# Patient Record
Sex: FEMALE | Race: WHITE | NOT HISPANIC OR LATINO | Employment: FULL TIME | URBAN - METROPOLITAN AREA
[De-identification: names, ages, dates, MRNs, and addresses within clinical notes are randomized per-mention and may not be internally consistent; named-entity substitution may affect disease eponyms.]

---

## 2016-08-10 LAB
HUMAN PAPILLOMAVIRUS (HPV): NORMAL
PAP SMEAR: ABNORMAL

## 2017-01-09 DIAGNOSIS — I10 ESSENTIAL HYPERTENSION: ICD-10-CM

## 2017-01-10 RX ORDER — ATENOLOL 25 MG/1
TABLET ORAL
Qty: 90 TABLET | Refills: 3 | Status: SHIPPED | OUTPATIENT
Start: 2017-01-10 | End: 2017-04-26

## 2017-01-11 ENCOUNTER — PATIENT MESSAGE (OUTPATIENT)
Dept: FAMILY MEDICINE | Facility: CLINIC | Age: 57
End: 2017-01-11

## 2017-01-13 ENCOUNTER — PATIENT MESSAGE (OUTPATIENT)
Dept: FAMILY MEDICINE | Facility: CLINIC | Age: 57
End: 2017-01-13

## 2017-01-16 ENCOUNTER — TELEPHONE (OUTPATIENT)
Dept: FAMILY MEDICINE | Facility: CLINIC | Age: 57
End: 2017-01-16

## 2017-01-16 DIAGNOSIS — I10 ESSENTIAL HYPERTENSION: Primary | ICD-10-CM

## 2017-01-16 RX ORDER — HYDROCHLOROTHIAZIDE 25 MG/1
25 TABLET ORAL DAILY
Qty: 30 TABLET | Refills: 1 | Status: SHIPPED | OUTPATIENT
Start: 2017-01-16 | End: 2017-03-11 | Stop reason: SDUPTHER

## 2017-01-16 NOTE — TELEPHONE ENCOUNTER
----- Message from Mela Hurst sent at 1/13/2017  4:44 PM CST -----  Contact: Patient  Malaika rendon [phone] patient. Patient has sent several requests for medication refill on Rx HCTZ (blood pressure). Please advise. Patient is out of medication. Thanks. Third request.

## 2017-02-23 ENCOUNTER — OFFICE VISIT (OUTPATIENT)
Dept: OPHTHALMOLOGY | Facility: CLINIC | Age: 57
End: 2017-02-23
Payer: COMMERCIAL

## 2017-02-23 DIAGNOSIS — Z11.59 NEED FOR HEPATITIS C SCREENING TEST: ICD-10-CM

## 2017-02-23 DIAGNOSIS — H40.003 GLAUCOMA SUSPECT, BILATERAL: Primary | ICD-10-CM

## 2017-02-23 PROCEDURE — 92004 COMPRE OPH EXAM NEW PT 1/>: CPT | Mod: S$GLB,,, | Performed by: OPHTHALMOLOGY

## 2017-02-23 PROCEDURE — 99999 PR PBB SHADOW E&M-EST. PATIENT-LVL II: CPT | Mod: PBBFAC,,, | Performed by: OPHTHALMOLOGY

## 2017-02-23 PROCEDURE — 92134 CPTRZ OPH DX IMG PST SGM RTA: CPT | Mod: S$GLB,,, | Performed by: OPHTHALMOLOGY

## 2017-02-23 NOTE — MR AVS SNAPSHOT
[facility]  [address]  [facility] [address]  Phone: [phone]  Fax: [phone]                  Malaika Craven   2017 3:15 PM   Office Visit    Description:  Female : 1960   Provider:  Vicki Lujan MD   Department:  [facility]           Reason for Visit     Concerns About Ocular Health           Diagnoses this Visit        Comments    Glaucoma suspect, bilateral    -  Primary     Need for hepatitis C screening test                To Do List           Future Appointments        Provider Department Dept Phone    3/29/2017 3:30 PM Carlito Jaime MD [facility] [phone]      Goals (5 Years of Data)     None      Follow-Up and Disposition     Return in about 6 months (around 2017) for IOP check.      [facility]     [facility] Nurse Care [facility] -  Assistance  Registered nurses in the [facility] provide clinical advisement, health education, appointment booking, and other advisory services.  Call for this free service at [phone].             Medications           Message regarding Medications     Verify the changes and/or additions to your medication regime listed below are the same as discussed with your clinician today.  If any of these changes or additions are incorrect, please notify your healthcare provider.             Verify that the below list of medications is an accurate representation of the medications you are currently taking.  If none reported, the list may be blank. If incorrect, please contact your healthcare provider. Carry this list with you in case of emergency.           Current Medications     atenolol (TENORMIN) 25 MG tablet TAKE 1 TABLET(25 MG) BY MOUTH EVERY DAY    fluticasone (FLONASE) 50 mcg/actuation nasal spray 2 sprays by Each Nare route once daily.    hydrochlorothiazide (HYDRODIURIL) 25 MG tablet Take 1 tablet (25 mg total) by mouth once daily.            Clinical Reference Information           Allergies as of 2/23/2017     Tetracyclines      Immunizations Administered on Date of Encounter - 2/23/2017     None      Orders Placed During Today's Visit      Normal Orders This Visit    [place] Retina Tomography (HRT) - OU - Both Eyes     Hepatitis C antibody     Recurring Lab Work Interval Expires    [place] [facility]ina Tomography (HRT) - OU - Both Eyes  Every 12 Months until 2/23/2018 2/23/2018      Language Assistance Services     ATTENTION: Language assistance services are available, free of charge. Please call [phone].      ATENCIÓN: Si trixiela paddy, tiene a lobo disposición servicios gratuitos de asistencia lingüística. Llame al [phone].     [facility] Ý: N?u b?n nói Ti?ng Vi?t, có các d?ch v? h? tr? ngôn ng? mi?n phí dành cho b?n. G?i s? [phone].         [facility] complies with applicable Federal civil rights laws and does not discriminate on the basis of race, color, national origin, age, disability, or sex.

## 2017-02-23 NOTE — PROGRESS NOTES
HPI     Concerns About Ocular Health    Additional comments: Ocular health exam           Comments   DLE: x 7+ yrs    Pt states no va complaints at this time. Uses OTC readers for near only.   Has been having trouble with dry mouth and PCP worried it may effect her   eyes. Has h/o dry eyes in past but seems to be doing well at this time. No   floaters or flashes.     Agree with above. No known family history of eye problems.        Last edited by Vicki Lujan MD on 2/23/2017  4:46 PM.   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   (History)        Assessment /Plan     For exam results, see Encounter Report.    Glaucoma suspect, bilateral  -     [place] Retina Tomography (HRT) - OU - Both Eyes; Standing    Need for hepatitis C screening test  -     Hepatitis C antibody          Glaucoma suspect, bilateral - based on C:D asymmetry, OD>OS. Both look WNL independently. IOP high normal, no known family history. Baseline HRT done today, borderline 1 section OU. F/u 6 months for IOP check and OCT optic nerve.     Need for hepatitis C screening test - I have no idea why this is on my visit problem list and I get a pop-up message if I try to remove it.  -     Hepatitis C antibody

## 2017-03-11 DIAGNOSIS — I10 ESSENTIAL HYPERTENSION: ICD-10-CM

## 2017-03-13 DIAGNOSIS — I10 ESSENTIAL HYPERTENSION: ICD-10-CM

## 2017-03-13 RX ORDER — HYDROCHLOROTHIAZIDE 25 MG/1
TABLET ORAL
Qty: 30 TABLET | Refills: 0 | Status: SHIPPED | OUTPATIENT
Start: 2017-03-13 | End: 2017-03-21

## 2017-03-13 RX ORDER — HYDROCHLOROTHIAZIDE 25 MG/1
25 TABLET ORAL DAILY
Qty: 30 TABLET | Refills: 0 | Status: SHIPPED | OUTPATIENT
Start: 2017-03-13 | End: 2017-03-21 | Stop reason: SDUPTHER

## 2017-03-15 ENCOUNTER — PATIENT OUTREACH (OUTPATIENT)
Dept: ADMINISTRATIVE | Facility: HOSPITAL | Age: 57
End: 2017-03-15

## 2017-03-21 ENCOUNTER — LAB VISIT (OUTPATIENT)
Dept: LAB | Facility: HOSPITAL | Age: 57
End: 2017-03-21
Attending: NURSE PRACTITIONER
Payer: COMMERCIAL

## 2017-03-21 ENCOUNTER — OFFICE VISIT (OUTPATIENT)
Dept: FAMILY MEDICINE | Facility: CLINIC | Age: 57
End: 2017-03-21
Payer: COMMERCIAL

## 2017-03-21 VITALS
HEART RATE: 74 BPM | DIASTOLIC BLOOD PRESSURE: 64 MMHG | OXYGEN SATURATION: 97 % | BODY MASS INDEX: 30.9 KG/M2 | HEIGHT: 63 IN | SYSTOLIC BLOOD PRESSURE: 114 MMHG | WEIGHT: 174.38 LBS

## 2017-03-21 DIAGNOSIS — R41.3 MEMORY PROBLEM: ICD-10-CM

## 2017-03-21 DIAGNOSIS — R68.2 DRY MOUTH: Primary | ICD-10-CM

## 2017-03-21 DIAGNOSIS — I10 ESSENTIAL HYPERTENSION: ICD-10-CM

## 2017-03-21 DIAGNOSIS — F41.9 ANXIOUSNESS: ICD-10-CM

## 2017-03-21 DIAGNOSIS — R00.2 PALPITATIONS: ICD-10-CM

## 2017-03-21 DIAGNOSIS — R21 RASH: ICD-10-CM

## 2017-03-21 LAB
ANION GAP SERPL CALC-SCNC: 10 MMOL/L
BASOPHILS # BLD AUTO: 0.06 K/UL
BASOPHILS NFR BLD: 0.8 %
BUN SERPL-MCNC: 13 MG/DL
CALCIUM SERPL-MCNC: 9.8 MG/DL
CHLORIDE SERPL-SCNC: 102 MMOL/L
CO2 SERPL-SCNC: 32 MMOL/L
CREAT SERPL-MCNC: 0.9 MG/DL
DIFFERENTIAL METHOD: NORMAL
EOSINOPHIL # BLD AUTO: 0.3 K/UL
EOSINOPHIL NFR BLD: 3.6 %
ERYTHROCYTE [DISTWIDTH] IN BLOOD BY AUTOMATED COUNT: 13.1 %
EST. GFR  (AFRICAN AMERICAN): >60 ML/MIN/1.73 M^2
EST. GFR  (NON AFRICAN AMERICAN): >60 ML/MIN/1.73 M^2
GLUCOSE SERPL-MCNC: 116 MG/DL
HCT VFR BLD AUTO: 39.6 %
HGB BLD-MCNC: 13.8 G/DL
LYMPHOCYTES # BLD AUTO: 2.7 K/UL
LYMPHOCYTES NFR BLD: 37.4 %
MCH RBC QN AUTO: 30.7 PG
MCHC RBC AUTO-ENTMCNC: 34.8 %
MCV RBC AUTO: 88 FL
MONOCYTES # BLD AUTO: 0.4 K/UL
MONOCYTES NFR BLD: 5.3 %
NEUTROPHILS # BLD AUTO: 3.8 K/UL
NEUTROPHILS NFR BLD: 52.9 %
PLATELET # BLD AUTO: 251 K/UL
PMV BLD AUTO: 11.3 FL
POTASSIUM SERPL-SCNC: 4 MMOL/L
RBC # BLD AUTO: 4.5 M/UL
SODIUM SERPL-SCNC: 144 MMOL/L
TSH SERPL DL<=0.005 MIU/L-ACNC: 0.87 UIU/ML
WBC # BLD AUTO: 7.13 K/UL

## 2017-03-21 PROCEDURE — 3074F SYST BP LT 130 MM HG: CPT | Mod: S$GLB,,, | Performed by: NURSE PRACTITIONER

## 2017-03-21 PROCEDURE — 85025 COMPLETE CBC W/AUTO DIFF WBC: CPT | Mod: PO

## 2017-03-21 PROCEDURE — 99213 OFFICE O/P EST LOW 20 MIN: CPT | Mod: S$GLB,,, | Performed by: NURSE PRACTITIONER

## 2017-03-21 PROCEDURE — 36415 COLL VENOUS BLD VENIPUNCTURE: CPT | Mod: PO

## 2017-03-21 PROCEDURE — 84443 ASSAY THYROID STIM HORMONE: CPT

## 2017-03-21 PROCEDURE — 1160F RVW MEDS BY RX/DR IN RCRD: CPT | Mod: S$GLB,,, | Performed by: NURSE PRACTITIONER

## 2017-03-21 PROCEDURE — 80048 BASIC METABOLIC PNL TOTAL CA: CPT | Mod: PO

## 2017-03-21 PROCEDURE — 99999 PR PBB SHADOW E&M-EST. PATIENT-LVL III: CPT | Mod: PBBFAC,,, | Performed by: NURSE PRACTITIONER

## 2017-03-21 PROCEDURE — 3078F DIAST BP <80 MM HG: CPT | Mod: S$GLB,,, | Performed by: NURSE PRACTITIONER

## 2017-03-21 NOTE — MR AVS SNAPSHOT
[facility]  [address]  [place] [address]  Phone: [phone]  Fax: [phone]                  Malaika Craven   3/21/2017 1:40 PM   Office Visit    Description:  Female : 1960   Provider:  Alexa Caballero NP   Department:  [facility]           Reason for Visit     Medication Reaction           Diagnoses this Visit        Comments    Dry mouth    -  Primary     Essential hypertension         Rash         Memory problem         Anxiousness         Palpitations                To Do List           Future Appointments        Provider Department Dept Phone    3/21/2017 2:15 PM LAB, [place] [facility] [phone]    2017 3:00 PM Alexa Caballero NP [facility] [phone]      Goals (5 Years of Data)     None      Follow-Up and Disposition     Return in about 4 weeks (around 2017).      [facility]     [facility] -  Assistance  Registered nurses in the [facility] provide clinical advisement, health education, appointment booking, and other advisory services.  Call for this free service at [phone].             Medications           Message regarding Medications     Verify the changes and/or additions to your medication regime listed below are the same as discussed with your clinician today.  If any of these changes or additions are incorrect, please notify your healthcare provider.        STOP taking these medications     hydrochlorothiazide (HYDRODIURIL) 25 MG tablet Take 1 tablet (25 mg total) by mouth once daily.    hydrochlorothiazide (HYDRODIURIL) 25 MG tablet TAKE 1 TABLET(25 MG) BY MOUTH EVERY DAY           Verify that the below list of medications is an accurate representation of the medications you are currently taking.  If none reported, the list may be blank. If incorrect, please contact your healthcare provider. Carry this list with you in case of  "emergency.           Current Medications     atenolol (TENORMIN) 25 MG tablet TAKE 1 TABLET(25 MG) BY MOUTH EVERY DAY    fluticasone (FLONASE) 50 mcg/actuation nasal spray 2 sprays by Each Nare route once daily.           Clinical Reference Information           Your Vitals Were     BP Pulse Height Weight SpO2 BMI    114/64 74 5' 3" (1.6 m) 79.1 kg (174 lb 6.1 oz) 97% 30.89 kg/m2      Blood Pressure          Most Recent Value    BP  114/64      Allergies as of 3/21/2017     Tetracyclines    Sulfa (Sulfonamide Antibiotics)      Immunizations Administered on Date of Encounter - 3/21/2017     None      Orders Placed During Today's Visit     Future Labs/Procedures Expected by Expires    Basic metabolic panel  3/21/2017 5/20/2018    CBC auto differential  3/21/2017 6/19/2017    TSH  3/21/2017 6/19/2017      Language Assistance Services     ATTENTION: Language assistance services are available, free of charge. Please call [phone].      ATENCIÓN: Si habla español, tiene a lobo disposición servicios gratuitos de asistencia lingüística. Llame al [phone].     CHÚ Ý: N?u b?n nói Ti?ng Vi?t, có các d?ch v? h? tr? ngôn ng? mi?n phí missaelh cho b?n. G?i s? [phone].         [facility] complies with applicable Federal civil rights laws and does not discriminate on the basis of race, color, national origin, age, disability, or sex.        "

## 2017-03-22 NOTE — PROGRESS NOTES
Subjective:       Patient ID: Malaika Craven is a 56 y.o. female.    Chief Complaint: Medication Reaction (rash, memory loss, dry mouth, having issues focusing, weight gain)    HPI Comments: Here today as new patient to me. She has multiple complaints.   C/o dry mouth, dry eyes, memory problems, focus problems, anxiety, occasional palpitations, and ongoing recurrent rash  She states that she was told she had hypertension last fall and started on Atenolol and HCTZ. She feel like she has not felt well since starting these medication and attributes her symptoms to starting these medications.    Review of Systems   Constitutional: Positive for fatigue and unexpected weight change. Negative for activity change, appetite change and fever.   HENT: Negative.    Eyes:        Dry eyes   Respiratory: Negative for cough, chest tightness, shortness of breath and wheezing.    Cardiovascular: Positive for palpitations. Negative for chest pain and leg swelling.   Gastrointestinal: Negative for abdominal pain, blood in stool, constipation, diarrhea, nausea and vomiting.   Genitourinary: Negative.    Musculoskeletal: Negative.    Skin: Positive for rash.   Neurological: Negative for dizziness, light-headedness and headaches.   Psychiatric/Behavioral: Positive for confusion. Negative for sleep disturbance and suicidal ideas. The patient is nervous/anxious.        Objective:      Physical Exam   Constitutional: She is oriented to person, place, and time. She appears well-nourished. She is active and cooperative.   HENT:   Head: Normocephalic and atraumatic.   Right Ear: Hearing, tympanic membrane, external ear and ear canal normal.   Left Ear: Hearing, tympanic membrane, external ear and ear canal normal.   Nose: Nose normal.   Mouth/Throat: Oropharynx is clear and moist and mucous membranes are normal. No oropharyngeal exudate or posterior oropharyngeal erythema.   Eyes: Conjunctivae and EOM are normal. Pupils are equal, round,  and reactive to light. No scleral icterus.   Neck: Normal range of motion. Neck supple.   Cardiovascular: Normal rate, regular rhythm, normal heart sounds and intact distal pulses.    Pulmonary/Chest: Effort normal and breath sounds normal. She has no wheezes. She has no rales.   Abdominal: Soft. Bowel sounds are normal. There is no tenderness.   Lymphadenopathy:     She has no cervical adenopathy.   Neurological: She is alert and oriented to person, place, and time. She has normal reflexes. She displays normal reflexes. No cranial nerve deficit. She exhibits normal muscle tone. Coordination normal.   Skin: Skin is warm and dry. Rash (bilateral flank area -appearance of bites) noted.        Vitals reviewed.      Assessment:       1. Dry mouth    2. Essential hypertension    3. Rash    4. Memory problem    5. Anxiousness    6. Palpitations        Plan:     Malaika was seen today for medication reaction.    Diagnoses and all orders for this visit:    Dry mouth  -     Basic metabolic panel; Future  -     TSH; Future  -     CBC auto differential; Future    Essential hypertension  STOP hydrochlorothiazide  Monitor home blood pressures and swelling    Rash  Follow up with derm    Memory problem   -     Basic metabolic panel; Future  -     TSH; Future  -     CBC auto differential; Future    Anxiousness  -     Basic metabolic panel; Future  -     TSH; Future  -     CBC auto differential; Future    Palpitations  -     Basic metabolic panel; Future  -     TSH; Future    Return in about 4 weeks (around 4/18/2017).

## 2017-04-07 ENCOUNTER — PATIENT MESSAGE (OUTPATIENT)
Dept: FAMILY MEDICINE | Facility: CLINIC | Age: 57
End: 2017-04-07

## 2017-04-24 ENCOUNTER — PATIENT MESSAGE (OUTPATIENT)
Dept: FAMILY MEDICINE | Facility: CLINIC | Age: 57
End: 2017-04-24

## 2017-04-26 ENCOUNTER — OFFICE VISIT (OUTPATIENT)
Dept: FAMILY MEDICINE | Facility: CLINIC | Age: 57
End: 2017-04-26
Payer: COMMERCIAL

## 2017-04-26 VITALS
HEART RATE: 65 BPM | HEIGHT: 63 IN | DIASTOLIC BLOOD PRESSURE: 76 MMHG | BODY MASS INDEX: 31.61 KG/M2 | OXYGEN SATURATION: 97 % | WEIGHT: 178.38 LBS | SYSTOLIC BLOOD PRESSURE: 116 MMHG

## 2017-04-26 DIAGNOSIS — I10 ESSENTIAL HYPERTENSION: ICD-10-CM

## 2017-04-26 DIAGNOSIS — R21 RASH: Primary | ICD-10-CM

## 2017-04-26 DIAGNOSIS — R68.2 DRY MOUTH: ICD-10-CM

## 2017-04-26 PROCEDURE — 99999 PR PBB SHADOW E&M-EST. PATIENT-LVL III: CPT | Mod: PBBFAC,,, | Performed by: NURSE PRACTITIONER

## 2017-04-26 PROCEDURE — 99213 OFFICE O/P EST LOW 20 MIN: CPT | Mod: S$GLB,,, | Performed by: NURSE PRACTITIONER

## 2017-04-26 PROCEDURE — 3078F DIAST BP <80 MM HG: CPT | Mod: S$GLB,,, | Performed by: NURSE PRACTITIONER

## 2017-04-26 PROCEDURE — 1160F RVW MEDS BY RX/DR IN RCRD: CPT | Mod: S$GLB,,, | Performed by: NURSE PRACTITIONER

## 2017-04-26 PROCEDURE — 3074F SYST BP LT 130 MM HG: CPT | Mod: S$GLB,,, | Performed by: NURSE PRACTITIONER

## 2017-04-26 NOTE — MR AVS SNAPSHOT
[facility]  [address]  [facility] [address]  Phone: [phone]  Fax: [phone]                  Malaika Craven   2017 2:20 PM   Office Visit    Description:  Female : 1960   Provider:  Alexa Caballero NP   Department:  [facility]           Reason for Visit     Hypertension           Diagnoses this Visit        Comments    Rash    -  Primary     Dry mouth                To Do List           Future Appointments        Provider Department Dept Phone    2017 2:45 PM Joanne Fowler MD [facility] [phone]      Goals (5 Years of Data)     None      Follow-Up and Disposition     Return in about 2 weeks (around 5/10/2017).      [facility] On [facility]     [facility] Nurse Care Line -  Assistance  Unless otherwise directed by your provider, please contact [facility], our nurse care line that is available for  assistance.     Registered nurses in the [facility] provide: appointment scheduling, clinical advisement, health education, and other advisory services.  Call: [phone] (toll free)               Medications           Message regarding Medications     Verify the changes and/or additions to your medication regime listed below are the same as discussed with your clinician today.  If any of these changes or additions are incorrect, please notify your healthcare provider.        STOP taking these medications     atenolol (TENORMIN) 25 MG tablet TAKE 1 TABLET(25 MG) BY MOUTH EVERY DAY           Verify that the below list of medications is an accurate representation of the medications you are currently taking.  If none reported, the list may be blank. If incorrect, please contact your healthcare provider. Carry this list with you in case of emergency.           Current Medications     fluticasone (FLONASE) 50 mcg/actuation nasal spray 2 sprays by Each Nare route once daily.           Clinical Reference  "Information           Your Vitals Were     BP Pulse Height Weight SpO2 BMI    116/76 65 5' 3" (1.6 m) 80.9 kg (178 lb 5.6 oz) 97% 31.59 kg/m2      Blood Pressure          Most Recent Value    BP  116/76      Allergies as of 4/26/2017     Tetracyclines    Sulfa (Sulfonamide Antibiotics)      Immunizations Administered on Date of Encounter - 4/26/2017     None      Orders Placed During Today's Visit      Normal Orders This Visit    Ambulatory referral to Dermatology       Instructions    Blood pressure > 140/90, Notify clinic       Language Assistance Services     ATTENTION: Language assistance services are available, free of charge. Please call [phone].      ATENCIÓN: Si habla español, tiene a lobo disposición servicios gratuitos de asistencia lingüística. Llame al [phone].     SOFÍA Ý: N?u b?n nói Ti?ng Vi?t, có các d?ch v? h? tr? ngôn ng? mi?n phí dành cho b?n. G?i s? [phone].         [facility] complies with applicable Federal civil rights laws and does not discriminate on the basis of race, color, national origin, age, disability, or sex.        "

## 2017-04-26 NOTE — PROGRESS NOTES
Subjective:       Patient ID: Malaika Craven is a 56 y.o. female.    Chief Complaint: Hypertension (follow up)    Hypertension   This is a chronic problem. The current episode started more than 1 month ago. The problem has been resolved since onset. The problem is controlled. Pertinent negatives include no anxiety, blurred vision, chest pain, headaches, malaise/fatigue, orthopnea, peripheral edema, PND or shortness of breath. There are no associated agents to hypertension. Risk factors for coronary artery disease include obesity, post-menopausal state and stress. Past treatments include beta blockers. The current treatment provides no improvement. There are no compliance problems.      Review of Systems   Constitutional: Negative for malaise/fatigue.   Eyes: Negative for blurred vision.   Respiratory: Negative for shortness of breath.    Cardiovascular: Negative for chest pain, orthopnea and PND.   Neurological: Negative for headaches.       Objective:      Physical Exam   Constitutional: She is oriented to person, place, and time. She appears well-nourished.   Cardiovascular: Normal rate, regular rhythm, normal heart sounds and intact distal pulses.    Pulmonary/Chest: Effort normal and breath sounds normal.   Abdominal: Soft. Bowel sounds are normal. There is no tenderness.   Neurological: She is alert and oriented to person, place, and time.   Skin: Skin is warm and dry. No rash noted.   Vitals reviewed.      Assessment:       1. Rash    2. Dry mouth    3. Essential hypertension        Plan:       Malaika was seen today for hypertension.    Diagnoses and all orders for this visit:    Rash  -     Ambulatory referral to Dermatology    Dry mouth  Lemon drops  Consider ENT consult if no improvement    Essential hypertension  Doing well off HCTZ. Will taper off Atenolol and monitor home blood pressures. [facility] sooner if [facility]  Return in about 2 weeks (around  5/10/2017).

## 2017-05-10 ENCOUNTER — PATIENT MESSAGE (OUTPATIENT)
Dept: FAMILY MEDICINE | Facility: CLINIC | Age: 57
End: 2017-05-10

## 2017-05-10 DIAGNOSIS — I10 ESSENTIAL HYPERTENSION: Primary | ICD-10-CM

## 2017-05-10 RX ORDER — LISINOPRIL 20 MG/1
20 TABLET ORAL DAILY
Qty: 30 TABLET | Refills: 11 | Status: SHIPPED | OUTPATIENT
Start: 2017-05-10 | End: 2017-05-23

## 2017-05-23 ENCOUNTER — PATIENT MESSAGE (OUTPATIENT)
Dept: FAMILY MEDICINE | Facility: CLINIC | Age: 57
End: 2017-05-23

## 2017-05-23 RX ORDER — LOSARTAN POTASSIUM 50 MG/1
50 TABLET ORAL DAILY
Qty: 30 TABLET | Refills: 1 | Status: SHIPPED | OUTPATIENT
Start: 2017-05-23 | End: 2017-06-20 | Stop reason: SDUPTHER

## 2017-06-20 RX ORDER — LOSARTAN POTASSIUM 50 MG/1
TABLET ORAL
Qty: 90 TABLET | Refills: 1 | Status: SHIPPED | OUTPATIENT
Start: 2017-06-20 | End: 2017-12-21 | Stop reason: SDUPTHER

## 2017-07-24 ENCOUNTER — TELEPHONE (OUTPATIENT)
Dept: DERMATOLOGY | Facility: CLINIC | Age: 57
End: 2017-07-24

## 2017-07-25 NOTE — TELEPHONE ENCOUNTER
----- Message from Gayatri Pearl sent at 7/25/2017 12:56 PM CDT -----  Contact: self  Patient needs a later time than the one scheduled on 9/12, sometime in the afternoon.  Please call back at [phone] (home) [phone] (work)    Appt scheduled for 9-12-17 @ 3pm .

## 2017-08-04 DIAGNOSIS — J30.89 NON-SEASONAL ALLERGIC RHINITIS DUE TO OTHER ALLERGIC TRIGGER: ICD-10-CM

## 2017-08-04 RX ORDER — FLUTICASONE PROPIONATE 50 MCG
SPRAY, SUSPENSION (ML) NASAL
Qty: 48 G | Refills: 1 | Status: SHIPPED | OUTPATIENT
Start: 2017-08-04 | End: 2017-12-04 | Stop reason: SDUPTHER

## 2017-08-21 DIAGNOSIS — Z12.11 COLON CANCER SCREENING: ICD-10-CM

## 2017-11-18 ENCOUNTER — HOSPITAL ENCOUNTER (EMERGENCY)
Facility: HOSPITAL | Age: 57
Discharge: HOME | End: 2017-11-18
Attending: EMERGENCY MEDICINE
Payer: COMMERCIAL

## 2017-11-18 VITALS
SYSTOLIC BLOOD PRESSURE: 171 MMHG | HEART RATE: 66 BPM | DIASTOLIC BLOOD PRESSURE: 76 MMHG | BODY MASS INDEX: 29.44 KG/M2 | TEMPERATURE: 99 F | HEIGHT: 62 IN | WEIGHT: 160 LBS | RESPIRATION RATE: 12 BRPM | OXYGEN SATURATION: 98 %

## 2017-11-18 DIAGNOSIS — R07.9 CHEST PAIN: ICD-10-CM

## 2017-11-18 DIAGNOSIS — I10 HYPERTENSION, UNSPECIFIED TYPE: Primary | ICD-10-CM

## 2017-11-18 LAB
ALBUMIN SERPL BCP-MCNC: 3.6 G/DL
ALP SERPL-CCNC: 84 U/L
ALT SERPL W/O P-5'-P-CCNC: 21 U/L
ANION GAP SERPL CALC-SCNC: 8 MMOL/L
AST SERPL-CCNC: 20 U/L
BASOPHILS # BLD AUTO: 0 K/UL
BASOPHILS NFR BLD: 0.5 %
BILIRUB SERPL-MCNC: 0.3 MG/DL
BNP SERPL-MCNC: 42 PG/ML
BUN SERPL-MCNC: 13 MG/DL
CALCIUM SERPL-MCNC: 9.4 MG/DL
CHLORIDE SERPL-SCNC: 105 MMOL/L
CO2 SERPL-SCNC: 28 MMOL/L
CREAT SERPL-MCNC: 0.8 MG/DL
DIFFERENTIAL METHOD: ABNORMAL
EOSINOPHIL # BLD AUTO: 0.2 K/UL
EOSINOPHIL NFR BLD: 3.2 %
ERYTHROCYTE [DISTWIDTH] IN BLOOD BY AUTOMATED COUNT: 13.2 %
EST. GFR  (AFRICAN AMERICAN): >60 ML/MIN/1.73 M^2
EST. GFR  (NON AFRICAN AMERICAN): >60 ML/MIN/1.73 M^2
GLUCOSE SERPL-MCNC: 99 MG/DL
HCT VFR BLD AUTO: 39 %
HGB BLD-MCNC: 13.4 G/DL
LYMPHOCYTES # BLD AUTO: 2.8 K/UL
LYMPHOCYTES NFR BLD: 37.3 %
MCH RBC QN AUTO: 30.2 PG
MCHC RBC AUTO-ENTMCNC: 34.3 G/DL
MCV RBC AUTO: 88 FL
MONOCYTES # BLD AUTO: 0.3 K/UL
MONOCYTES NFR BLD: 4.4 %
NEUTROPHILS # BLD AUTO: 4.1 K/UL
NEUTROPHILS NFR BLD: 54.6 %
PLATELET # BLD AUTO: 213 K/UL
PMV BLD AUTO: 8.9 FL
POTASSIUM SERPL-SCNC: 4.5 MMOL/L
PROT SERPL-MCNC: 6.9 G/DL
RBC # BLD AUTO: 4.42 M/UL
SODIUM SERPL-SCNC: 141 MMOL/L
TROPONIN I SERPL DL<=0.01 NG/ML-MCNC: <0.006 NG/ML
TROPONIN I SERPL DL<=0.01 NG/ML-MCNC: <0.006 NG/ML
WBC # BLD AUTO: 7.4 K/UL

## 2017-11-18 PROCEDURE — 25000003 PHARM REV CODE 250: Performed by: EMERGENCY MEDICINE

## 2017-11-18 PROCEDURE — 93005 ELECTROCARDIOGRAM TRACING: CPT

## 2017-11-18 PROCEDURE — 85025 COMPLETE CBC W/AUTO DIFF WBC: CPT

## 2017-11-18 PROCEDURE — 99284 EMERGENCY DEPT VISIT MOD MDM: CPT

## 2017-11-18 PROCEDURE — 80053 COMPREHEN METABOLIC PANEL: CPT

## 2017-11-18 PROCEDURE — 93010 ELECTROCARDIOGRAM REPORT: CPT | Mod: ,,, | Performed by: INTERNAL MEDICINE

## 2017-11-18 PROCEDURE — 83880 ASSAY OF NATRIURETIC PEPTIDE: CPT

## 2017-11-18 PROCEDURE — 84484 ASSAY OF TROPONIN QUANT: CPT

## 2017-11-18 PROCEDURE — 36415 COLL VENOUS BLD VENIPUNCTURE: CPT

## 2017-11-18 RX ORDER — LOSARTAN POTASSIUM 25 MG/1
50 TABLET ORAL DAILY
Status: DISCONTINUED | OUTPATIENT
Start: 2017-11-19 | End: 2017-11-18 | Stop reason: HOSPADM

## 2017-11-18 RX ORDER — LOSARTAN POTASSIUM 25 MG/1
TABLET ORAL
Status: DISCONTINUED
Start: 2017-11-18 | End: 2017-11-18 | Stop reason: HOSPADM

## 2017-11-18 RX ADMIN — LOSARTAN POTASSIUM 50 MG: 25 TABLET, FILM COATED ORAL at 04:11

## 2017-11-18 NOTE — ED PROVIDER NOTES
"Encounter Date: 2017       History     Chief Complaint   Patient presents with    Chest Pain     today off and on. BP elevated.     Pt is a 58 y/o female presenting to the with complaints of elevated BP noted earlier today at multiple intervals. She is additionally endorsing mild "chest tightness" over the central chest, non-radiating. She denies associated SOB, unilateral or BL LE swelling, PMH of PE/DVT, NV, abdominal pain. She denies taking anything PTA to improve symptoms.          Review of patient's allergies indicates:   Allergen Reactions    Tetracyclines Rash    Lisinopril     Sulfa (sulfonamide antibiotics) Rash     Past Medical History:   Diagnosis Date    Allergy     Corneal erosion of right eye     Hypertension     Sinus problem      Past Surgical History:   Procedure Laterality Date     SECTION      X 2    CHOLECYSTECTOMY      DNC      EYE SURGERY Right     FOR CORNEAL EROSION    LIPOSUCTION      x 2    Mole Excision Right     Cartagena     Family History   Problem Relation Age of Onset    Heart disease Mother     Hypertension Mother     Arthritis Father     Thyroid disease Father     Kidney disease Paternal Grandmother     Liver cancer Maternal Grandmother     Cancer Maternal Grandfather     Allergies Sister     Allergic rhinitis Sister     Angioedema Neg Hx     Asthma Neg Hx     Atopy Neg Hx     Eczema Neg Hx     Immunodeficiency Neg Hx     Rhinitis Neg Hx     Urticaria Neg Hx     Amblyopia Neg Hx     Blindness Neg Hx     Cataracts Neg Hx     Diabetes Neg Hx     Glaucoma Neg Hx     Macular degeneration Neg Hx     Retinal detachment Neg Hx     Strabismus Neg Hx     Stroke Neg Hx      Social History   Substance Use Topics    Smoking status: Never Smoker    Smokeless tobacco: Never Used    Alcohol use Yes      Comment: 2 drinks per week     Review of Systems   Constitutional: Negative for fever.   HENT: Negative for congestion.    Respiratory: " Negative for shortness of breath.    Cardiovascular: Positive for chest pain.   Gastrointestinal: Negative for abdominal pain.   Genitourinary: Negative for dysuria.   Musculoskeletal: Negative for back pain.   Skin: Negative for rash.   Neurological: Negative for syncope and headaches.   Hematological: Does not bruise/bleed easily.   Psychiatric/Behavioral: Negative for confusion.       Physical Exam     Initial Vitals [11/18/17 1551]   BP Pulse Resp Temp SpO2   (!) 191/80 77 12 98.8 °F (37.1 °C) 98 %      MAP       117         Physical Exam    Nursing note and vitals reviewed.  Constitutional: She appears well-nourished. No distress.   HENT:   Head: Normocephalic and atraumatic.   Mouth/Throat: Oropharynx is clear and moist.   Eyes: Conjunctivae and EOM are normal.   Neck: Normal range of motion. Neck supple. No JVD present.   Cardiovascular: Normal rate and regular rhythm.   Pulmonary/Chest: No stridor. No respiratory distress. She has no wheezes.   Abdominal: She exhibits no distension. There is no tenderness.   Musculoskeletal: Normal range of motion. She exhibits no edema or tenderness.   Neurological: She is alert and oriented to person, place, and time.   Skin: Skin is warm and dry. Capillary refill takes less than 2 seconds.   Psychiatric: She has a normal mood and affect. Thought content normal.         ED Course   Procedures  Labs Reviewed   CBC W/ AUTO DIFFERENTIAL - Abnormal; Notable for the following:        Result Value    MPV 8.9 (*)     All other components within normal limits   COMPREHENSIVE METABOLIC PANEL   TROPONIN I   B-TYPE NATRIURETIC PEPTIDE     EKG Readings: (Independently Interpreted)   Normal sinus rhythm, 70 bpm, normal axis, normal intervals, possible inferior infarct, age undetermined, possible anterior infarct, age undetermined, no acute ischemic wave segments, no STEMI          Medical Decision Making:   Initial Assessment:   Pt interviewed and examined and noted to be in NAD.  Reporting atypical CP with hypertension and no additional concerning historical red flags. Pt does warrant lab analyses and imaging to r/o evidence of acute cardiac injury or structural contributing disease.   ED Management:  This is an emergent evaluation for a patient with chest pain. The patient is complaining of chest pain for the past 1 full day. The patient's pain is atypical for cardiac etiology.  I decided to obtain and review the patient's past medical record.        The vital signs are stable in the room.    EKG is negative for acute ischemic disease.  There is no evidence of STEMI.    CXR is negative for pneumonia, pneumothorax and edema.  Troponin is negative and was drawn at least 8 hours since the onset of pain.  I doubt ACS.  BNP is negative.  There is no evidence of congestive heart failure.  The electrolytes are relatively normal.  The pt is not anemic. I doubt PE and history, PE and vitals do not support this    Currently the patient has a non-diagnostic EKG with negative troponin in the emergency department.  I doubt acute coronary syndrome.  I did inform the patient that even with negative testing, we can never eliminate all risk.  I believe the patient is low risk with negative initial testing; they are appropriate for close outpatient follow-up particularly to assess for optimal outpatient HTN management, possible stress test.  The patient is aware of the small but persistent risk for MI/ACS with subsequent cardiac complications or death.  I have low suspicion for cardiopulmonary, vascular, infectious, respiratory, or other emergent medical condition based on my evaluation in the ED.     The patient's pain is currently improved.      The results and physical exam findings were reviewed with the patient. Patient agrees with assessment, disposition and treatment plan and has no further questions or complaints at this time.                         ED Course      Clinical Impression:   The primary  encounter diagnosis was Hypertension, unspecified type. A diagnosis of Chest pain was also pertinent to this visit.    Disposition:   Disposition: Discharged  Condition: Stable                        Mike Neil MD  11/23/17 0355

## 2017-11-18 NOTE — ED PROVIDER NOTES
Encounter Date: 2017    SCRIBE #1 NOTE: I, Jarrett James, am scribing for, and in the presence of, Dr. Neil.       History     Chief Complaint   Patient presents with    Chest Pain     today off and on. BP elevated.       2017 4:13  PM     Chief complaint: Chest pain      Malaika Craven is a 57 y.o. female with a PMHx of HTN who presents to the ED c/o of chest pain. She states it began around 5-6AM this morning. She began to take her blood pressure throughout the day reporting pressures in the 200s/100s. She began to take medication for her high blood pressure about a year ago but has only been on her current dose Losartan for about 4 months. She states this morning she had a loss of appetite, chest tightness, and a headache. She says she normally takes her blood pressure medication around 7:30 AM but took it today at 1PM. She denies having any new swelling but states sometimes she will get some swelling on the bottom of her feet or in her hands. She describes her chest pain as being in the center of her breast bone that is not exacerbated by pressure. She does have a past SHx of a cholecystotomy about 4 years ago. She denies having any SOB and does not have any history of blood clots.       The history is provided by the patient.     Review of patient's allergies indicates:   Allergen Reactions    Tetracyclines Rash    Lisinopril     Sulfa (sulfonamide antibiotics) Rash     Past Medical History:   Diagnosis Date    Allergy     Corneal erosion of right eye     Hypertension     Sinus problem      Past Surgical History:   Procedure Laterality Date     SECTION      X 2    CHOLECYSTECTOMY      DNC      EYE SURGERY Right     FOR CORNEAL EROSION    LIPOSUCTION      x 2    Mole Excision Right     Cartagena     Family History   Problem Relation Age of Onset    Heart disease Mother     Hypertension Mother     Arthritis Father     Thyroid disease Father     Kidney disease  Paternal Grandmother     Liver cancer Maternal Grandmother     Cancer Maternal Grandfather     Allergies Sister     Allergic rhinitis Sister     Angioedema Neg Hx     Asthma Neg Hx     Atopy Neg Hx     Eczema Neg Hx     Immunodeficiency Neg Hx     Rhinitis Neg Hx     Urticaria Neg Hx     Amblyopia Neg Hx     Blindness Neg Hx     Cataracts Neg Hx     Diabetes Neg Hx     Glaucoma Neg Hx     Macular degeneration Neg Hx     Retinal detachment Neg Hx     Strabismus Neg Hx     Stroke Neg Hx      Social History   Substance Use Topics    Smoking status: Never Smoker    Smokeless tobacco: Never Used    Alcohol use Yes      Comment: 2 drinks per week     Review of Systems   Constitutional: Positive for appetite change. Negative for fever.   HENT: Negative for congestion.    Eyes: Negative for visual disturbance.   Respiratory: Positive for chest tightness. Negative for shortness of breath and wheezing.    Cardiovascular: Positive for chest pain. Negative for leg swelling.   Gastrointestinal: Negative for abdominal pain.   Genitourinary: Negative for dysuria.   Musculoskeletal: Negative for joint swelling.   Skin: Negative for rash.   Neurological: Positive for headaches. Negative for syncope.   Hematological: Does not bruise/bleed easily.   Psychiatric/Behavioral: Negative for confusion.       Physical Exam     Initial Vitals [11/18/17 1551]   BP Pulse Resp Temp SpO2   (!) 191/80 77 12 98.8 °F (37.1 °C) 98 %      MAP       117         Physical Exam    Nursing note and vitals reviewed.  Constitutional: She appears well-developed and well-nourished. She is not diaphoretic. No distress.   No active distress.    HENT:   Head: Normocephalic and atraumatic.   Mouth/Throat: Oropharynx is clear and moist.   Eyes: Conjunctivae are normal.   Neck: Neck supple.   Cardiovascular: Normal rate, regular rhythm, normal heart sounds and intact distal pulses. Exam reveals no gallop and no friction rub.    No murmur  heard.  Pulmonary/Chest: Breath sounds normal. She has no wheezes. She has no rhonchi. She has no rales.   No reproducible chest pain   Abdominal: Soft. She exhibits no distension. There is no tenderness.   Musculoskeletal: Normal range of motion.   Neurological: She is alert and oriented to person, place, and time.   Skin: No rash noted. No erythema.   Psychiatric: She has a normal mood and affect. Her speech is normal and behavior is normal. Judgment and thought content normal.         ED Course   Procedures  Labs Reviewed   CBC W/ AUTO DIFFERENTIAL   COMPREHENSIVE METABOLIC PANEL   TROPONIN I   B-TYPE NATRIURETIC PEPTIDE             Medical Decision Making:   History:   Old Medical Records: I decided to obtain old medical records.  Clinical Tests:   Lab Tests: Ordered and Reviewed  Radiological Study: Ordered and Reviewed  Medical Tests: Ordered and Reviewed            Scribe Attestation:   Scribe #1: I performed the above scribed service and the documentation accurately describes the services I performed. I attest to the accuracy of the note.    ***        ED Course      Clinical Impression:   The encounter diagnosis was Chest pain.

## 2017-11-20 ENCOUNTER — TELEPHONE (OUTPATIENT)
Dept: FAMILY MEDICINE | Facility: CLINIC | Age: 57
End: 2017-11-20

## 2017-11-20 NOTE — TELEPHONE ENCOUNTER
Spoke to patient and states this weekend her BP was elevated. States they gave her 50mg of Losartan in the ER and this helped her BP. States this AM her BP  150/105 and 142/89. States she took her BP medication this AM around 7:30. Advised patient to continue to monitor BP. Appointment scheduled for patient to come into clinic tomorrow. Verbalized understanding.

## 2017-11-20 NOTE — TELEPHONE ENCOUNTER
----- Message from Rita Gold RT sent at 11/20/2017 10:47 AM CST -----  Contact: pt    pt , requesting to inform she had spikes in her B/P this week end and would like medical advise on if her medication needs to be increased, thanks.

## 2017-11-21 ENCOUNTER — OFFICE VISIT (OUTPATIENT)
Dept: FAMILY MEDICINE | Facility: CLINIC | Age: 57
End: 2017-11-21
Payer: COMMERCIAL

## 2017-11-21 VITALS
BODY MASS INDEX: 34.69 KG/M2 | WEIGHT: 188.5 LBS | HEIGHT: 62 IN | DIASTOLIC BLOOD PRESSURE: 90 MMHG | SYSTOLIC BLOOD PRESSURE: 138 MMHG | HEART RATE: 72 BPM

## 2017-11-21 DIAGNOSIS — K13.79 MOUTH PAIN: Primary | ICD-10-CM

## 2017-11-21 DIAGNOSIS — I10 ESSENTIAL HYPERTENSION: ICD-10-CM

## 2017-11-21 PROCEDURE — 99999 PR PBB SHADOW E&M-EST. PATIENT-LVL III: CPT | Mod: PBBFAC,,, | Performed by: PHYSICIAN ASSISTANT

## 2017-11-21 PROCEDURE — 99214 OFFICE O/P EST MOD 30 MIN: CPT | Mod: S$GLB,,, | Performed by: PHYSICIAN ASSISTANT

## 2017-11-21 NOTE — PROGRESS NOTES
Subjective:       Patient ID: Malaika Craven is a 57 y.o. female    Chief Complaint: Hypertension; Oral Pain; and disoriented    HPI  Mrs Dai presents today CO elevated blood pressure and mouth pain.  She reports that upper and lower tooth pain began a few months ago.  She reports waking up without pain and the pain begins as soon as she gets in her car to go to work.  She denies any headaches.  She saw the dentist on 11/17/2017 and she was told there is no tooth disease.  Also, her blood pressure has been elevated on her home machine.    Review of Systems   Constitutional: Negative for fatigue.   HENT: Negative for congestion, dental problem, ear discharge, ear pain, facial swelling, postnasal drip, sinus pain, sinus pressure, sneezing and sore throat.         Mouth pain anterior upper and lower teeth   Eyes: Negative for visual disturbance.   Respiratory: Negative for cough and shortness of breath.    Cardiovascular: Negative for chest pain.   Gastrointestinal: Negative for diarrhea, nausea and vomiting.   Musculoskeletal: Negative for back pain.   Skin: Negative for rash.   Neurological: Negative for dizziness, tremors, facial asymmetry, speech difficulty, weakness, numbness and headaches.   Psychiatric/Behavioral: Negative for decreased concentration. The patient is not nervous/anxious.         Objective:   Physical Exam   Constitutional: She is oriented to person, place, and time. She appears well-developed and well-nourished. No distress.   HENT:   Head: Normocephalic and atraumatic.   Eyes: EOM are normal. Pupils are equal, round, and reactive to light.   Neck: Normal range of motion. Neck supple.   Cardiovascular: Normal rate, regular rhythm, normal heart sounds and intact distal pulses.  Exam reveals no gallop and no friction rub.    No murmur heard.  Pulmonary/Chest: Effort normal and breath sounds normal. No respiratory distress. She has no wheezes. She has no rales. She exhibits no tenderness.    Abdominal: Soft. Bowel sounds are normal. She exhibits no distension and no mass. There is no tenderness. There is no guarding.   Musculoskeletal: Normal range of motion.   Neurological: She is alert and oriented to person, place, and time.   Skin: Skin is warm and dry. No rash noted. She is not diaphoretic.   Psychiatric: She has a normal mood and affect. Her behavior is normal. Judgment and thought content normal.        Assessment:       1. Mouth pain  Ambulatory referral to Neurology   2. Essential hypertension          Plan:       Mouth pain  -     Ambulatory referral to Neurology    Essential hypertension  - continue Losartan  - RTC 4 weeks, bring log and home machine

## 2017-11-30 ENCOUNTER — TELEPHONE (OUTPATIENT)
Dept: NEUROLOGY | Facility: CLINIC | Age: 57
End: 2017-11-30

## 2017-12-04 RX ORDER — FLUTICASONE PROPIONATE 50 MCG
SPRAY, SUSPENSION (ML) NASAL
Qty: 48 G | Refills: 3 | Status: SHIPPED | OUTPATIENT
Start: 2017-12-04 | End: 2018-12-13

## 2017-12-05 ENCOUNTER — PATIENT MESSAGE (OUTPATIENT)
Dept: FAMILY MEDICINE | Facility: CLINIC | Age: 57
End: 2017-12-05

## 2017-12-05 RX ORDER — FLUTICASONE PROPIONATE 50 MCG
SPRAY, SUSPENSION (ML) NASAL
Qty: 48 G | Refills: 3 | Status: SHIPPED | OUTPATIENT
Start: 2017-12-05 | End: 2018-01-30

## 2017-12-22 RX ORDER — LOSARTAN POTASSIUM 50 MG/1
TABLET ORAL
Qty: 90 TABLET | Refills: 0 | Status: SHIPPED | OUTPATIENT
Start: 2017-12-22 | End: 2018-03-20 | Stop reason: SDUPTHER

## 2018-01-30 ENCOUNTER — OFFICE VISIT (OUTPATIENT)
Dept: NEUROLOGY | Facility: CLINIC | Age: 58
End: 2018-01-30
Payer: COMMERCIAL

## 2018-01-30 VITALS
HEART RATE: 73 BPM | WEIGHT: 188.5 LBS | SYSTOLIC BLOOD PRESSURE: 152 MMHG | HEIGHT: 62 IN | DIASTOLIC BLOOD PRESSURE: 93 MMHG | RESPIRATION RATE: 18 BRPM | BODY MASS INDEX: 34.69 KG/M2

## 2018-01-30 DIAGNOSIS — G24.4 ISOLATED OROMANDIBULAR DYSTONIA: ICD-10-CM

## 2018-01-30 DIAGNOSIS — Z78.0 MENOPAUSE: ICD-10-CM

## 2018-01-30 DIAGNOSIS — I10 ESSENTIAL HYPERTENSION: Primary | ICD-10-CM

## 2018-01-30 PROCEDURE — 99204 OFFICE O/P NEW MOD 45 MIN: CPT | Mod: S$GLB,,, | Performed by: PSYCHIATRY & NEUROLOGY

## 2018-01-30 PROCEDURE — 99999 PR PBB SHADOW E&M-EST. PATIENT-LVL III: CPT | Mod: PBBFAC,,, | Performed by: PSYCHIATRY & NEUROLOGY

## 2018-01-30 RX ORDER — BACLOFEN 10 MG/1
TABLET ORAL
Qty: 90 TABLET | Refills: 11 | Status: SHIPPED | OUTPATIENT
Start: 2018-01-30 | End: 2018-12-13

## 2018-01-30 NOTE — LETTER
January 30, 2018      Jeanette Lira PA-C  [address] [facility] [address]  [facility] [address]           [facility]  [address] [facility] [address]  [facility] [address]  Phone: [phone]          Patient: Malaika Craven   MR Number: [id number]   YOB: 1960   Date of Visit: 1/30/2018       Dear Jeanette Lira:    Thank you for referring Malaika Craven to me for evaluation. Attached you will find relevant portions of my assessment and plan of care.    If you have questions, please do not hesitate to call me. I look forward to following Malaika Craven along with you.    Sincerely,    Rimma Cash MD    Enclosure  CC:  No Recipients    If you would like to receive this communication electronically, please contact [email or web address] or [phone] to request more information on [employer] Link access.    For providers and/or their staff who would like to refer a patient to [facility], please contact us through our one-stop-shop provider referral line, Corey Arora, at [phone].    If you feel you have received this communication in error or would no longer like to receive these types of communications, please e-mail [email or web address]

## 2018-01-30 NOTE — PROGRESS NOTES
Subjective:       Patient ID: Malaika Craven is a 57 y.o. female.    Chief Complaint: Pain (mouth)    HPI  The patient is a very pleasant 58 y/o female presenting with chief complaint of disesthesic sensation in the buccal aspect of the frontal teeth and inner lower lip with subsequent compulsion to tough those areas with her tongue. This has been going on for over a year and a half. It seems that is slightly better or at least, not worse. She possibly attributes it to taking Phentermine for weight loss. This drug is associated with xerostomia, but the symptoms persisted after she stopped it. She has tried all kind of strategies for dry mouth to no avail. She is frustrated as she is nearly constantly touching the affected teeth, fortunately, it does not bother her sleep. Her chin is not involved.        Review of Systems   Constitutional: Negative for activity change, appetite change, fatigue and fever.   HENT: Negative for congestion, dental problem, hearing loss, sinus pressure, tinnitus, trouble swallowing and voice change.    Eyes: Negative for photophobia, pain, redness and visual disturbance.   Respiratory: Negative for cough, chest tightness and shortness of breath.    Cardiovascular: Negative for chest pain, palpitations and leg swelling.   Gastrointestinal: Negative for abdominal pain, blood in stool, nausea and vomiting.   Endocrine: Negative for cold intolerance and heat intolerance.   Genitourinary: Negative for difficulty urinating, frequency, menstrual problem and urgency.   Musculoskeletal: Negative for arthralgias, back pain, gait problem, joint swelling, myalgias, neck pain and neck stiffness.   Skin: Negative.    Neurological: Negative for dizziness, tremors, seizures, syncope, facial asymmetry, speech difficulty, weakness, light-headedness, numbness and headaches.   Hematological: Negative for adenopathy. Does not bruise/bleed easily.   Psychiatric/Behavioral: Negative for agitation,  behavioral problems, confusion, decreased concentration, self-injury, sleep disturbance and suicidal ideas. The patient is not nervous/anxious and is not hyperactive.                Past Medical History:   Diagnosis Date    Allergy     Corneal erosion of right eye     Hypertension     Sinus problem      Past Surgical History:   Procedure Laterality Date     SECTION      X 2    CHOLECYSTECTOMY      DNC      EYE SURGERY Right     FOR CORNEAL EROSION    LIPOSUCTION      x 2    Mole Excision Right     Cartagena     Family History   Problem Relation Age of Onset    Heart disease Mother     Hypertension Mother     Arthritis Father     Thyroid disease Father     Kidney disease Paternal Grandmother     Liver cancer Maternal Grandmother     Cancer Maternal Grandfather     Allergies Sister     Allergic rhinitis Sister     Angioedema Neg Hx     Asthma Neg Hx     Atopy Neg Hx     Eczema Neg Hx     Immunodeficiency Neg Hx     Rhinitis Neg Hx     Urticaria Neg Hx     Amblyopia Neg Hx     Blindness Neg Hx     Cataracts Neg Hx     Diabetes Neg Hx     Glaucoma Neg Hx     Macular degeneration Neg Hx     Retinal detachment Neg Hx     Strabismus Neg Hx     Stroke Neg Hx      Social History     Social History    Marital status:      Spouse name: N/A    Number of children: N/A    Years of education: N/A     Occupational History    Not on file.     Social History Main Topics    Smoking status: Never Smoker    Smokeless tobacco: Never Used    Alcohol use Yes      Comment: 2 drinks per week    Drug use: No    Sexual activity: Yes     Partners: Male     Birth control/ protection: None     Other Topics Concern    Not on file     Social History Narrative    No narrative on file     Review of patient's allergies indicates:   Allergen Reactions    Tetracyclines Rash    Lisinopril     Sulfa (sulfonamide antibiotics) Rash       Current Outpatient Prescriptions:     fluticasone  (FLONASE) 50 mcg/actuation nasal spray, SHAKE LIQUID AND USE 2 SPRAYS IN EACH NOSTRIL EVERY DAY, Disp: 48 g, Rfl: 3    losartan (COZAAR) 50 MG tablet, TAKE 1 TABLET BY MOUTH ONCE DAILY, Disp: 90 tablet, Rfl: 0      Objective:      Vitals:    01/30/18 0759   BP: (!) 152/93   Pulse: 73   Resp: 18     Body mass index is 34.48 kg/m².    Physical Exam    Constitutional:   She appears well-developed and well-nourished. She is well groomed    HENT:    Head: Atraumatic, oral and nasal mucosa intact  Eyes: Conjunctivae and EOM are normal. Pupils are equal, round, and reactive to light OU  Neck: Neck supple. No thyromegaly present  Cardiovascular: Normal rate and normal heart sounds  No murmur heard  Pulmonary/Chest: Effort normal and breath sounds normal  Musculoskeletal: Normal range of motion. No joint stiffness. No vertebral point tenderness  Skin: Skin is warm and dry  Psychiatric: Normal mood and affect     Neuro exam:    Mental status:  Awake, attentive, Alert, oriented to self, place, year and month  Language function is intact    Cranial Nerves:  Smell was not formally evaluated  Cranial Nerves II - XII: intact  Pursuits were smooth, normal saccades, no nystagmus OU  Funduscopic exam - disc were flat and pink, no exudates or hemorrhages OU  Motor - facial movement was symmetrical and normal     Palate moved well and was symmetrical with normal palatal and oral sensation  Tongue movements were full    Coordination:     Rapid alternating movements and rapid finger tapping - normal bilaterally  Finger to nose - normal and symmetric bilaterally   Heel to shin test - normal and symmetric bilaterally   Arm roll - smooth and symmetric   No intentional or positional tremor.     Motor:  Normal muscle bulk and symmetry. No fasciculations were noted    No pronator drift  Strength 5/5 bilaterally     Reflexes:  Tendon reflexes were 2 + at biceps, triceps, brachioradialis, patellar, and Achilles bilaterally  No clonus was noted      Sensory: Intact to light touch, pin prick in all extremities.     Gait: Romberg absent. Normal gait. Normal arm swing and turns. Good tandem    Review of Data:  Lab Results   Component Value Date    BNP 42 11/18/2017     11/18/2017    K 4.5 11/18/2017    MG 1.9 06/19/2011     11/18/2017    CO2 28 11/18/2017    BUN 13 11/18/2017    CREATININE 0.8 11/18/2017    GLU 99 11/18/2017    HGBA1C 5.3 11/22/2016    AST 20 11/18/2017    ALT 21 11/18/2017    ALBUMIN 3.6 11/18/2017    PROT 6.9 11/18/2017    BILITOT 0.3 11/18/2017    CHOL 183 11/09/2016    HDL 50 11/09/2016    LDLCALC 117.4 11/09/2016    TRIG 78 11/09/2016       Lab Results   Component Value Date    WBC 7.40 11/18/2017    HGB 13.4 11/18/2017    HCT 39.0 11/18/2017    MCV 88 11/18/2017     11/18/2017       Lab Results   Component Value Date    TSH 0.874 03/21/2017           Assessment and Plan   The patient does not present with the classic symptoms of burning mouth syndrome, rather, disesthetic frontal teeth sensation with a compulsion to touch the area with her tongue. This is similar to a mild form of oromandibular dystonia or an obsessive compulsive behavior.  Although she experiences a mild tingling sensation, I will avoid Gabapentin given the fact that she is actively trying to lose weight.  I will start with Baclofen 5 mg TID.  Other options include Clomipramine, SSRIs with class A evidence, Orap (pimozide)   Hypertension. Blood pressure noted to be elevated today      Zan Cash M.D  Medical Director, Headache and Facial Pain  [facility]

## 2018-02-17 ENCOUNTER — PATIENT MESSAGE (OUTPATIENT)
Dept: NEUROLOGY | Facility: CLINIC | Age: 58
End: 2018-02-17

## 2018-03-20 RX ORDER — LOSARTAN POTASSIUM 50 MG/1
TABLET ORAL
Qty: 90 TABLET | Refills: 0 | Status: SHIPPED | OUTPATIENT
Start: 2018-03-20 | End: 2018-06-18 | Stop reason: SDUPTHER

## 2018-06-18 RX ORDER — LOSARTAN POTASSIUM 50 MG/1
TABLET ORAL
Qty: 90 TABLET | Refills: 0 | OUTPATIENT
Start: 2018-06-18

## 2018-06-18 RX ORDER — LOSARTAN POTASSIUM 50 MG/1
50 TABLET ORAL DAILY
Qty: 90 TABLET | Refills: 0 | Status: SHIPPED | OUTPATIENT
Start: 2018-06-18 | End: 2018-09-16 | Stop reason: SDUPTHER

## 2018-08-20 ENCOUNTER — OFFICE VISIT (OUTPATIENT)
Dept: URGENT CARE | Facility: CLINIC | Age: 58
End: 2018-08-20
Payer: COMMERCIAL

## 2018-08-20 VITALS
RESPIRATION RATE: 18 BRPM | DIASTOLIC BLOOD PRESSURE: 83 MMHG | HEIGHT: 63 IN | SYSTOLIC BLOOD PRESSURE: 135 MMHG | OXYGEN SATURATION: 96 % | BODY MASS INDEX: 30.12 KG/M2 | TEMPERATURE: 100 F | HEART RATE: 95 BPM | WEIGHT: 170 LBS

## 2018-08-20 DIAGNOSIS — J01.10 ACUTE FRONTAL SINUSITIS, RECURRENCE NOT SPECIFIED: Primary | ICD-10-CM

## 2018-08-20 PROCEDURE — 3075F SYST BP GE 130 - 139MM HG: CPT | Mod: CPTII,S$GLB,, | Performed by: NURSE PRACTITIONER

## 2018-08-20 PROCEDURE — 3079F DIAST BP 80-89 MM HG: CPT | Mod: CPTII,S$GLB,, | Performed by: NURSE PRACTITIONER

## 2018-08-20 PROCEDURE — 3008F BODY MASS INDEX DOCD: CPT | Mod: CPTII,S$GLB,, | Performed by: NURSE PRACTITIONER

## 2018-08-20 PROCEDURE — 99214 OFFICE O/P EST MOD 30 MIN: CPT | Mod: 25,S$GLB,, | Performed by: NURSE PRACTITIONER

## 2018-08-20 PROCEDURE — 96372 THER/PROPH/DIAG INJ SC/IM: CPT | Mod: S$GLB,,, | Performed by: NURSE PRACTITIONER

## 2018-08-20 RX ORDER — AMOXICILLIN AND CLAVULANATE POTASSIUM 875; 125 MG/1; MG/1
1 TABLET, FILM COATED ORAL 2 TIMES DAILY
Qty: 20 TABLET | Refills: 0 | Status: SHIPPED | OUTPATIENT
Start: 2018-08-20 | End: 2018-08-30

## 2018-08-20 RX ORDER — BETAMETHASONE SODIUM PHOSPHATE AND BETAMETHASONE ACETATE 3; 3 MG/ML; MG/ML
9 INJECTION, SUSPENSION INTRA-ARTICULAR; INTRALESIONAL; INTRAMUSCULAR; SOFT TISSUE ONCE
Status: COMPLETED | OUTPATIENT
Start: 2018-08-20 | End: 2018-08-20

## 2018-08-20 RX ORDER — PROMETHAZINE HYDROCHLORIDE AND DEXTROMETHORPHAN HYDROBROMIDE 6.25; 15 MG/5ML; MG/5ML
5 SYRUP ORAL EVERY 6 HOURS PRN
Qty: 180 ML | Refills: 0 | Status: SHIPPED | OUTPATIENT
Start: 2018-08-20 | End: 2018-08-30

## 2018-08-20 RX ORDER — BENZONATATE 100 MG/1
200 CAPSULE ORAL 3 TIMES DAILY PRN
Qty: 30 CAPSULE | Refills: 0 | Status: SHIPPED | OUTPATIENT
Start: 2018-08-20 | End: 2018-12-13

## 2018-08-20 RX ADMIN — BETAMETHASONE SODIUM PHOSPHATE AND BETAMETHASONE ACETATE 9 MG: 3; 3 INJECTION, SUSPENSION INTRA-ARTICULAR; INTRALESIONAL; INTRAMUSCULAR; SOFT TISSUE at 12:08

## 2018-08-20 NOTE — PATIENT INSTRUCTIONS
Sinusitis (Antibiotic Treatment)    The sinuses are air-filled spaces within the bones of the face. They connect to the inside of the nose. Sinusitis is an inflammation of the tissue lining the sinus cavity. Sinus inflammation can occur during a cold. It can also be due to allergies to pollens and other particles in the air. Sinusitis can cause symptoms of sinus congestion and fullness. A sinus infection causes fever, headache and facial pain. There is often green or yellow drainage from the nose or into the back of the throat (post-nasal drip). You have been given antibiotics to treat this condition.  Home care:  · Take the full course of antibiotics as instructed. Do not stop taking them, even if you feel better.  · Drink plenty of water, hot tea, and other liquids. This may help thin mucus. It also may promote sinus drainage.  · Heat may help soothe painful areas of the face. Use a towel soaked in hot water. Or,  the shower and direct the hot spray onto your face. Using a vaporizer along with a menthol rub at night may also help.   · An expectorant containing guaifenesin may help thin the mucus and promote drainage from the sinuses.  · Over-the-counter decongestants may be used unless a similar medicine was prescribed. Nasal sprays work the fastest. Use one that contains phenylephrine or oxymetazoline. First blow the nose gently. Then use the spray. Do not use these medicines more often than directed on the label or symptoms may get worse. You may also use tablets containing pseudoephedrine. Avoid products that combine ingredients, because side effects may be increased. Read labels. You can also ask the pharmacist for help. (NOTE: Persons with high blood pressure should not use decongestants. They can raise blood pressure.)  · Over-the-counter antihistamines may help if allergies contributed to your sinusitis.    · Do not use nasal rinses or irrigation during an acute sinus infection, unless told to by  your health care provider. Rinsing may spread the infection to other sinuses.  · Use acetaminophen or ibuprofen to control pain, unless another pain medicine was prescribed. (If you have chronic liver or kidney disease or ever had a stomach ulcer, talk with your doctor before using these medicines. Aspirin should never be used in anyone under 18 years of age who is ill with a fever. It may cause severe liver damage.)  · Don't smoke. This can worsen symptoms.  Follow-up care  Follow up with your healthcare provider or our staff if you are not improving within the next week.  When to seek medical advice  Call your healthcare provider if any of these occur:  · Facial pain or headache becoming more severe  · Stiff neck  · Unusual drowsiness or confusion  · Swelling of the forehead or eyelids  · Vision problems, including blurred or double vision  · Fever of 100.4ºF (38ºC) or higher, or as directed by your healthcare provider  · Seizure  · Breathing problems  · Symptoms not resolving within 10 days  Date Last Reviewed: 4/13/2015  © 8065-6759 [employer]. [address] [place] [address], [place] [address]. All rights reserved. This information is not intended as a substitute for professional medical care. Always follow your healthcare professional's instructions.

## 2018-08-20 NOTE — PROGRESS NOTES
"Subjective:       Patient ID: Malaika Craven is a 58 y.o. female.    Vitals:  height is 5' 3" (1.6 m) and weight is 77.1 kg (170 lb). Her tympanic temperature is 99.5 °F (37.5 °C). Her blood pressure is 135/83 and her pulse is 95. Her respiration is 18 and oxygen saturation is 96%.     Chief Complaint: URI    URI    This is a new problem. The current episode started in the past 7 days (08/15/2018). The problem has been gradually worsening. The maximum temperature recorded prior to her arrival was 100.4 - 100.9 F. Associated symptoms include congestion, coughing, headaches and a sore throat. Pertinent negatives include no abdominal pain, chest pain, ear pain, nausea or wheezing. She has tried antihistamine (Zyrtec) for the symptoms. The treatment provided no relief.     Review of Systems   Constitution: Positive for fever and malaise/fatigue. Negative for chills.   HENT: Positive for congestion, hoarse voice and sore throat. Negative for ear pain.    Eyes: Negative for discharge and redness.   Cardiovascular: Negative for chest pain, dyspnea on exertion and leg swelling.   Respiratory: Positive for cough and sputum production. Negative for shortness of breath and wheezing.    Musculoskeletal: Negative for myalgias.   Gastrointestinal: Negative for abdominal pain and nausea.   Neurological: Positive for headaches.       Objective:      Physical Exam   Constitutional: She is oriented to person, place, and time. She appears well-developed and well-nourished. She is cooperative.  Non-toxic appearance. She does not appear ill. No distress.   HENT:   Head: Normocephalic and atraumatic.   Right Ear: Hearing and ear canal normal. A middle ear effusion is present.   Left Ear: Hearing and ear canal normal. A middle ear effusion is present.   Nose: Mucosal edema present. No rhinorrhea or nasal deformity. No epistaxis. Right sinus exhibits frontal sinus tenderness. Right sinus exhibits no maxillary sinus tenderness. Left " sinus exhibits frontal sinus tenderness. Left sinus exhibits no maxillary sinus tenderness.   Mouth/Throat: Uvula is midline and mucous membranes are normal. No trismus in the jaw. Normal dentition. No uvula swelling. No posterior oropharyngeal erythema.   Eyes: Conjunctivae and lids are normal. Right eye exhibits no discharge. Left eye exhibits no discharge. No scleral icterus.   Sclera clear bilat   Neck: Trachea normal, normal range of motion, full passive range of motion without pain and phonation normal. Neck supple.   Cardiovascular: Normal rate, regular rhythm, normal heart sounds, intact distal pulses and normal pulses.   Pulmonary/Chest: Effort normal and breath sounds normal. No respiratory distress.   Abdominal: Soft. Normal appearance and bowel sounds are normal. She exhibits no distension, no pulsatile midline mass and no mass. There is no tenderness.   Musculoskeletal: Normal range of motion. She exhibits no edema or deformity.   Neurological: She is alert and oriented to person, place, and time. She exhibits normal muscle tone. Coordination normal.   Skin: Skin is warm, dry and intact. She is not diaphoretic. No pallor.   Psychiatric: She has a normal mood and affect. Her speech is normal and behavior is normal. Judgment and thought content normal. Cognition and memory are normal.   Nursing note and vitals reviewed.      Assessment:       1. Acute frontal sinusitis, recurrence not specified        Plan:       Patient Instructions     Sinusitis (Antibiotic Treatment)    The sinuses are air-filled spaces within the bones of the face. They connect to the inside of the nose. Sinusitis is an inflammation of the tissue lining the sinus cavity. Sinus inflammation can occur during a cold. It can also be due to allergies to pollens and other particles in the air. Sinusitis can cause symptoms of sinus congestion and fullness. A sinus infection causes fever, headache and facial pain. There is often green or  yellow drainage from the nose or into the back of the throat (post-nasal drip). You have been given antibiotics to treat this condition.  Home care:  · Take the full course of antibiotics as instructed. Do not stop taking them, even if you feel better.  · Drink plenty of water, hot tea, and other liquids. This may help thin mucus. It also may promote sinus drainage.  · Heat may help soothe painful areas of the face. Use a towel soaked in hot water. Or,  the shower and direct the hot spray onto your face. Using a vaporizer along with a menthol rub at night may also help.   · An expectorant containing guaifenesin may help thin the mucus and promote drainage from the sinuses.  · Over-the-counter decongestants may be used unless a similar medicine was prescribed. Nasal sprays work the fastest. Use one that contains phenylephrine or oxymetazoline. First blow the nose gently. Then use the spray. Do not use these medicines more often than directed on the label or symptoms may get worse. You may also use tablets containing pseudoephedrine. Avoid products that combine ingredients, because side effects may be increased. Read labels. You can also ask the pharmacist for help. (NOTE: Persons with high blood pressure should not use decongestants. They can raise blood pressure.)  · Over-the-counter antihistamines may help if allergies contributed to your sinusitis.    · Do not use nasal rinses or irrigation during an acute sinus infection, unless told to by your health care provider. Rinsing may spread the infection to other sinuses.  · Use acetaminophen or ibuprofen to control pain, unless another pain medicine was prescribed. (If you have chronic liver or kidney disease or ever had a stomach ulcer, talk with your doctor before using these medicines. Aspirin should never be used in anyone under 18 years of age who is ill with a fever. It may cause severe liver damage.)  · Don't smoke. This can worsen symptoms.  Follow-up  care  Follow up with your healthcare provider or our staff if you are not improving within the next week.  When to seek medical advice  Call your healthcare provider if any of these occur:  · Facial pain or headache becoming more severe  · Stiff neck  · Unusual drowsiness or confusion  · Swelling of the forehead or eyelids  · Vision problems, including blurred or double vision  · Fever of 100.4ºF (38ºC) or higher, or as directed by your healthcare provider  · Seizure  · Breathing problems  · Symptoms not resolving within 10 days  Date Last Reviewed: 4/13/2015  © 4936-9035 [employer]. [address] [place] [address]. All rights reserved. This information is not intended as a substitute for professional medical care. Always follow your healthcare professional's instructions.            Acute frontal sinusitis, recurrence not specified    Other orders  -     betamethasone acetate-betamethasone sodium phosphate injection 9 mg; Inject 1.5 mLs (9 mg total) into the muscle once.  -     amoxicillin-clavulanate 875-125mg (AUGMENTIN) 875-125 mg per tablet; Take 1 tablet by mouth 2 (two) times daily. for 10 days  Dispense: 20 tablet; Refill: 0  -     benzonatate (TESSALON PERLES) 100 MG capsule; Take 2 capsules (200 mg total) by mouth 3 (three) times daily as needed.  Dispense: 30 capsule; Refill: 0  -     promethazine-dextromethorphan (PROMETHAZINE-DM) 6.25-15 mg/5 mL Syrp; Take 5 mLs by mouth every 6 (six) hours as needed.  Dispense: 180 mL; Refill: 0

## 2018-08-24 DIAGNOSIS — Z12.11 COLON CANCER SCREENING: ICD-10-CM

## 2018-09-16 RX ORDER — LOSARTAN POTASSIUM 50 MG/1
TABLET ORAL
Qty: 90 TABLET | Refills: 0 | Status: SHIPPED | OUTPATIENT
Start: 2018-09-16 | End: 2018-12-14 | Stop reason: SDUPTHER

## 2018-09-18 ENCOUNTER — PATIENT MESSAGE (OUTPATIENT)
Dept: FAMILY MEDICINE | Facility: CLINIC | Age: 58
End: 2018-09-18

## 2018-09-28 DIAGNOSIS — Z12.39 BREAST CANCER SCREENING: ICD-10-CM

## 2018-12-13 ENCOUNTER — OFFICE VISIT (OUTPATIENT)
Dept: URGENT CARE | Facility: CLINIC | Age: 58
End: 2018-12-13
Payer: COMMERCIAL

## 2018-12-13 VITALS
DIASTOLIC BLOOD PRESSURE: 93 MMHG | WEIGHT: 180 LBS | HEIGHT: 63 IN | BODY MASS INDEX: 31.89 KG/M2 | HEART RATE: 79 BPM | TEMPERATURE: 99 F | SYSTOLIC BLOOD PRESSURE: 135 MMHG | RESPIRATION RATE: 18 BRPM | OXYGEN SATURATION: 98 %

## 2018-12-13 DIAGNOSIS — H66.91 ACUTE BACTERIAL OTITIS MEDIA, RIGHT: Primary | ICD-10-CM

## 2018-12-13 DIAGNOSIS — J01.40 ACUTE NON-RECURRENT PANSINUSITIS: ICD-10-CM

## 2018-12-13 PROCEDURE — 3008F BODY MASS INDEX DOCD: CPT | Mod: CPTII,S$GLB,, | Performed by: NURSE PRACTITIONER

## 2018-12-13 PROCEDURE — 96372 THER/PROPH/DIAG INJ SC/IM: CPT | Mod: S$GLB,,, | Performed by: NURSE PRACTITIONER

## 2018-12-13 PROCEDURE — 3075F SYST BP GE 130 - 139MM HG: CPT | Mod: CPTII,S$GLB,, | Performed by: NURSE PRACTITIONER

## 2018-12-13 PROCEDURE — 99214 OFFICE O/P EST MOD 30 MIN: CPT | Mod: 25,S$GLB,, | Performed by: NURSE PRACTITIONER

## 2018-12-13 PROCEDURE — 3080F DIAST BP >= 90 MM HG: CPT | Mod: CPTII,S$GLB,, | Performed by: NURSE PRACTITIONER

## 2018-12-13 RX ORDER — BETAMETHASONE SODIUM PHOSPHATE AND BETAMETHASONE ACETATE 3; 3 MG/ML; MG/ML
6 INJECTION, SUSPENSION INTRA-ARTICULAR; INTRALESIONAL; INTRAMUSCULAR; SOFT TISSUE
Status: COMPLETED | OUTPATIENT
Start: 2018-12-13 | End: 2018-12-13

## 2018-12-13 RX ORDER — AMOXICILLIN AND CLAVULANATE POTASSIUM 875; 125 MG/1; MG/1
1 TABLET, FILM COATED ORAL 2 TIMES DAILY
Qty: 20 TABLET | Refills: 0 | Status: SHIPPED | OUTPATIENT
Start: 2018-12-13 | End: 2018-12-23

## 2018-12-13 RX ADMIN — BETAMETHASONE SODIUM PHOSPHATE AND BETAMETHASONE ACETATE 6 MG: 3; 3 INJECTION, SUSPENSION INTRA-ARTICULAR; INTRALESIONAL; INTRAMUSCULAR; SOFT TISSUE at 09:12

## 2018-12-13 NOTE — PROGRESS NOTES
"Subjective:       Patient ID: Malaika Craven is a 58 y.o. female.    Vitals:  height is 5' 3" (1.6 m) and weight is 81.6 kg (180 lb). Her oral temperature is 99.4 °F (37.4 °C). Her blood pressure is 135/93 (abnormal) and her pulse is 79. Her respiration is 18 and oxygen saturation is 98%.     Chief Complaint: URI    URI    This is a new problem. The current episode started in the past 7 days (started 4-5 days ago). The problem has been gradually worsening. There has been no fever. Associated symptoms include congestion, coughing, ear pain, headaches, a plugged ear sensation, rhinorrhea, sinus pain and sneezing. Pertinent negatives include no abdominal pain, chest pain, diarrhea, dysuria, joint pain, joint swelling, nausea, neck pain, rash, sore throat, swollen glands, vomiting or wheezing. Treatments tried: nasal spray, Zyrtec. The treatment provided no relief.       Constitution: Negative for chills, sweating, fatigue and fever.   HENT: Positive for ear pain, congestion, postnasal drip, sinus pain, sinus pressure and voice change. Negative for sore throat and trouble swallowing.    Neck: Negative for neck pain and painful lymph nodes.   Cardiovascular: Negative for chest pain.   Eyes: Negative for eye redness.   Respiratory: Positive for cough and sputum production. Negative for chest tightness, bloody sputum, COPD, shortness of breath, stridor, wheezing and asthma.    Gastrointestinal: Negative for abdominal pain, nausea, vomiting, constipation and diarrhea.   Genitourinary: Negative for dysuria.   Musculoskeletal: Negative for muscle ache.   Skin: Negative for rash.   Allergic/Immunologic: Positive for sneezing. Negative for seasonal allergies and asthma.   Neurological: Positive for headaches.   Hematologic/Lymphatic: Negative for swollen lymph nodes.       Objective:      Physical Exam   Constitutional: She is oriented to person, place, and time. She appears well-developed and well-nourished. She is " cooperative.  Non-toxic appearance. She does not appear ill. No distress.   HENT:   Head: Normocephalic and atraumatic.   Right Ear: Hearing, external ear and ear canal normal. Tympanic membrane is erythematous and bulging.   Left Ear: Hearing, tympanic membrane, external ear and ear canal normal.   Nose: Mucosal edema and rhinorrhea present. No nasal deformity. No epistaxis. Right sinus exhibits no maxillary sinus tenderness and no frontal sinus tenderness. Left sinus exhibits no maxillary sinus tenderness and no frontal sinus tenderness.   Mouth/Throat: Uvula is midline and mucous membranes are normal. No trismus in the jaw. Normal dentition. No uvula swelling. Posterior oropharyngeal erythema present. Tonsils are 1+ on the right. Tonsils are 1+ on the left. No tonsillar exudate.   Eyes: Conjunctivae and lids are normal. No scleral icterus.   Sclera clear bilat   Neck: Trachea normal, full passive range of motion without pain and phonation normal. Neck supple.   Cardiovascular: Normal rate, regular rhythm, normal heart sounds, intact distal pulses and normal pulses.   Pulmonary/Chest: Effort normal and breath sounds normal. No respiratory distress.   Abdominal: Soft. Normal appearance and bowel sounds are normal. She exhibits no distension. There is no tenderness.   Musculoskeletal: Normal range of motion. She exhibits no edema or deformity.   Neurological: She is alert and oriented to person, place, and time. She exhibits normal muscle tone. Coordination normal.   Skin: Skin is warm, dry and intact. She is not diaphoretic. No pallor.   Psychiatric: She has a normal mood and affect. Her speech is normal and behavior is normal. Judgment and thought content normal. Cognition and memory are normal.   Nursing note and vitals reviewed.      Assessment:       1. Acute bacterial otitis media, right    2. Acute non-recurrent pansinusitis        Plan:         Acute bacterial otitis media, right  -     betamethasone  acetate-betamethasone sodium phosphate injection 6 mg  -     amoxicillin-clavulanate 875-125mg (AUGMENTIN) 875-125 mg per tablet; Take 1 tablet by mouth 2 (two) times daily. for 10 days  Dispense: 20 tablet; Refill: 0    Acute non-recurrent pansinusitis    -Flonase.  -Claritin or Zyrtec daily.  Patient Instructions     Middle Ear Infection (Adult)  You have an infection of the middle ear, the space behind the eardrum. This is also called acute otitis media (AOM). Sometimes it is caused by the common cold. This is because congestion can block the internal passage (eustachian tube) that drains fluid from the middle ear. When the middle ear fills with fluid, bacteria can grow there and cause an infection. Oral antibiotics are used to treat this illness, not ear drops. Symptoms usually start to improve within 1 to 2 days of treatment.    Home care  The following are general care guidelines:  · Finish all of the antibiotic medicine given, even though you may feel better after the first few days.  · You may use over-the-counter medicine, such as acetaminophen or ibuprofen, to control pain and fever, unless something else was prescribed. If you have chronic liver or kidney disease or have ever had a stomach ulcer or gastrointestinal bleeding, talk with your healthcare provider before using these medicines. Do not give aspirin to anyone under 18 years of age who has a fever. It may cause severe illness or death.  Follow-up care  Follow up with your healthcare provider, or as advised, in 2 weeks if all symptoms have not gotten better, or if hearing doesn't go back to normal within 1 month.  When to seek medical advice  Call your healthcare provider right away if any of these occur:  · Ear pain gets worse or does not improve after 3 days of treatment  · Unusual drowsiness or confusion  · Neck pain, stiff neck, or headache  · Fluid or blood draining from the ear canal  · Fever of 100.4°F (38°C) or as advised   · Seizure  Date  Last Reviewed: 6/1/2016  © 2442-2338 [employer]. [address] [place] [address], [place] [address]. All rights reserved. This information is not intended as a substitute for professional medical care. Always follow your healthcare professional's instructions.        Sinusitis (Antibiotic Treatment)    The sinuses are air-filled spaces within the bones of the face. They connect to the inside of the nose. Sinusitis is an inflammation of the tissue lining the sinus cavity. Sinus inflammation can occur during a cold. It can also be due to allergies to pollens and other particles in the air. Sinusitis can cause symptoms of sinus congestion and fullness. A sinus infection causes fever, headache and facial pain. There is often green or yellow drainage from the nose or into the back of the throat (post-nasal drip). You have been given antibiotics to treat this condition.  Home care:  · Take the full course of antibiotics as instructed. Do not stop taking them, even if you feel better.  · Drink plenty of water, hot tea, and other liquids. This may help thin mucus. It also may promote sinus drainage.  · Heat may help soothe painful areas of the face. Use a towel soaked in hot water. Or,  the shower and direct the hot spray onto your face. Using a vaporizer along with a menthol rub at night may also help.   · An expectorant containing guaifenesin may help thin the mucus and promote drainage from the sinuses.  · Over-the-counter decongestants may be used unless a similar medicine was prescribed. Nasal sprays work the fastest. Use one that contains phenylephrine or oxymetazoline. First blow the nose gently. Then use the spray. Do not use these medicines more often than directed on the label or symptoms may get worse. You may also use tablets containing pseudoephedrine. Avoid products that combine ingredients, because side effects may be increased. Read labels. You can also ask the pharmacist for help.  (NOTE: Persons with high blood pressure should not use decongestants. They can raise blood pressure.)  · Over-the-counter antihistamines may help if allergies contributed to your sinusitis.    · Do not use nasal rinses or irrigation during an acute sinus infection, unless told to by your health care provider. Rinsing may spread the infection to other sinuses.  · Use acetaminophen or ibuprofen to control pain, unless another pain medicine was prescribed. (If you have chronic liver or kidney disease or ever had a stomach ulcer, talk with your doctor before using these medicines. Aspirin should never be used in anyone under 18 years of age who is ill with a fever. It may cause severe liver damage.)  · Don't smoke. This can worsen symptoms.  Follow-up care  Follow up with your healthcare provider or our staff if you are not improving within the next week.  When to seek medical advice  Call your healthcare provider if any of these occur:  · Facial pain or headache becoming more severe  · Stiff neck  · Unusual drowsiness or confusion  · Swelling of the forehead or eyelids  · Vision problems, including blurred or double vision  · Fever of 100.4ºF (38ºC) or higher, or as directed by your healthcare provider  · Seizure  · Breathing problems  · Symptoms not resolving within 10 days  Date Last Reviewed: 4/13/2015  © 1823-7009 [employer]. [address] [place] [address], [place] [address]. All rights reserved. This information is not intended as a substitute for professional medical care. Always follow your healthcare professional's instructions.      -Flonase and Zyrtec/Claritin as needed for congestion and sinus symptoms.  -10 days of antibiotics.  -Steroid shot given here today.  -Increase your fluids.  Please follow up with your Primary care provider within 2-5 days if your signs and symptoms have not resolved or worsen.     If your condition worsens or fails to improve we recommend that you receive another evaluation  at the emergency room immediately or contact your primary medical clinic to discuss your concerns.   You must understand that you have received an Urgent Care treatment only and that you may be released before all of your medical problems are known or treated. You, the patient, will arrange for follow up care as instructed.

## 2018-12-13 NOTE — PATIENT INSTRUCTIONS
Middle Ear Infection (Adult)  You have an infection of the middle ear, the space behind the eardrum. This is also called acute otitis media (AOM). Sometimes it is caused by the common cold. This is because congestion can block the internal passage (eustachian tube) that drains fluid from the middle ear. When the middle ear fills with fluid, bacteria can grow there and cause an infection. Oral antibiotics are used to treat this illness, not ear drops. Symptoms usually start to improve within 1 to 2 days of treatment.    Home care  The following are general care guidelines:  · Finish all of the antibiotic medicine given, even though you may feel better after the first few days.  · You may use over-the-counter medicine, such as acetaminophen or ibuprofen, to control pain and fever, unless something else was prescribed. If you have chronic liver or kidney disease or have ever had a stomach ulcer or gastrointestinal bleeding, talk with your healthcare provider before using these medicines. Do not give aspirin to anyone under 18 years of age who has a fever. It may cause severe illness or death.  Follow-up care  Follow up with your healthcare provider, or as advised, in 2 weeks if all symptoms have not gotten better, or if hearing doesn't go back to normal within 1 month.  When to seek medical advice  Call your healthcare provider right away if any of these occur:  · Ear pain gets worse or does not improve after 3 days of treatment  · Unusual drowsiness or confusion  · Neck pain, stiff neck, or headache  · Fluid or blood draining from the ear canal  · Fever of 100.4°F (38°C) or as advised   · Seizure  Date Last Reviewed: 6/1/2016  © 3033-6657 [employer]. [address] [place] [address], [place] [address]. All rights reserved. This information is not intended as a substitute for professional medical care. Always follow your healthcare professional's instructions.        Sinusitis (Antibiotic Treatment)    The  sinuses are air-filled spaces within the bones of the face. They connect to the inside of the nose. Sinusitis is an inflammation of the tissue lining the sinus cavity. Sinus inflammation can occur during a cold. It can also be due to allergies to pollens and other particles in the air. Sinusitis can cause symptoms of sinus congestion and fullness. A sinus infection causes fever, headache and facial pain. There is often green or yellow drainage from the nose or into the back of the throat (post-nasal drip). You have been given antibiotics to treat this condition.  Home care:  · Take the full course of antibiotics as instructed. Do not stop taking them, even if you feel better.  · Drink plenty of water, hot tea, and other liquids. This may help thin mucus. It also may promote sinus drainage.  · Heat may help soothe painful areas of the face. Use a towel soaked in hot water. Or,  the shower and direct the hot spray onto your face. Using a vaporizer along with a menthol rub at night may also help.   · An expectorant containing guaifenesin may help thin the mucus and promote drainage from the sinuses.  · Over-the-counter decongestants may be used unless a similar medicine was prescribed. Nasal sprays work the fastest. Use one that contains phenylephrine or oxymetazoline. First blow the nose gently. Then use the spray. Do not use these medicines more often than directed on the label or symptoms may get worse. You may also use tablets containing pseudoephedrine. Avoid products that combine ingredients, because side effects may be increased. Read labels. You can also ask the pharmacist for help. (NOTE: Persons with high blood pressure should not use decongestants. They can raise blood pressure.)  · Over-the-counter antihistamines may help if allergies contributed to your sinusitis.    · Do not use nasal rinses or irrigation during an acute sinus infection, unless told to by your health care provider. Rinsing may  spread the infection to other sinuses.  · Use acetaminophen or ibuprofen to control pain, unless another pain medicine was prescribed. (If you have chronic liver or kidney disease or ever had a stomach ulcer, talk with your doctor before using these medicines. Aspirin should never be used in anyone under 18 years of age who is ill with a fever. It may cause severe liver damage.)  · Don't smoke. This can worsen symptoms.  Follow-up care  Follow up with your healthcare provider or our staff if you are not improving within the next week.  When to seek medical advice  Call your healthcare provider if any of these occur:  · Facial pain or headache becoming more severe  · Stiff neck  · Unusual drowsiness or confusion  · Swelling of the forehead or eyelids  · Vision problems, including blurred or double vision  · Fever of 100.4ºF (38ºC) or higher, or as directed by your healthcare provider  · Seizure  · Breathing problems  · Symptoms not resolving within 10 days  Date Last Reviewed: 4/13/2015  © 5827-2888 [employer]. [address] [place] [address]. All rights reserved. This information is not intended as a substitute for professional medical care. Always follow your healthcare professional's instructions.      -Flonase and Zyrtec/Claritin as needed for congestion and sinus symptoms.  -10 days of antibiotics.  -Steroid shot given here today.  -Increase your fluids.  Please follow up with your Primary care provider within 2-5 days if your signs and symptoms have not resolved or worsen.     If your condition worsens or fails to improve we recommend that you receive another evaluation at the emergency room immediately or contact your primary medical clinic to discuss your concerns.   You must understand that you have received an Urgent Care treatment only and that you may be released before all of your medical problems are known or treated. You, the patient, will arrange for follow up care as  instructed.

## 2018-12-14 RX ORDER — LOSARTAN POTASSIUM 50 MG/1
TABLET ORAL
Qty: 90 TABLET | Refills: 0 | Status: SHIPPED | OUTPATIENT
Start: 2018-12-14 | End: 2019-03-23 | Stop reason: SDUPTHER

## 2019-01-03 ENCOUNTER — OFFICE VISIT (OUTPATIENT)
Dept: URGENT CARE | Facility: CLINIC | Age: 59
End: 2019-01-03
Payer: COMMERCIAL

## 2019-01-03 VITALS
HEART RATE: 70 BPM | BODY MASS INDEX: 30.12 KG/M2 | TEMPERATURE: 98 F | HEIGHT: 63 IN | DIASTOLIC BLOOD PRESSURE: 82 MMHG | RESPIRATION RATE: 18 BRPM | WEIGHT: 170 LBS | SYSTOLIC BLOOD PRESSURE: 129 MMHG | OXYGEN SATURATION: 97 %

## 2019-01-03 DIAGNOSIS — H92.01 OTALGIA OF RIGHT EAR: Primary | ICD-10-CM

## 2019-01-03 DIAGNOSIS — H65.91 MIDDLE EAR EFFUSION, RIGHT: ICD-10-CM

## 2019-01-03 PROCEDURE — 3079F DIAST BP 80-89 MM HG: CPT | Mod: CPTII,S$GLB,, | Performed by: NURSE PRACTITIONER

## 2019-01-03 PROCEDURE — 3074F PR MOST RECENT SYSTOLIC BLOOD PRESSURE < 130 MM HG: ICD-10-PCS | Mod: CPTII,S$GLB,, | Performed by: NURSE PRACTITIONER

## 2019-01-03 PROCEDURE — 3074F SYST BP LT 130 MM HG: CPT | Mod: CPTII,S$GLB,, | Performed by: NURSE PRACTITIONER

## 2019-01-03 PROCEDURE — 3079F PR MOST RECENT DIASTOLIC BLOOD PRESSURE 80-89 MM HG: ICD-10-PCS | Mod: CPTII,S$GLB,, | Performed by: NURSE PRACTITIONER

## 2019-01-03 PROCEDURE — 3008F PR BODY MASS INDEX (BMI) DOCUMENTED: ICD-10-PCS | Mod: CPTII,S$GLB,, | Performed by: NURSE PRACTITIONER

## 2019-01-03 PROCEDURE — 3008F BODY MASS INDEX DOCD: CPT | Mod: CPTII,S$GLB,, | Performed by: NURSE PRACTITIONER

## 2019-01-03 PROCEDURE — 99214 OFFICE O/P EST MOD 30 MIN: CPT | Mod: S$GLB,,, | Performed by: NURSE PRACTITIONER

## 2019-01-03 PROCEDURE — 99214 PR OFFICE/OUTPT VISIT, EST, LEVL IV, 30-39 MIN: ICD-10-PCS | Mod: S$GLB,,, | Performed by: NURSE PRACTITIONER

## 2019-01-03 NOTE — PATIENT INSTRUCTIONS
START TAKING ZYRTEC AND FLONASE DAILY. FOLLOW UP WITH ENT IF YOU DO NOT HEAR FROM SCHEDULING DEPARTMENT-- CALL [phone] TO MAKE APPOINTMENT      You must understand that you've received an Urgent Care treatment only and that you may be released before all your medical problems are known or treated. You, the patient, will arrange for follow up care as instructed.  If your condition worsens we recommend that you receive another evaluation at the emergency room immediately or contact your primary medical clinics after hours call service to discuss your concerns.  Please return here or go to the Emergency Department for any concerns or worsening of condition.        Earache, No Infection (Adult)  Earaches can happen without an infection. This occurs when air and fluid build up behind the eardrum causing a feeling of fullness and discomfort and reduced hearing. This is called otitis media with effusion (OME) or serous otitis media. It means there is fluid in the middle ear. It is not the same as acute otitis media, which is typically from infection.  OME can happen when you have a cold if congestion blocks the passage that drains the middle ear. This passage is called the eustachian tube. OME may also occur with nasal allergies or after a bacterial middle ear infection.    The pain or discomfort may come and go. You may hear clicking or popping sounds when you chew or swallow. You may feel that your balance is off. Or you may hear ringing in the ear.  It often takes from several weeks up to 3 months for the fluid to clear on its own. Oral pain relievers and ear drops help if there is pain. Decongestants and antihistamines sometimes help. Antibiotics don't help since there is no infection. Your doctor may prescribe a nasal spray to help reduce swelling in the nose and eustachian tube. This can allow the ear to drain.  If your OME doesn't improve after 3 months, surgery may be used to drain the fluid and insert a small  tube in the eardrum to allow continued drainage.  Because the middle ear fluid can become infected, it is important to watch for signs of an ear infection which may develop later. These signs include increased ear pain, fever, or drainage from the ear.  Home care  The following guidelines will help you care for yourself at home:  · You may use over-the-counter medicine as directed to control pain, unless another medicine was prescribed. If you have chronic liver or kidney disease or ever had a stomach ulcer or GI bleeding, talk with your doctor before using these medicines. Aspirin should never be used in anyone under 18 years of age who is ill with a fever. It may cause severe liver damage.  · You may use over-the-counter decongestants such as phenylephrine or pseudoephedrine. But they are not always helpful. Don't use nasal spray decongestants more than 3 days. Longer use can make congestion worse. Prescription nasal sprays from your doctor don't typically have those restrictions.  · Antihistamines may help if you are also having allergy symptoms.  · You may use medicines such as guaifenesin to thin mucus and promote drainage.  Follow-up care  Follow up with your healthcare provider or as advised if you are not feeling better after 3 days.  When to seek medical advice  Call your healthcare provider right away if any of the following occur:  · Your ear pain gets worse or does not start to improve   · Fever of 100.4°F (38°C) or higher, or as directed by your healthcare provider  · Fluid or blood draining from the ear  · Headache or sinus pain  · Stiff neck  · Unusual drowsiness or confusion  Date Last Reviewed: 10/1/2016  © 9893-1025 [employer]. [address] [place] [address], [place] [address]. All rights reserved. This information is not intended as a substitute for professional medical care. Always follow your healthcare professional's instructions.

## 2019-01-03 NOTE — PROGRESS NOTES
"Subjective:       Patient ID: Malaika Craven is a 58 y.o. female.    Vitals:  height is 5' 3" (1.6 m) and weight is 77.1 kg (170 lb). Her oral temperature is 98.2 °F (36.8 °C). Her blood pressure is 129/82 and her pulse is 70. Her respiration is 18 and oxygen saturation is 97%.     Chief Complaint: Otalgia    Patient was treated recently for a right ear infection. She states that she still has mild discomfort. She denies any congestion.       Otalgia    There is pain in both ears. This is a new problem. The current episode started in the past 7 days (started 4-5 days). The problem occurs constantly. The problem has been unchanged. There has been no fever. The pain is at a severity of 3/10. The pain is mild. Pertinent negatives include no abdominal pain, coughing, diarrhea, ear discharge, headaches, hearing loss, neck pain, rash, rhinorrhea, sore throat or vomiting. She has tried antibiotics for the symptoms. The treatment provided no relief. There is no history of a chronic ear infection, hearing loss or a tympanostomy tube.       Constitution: Negative for chills, fatigue and fever.   HENT: Positive for ear pain. Negative for ear discharge, hearing loss, congestion and sore throat.    Neck: Negative for neck pain and painful lymph nodes.   Cardiovascular: Negative for chest pain and leg swelling.   Eyes: Negative for double vision and blurred vision.   Respiratory: Negative for cough and shortness of breath.    Gastrointestinal: Negative for abdominal pain, nausea, vomiting and diarrhea.   Genitourinary: Negative for dysuria, frequency, urgency and history of kidney stones.   Musculoskeletal: Negative for joint pain, joint swelling, muscle cramps and muscle ache.   Skin: Negative for color change, pale, rash and bruising.   Allergic/Immunologic: Negative for seasonal allergies.   Neurological: Negative for dizziness, history of vertigo, light-headedness, passing out and headaches.   Hematologic/Lymphatic: " Negative for swollen lymph nodes.   Psychiatric/Behavioral: Negative for nervous/anxious, sleep disturbance and depression. The patient is not nervous/anxious.        Objective:      Physical Exam   Constitutional: She is oriented to person, place, and time. She appears well-developed and well-nourished. She is cooperative.  Non-toxic appearance. She does not appear ill. No distress.   HENT:   Head: Normocephalic and atraumatic.   Right Ear: Hearing, external ear and ear canal normal. Tympanic membrane is not erythematous and not bulging. A middle ear effusion is present.   Left Ear: Hearing, tympanic membrane, external ear and ear canal normal.   Nose: Nose normal. No mucosal edema, rhinorrhea or nasal deformity. No epistaxis. Right sinus exhibits no maxillary sinus tenderness and no frontal sinus tenderness. Left sinus exhibits no maxillary sinus tenderness and no frontal sinus tenderness.   Mouth/Throat: Uvula is midline, oropharynx is clear and moist and mucous membranes are normal. No trismus in the jaw. Normal dentition. No uvula swelling. No posterior oropharyngeal erythema.   Eyes: Conjunctivae and lids are normal. No scleral icterus.   Sclera clear bilat   Neck: Trachea normal, full passive range of motion without pain and phonation normal. Neck supple.   Cardiovascular: Normal rate, regular rhythm, normal heart sounds, intact distal pulses and normal pulses.   Pulmonary/Chest: Effort normal and breath sounds normal. No respiratory distress.   Abdominal: Soft. Normal appearance and bowel sounds are normal. She exhibits no distension. There is no tenderness.   Musculoskeletal: Normal range of motion. She exhibits no edema or deformity.   Neurological: She is alert and oriented to person, place, and time. She exhibits normal muscle tone. Coordination normal.   Skin: Skin is warm, dry and intact. She is not diaphoretic. No pallor.   Psychiatric: She has a normal mood and affect. Her speech is normal and  behavior is normal. Judgment and thought content normal. Cognition and memory are normal.   Nursing note and vitals reviewed.      Assessment:       1. Otalgia of right ear    2. Middle ear effusion, right        Plan:         Otalgia of right ear  -     Ambulatory referral to ENT    Middle ear effusion, right  -     Ambulatory referral to ENT            Patient Instructions   START TAKING ZYRTEC AND FLONASE DAILY. FOLLOW UP WITH ENT IF YOU DO NOT HEAR FROM SCHEDULING DEPARTMENT-- CALL [phone] TO MAKE APPOINTMENT      You must understand that you've received an Urgent Care treatment only and that you may be released before all your medical problems are known or treated. You, the patient, will arrange for follow up care as instructed.  If your condition worsens we recommend that you receive another evaluation at the emergency room immediately or contact your primary medical clinics after hours call service to discuss your concerns.  Please return here or go to the Emergency Department for any concerns or worsening of condition.        Earache, No Infection (Adult)  Earaches can happen without an infection. This occurs when air and fluid build up behind the eardrum causing a feeling of fullness and discomfort and reduced hearing. This is called otitis media with effusion (OME) or serous otitis media. It means there is fluid in the middle ear. It is not the same as acute otitis media, which is typically from infection.  OME can happen when you have a cold if congestion blocks the passage that drains the middle ear. This passage is called the eustachian tube. OME may also occur with nasal allergies or after a bacterial middle ear infection.    The pain or discomfort may come and go. You may hear clicking or popping sounds when you chew or swallow. You may feel that your balance is off. Or you may hear ringing in the ear.  It often takes from several weeks up to 3 months for the fluid to clear on its own. Oral pain  relievers and ear drops help if there is pain. Decongestants and antihistamines sometimes help. Antibiotics don't help since there is no infection. Your doctor may prescribe a nasal spray to help reduce swelling in the nose and eustachian tube. This can allow the ear to drain.  If your OME doesn't improve after 3 months, surgery may be used to drain the fluid and insert a small tube in the eardrum to allow continued drainage.  Because the middle ear fluid can become infected, it is important to watch for signs of an ear infection which may develop later. These signs include increased ear pain, fever, or drainage from the ear.  Home care  The following guidelines will help you care for yourself at home:  · You may use over-the-counter medicine as directed to control pain, unless another medicine was prescribed. If you have chronic liver or kidney disease or ever had a stomach ulcer or GI bleeding, talk with your doctor before using these medicines. Aspirin should never be used in anyone under 18 years of age who is ill with a fever. It may cause severe liver damage.  · You may use over-the-counter decongestants such as phenylephrine or pseudoephedrine. But they are not always helpful. Don't use nasal spray decongestants more than 3 days. Longer use can make congestion worse. Prescription nasal sprays from your doctor don't typically have those restrictions.  · Antihistamines may help if you are also having allergy symptoms.  · You may use medicines such as guaifenesin to thin mucus and promote drainage.  Follow-up care  Follow up with your healthcare provider or as advised if you are not feeling better after 3 days.  When to seek medical advice  Call your healthcare provider right away if any of the following occur:  · Your ear pain gets worse or does not start to improve   · Fever of 100.4°F (38°C) or higher, or as directed by your healthcare provider  · Fluid or blood draining from the ear  · Headache or sinus  pain  · Stiff neck  · Unusual drowsiness or confusion  Date Last Reviewed: 10/1/2016  © 1192-9598 [employer]. [address] [place] [address], [place] [address]. All rights reserved. This information is not intended as a substitute for professional medical care. Always follow your healthcare professional's instructions.

## 2019-01-09 ENCOUNTER — OFFICE VISIT (OUTPATIENT)
Dept: FAMILY MEDICINE | Facility: CLINIC | Age: 59
End: 2019-01-09
Payer: COMMERCIAL

## 2019-01-09 ENCOUNTER — TELEPHONE (OUTPATIENT)
Dept: FAMILY MEDICINE | Facility: CLINIC | Age: 59
End: 2019-01-09

## 2019-01-09 VITALS
BODY MASS INDEX: 31.13 KG/M2 | HEART RATE: 68 BPM | DIASTOLIC BLOOD PRESSURE: 82 MMHG | SYSTOLIC BLOOD PRESSURE: 126 MMHG | HEIGHT: 63 IN | WEIGHT: 175.69 LBS

## 2019-01-09 DIAGNOSIS — G24.4 OROMANDIBULAR DYSTONIA: ICD-10-CM

## 2019-01-09 DIAGNOSIS — H60.501 ACUTE OTITIS EXTERNA OF RIGHT EAR, UNSPECIFIED TYPE: ICD-10-CM

## 2019-01-09 DIAGNOSIS — Z00.00 ROUTINE HEALTH MAINTENANCE: Primary | ICD-10-CM

## 2019-01-09 DIAGNOSIS — I10 ESSENTIAL HYPERTENSION: ICD-10-CM

## 2019-01-09 DIAGNOSIS — R30.0 DYSURIA: ICD-10-CM

## 2019-01-09 PROCEDURE — 99999 PR PBB SHADOW E&M-EST. PATIENT-LVL III: ICD-10-PCS | Mod: PBBFAC,,, | Performed by: FAMILY MEDICINE

## 2019-01-09 PROCEDURE — 3074F PR MOST RECENT SYSTOLIC BLOOD PRESSURE < 130 MM HG: ICD-10-PCS | Mod: CPTII,S$GLB,, | Performed by: FAMILY MEDICINE

## 2019-01-09 PROCEDURE — 3079F DIAST BP 80-89 MM HG: CPT | Mod: CPTII,S$GLB,, | Performed by: FAMILY MEDICINE

## 2019-01-09 PROCEDURE — 99396 PR PREVENTIVE VISIT,EST,40-64: ICD-10-PCS | Mod: S$GLB,,, | Performed by: FAMILY MEDICINE

## 2019-01-09 PROCEDURE — 3079F PR MOST RECENT DIASTOLIC BLOOD PRESSURE 80-89 MM HG: ICD-10-PCS | Mod: CPTII,S$GLB,, | Performed by: FAMILY MEDICINE

## 2019-01-09 PROCEDURE — 3074F SYST BP LT 130 MM HG: CPT | Mod: CPTII,S$GLB,, | Performed by: FAMILY MEDICINE

## 2019-01-09 PROCEDURE — 99999 PR PBB SHADOW E&M-EST. PATIENT-LVL III: CPT | Mod: PBBFAC,,, | Performed by: FAMILY MEDICINE

## 2019-01-09 PROCEDURE — 99396 PREV VISIT EST AGE 40-64: CPT | Mod: S$GLB,,, | Performed by: FAMILY MEDICINE

## 2019-01-09 RX ORDER — NEOMYCIN SULFATE, POLYMYXIN B SULFATE AND HYDROCORTISONE 10; 3.5; 1 MG/ML; MG/ML; [USP'U]/ML
3 SUSPENSION/ DROPS AURICULAR (OTIC) 3 TIMES DAILY
Qty: 10 ML | Refills: 0 | Status: SHIPPED | OUTPATIENT
Start: 2019-01-09 | End: 2020-10-01

## 2019-01-09 RX ORDER — DULOXETIN HYDROCHLORIDE 30 MG/1
30 CAPSULE, DELAYED RELEASE ORAL DAILY
Qty: 30 CAPSULE | Refills: 11 | Status: SHIPPED | OUTPATIENT
Start: 2019-01-09 | End: 2019-01-09 | Stop reason: SDUPTHER

## 2019-01-09 NOTE — PATIENT INSTRUCTIONS
2 Cardiologist  Leticia Caraballo ([place] [facility] [place])  Dr. Dougherty ([facility])

## 2019-01-09 NOTE — PROGRESS NOTES
HPI  Malaika Craven is a 58 y.o. female with multiple medical diagnoses as listed in the medical history and problem list that presents for Hypertension (follow up)  .      Hypertension   This is a chronic problem. The current episode started more than 1 year ago. The problem is unchanged. The problem is controlled. Pertinent negatives include no anxiety, blurred vision, chest pain, headaches, malaise/fatigue, neck pain, orthopnea, palpitations, peripheral edema, PND, shortness of breath or sweats. There are no associated agents to hypertension. Risk factors for coronary artery disease include family history, obesity and post-menopausal state. There are no compliance problems.      Here today for routine health maintenance.    PAST MEDICAL HISTORY:  Past Medical History:   Diagnosis Date    Allergy     Corneal erosion of right eye     Hypertension     Sinus problem        PAST SURGICAL HISTORY:  Past Surgical History:   Procedure Laterality Date     SECTION      X 2    CHOLECYSTECTOMY      DNC      EYE SURGERY Right 1994    FOR CORNEAL EROSION    LIPOSUCTION      x 2    Mole Excision Right     Shin       SOCIAL HISTORY:  Social History     Socioeconomic History    Marital status:      Spouse name: Not on file    Number of children: Not on file    Years of education: Not on file    Highest education level: Not on file   Social Needs    Financial resource strain: Not on file    Food insecurity - worry: Not on file    Food insecurity - inability: Not on file    Transportation needs - medical: Not on file    Transportation needs - non-medical: Not on file   Occupational History    Not on file   Tobacco Use    Smoking status: Never Smoker    Smokeless tobacco: Never Used   Substance and Sexual Activity    Alcohol use: Yes     Comment: 2 drinks per week    Drug use: No    Sexual activity: Yes     Partners: Male     Birth control/protection: None   Other Topics Concern     Not on file   Social History Narrative    Not on file       FAMILY HISTORY:  Family History   Problem Relation Age of Onset    Heart disease Mother     Hypertension Mother     Arthritis Father     Thyroid disease Father     Kidney disease Paternal Grandmother     Liver cancer Maternal Grandmother     Cancer Maternal Grandfather     Allergies Sister     Allergic rhinitis Sister     Angioedema Neg Hx     Asthma Neg Hx     Atopy Neg Hx     Eczema Neg Hx     Immunodeficiency Neg Hx     Rhinitis Neg Hx     Urticaria Neg Hx     Amblyopia Neg Hx     Blindness Neg Hx     Cataracts Neg Hx     Diabetes Neg Hx     Glaucoma Neg Hx     Macular degeneration Neg Hx     Retinal detachment Neg Hx     Strabismus Neg Hx     Stroke Neg Hx        ALLERGIES AND MEDICATIONS: updated and reviewed.  Review of patient's allergies indicates:   Allergen Reactions    Tetracyclines Rash    Lisinopril     Sulfa (sulfonamide antibiotics) Rash     Current Outpatient Medications   Medication Sig Dispense Refill    losartan (COZAAR) 50 MG tablet TAKE 1 TABLET(50 MG) BY MOUTH EVERY DAY 90 tablet 0    DULoxetine (CYMBALTA) 30 MG capsule Take 1 capsule (30 mg total) by mouth once daily. 30 capsule 11    neomycin-polymyxin-hydrocortisone (CORTISPORIN) 3.5-10,000-1 mg/mL-unit/mL-% otic suspension Place 3 drops into the right ear 3 (three) times daily. 10 mL 0     No current facility-administered medications for this visit.        ROS  Review of Systems   Constitutional: Negative for fatigue, fever, malaise/fatigue and unexpected weight change.   HENT: Negative for congestion, hearing loss, rhinorrhea and sore throat.         Recent ear pain on the right  Recurrent pain in her mouth   Eyes: Negative for blurred vision and visual disturbance.   Respiratory: Negative for cough, chest tightness, shortness of breath and wheezing.    Cardiovascular: Negative for chest pain, palpitations, orthopnea, leg swelling and PND.  "  Gastrointestinal: Negative for abdominal distention, abdominal pain, blood in stool, constipation, diarrhea, nausea and vomiting.   Genitourinary: Negative for difficulty urinating, dysuria, frequency, hematuria, menstrual problem, pelvic pain, urgency and vaginal bleeding.   Musculoskeletal: Negative for back pain, joint swelling and neck pain.   Skin: Negative for rash.   Neurological: Negative for dizziness, tremors, weakness, light-headedness, numbness and headaches.   Psychiatric/Behavioral: Negative for confusion, dysphoric mood and sleep disturbance. The patient is not nervous/anxious.        Physical Exam  Vitals:    01/09/19 1535   BP: 126/82   Pulse: 68    Body mass index is 31.13 kg/m².  Weight: 79.7 kg (175 lb 11.3 oz)   Height: 5' 3" (160 cm)     Physical Exam   Constitutional: She is oriented to person, place, and time. She appears well-developed and well-nourished.   HENT:   Head: Normocephalic and atraumatic.   Right Ear: External ear normal.   Left Ear: External ear normal.   Nose: Nose normal.   Mouth/Throat: Oropharynx is clear and moist.   Eyes: Conjunctivae and EOM are normal. Pupils are equal, round, and reactive to light. No scleral icterus.   Neck: Normal range of motion. Neck supple. No JVD present. No thyromegaly present.   Cardiovascular: Normal rate, regular rhythm and normal heart sounds. Exam reveals no gallop and no friction rub.   No murmur heard.  Pulmonary/Chest: Effort normal and breath sounds normal. She has no wheezes. She has no rales.   Abdominal: Soft. Bowel sounds are normal. She exhibits no distension and no mass. There is no tenderness. There is no rebound and no guarding.   Musculoskeletal: Normal range of motion. She exhibits no edema.   Lymphadenopathy:     She has no cervical adenopathy.   Neurological: She is alert and oriented to person, place, and time. She has normal strength. No cranial nerve deficit or sensory deficit.   Skin: Skin is warm and dry. No rash " noted.   Vitals reviewed.      Health Maintenance       Date Due Completion Date    Hepatitis C Screening 1960 ---    TETANUS VACCINE 07/24/1980 ---    Colonoscopy 07/24/2012 ---    Mammogram 09/22/2018 9/22/2016 (Done)    Override on 9/22/2016: Done    Pap Smear with HPV Cotest 08/10/2019 8/10/2016 (Done)    Override on 8/10/2016: Done    Lipid Panel 11/09/2021 11/9/2016          Assessment & Plan    Routine health maintenance  -     Comprehensive metabolic panel; Future; Expected date: 01/09/2019  -     Lipid panel; Future; Expected date: 01/09/2019  -     Hepatitis C antibody; Future; Expected date: 01/09/2019  -     Mammo Digital Screening Bilat; Future; Expected date: 01/09/2019  -     Case request GI: COLONOSCOPY  - Health maintenance reviewed  - Diet and exercise education.    Essential hypertension  - Continue current therapy  - Serial blood pressure monitoring  - Diet and exercise education.    Dysuria  -     Urinalysis; Future; Expected date: 01/09/2019    Oromandibular dystonia  -     ADD DULoxetine (CYMBALTA) 30 MG capsule; Take 1 capsule (30 mg total) by mouth once daily.  Dispense: 30 capsule; Refill: 11    Acute otitis externa of right ear, unspecified type  -     neomycin-polymyxin-hydrocortisone (CORTISPORIN) 3.5-10,000-1 mg/mL-unit/mL-% otic suspension; Place 3 drops into the right ear 3 (three) times daily.  Dispense: 10 mL; Refill: 0    Follow-up in about 1 year (around 1/9/2020).

## 2019-01-09 NOTE — TELEPHONE ENCOUNTER
Patient wants to know what were the two names of the cardiologist you suggested for a family member

## 2019-01-10 ENCOUNTER — PATIENT MESSAGE (OUTPATIENT)
Dept: FAMILY MEDICINE | Facility: CLINIC | Age: 59
End: 2019-01-10

## 2019-01-10 RX ORDER — DULOXETIN HYDROCHLORIDE 30 MG/1
CAPSULE, DELAYED RELEASE ORAL
Qty: 90 CAPSULE | Refills: 3 | Status: SHIPPED | OUTPATIENT
Start: 2019-01-10 | End: 2019-02-22 | Stop reason: ALTCHOICE

## 2019-01-12 ENCOUNTER — TELEPHONE (OUTPATIENT)
Dept: GASTROENTEROLOGY | Facility: CLINIC | Age: 59
End: 2019-01-12

## 2019-01-15 ENCOUNTER — LAB VISIT (OUTPATIENT)
Dept: LAB | Facility: HOSPITAL | Age: 59
End: 2019-01-15
Attending: FAMILY MEDICINE
Payer: COMMERCIAL

## 2019-01-15 DIAGNOSIS — Z00.00 ROUTINE HEALTH MAINTENANCE: ICD-10-CM

## 2019-01-15 LAB
ALBUMIN SERPL BCP-MCNC: 3.9 G/DL
ALP SERPL-CCNC: 73 U/L
ALT SERPL W/O P-5'-P-CCNC: 16 U/L
ANION GAP SERPL CALC-SCNC: 6 MMOL/L
AST SERPL-CCNC: 16 U/L
BILIRUB SERPL-MCNC: 0.7 MG/DL
BUN SERPL-MCNC: 13 MG/DL
CALCIUM SERPL-MCNC: 9.7 MG/DL
CHLORIDE SERPL-SCNC: 104 MMOL/L
CHOLEST SERPL-MCNC: 188 MG/DL
CHOLEST/HDLC SERPL: 3.6 {RATIO}
CO2 SERPL-SCNC: 29 MMOL/L
CREAT SERPL-MCNC: 0.8 MG/DL
EST. GFR  (AFRICAN AMERICAN): >60 ML/MIN/1.73 M^2
EST. GFR  (NON AFRICAN AMERICAN): >60 ML/MIN/1.73 M^2
GLUCOSE SERPL-MCNC: 101 MG/DL
HCV AB SERPL QL IA: NEGATIVE
HDLC SERPL-MCNC: 52 MG/DL
HDLC SERPL: 27.7 %
LDLC SERPL CALC-MCNC: 125.8 MG/DL
NONHDLC SERPL-MCNC: 136 MG/DL
POTASSIUM SERPL-SCNC: 4.2 MMOL/L
PROT SERPL-MCNC: 7.5 G/DL
SODIUM SERPL-SCNC: 139 MMOL/L
TRIGL SERPL-MCNC: 51 MG/DL

## 2019-01-15 PROCEDURE — 36415 COLL VENOUS BLD VENIPUNCTURE: CPT | Mod: PO

## 2019-01-15 PROCEDURE — 80061 LIPID PANEL: CPT

## 2019-01-15 PROCEDURE — 80053 COMPREHEN METABOLIC PANEL: CPT

## 2019-01-15 PROCEDURE — 86803 HEPATITIS C AB TEST: CPT

## 2019-02-21 ENCOUNTER — PATIENT MESSAGE (OUTPATIENT)
Dept: FAMILY MEDICINE | Facility: CLINIC | Age: 59
End: 2019-02-21

## 2019-02-22 RX ORDER — DULOXETIN HYDROCHLORIDE 60 MG/1
60 CAPSULE, DELAYED RELEASE ORAL DAILY
Qty: 90 CAPSULE | Refills: 3 | Status: SHIPPED | OUTPATIENT
Start: 2019-02-22 | End: 2020-04-08 | Stop reason: SDUPTHER

## 2019-03-24 RX ORDER — LOSARTAN POTASSIUM 50 MG/1
TABLET ORAL
Qty: 90 TABLET | Refills: 3 | Status: SHIPPED | OUTPATIENT
Start: 2019-03-24 | End: 2020-04-27 | Stop reason: SDUPTHER

## 2019-04-12 DIAGNOSIS — Z12.39 BREAST CANCER SCREENING: ICD-10-CM

## 2020-04-14 NOTE — TELEPHONE ENCOUNTER
Refill Authorization Note     is requesting a refill authorization.    Brief assessment and rationale for refill: DEFER: pt overdue for annual          Medication Therapy Plan: Last message requested change in duloxtine dose and pt has not followed up since; due for annual; please review                              Comments:     Refill Center Care Gap Closure protocols temporarily suspended.   Requested Prescriptions   Pending Prescriptions Disp Refills    DULoxetine (CYMBALTA) 60 MG capsule 90 capsule 0     Sig: Take 1 capsule (60 mg total) by mouth once daily.       Psychiatry: Antidepressants - SNRI Failed - 4/14/2020 11:45 AM        Failed - Last BP in normal range within 360 days.     BP Readings from Last 3 Encounters:   01/09/19 126/82   01/03/19 129/82   12/13/18 (!) 135/93              Failed - Office visit in past 6 months or future 90 days.     Recent Outpatient Visits            1 year ago Routine health maintenance    [facility] Carlito Jaime MD    1 year ago Otalgia of right ear    [facility] [place] Barth[facility] NP    1 year ago Acute bacterial otitis media, right    [facility] [place] Heide Rea NP    1 year ago Acute frontal sinusitis, recurrence not specified    [facility]    2 years ago Essential hypertension    [facility] Rimma Cash MD                    Failed - Cr is 1.3 or below and within 360 days     Creatinine   Date Value Ref Range Status   01/15/2019 0.8 0.5 - 1.4 mg/dL Final   11/18/2017 0.8 0.5 - 1.4 mg/dL Final   03/21/2017 0.9 0.5 - 1.4 mg/dL Final              Failed - eGFR within 360 days     eGFR if non    Date Value Ref Range Status   01/15/2019 >60.0 >60 mL/min/1.73 m^2 Final     Comment:     Calculation used to obtain the estimated glomerular filtration  rate (eGFR) is the CKD-EPI equation.      11/18/2017 >60 >60  mL/min/1.73 m^2 Final     Comment:     Calculation used to obtain the estimated glomerular filtration  rate (eGFR) is the CKD-EPI equation.      03/21/2017 >60 >60 mL/min/1.73 m^2 Final     Comment:     Calculation used to obtain the estimated glomerular filtration  rate (eGFR) is the CKD-EPI equation. Since race is unknown   in our information system, the eGFR values for   -American and Non--American patients are given   for each creatinine result.       eGFR if    Date Value Ref Range Status   01/15/2019 >60.0 >60 mL/min/1.73 m^2 Final   11/18/2017 >60 >60 mL/min/1.73 m^2 Final   03/21/2017 >60 >60 mL/min/1.73 m^2 Final              Passed - Patient is at least 18 years old         Appointments  past 12m or future 3m with PCP    Date Provider   Last Visit   1/9/2019 Carlito Jaime MD   Next Visit   Visit date not found Carlito Jaime MD   .  ED visits in past 90 days: [unfilled]       Note composed:11:46 AM 04/14/2020

## 2020-04-15 RX ORDER — DULOXETIN HYDROCHLORIDE 60 MG/1
60 CAPSULE, DELAYED RELEASE ORAL DAILY
Qty: 90 CAPSULE | Refills: 0 | Status: SHIPPED | OUTPATIENT
Start: 2020-04-15 | End: 2020-10-01

## 2020-04-27 DIAGNOSIS — I10 ESSENTIAL HYPERTENSION: Primary | ICD-10-CM

## 2020-05-01 RX ORDER — LOSARTAN POTASSIUM 50 MG/1
TABLET ORAL
Qty: 90 TABLET | Refills: 0 | Status: SHIPPED | OUTPATIENT
Start: 2020-05-01 | End: 2020-08-03 | Stop reason: SDUPTHER

## 2020-05-01 NOTE — PROGRESS NOTES
Refill Authorization Note    is requesting a refill authorization.    Brief assessment and rationale for refill: APPROVE: needs appt/ needs labs   Name and strength of medication:  losartan (COZAAR) 50 MG tablet   Medication-related problems identified:   Requires labs  Requires appointment    Medication Therapy Plan: Needs appt (ANNUAL); NTBO(CMP); approve 3 more     Medication reconciliation completed: No                         Comments:      Requested Prescriptions   Signed Prescriptions Disp Refills    losartan (COZAAR) 50 MG tablet 90 tablet 0     Sig: TAKE 1 TABLET(50 MG) BY MOUTH EVERY DAY       Cardiovascular:  Angiotensin Receptor Blockers Failed - 5/1/2020 11:54 AM        Failed - Last BP in normal range within 360 days.     BP Readings from Last 3 Encounters:   01/09/19 126/82   01/03/19 129/82   12/13/18 (!) 135/93              Failed - Office visit in past 12 months or future 90 days.     Recent Outpatient Visits            1 year ago Routine health maintenance    [facility] Carlito Jaime MD    1 year ago Otalgia of right ear    [facility] - Reese Bartholomew NP    1 year ago Acute bacterial otitis media, right    [facility] - [facility] Rea, SERGIO    1 year ago Acute frontal sinusitis, recurrence not specified    [facility] - [facility] Steven, NP    2 years ago Essential hypertension    [facility] Rimma Cash MD                    Failed - Cr is 1.3 or below and within 360 days     Creatinine   Date Value Ref Range Status   01/15/2019 0.8 0.5 - 1.4 mg/dL Final   11/18/2017 0.8 0.5 - 1.4 mg/dL Final   03/21/2017 0.9 0.5 - 1.4 mg/dL Final              Failed - K in normal range and within 360 days     Potassium   Date Value Ref Range Status   01/15/2019 4.2 3.5 - 5.1 mmol/L Final   11/18/2017 4.5 3.5 - 5.1 mmol/L Final   03/21/2017 4.0 3.5 - 5.1 mmol/L Final              Failed - eGFR within  360 days     eGFR if non    Date Value Ref Range Status   01/15/2019 >60.0 >60 mL/min/1.73 m^2 Final     Comment:     Calculation used to obtain the estimated glomerular filtration  rate (eGFR) is the CKD-EPI equation.      11/18/2017 >60 >60 mL/min/1.73 m^2 Final     Comment:     Calculation used to obtain the estimated glomerular filtration  rate (eGFR) is the CKD-EPI equation.      03/21/2017 >60 >60 mL/min/1.73 m^2 Final     Comment:     Calculation used to obtain the estimated glomerular filtration  rate (eGFR) is the CKD-EPI equation. Since race is unknown   in our information system, the eGFR values for   -American and Non--American patients are given   for each creatinine result.       eGFR if    Date Value Ref Range Status   01/15/2019 >60.0 >60 mL/min/1.73 m^2 Final   11/18/2017 >60 >60 mL/min/1.73 m^2 Final   03/21/2017 >60 >60 mL/min/1.73 m^2 Final              Passed - Patient is at least 18 years old         Appointments  past 12m or future 3m with PCP    Date Provider   Last Visit   1/9/2019 Carlito Jaime MD   Next Visit   Visit date not found Carlito Jaime MD   ED visits in past 90 days: 0     Note composed:1:54 PM 05/01/2020

## 2020-05-01 NOTE — TELEPHONE ENCOUNTER
Provider Staff:   Please schedule patient for the following     Appointment Annual    Labs  CMP     Please also check with your provider to determine if any additional labs are required and schedule labs together.     Thanks!  [facility]      Appointments past 12m or future 3m    Date Provider   Last Visit   1/9/2019 Carlito Jaime MD   Next Visit   Visit date not found Carlito Jaime MD     Note composed: 05/01/2020 1:55 PM

## 2020-05-06 ENCOUNTER — PATIENT MESSAGE (OUTPATIENT)
Dept: ADMINISTRATIVE | Facility: HOSPITAL | Age: 60
End: 2020-05-06

## 2020-06-12 DIAGNOSIS — Z12.39 BREAST CANCER SCREENING: ICD-10-CM

## 2020-08-03 DIAGNOSIS — I10 ESSENTIAL HYPERTENSION: ICD-10-CM

## 2020-08-03 NOTE — TELEPHONE ENCOUNTER
Care Due:                  Date            Visit Type   Department     Provider  --------------------------------------------------------------------------------                                ESTABLISHED                              PATIENT      [facility]  Last Visit: 01-      [facility]       DERRICK DAVIDSON  Next Visit: None Scheduled  None         None Found                                                            Last  Test          Frequency    Reason                     Performed    Due Date  --------------------------------------------------------------------------------    Office Visit  12 months..  losartan.................  01- 01-    Cr..........  12 months..  losartan.................  01-   01-    K...........  12 months..  losartan.................  01-   01-    Powered by [employer]. Reference number: [id number]. 8/03/2020 11:32:34 AM   CDT

## 2020-08-04 RX ORDER — LOSARTAN POTASSIUM 50 MG/1
TABLET ORAL
Qty: 90 TABLET | Refills: 0 | Status: SHIPPED | OUTPATIENT
Start: 2020-08-04 | End: 2020-10-09 | Stop reason: ALTCHOICE

## 2020-09-24 ENCOUNTER — PATIENT MESSAGE (OUTPATIENT)
Dept: FAMILY MEDICINE | Facility: CLINIC | Age: 60
End: 2020-09-24

## 2020-10-01 ENCOUNTER — OFFICE VISIT (OUTPATIENT)
Dept: FAMILY MEDICINE | Facility: CLINIC | Age: 60
End: 2020-10-01
Payer: COMMERCIAL

## 2020-10-01 VITALS
WEIGHT: 190.69 LBS | SYSTOLIC BLOOD PRESSURE: 150 MMHG | OXYGEN SATURATION: 95 % | HEART RATE: 99 BPM | DIASTOLIC BLOOD PRESSURE: 92 MMHG | BODY MASS INDEX: 33.79 KG/M2 | HEIGHT: 63 IN

## 2020-10-01 DIAGNOSIS — E66.09 CLASS 1 OBESITY DUE TO EXCESS CALORIES WITH SERIOUS COMORBIDITY AND BODY MASS INDEX (BMI) OF 33.0 TO 33.9 IN ADULT: ICD-10-CM

## 2020-10-01 DIAGNOSIS — Z12.31 ENCOUNTER FOR SCREENING MAMMOGRAM FOR BREAST CANCER: ICD-10-CM

## 2020-10-01 DIAGNOSIS — Z12.11 SCREENING FOR COLON CANCER: ICD-10-CM

## 2020-10-01 DIAGNOSIS — R43.2 DECREASED SENSE OF TASTE: ICD-10-CM

## 2020-10-01 DIAGNOSIS — I10 ESSENTIAL HYPERTENSION: Primary | ICD-10-CM

## 2020-10-01 DIAGNOSIS — R53.83 OTHER FATIGUE: ICD-10-CM

## 2020-10-01 DIAGNOSIS — L98.9 SKIN LESION: ICD-10-CM

## 2020-10-01 PROCEDURE — 99999 PR PBB SHADOW E&M-EST. PATIENT-LVL IV: CPT | Mod: PBBFAC,,, | Performed by: FAMILY MEDICINE

## 2020-10-01 PROCEDURE — 3077F SYST BP >= 140 MM HG: CPT | Mod: CPTII,S$GLB,, | Performed by: FAMILY MEDICINE

## 2020-10-01 PROCEDURE — 3008F BODY MASS INDEX DOCD: CPT | Mod: CPTII,S$GLB,, | Performed by: FAMILY MEDICINE

## 2020-10-01 PROCEDURE — 99214 OFFICE O/P EST MOD 30 MIN: CPT | Mod: S$GLB,,, | Performed by: FAMILY MEDICINE

## 2020-10-01 PROCEDURE — 3080F DIAST BP >= 90 MM HG: CPT | Mod: CPTII,S$GLB,, | Performed by: FAMILY MEDICINE

## 2020-10-01 PROCEDURE — 3077F PR MOST RECENT SYSTOLIC BLOOD PRESSURE >= 140 MM HG: ICD-10-PCS | Mod: CPTII,S$GLB,, | Performed by: FAMILY MEDICINE

## 2020-10-01 PROCEDURE — 99999 PR PBB SHADOW E&M-EST. PATIENT-LVL IV: ICD-10-PCS | Mod: PBBFAC,,, | Performed by: FAMILY MEDICINE

## 2020-10-01 PROCEDURE — 3080F PR MOST RECENT DIASTOLIC BLOOD PRESSURE >= 90 MM HG: ICD-10-PCS | Mod: CPTII,S$GLB,, | Performed by: FAMILY MEDICINE

## 2020-10-01 PROCEDURE — 3008F PR BODY MASS INDEX (BMI) DOCUMENTED: ICD-10-PCS | Mod: CPTII,S$GLB,, | Performed by: FAMILY MEDICINE

## 2020-10-01 PROCEDURE — 99214 PR OFFICE/OUTPT VISIT, EST, LEVL IV, 30-39 MIN: ICD-10-PCS | Mod: S$GLB,,, | Performed by: FAMILY MEDICINE

## 2020-10-01 NOTE — PATIENT INSTRUCTIONS
Eating Heart-Healthy Food: Using the DASH Plan    Eating for your heart doesnt have to be hard or boring. You just need to know how to make healthier choices. The DASH eating plan has been developed to help you do just that. DASH stands for Dietary Approaches to Stop Hypertension. It is a plan that has been proven to be healthier for your heart and to lower your risk for high blood pressure. It can also help lower your risk for cancer, heart disease, osteoporosis, and diabetes.  Choosing from each food group  Choose foods from each of the food groups below each day. Try to get the recommended number of servings for each food group. The serving numbers are based on a diet of 2,000 calories a day. Talk to your doctor if youre unsure about your calorie needs. Along with getting the correct servings, the DASH plan also recommends a sodium intake less than 2,300 mg per day.        Grains  Servings: 6 to 8 a day  A serving is:  · 1 slice bread  · 1 ounce dry cereal  · Half a cup cooked rice, pasta or cereal  Best choices: Whole grains and any grains high in fiber. Vegetables  Servings: 4 to 5 a day  A serving is:  · 1 cup raw leafy vegetable  · Half a cup cut-up raw or cooked vegetable  · Half a cup vegetable juice  Best choices: Fresh or frozen vegetables prepared without added salt or fat.   Fruits  Servings: 4 to 5 a day  A serving is:  · 1 medium fruit  · One-quarter cup dried fruit  · Half a cup fresh, frozen, or canned fruit  · Half a cup of 100% fruit juices  Best choices: A variety of fresh fruits of different colors. Whole fruits are a better choice than fruit juices. Low-fat or fat-free dairy  Servings: 2 to 3 a day  A serving is:  · 1 cup milk  · 1 cup yogurt  · One and a half ounces cheese  Best choices: Skim or 1% milk, low-fat or fat-free yogurt or buttermilk, and low-fat cheeses.         Lean meats, poultry, fish  Servings: 6 or fewer a day  A serving is:  · 1 ounce cooked meats, poultry, or fish  · 1  egg  Best choices: Lean poultry and fish. Trim away visible fat. Broil, grill, roast, or boil instead of frying. Remove skin from poultry before eating. Limit how much red meat you eat.  Nuts, seeds, beans  Servings: 4 to 5 a week  A serving is:  · One-third cup nuts (one and a half ounces)  · 2 tablespoons nut butter or seeds  · Half a cup cooked dry beans or legumes  Best choices: Dry roasted nuts with no salt added, lentils, kidney beans, garbanzo beans, and whole vidal beans.   Fats and oils  Servings: 2 to 3 a day  A serving is:  · 1 teaspoon vegetable oil  · 1 teaspoon soft margarine  · 1 tablespoon mayonnaise  · 2 tablespoons salad dressing  Best choices: Nut and vegetable oils (nontropical vegetable oils), such as olive and canola oil. Sweets  Servings: 5 a week or fewer  A serving is:  · 1 tablespoon sugar, maple syrup, or honey  · 1 tablespoon jam or jelly  · 1 half-ounce jelly beans (about 15)  · 1 cup lemonade  Best choices: Dried fruit can be a satisfying sweet. Choose low-fat sweets. And watch your serving sizes!      For more on the DASH eating plan, visit:  www.nhlbi.nih.gov/health/health-topics/topics/dash   Date Last Reviewed: 6/1/2016  © 1809-3998 [employer]. [address] [place] [address], [place] [address]. All rights reserved. This information is not intended as a substitute for professional medical care. Always follow your healthcare professional's instructions.

## 2020-10-04 NOTE — PROGRESS NOTES
"Subjective:       Patient ID: Malaika Craven is a 60 y.o. female.    Chief Complaint: Neck Pain (SINCE TAKING DIET PILLS ), DRY MOUTH (SINCE TAKING DIET PILLS-"BURNING MOUTH"), and Mole (BACK-LOWER)    HPI     Hypertension:  The patient has been taking her blood pressure medications as directed, denies any side effects of the medication.  The patient is currently taking losartan 50 mg daily.  The last cholesterol levels were slightly elevated.    Fatigue:  The patient complains of feeling tired, the patient last blood work did not reveal any abnormalities.  She complains of burning sensation in the mouth and also metallic taste.    Obesity:  The patient stated that is difficult for her to lose weight.  She has been trying to eat healthier.    Mole:  The patient complains of a skin lesion and wants this to be checked.    Past medical history, past social history was reviewed and discussed with the patient.    Review of Systems   Constitutional: Positive for fatigue. Negative for activity change and appetite change.   HENT: Negative for congestion and ear discharge.    Eyes: Negative for discharge and itching.   Respiratory: Negative for choking and chest tightness.    Cardiovascular: Negative for chest pain and leg swelling.   Gastrointestinal: Negative for abdominal distention and abdominal pain.   Endocrine: Negative for cold intolerance and heat intolerance.   Genitourinary: Negative for dysuria and flank pain.   Musculoskeletal: Negative for arthralgias and back pain.   Skin: Negative for pallor and rash.        Skin lesion   Allergic/Immunologic: Negative for environmental allergies and food allergies.   Neurological: Negative for dizziness and facial asymmetry.   Hematological: Negative for adenopathy. Does not bruise/bleed easily.   Psychiatric/Behavioral: Negative for agitation and confusion.       Objective:      Physical Exam  Vitals signs and nursing note reviewed.   Constitutional:       General: She " is not in acute distress.     Appearance: She is well-developed. She is obese. She is not diaphoretic.   HENT:      Head: Normocephalic and atraumatic.      Right Ear: External ear normal.      Left Ear: External ear normal.      Nose: Nose normal.   Eyes:      General:         Right eye: No discharge.         Left eye: No discharge.      Conjunctiva/sclera: Conjunctivae normal.   Neck:      Musculoskeletal: Neck supple.   Cardiovascular:      Rate and Rhythm: Normal rate and regular rhythm.      Heart sounds: Normal heart sounds. No murmur.   Pulmonary:      Effort: Pulmonary effort is normal. No respiratory distress.      Breath sounds: Normal breath sounds.   Abdominal:      General: There is no distension.      Palpations: There is no mass.   Musculoskeletal:         General: No tenderness or deformity.   Skin:     Coloration: Skin is not pale.      Findings: No erythema.      Comments: Skin lesion   Neurological:      Cranial Nerves: No cranial nerve deficit.      Coordination: Coordination normal.   Psychiatric:         Behavior: Behavior normal.         Thought Content: Thought content normal.         Judgment: Judgment normal.                     Assessment:       1. Essential hypertension    2. Other fatigue    3. Class 1 obesity due to excess calories with serious comorbidity and body mass index (BMI) of 33.0 to 33.9 in adult    4. Decreased sense of taste    5. Screening for colon cancer    6. Encounter for screening mammogram for breast cancer        Plan:       Essential hypertension:  Uncontrolled  -     Comprehensive metabolic panel; Future; Expected date: 10/01/2020  -     Lipid Panel; Future; Expected date: 10/01/2020  -     Microalbumin/creatinine urine ratio; Future; Expected date: 10/01/2020    Other fatigue:  New problem workup needed  -     CBC auto differential; Future; Expected date: 10/01/2020  -     TSH; Future; Expected date: 10/01/2020    Class 1 obesity due to excess calories with  serious comorbidity and body mass index (BMI) of 33.0 to 33.9 in adult:  New problem workup needed  -     INSULIN, RANDOM; Future; Expected date: 10/01/2020  -     Vitamin D; Future; Expected date: 10/01/2020    Decreased sense of taste:  New problem workup needed  -     Vitamin B12; Future; Expected date: 10/01/2020  -     Folate; Future; Expected date: 10/01/2020  -     Zinc; Future; Expected date: 10/01/2020    Skin lesion:  New problem, next visit workup    Screening for colon cancer  -     Fecal Immunochemical Test (iFOBT); Future; Expected date: 10/01/2020    Encounter for screening mammogram for breast cancer  -     Mammo Digital Screening Bilat; Future; Expected date: 10/01/2020      Will call the patient after we have the results of the test, healthy habits, weight loss, increase physical activity as tolerated.  Fecal test was ordered and a mammogram.  Skin lesion most likely seborrheic keratosis, will monitor this, will recheck at her next office visit, if necessary, will refer the patient to see the dermatologist.  The patient's BMI has been recorded in the chart. The patient has been provided educational materials regarding the benefits of attaining and maintaining a normal weight. We will continue to address and follow this issue during follow up visits.   Patient agreed with assessment and plan. Patient verbalized understanding.

## 2020-10-05 ENCOUNTER — PATIENT MESSAGE (OUTPATIENT)
Dept: ADMINISTRATIVE | Facility: HOSPITAL | Age: 60
End: 2020-10-05

## 2020-10-07 ENCOUNTER — HOSPITAL ENCOUNTER (OUTPATIENT)
Dept: RADIOLOGY | Facility: HOSPITAL | Age: 60
Discharge: HOME | End: 2020-10-07
Attending: FAMILY MEDICINE
Payer: COMMERCIAL

## 2020-10-07 DIAGNOSIS — Z12.31 ENCOUNTER FOR SCREENING MAMMOGRAM FOR MALIGNANT NEOPLASM OF BREAST: ICD-10-CM

## 2020-10-07 DIAGNOSIS — Z12.31 SCREENING MAMMOGRAM, ENCOUNTER FOR: ICD-10-CM

## 2020-10-07 PROCEDURE — 77067 SCR MAMMO BI INCL CAD: CPT | Mod: TC,PO

## 2020-10-07 PROCEDURE — 77067 MAMMO DIGITAL SCREENING BILAT WITH TOMO: ICD-10-PCS | Mod: 26,,, | Performed by: RADIOLOGY

## 2020-10-07 PROCEDURE — 77067 SCR MAMMO BI INCL CAD: CPT | Mod: 26,,, | Performed by: RADIOLOGY

## 2020-10-07 PROCEDURE — 77063 MAMMO DIGITAL SCREENING BILAT WITH TOMO: ICD-10-PCS | Mod: 26,,, | Performed by: RADIOLOGY

## 2020-10-07 PROCEDURE — 77063 BREAST TOMOSYNTHESIS BI: CPT | Mod: 26,,, | Performed by: RADIOLOGY

## 2020-10-09 ENCOUNTER — OFFICE VISIT (OUTPATIENT)
Dept: FAMILY MEDICINE | Facility: CLINIC | Age: 60
End: 2020-10-09
Payer: COMMERCIAL

## 2020-10-09 VITALS
WEIGHT: 188.06 LBS | BODY MASS INDEX: 33.32 KG/M2 | TEMPERATURE: 98 F | HEART RATE: 96 BPM | DIASTOLIC BLOOD PRESSURE: 82 MMHG | OXYGEN SATURATION: 98 % | HEIGHT: 63 IN | SYSTOLIC BLOOD PRESSURE: 148 MMHG

## 2020-10-09 DIAGNOSIS — E55.9 VITAMIN D DEFICIENCY: Primary | ICD-10-CM

## 2020-10-09 DIAGNOSIS — E66.09 CLASS 1 OBESITY DUE TO EXCESS CALORIES WITH SERIOUS COMORBIDITY AND BODY MASS INDEX (BMI) OF 33.0 TO 33.9 IN ADULT: ICD-10-CM

## 2020-10-09 DIAGNOSIS — I10 ESSENTIAL HYPERTENSION: ICD-10-CM

## 2020-10-09 DIAGNOSIS — E53.8 VITAMIN B12 DEFICIENCY: ICD-10-CM

## 2020-10-09 DIAGNOSIS — K13.79 MOUTH PAIN: ICD-10-CM

## 2020-10-09 PROCEDURE — 3079F PR MOST RECENT DIASTOLIC BLOOD PRESSURE 80-89 MM HG: ICD-10-PCS | Mod: CPTII,S$GLB,, | Performed by: PHYSICIAN ASSISTANT

## 2020-10-09 PROCEDURE — 99999 PR PBB SHADOW E&M-EST. PATIENT-LVL IV: ICD-10-PCS | Mod: PBBFAC,,, | Performed by: PHYSICIAN ASSISTANT

## 2020-10-09 PROCEDURE — 99999 PR PBB SHADOW E&M-EST. PATIENT-LVL IV: CPT | Mod: PBBFAC,,, | Performed by: PHYSICIAN ASSISTANT

## 2020-10-09 PROCEDURE — 3008F BODY MASS INDEX DOCD: CPT | Mod: CPTII,S$GLB,, | Performed by: PHYSICIAN ASSISTANT

## 2020-10-09 PROCEDURE — 3079F DIAST BP 80-89 MM HG: CPT | Mod: CPTII,S$GLB,, | Performed by: PHYSICIAN ASSISTANT

## 2020-10-09 PROCEDURE — 99214 PR OFFICE/OUTPT VISIT, EST, LEVL IV, 30-39 MIN: ICD-10-PCS | Mod: S$GLB,,, | Performed by: PHYSICIAN ASSISTANT

## 2020-10-09 PROCEDURE — 3008F PR BODY MASS INDEX (BMI) DOCUMENTED: ICD-10-PCS | Mod: CPTII,S$GLB,, | Performed by: PHYSICIAN ASSISTANT

## 2020-10-09 PROCEDURE — 3077F PR MOST RECENT SYSTOLIC BLOOD PRESSURE >= 140 MM HG: ICD-10-PCS | Mod: CPTII,S$GLB,, | Performed by: PHYSICIAN ASSISTANT

## 2020-10-09 PROCEDURE — 3077F SYST BP >= 140 MM HG: CPT | Mod: CPTII,S$GLB,, | Performed by: PHYSICIAN ASSISTANT

## 2020-10-09 PROCEDURE — 99214 OFFICE O/P EST MOD 30 MIN: CPT | Mod: S$GLB,,, | Performed by: PHYSICIAN ASSISTANT

## 2020-10-09 RX ORDER — LOSARTAN POTASSIUM AND HYDROCHLOROTHIAZIDE 12.5; 5 MG/1; MG/1
1 TABLET ORAL DAILY
Qty: 90 TABLET | Refills: 3 | Status: SHIPPED | OUTPATIENT
Start: 2020-10-09 | End: 2020-10-16 | Stop reason: ALTCHOICE

## 2020-10-09 RX ORDER — ERGOCALCIFEROL 1.25 MG/1
50000 CAPSULE ORAL WEEKLY
Qty: 8 CAPSULE | Refills: 0 | Status: SHIPPED | OUTPATIENT
Start: 2020-10-09 | End: 2020-10-09

## 2020-10-09 NOTE — PROGRESS NOTES
"Subjective:       Patient ID: Malaika Craven is a 60 y.o. female    Chief Complaint: Hypertension (flollow up)    HPI  The patient is new to me, PCP is Dr August.  She present today for follow up after her recent visit with Dr August  She has uncontrolled hypertension that has been treated in the past with Losartan.  She admits that she has gained 30 + pounds over the past few months.  She has been treated with stimulants in the past and that has helped.     Mouth burning:  She has mouth "burning pain" that began several years ago.  She does not wake up with pain but it occurs every day.      Review of Systems   Constitutional: Negative for chills and fever.   HENT: Negative for congestion and sore throat.    Respiratory: Negative for cough and shortness of breath.    Cardiovascular: Negative for chest pain and palpitations.   Gastrointestinal: Negative for abdominal pain, constipation, diarrhea, nausea and vomiting.   Genitourinary: Negative for dysuria, frequency and vaginal discharge.   Musculoskeletal: Negative for back pain.   Skin: Negative for rash.   Neurological: Negative for dizziness.        Objective:   Physical Exam  Constitutional:       General: She is not in acute distress.     Appearance: She is well-developed. She is not diaphoretic.   HENT:      Head: Normocephalic and atraumatic.   Eyes:      Pupils: Pupils are equal, round, and reactive to light.   Neck:      Musculoskeletal: Normal range of motion and neck supple.   Cardiovascular:      Rate and Rhythm: Normal rate and regular rhythm.      Heart sounds: Normal heart sounds. No murmur. No friction rub. No gallop.    Pulmonary:      Effort: Pulmonary effort is normal. No respiratory distress.      Breath sounds: Normal breath sounds. No wheezing or rales.   Chest:      Chest wall: No tenderness.   Abdominal:      General: Bowel sounds are normal. There is no distension.      Palpations: Abdomen is soft. There is no mass.      Tenderness: There " is no abdominal tenderness. There is no guarding.   Musculoskeletal: Normal range of motion.   Skin:     General: Skin is warm and dry.      Findings: No rash.   Neurological:      Mental Status: She is alert and oriented to person, place, and time.   Psychiatric:         Behavior: Behavior normal.         Thought Content: Thought content normal.         Judgment: Judgment normal.          Assessment:       1. Vitamin D deficiency  ergocalciferol (ERGOCALCIFEROL) 50,000 unit Cap    Vitamin D   2. Essential hypertension  losartan-hydrochlorothiazide 50-12.5 mg (HYZAAR) 50-12.5 mg per tablet   3. Vitamin B12 deficiency     4. Mouth pain     5. Class 1 obesity due to excess calories with serious comorbidity and body mass index (BMI) of 33.0 to 33.9 in adult          Plan:       Vitamin D deficiency  -     ergocalciferol (ERGOCALCIFEROL) 50,000 unit Cap; Take 1 capsule (50,000 Units total) by mouth once a week. for 8 days  Dispense: 8 capsule; Refill: 0  -     Vitamin D; Future; Expected date: 12/04/2020    Essential hypertension  -     START losartan-hydrochlorothiazide 50-12.5 mg (HYZAAR) 50-12.5 mg per tablet; Take 1 tablet by mouth once daily.  Dispense: 90 tablet; Refill: 3  - STOP Losartan 50 mg    Vitamin B12 deficiency  - recommended that we do a B12 injection today, pre-authorization popped up and patient did not get a shot today    Mouth pain   - treat B12 def    Class 1 obesity due to excess calories with serious comorbidity and body mass index (BMI) of 33.0 to 33.9 in adult  - weight loss recommendation , recommended whole 30 diet or ideal protein

## 2020-10-12 ENCOUNTER — PATIENT OUTREACH (OUTPATIENT)
Dept: ADMINISTRATIVE | Facility: HOSPITAL | Age: 60
End: 2020-10-12

## 2020-10-12 NOTE — PROGRESS NOTES
Chart review completed 10/12/2020.  Care Everywhere updates requested and reviewed.  Immunizations reconciled. Media reports reviewed.  Duplicate HM overrides and  orders removed.  Overdue HM topic chart audit and/or requested.  Overdue lab testing linked to upcoming lab appointments if applies.    Lab gloria, and DP7 Digital reviewed   Pap Smear and Lab testing     HM updated with external Pap Smear report.   Links down 10/12/2020      Health Maintenance Due   Topic Date Due    HIV Screening  1975    Shingles Vaccine (1 of 2) 2012    Colorectal Cancer Screening  2012

## 2020-10-13 ENCOUNTER — LAB VISIT (OUTPATIENT)
Dept: LAB | Facility: HOSPITAL | Age: 60
End: 2020-10-13
Attending: FAMILY MEDICINE
Payer: COMMERCIAL

## 2020-10-13 DIAGNOSIS — Z12.11 SCREENING FOR COLON CANCER: ICD-10-CM

## 2020-10-13 PROCEDURE — 82274 ASSAY TEST FOR BLOOD FECAL: CPT

## 2020-10-14 ENCOUNTER — PATIENT MESSAGE (OUTPATIENT)
Dept: FAMILY MEDICINE | Facility: CLINIC | Age: 60
End: 2020-10-14

## 2020-10-15 NOTE — TELEPHONE ENCOUNTER
Can you please schedule the patient ASAP, the patient is having symptoms of shortness of breath and very high blood pressure with the new medication.  Thank you

## 2020-10-16 ENCOUNTER — HOSPITAL ENCOUNTER (OUTPATIENT)
Dept: RADIOLOGY | Facility: HOSPITAL | Age: 60
Discharge: HOME | End: 2020-10-16
Attending: PHYSICIAN ASSISTANT
Payer: COMMERCIAL

## 2020-10-16 ENCOUNTER — OFFICE VISIT (OUTPATIENT)
Dept: FAMILY MEDICINE | Facility: CLINIC | Age: 60
End: 2020-10-16
Payer: COMMERCIAL

## 2020-10-16 VITALS
WEIGHT: 190.5 LBS | OXYGEN SATURATION: 98 % | BODY MASS INDEX: 33.74 KG/M2 | HEART RATE: 93 BPM | SYSTOLIC BLOOD PRESSURE: 146 MMHG | DIASTOLIC BLOOD PRESSURE: 94 MMHG

## 2020-10-16 DIAGNOSIS — R06.09 DYSPNEA ON EXERTION: ICD-10-CM

## 2020-10-16 DIAGNOSIS — R07.9 CHEST PAIN, UNSPECIFIED TYPE: ICD-10-CM

## 2020-10-16 DIAGNOSIS — I10 ESSENTIAL HYPERTENSION: Primary | ICD-10-CM

## 2020-10-16 PROCEDURE — 99214 PR OFFICE/OUTPT VISIT, EST, LEVL IV, 30-39 MIN: ICD-10-PCS | Mod: S$GLB,,, | Performed by: PHYSICIAN ASSISTANT

## 2020-10-16 PROCEDURE — 3080F PR MOST RECENT DIASTOLIC BLOOD PRESSURE >= 90 MM HG: ICD-10-PCS | Mod: CPTII,S$GLB,, | Performed by: PHYSICIAN ASSISTANT

## 2020-10-16 PROCEDURE — 71046 X-RAY EXAM CHEST 2 VIEWS: CPT | Mod: 26,,, | Performed by: RADIOLOGY

## 2020-10-16 PROCEDURE — 71046 XR CHEST PA AND LATERAL: ICD-10-PCS | Mod: 26,,, | Performed by: RADIOLOGY

## 2020-10-16 PROCEDURE — 3080F DIAST BP >= 90 MM HG: CPT | Mod: CPTII,S$GLB,, | Performed by: PHYSICIAN ASSISTANT

## 2020-10-16 PROCEDURE — 71046 X-RAY EXAM CHEST 2 VIEWS: CPT | Mod: TC,FY,PO

## 2020-10-16 PROCEDURE — 3008F BODY MASS INDEX DOCD: CPT | Mod: CPTII,S$GLB,, | Performed by: PHYSICIAN ASSISTANT

## 2020-10-16 PROCEDURE — 99214 OFFICE O/P EST MOD 30 MIN: CPT | Mod: S$GLB,,, | Performed by: PHYSICIAN ASSISTANT

## 2020-10-16 PROCEDURE — 99999 PR PBB SHADOW E&M-EST. PATIENT-LVL III: CPT | Mod: PBBFAC,,, | Performed by: PHYSICIAN ASSISTANT

## 2020-10-16 PROCEDURE — 3008F PR BODY MASS INDEX (BMI) DOCUMENTED: ICD-10-PCS | Mod: CPTII,S$GLB,, | Performed by: PHYSICIAN ASSISTANT

## 2020-10-16 PROCEDURE — 3077F SYST BP >= 140 MM HG: CPT | Mod: CPTII,S$GLB,, | Performed by: PHYSICIAN ASSISTANT

## 2020-10-16 PROCEDURE — U0003 INFECTIOUS AGENT DETECTION BY NUCLEIC ACID (DNA OR RNA); SEVERE ACUTE RESPIRATORY SYNDROME CORONAVIRUS 2 (SARS-COV-2) (CORONAVIRUS DISEASE [COVID-19]), AMPLIFIED PROBE TECHNIQUE, MAKING USE OF HIGH THROUGHPUT TECHNOLOGIES AS DESCRIBED BY CMS-2020-01-R: HCPCS

## 2020-10-16 PROCEDURE — 3077F PR MOST RECENT SYSTOLIC BLOOD PRESSURE >= 140 MM HG: ICD-10-PCS | Mod: CPTII,S$GLB,, | Performed by: PHYSICIAN ASSISTANT

## 2020-10-16 PROCEDURE — 99999 PR PBB SHADOW E&M-EST. PATIENT-LVL III: ICD-10-PCS | Mod: PBBFAC,,, | Performed by: PHYSICIAN ASSISTANT

## 2020-10-16 RX ORDER — LOSARTAN POTASSIUM 100 MG/1
100 TABLET ORAL DAILY
Qty: 90 TABLET | Refills: 3 | Status: SHIPPED | OUTPATIENT
Start: 2020-10-16 | End: 2021-07-04

## 2020-10-16 NOTE — PROGRESS NOTES
Subjective:       Patient ID: Malaika Craven is a 60 y.o. female    Chief Complaint: Hypertension and Dizziness    HPI  The patient is familiar to me, PCP is Dr. August.  She presents today for follow-up for hypertension.  I last saw her and we started hydrochlorothiazide on 10/9/2020.  She had an episode of chest pain and shortness of breath that woke her up on 10/13/2020.  That night her blood pressure was 177/112 and later 150/79.  He has also had some mild diarrhea, a headache and a mild cough.  She denies any fever.   She denies any current pain.      Home log:   10/14/2020 - 154/90  10/15/2020 -  142/84  10/16/20 - 147/102 and 156/103.        Review of Systems   Constitutional: Negative for activity change, chills and fever.   HENT: Negative for congestion and sore throat.    Eyes: Negative for visual disturbance.   Respiratory: Positive for cough and shortness of breath. Negative for wheezing.    Cardiovascular: Positive for chest pain (10/13/2020). Negative for palpitations.   Gastrointestinal: Positive for diarrhea. Negative for abdominal pain, nausea and vomiting.   Endocrine: Negative for polydipsia and polyuria.   Musculoskeletal: Negative for myalgias.   Skin: Negative for rash.   Neurological: Positive for headaches.   Psychiatric/Behavioral: The patient is not nervous/anxious.         Objective:   Physical Exam  Constitutional:       General: She is not in acute distress.     Appearance: She is well-developed. She is not diaphoretic.   HENT:      Head: Normocephalic and atraumatic.   Eyes:      Pupils: Pupils are equal, round, and reactive to light.   Neck:      Musculoskeletal: Normal range of motion and neck supple.   Cardiovascular:      Rate and Rhythm: Normal rate and regular rhythm.      Heart sounds: Normal heart sounds. No murmur. No friction rub. No gallop.    Pulmonary:      Effort: Pulmonary effort is normal. No respiratory distress.      Breath sounds: Normal breath sounds. No stridor.  No wheezing, rhonchi or rales.   Chest:      Chest wall: No tenderness.   Abdominal:      General: Bowel sounds are normal. There is no distension.      Palpations: Abdomen is soft. There is no mass.      Tenderness: There is no abdominal tenderness. There is no guarding.   Musculoskeletal: Normal range of motion.   Skin:     General: Skin is warm and dry.      Findings: No rash.   Neurological:      Mental Status: She is alert and oriented to person, place, and time.   Psychiatric:         Behavior: Behavior normal.         Thought Content: Thought content normal.         Judgment: Judgment normal.          Assessment:       1. Essential hypertension  losartan (COZAAR) 100 MG tablet   2. Chest pain, unspecified type  X-Ray Chest PA And Lateral    Stress Echo Which stress agent will be used? Treadmill Exercise; Color Flow Doppler? No   3. Dyspnea on exertion  COVID-19 Routine Screening        Plan:       Essential hypertension  -    INCREASED losartan (COZAAR) 100 MG tablet; Take 1 tablet (100 mg total) by mouth once daily.  Dispense: 90 tablet; Refill: 3  - STOP HCTZ    Chest pain, unspecified type  -     X-Ray Chest PA And Lateral; Future; Expected date: 10/16/2020  -     Stress Echo Which stress agent will be used? Treadmill Exercise; Color Flow Doppler? No; Future    Dyspnea on exertion  -     COVID-19 Routine Screening; Future; Expected date: 10/16/2020

## 2020-10-17 LAB — SARS-COV-2 RNA RESP QL NAA+PROBE: NOT DETECTED

## 2020-10-20 LAB — HEMOCCULT STL QL IA: NEGATIVE

## 2020-10-26 ENCOUNTER — OFFICE VISIT (OUTPATIENT)
Dept: FAMILY MEDICINE | Facility: CLINIC | Age: 60
End: 2020-10-26
Payer: COMMERCIAL

## 2020-10-26 VITALS
BODY MASS INDEX: 34.44 KG/M2 | OXYGEN SATURATION: 99 % | DIASTOLIC BLOOD PRESSURE: 80 MMHG | SYSTOLIC BLOOD PRESSURE: 162 MMHG | HEART RATE: 87 BPM | TEMPERATURE: 98 F | WEIGHT: 194.44 LBS

## 2020-10-26 DIAGNOSIS — I10 ESSENTIAL HYPERTENSION: Primary | ICD-10-CM

## 2020-10-26 DIAGNOSIS — K13.79 MOUTH PAIN: ICD-10-CM

## 2020-10-26 DIAGNOSIS — E53.8 B12 DEFICIENCY: ICD-10-CM

## 2020-10-26 PROCEDURE — 3008F PR BODY MASS INDEX (BMI) DOCUMENTED: ICD-10-PCS | Mod: CPTII,S$GLB,, | Performed by: PHYSICIAN ASSISTANT

## 2020-10-26 PROCEDURE — 99214 PR OFFICE/OUTPT VISIT, EST, LEVL IV, 30-39 MIN: ICD-10-PCS | Mod: 25,S$GLB,, | Performed by: PHYSICIAN ASSISTANT

## 2020-10-26 PROCEDURE — 3008F BODY MASS INDEX DOCD: CPT | Mod: CPTII,S$GLB,, | Performed by: PHYSICIAN ASSISTANT

## 2020-10-26 PROCEDURE — 96372 PR INJECTION,THERAP/PROPH/DIAG2ST, IM OR SUBCUT: ICD-10-PCS | Mod: S$GLB,,, | Performed by: PHYSICIAN ASSISTANT

## 2020-10-26 PROCEDURE — 99999 PR PBB SHADOW E&M-EST. PATIENT-LVL III: CPT | Mod: PBBFAC,,, | Performed by: PHYSICIAN ASSISTANT

## 2020-10-26 PROCEDURE — 99999 PR PBB SHADOW E&M-EST. PATIENT-LVL III: ICD-10-PCS | Mod: PBBFAC,,, | Performed by: PHYSICIAN ASSISTANT

## 2020-10-26 PROCEDURE — 99214 OFFICE O/P EST MOD 30 MIN: CPT | Mod: 25,S$GLB,, | Performed by: PHYSICIAN ASSISTANT

## 2020-10-26 PROCEDURE — 3079F DIAST BP 80-89 MM HG: CPT | Mod: CPTII,S$GLB,, | Performed by: PHYSICIAN ASSISTANT

## 2020-10-26 PROCEDURE — 3079F PR MOST RECENT DIASTOLIC BLOOD PRESSURE 80-89 MM HG: ICD-10-PCS | Mod: CPTII,S$GLB,, | Performed by: PHYSICIAN ASSISTANT

## 2020-10-26 PROCEDURE — 3077F SYST BP >= 140 MM HG: CPT | Mod: CPTII,S$GLB,, | Performed by: PHYSICIAN ASSISTANT

## 2020-10-26 PROCEDURE — 96372 THER/PROPH/DIAG INJ SC/IM: CPT | Mod: S$GLB,,, | Performed by: PHYSICIAN ASSISTANT

## 2020-10-26 PROCEDURE — 3077F PR MOST RECENT SYSTOLIC BLOOD PRESSURE >= 140 MM HG: ICD-10-PCS | Mod: CPTII,S$GLB,, | Performed by: PHYSICIAN ASSISTANT

## 2020-10-26 RX ORDER — CYANOCOBALAMIN 1000 UG/ML
1000 INJECTION, SOLUTION INTRAMUSCULAR; SUBCUTANEOUS
Status: COMPLETED | OUTPATIENT
Start: 2020-10-26 | End: 2020-10-26

## 2020-10-26 RX ORDER — AMLODIPINE BESYLATE 5 MG/1
5 TABLET ORAL DAILY
Qty: 30 TABLET | Refills: 0 | Status: SHIPPED | OUTPATIENT
Start: 2020-10-26 | End: 2020-10-26

## 2020-10-26 RX ADMIN — CYANOCOBALAMIN 1000 MCG: 1000 INJECTION, SOLUTION INTRAMUSCULAR; SUBCUTANEOUS at 10:10

## 2020-10-26 NOTE — PROGRESS NOTES
Subjective:       Patient ID: Malaika Craven is a 60 y.o. female    Chief Complaint: Follow-up, Hypertension, and Oral Pain    HPI  The patient is familiar to me, PCP is Dr. August.  She presents today for hypertension follow-up.  We increased her losartan to 100 mg daily at her last visit.  She had a possible reaction to hydrochlorothiazide and we stopped that medication. She denies any headaches or blurred vision.  See home log:           Mouth pain:   She described as a ache in the gums, inside of lips and tounge that began several year ago.     Review of Systems   Constitutional: Negative for activity change, chills and fever.   HENT: Negative for congestion and sore throat.         Mouth pain   Eyes: Negative for visual disturbance.   Respiratory: Negative for cough and shortness of breath.    Cardiovascular: Negative for chest pain and palpitations.   Gastrointestinal: Negative for abdominal pain, diarrhea, nausea and vomiting.   Endocrine: Negative for polydipsia and polyuria.   Musculoskeletal: Negative for myalgias.   Skin: Negative for rash.   Neurological: Negative for headaches.   Psychiatric/Behavioral: The patient is not nervous/anxious.         Objective:   Physical Exam  Constitutional:       General: She is not in acute distress.     Appearance: She is well-developed.   HENT:      Head: Normocephalic and atraumatic.   Eyes:      Pupils: Pupils are equal, round, and reactive to light.   Neck:      Musculoskeletal: Normal range of motion and neck supple.      Thyroid: No thyromegaly.   Cardiovascular:      Rate and Rhythm: Normal rate and regular rhythm.      Heart sounds: Normal heart sounds.   Pulmonary:      Effort: Pulmonary effort is normal.      Breath sounds: Normal breath sounds.   Musculoskeletal: Normal range of motion.   Skin:     General: Skin is warm and dry.      Findings: No erythema or rash.   Neurological:      Mental Status: She is alert and oriented to person, place, and time.    Psychiatric:         Behavior: Behavior normal.         Thought Content: Thought content normal.         Judgment: Judgment normal.          Assessment:       1. Essential hypertension  amLODIPine (NORVASC) 5 MG tablet   2. B12 deficiency  cyanocobalamin injection 1,000 mcg   3. Mouth pain  cyanocobalamin injection 1,000 mcg        Plan:       Essential hypertension  -    START  amLODIPine (NORVASC) 5 MG tablet; Take 1 tablet (5 mg total) by mouth once daily.  Dispense: 30 tablet; Refill: 0  - CONTINUE Losartan 100 mg DAILY  - RTC 2 weeks and bring home log    B12 deficiency  -     cyanocobalamin injection 1,000 mcg    Mouth pain  -     cyanocobalamin injection 1,000 mcg

## 2020-10-26 NOTE — PROGRESS NOTES
After obtaining consent, and per orders of Jeanette CARPIO injection of Cyanocobalamin 1,000 mcg given in right dorsal gluteal by Brooke Akins. Patient instructed to remain in clinic for 15 minutes afterwards, and to report any adverse reaction to me immediately.

## 2020-10-29 ENCOUNTER — PATIENT OUTREACH (OUTPATIENT)
Dept: ADMINISTRATIVE | Facility: HOSPITAL | Age: 60
End: 2020-10-29

## 2020-10-29 NOTE — PROGRESS NOTES
10/29/2020 Care Everywhere updates requested and reviewed.  Immunizations reconciled. Media reports reviewed.  Duplicate HM overrides and  orders removed.  Overdue HM topic chart audit and/or requested.  Overdue lab testing linked to upcoming lab appointments if applies.            Health Maintenance Due   Topic Date Due    HIV Screening  1977    Shingles Vaccine (1 of 2) 2012

## 2020-11-06 ENCOUNTER — CLINICAL SUPPORT (OUTPATIENT)
Dept: CARDIOLOGY | Facility: CLINIC | Age: 60
End: 2020-11-06
Attending: PHYSICIAN ASSISTANT
Payer: COMMERCIAL

## 2020-11-06 VITALS
BODY MASS INDEX: 34.45 KG/M2 | HEIGHT: 63 IN | DIASTOLIC BLOOD PRESSURE: 79 MMHG | SYSTOLIC BLOOD PRESSURE: 133 MMHG | WEIGHT: 194.44 LBS | HEART RATE: 83 BPM

## 2020-11-06 DIAGNOSIS — R07.9 CHEST PAIN, UNSPECIFIED TYPE: ICD-10-CM

## 2020-11-06 LAB
ASCENDING AORTA: 2.37 CM
BSA FOR ECHO PROCEDURE: 1.98 M2
CV ECHO LV RWT: 0.44 CM
CV STRESS BASE HR: 83 BPM
DIASTOLIC BLOOD PRESSURE: 79 MMHG
DOP CALC LVOT AREA: 3 CM2
DOP CALC LVOT DIAMETER: 1.96 CM
DOP CALC LVOT PEAK VEL: 1.3 M/S
DOP CALC LVOT STROKE VOLUME: 63.09 CM3
DOP CALCLVOT PEAK VEL VTI: 20.92 CM
E WAVE DECELERATION TIME: 205.22 MSEC
E/A RATIO: 1.17
E/E' RATIO: 10.27 M/S
ECHO LV POSTERIOR WALL: 0.9 CM (ref 0.6–1.1)
FRACTIONAL SHORTENING: 31 % (ref 28–44)
INTERVENTRICULAR SEPTUM: 1.01 CM (ref 0.6–1.1)
IVRT: 88.49 MSEC
LA MAJOR: 4.16 CM
LA MINOR: 4.28 CM
LA WIDTH: 3.41 CM
LEFT ATRIUM SIZE: 3.42 CM
LEFT ATRIUM VOLUME INDEX: 21.9 ML/M2
LEFT ATRIUM VOLUME: 41.82 CM3
LEFT INTERNAL DIMENSION IN SYSTOLE: 2.83 CM (ref 2.1–4)
LEFT VENTRICLE DIASTOLIC VOLUME INDEX: 39.12 ML/M2
LEFT VENTRICLE DIASTOLIC VOLUME: 74.75 ML
LEFT VENTRICLE MASS INDEX: 65 G/M2
LEFT VENTRICLE SYSTOLIC VOLUME INDEX: 15.9 ML/M2
LEFT VENTRICLE SYSTOLIC VOLUME: 30.42 ML
LEFT VENTRICULAR INTERNAL DIMENSION IN DIASTOLE: 4.11 CM (ref 3.5–6)
LEFT VENTRICULAR MASS: 124.35 G
LV LATERAL E/E' RATIO: 9.63 M/S
LV SEPTAL E/E' RATIO: 11 M/S
MV PEAK A VEL: 0.66 M/S
MV PEAK E VEL: 0.77 M/S
MV STENOSIS PRESSURE HALF TIME: 59.51 MS
MV VALVE AREA P 1/2 METHOD: 3.7 CM2
OHS CV CPX 1 MINUTE RECOVERY HEART RATE: 109 BPM
OHS CV CPX 85 PERCENT MAX PREDICTED HEART RATE MALE: 132
OHS CV CPX ESTIMATED METS: 10
OHS CV CPX MAX PREDICTED HEART RATE: 155
OHS CV CPX PATIENT IS FEMALE: 1
OHS CV CPX PATIENT IS MALE: 0
OHS CV CPX PEAK DIASTOLIC BLOOD PRESSURE: 77 MMHG
OHS CV CPX PEAK HEAR RATE: 129 BPM
OHS CV CPX PEAK RATE PRESSURE PRODUCT: NORMAL
OHS CV CPX PEAK SYSTOLIC BLOOD PRESSURE: 200 MMHG
OHS CV CPX PERCENT MAX PREDICTED HEART RATE ACHIEVED: 83
OHS CV CPX RATE PRESSURE PRODUCT PRESENTING: NORMAL
PULM VEIN S/D RATIO: 1.53
PV PEAK D VEL: 0.4 M/S
PV PEAK S VEL: 0.61 M/S
RA MAJOR: 3.3 CM
RA PRESSURE: 3 MMHG
RA WIDTH: 2.66 CM
SINUS: 2.55 CM
STJ: 2.43 CM
STRESS ECHO POST EXERCISE DUR MIN: 5 MINUTES
STRESS ECHO POST EXERCISE DUR SEC: 47 SECONDS
SYSTOLIC BLOOD PRESSURE: 133 MMHG
TDI LATERAL: 0.08 M/S
TDI SEPTAL: 0.07 M/S
TDI: 0.08 M/S

## 2020-11-06 PROCEDURE — 99999 PR PBB SHADOW E&M-EST. PATIENT-LVL II: ICD-10-PCS | Mod: PBBFAC,,,

## 2020-11-06 PROCEDURE — 93351 STRESS TTE COMPLETE: CPT | Mod: S$GLB,,, | Performed by: INTERNAL MEDICINE

## 2020-11-06 PROCEDURE — 99999 PR PBB SHADOW E&M-EST. PATIENT-LVL II: CPT | Mod: PBBFAC,,,

## 2020-11-06 PROCEDURE — 93351 STRESS ECHO (CUPID ONLY): ICD-10-PCS | Mod: S$GLB,,, | Performed by: INTERNAL MEDICINE

## 2020-11-13 ENCOUNTER — OFFICE VISIT (OUTPATIENT)
Dept: FAMILY MEDICINE | Facility: CLINIC | Age: 60
End: 2020-11-13
Payer: COMMERCIAL

## 2020-11-13 VITALS
DIASTOLIC BLOOD PRESSURE: 70 MMHG | BODY MASS INDEX: 35.38 KG/M2 | SYSTOLIC BLOOD PRESSURE: 126 MMHG | HEART RATE: 90 BPM | TEMPERATURE: 98 F | OXYGEN SATURATION: 99 % | HEIGHT: 62 IN | WEIGHT: 192.25 LBS

## 2020-11-13 DIAGNOSIS — K13.79 MOUTH PAIN: ICD-10-CM

## 2020-11-13 DIAGNOSIS — E55.9 VITAMIN D DEFICIENCY: ICD-10-CM

## 2020-11-13 DIAGNOSIS — I10 ESSENTIAL HYPERTENSION: Primary | ICD-10-CM

## 2020-11-13 DIAGNOSIS — E66.01 CLASS 2 SEVERE OBESITY DUE TO EXCESS CALORIES WITH SERIOUS COMORBIDITY AND BODY MASS INDEX (BMI) OF 35.0 TO 35.9 IN ADULT: ICD-10-CM

## 2020-11-13 PROCEDURE — 3074F SYST BP LT 130 MM HG: CPT | Mod: CPTII,S$GLB,, | Performed by: FAMILY MEDICINE

## 2020-11-13 PROCEDURE — 3008F BODY MASS INDEX DOCD: CPT | Mod: CPTII,S$GLB,, | Performed by: FAMILY MEDICINE

## 2020-11-13 PROCEDURE — 1125F PR PAIN SEVERITY QUANTIFIED, PAIN PRESENT: ICD-10-PCS | Mod: S$GLB,,, | Performed by: FAMILY MEDICINE

## 2020-11-13 PROCEDURE — 1125F AMNT PAIN NOTED PAIN PRSNT: CPT | Mod: S$GLB,,, | Performed by: FAMILY MEDICINE

## 2020-11-13 PROCEDURE — 99214 OFFICE O/P EST MOD 30 MIN: CPT | Mod: S$GLB,,, | Performed by: FAMILY MEDICINE

## 2020-11-13 PROCEDURE — 3008F PR BODY MASS INDEX (BMI) DOCUMENTED: ICD-10-PCS | Mod: CPTII,S$GLB,, | Performed by: FAMILY MEDICINE

## 2020-11-13 PROCEDURE — 3074F PR MOST RECENT SYSTOLIC BLOOD PRESSURE < 130 MM HG: ICD-10-PCS | Mod: CPTII,S$GLB,, | Performed by: FAMILY MEDICINE

## 2020-11-13 PROCEDURE — 3078F PR MOST RECENT DIASTOLIC BLOOD PRESSURE < 80 MM HG: ICD-10-PCS | Mod: CPTII,S$GLB,, | Performed by: FAMILY MEDICINE

## 2020-11-13 PROCEDURE — 99214 PR OFFICE/OUTPT VISIT, EST, LEVL IV, 30-39 MIN: ICD-10-PCS | Mod: S$GLB,,, | Performed by: FAMILY MEDICINE

## 2020-11-13 PROCEDURE — 3078F DIAST BP <80 MM HG: CPT | Mod: CPTII,S$GLB,, | Performed by: FAMILY MEDICINE

## 2020-11-13 PROCEDURE — 99999 PR PBB SHADOW E&M-EST. PATIENT-LVL IV: ICD-10-PCS | Mod: PBBFAC,,, | Performed by: FAMILY MEDICINE

## 2020-11-13 PROCEDURE — 99999 PR PBB SHADOW E&M-EST. PATIENT-LVL IV: CPT | Mod: PBBFAC,,, | Performed by: FAMILY MEDICINE

## 2020-11-13 RX ORDER — AMLODIPINE BESYLATE 5 MG/1
5 TABLET ORAL DAILY
COMMUNITY
End: 2020-12-02

## 2020-11-13 NOTE — PATIENT INSTRUCTIONS
Eating Heart-Healthy Food: Using the DASH Plan    Eating for your heart doesnt have to be hard or boring. You just need to know how to make healthier choices. The DASH eating plan has been developed to help you do just that. DASH stands for Dietary Approaches to Stop Hypertension. It is a plan that has been proven to be healthier for your heart and to lower your risk for high blood pressure. It can also help lower your risk for cancer, heart disease, osteoporosis, and diabetes.  Choosing from each food group  Choose foods from each of the food groups below each day. Try to get the recommended number of servings for each food group. The serving numbers are based on a diet of 2,000 calories a day. Talk to your doctor if youre unsure about your calorie needs. Along with getting the correct servings, the DASH plan also recommends a sodium intake less than 2,300 mg per day.        Grains  Servings: 6 to 8 a day  A serving is:  · 1 slice bread  · 1 ounce dry cereal  · Half a cup cooked rice, pasta or cereal  Best choices: Whole grains and any grains high in fiber. Vegetables  Servings: 4 to 5 a day  A serving is:  · 1 cup raw leafy vegetable  · Half a cup cut-up raw or cooked vegetable  · Half a cup vegetable juice  Best choices: Fresh or frozen vegetables prepared without added salt or fat.   Fruits  Servings: 4 to 5 a day  A serving is:  · 1 medium fruit  · One-quarter cup dried fruit  · Half a cup fresh, frozen, or canned fruit  · Half a cup of 100% fruit juices  Best choices: A variety of fresh fruits of different colors. Whole fruits are a better choice than fruit juices. Low-fat or fat-free dairy  Servings: 2 to 3 a day  A serving is:  · 1 cup milk  · 1 cup yogurt  · One and a half ounces cheese  Best choices: Skim or 1% milk, low-fat or fat-free yogurt or buttermilk, and low-fat cheeses.         Lean meats, poultry, fish  Servings: 6 or fewer a day  A serving is:  · 1 ounce cooked meats, poultry, or fish  · 1  egg  Best choices: Lean poultry and fish. Trim away visible fat. Broil, grill, roast, or boil instead of frying. Remove skin from poultry before eating. Limit how much red meat you eat.  Nuts, seeds, beans  Servings: 4 to 5 a week  A serving is:  · One-third cup nuts (one and a half ounces)  · 2 tablespoons nut butter or seeds  · Half a cup cooked dry beans or legumes  Best choices: Dry roasted nuts with no salt added, lentils, kidney beans, garbanzo beans, and whole vidal beans.   Fats and oils  Servings: 2 to 3 a day  A serving is:  · 1 teaspoon vegetable oil  · 1 teaspoon soft margarine  · 1 tablespoon mayonnaise  · 2 tablespoons salad dressing  Best choices: Nut and vegetable oils (nontropical vegetable oils), such as olive and canola oil. Sweets  Servings: 5 a week or fewer  A serving is:  · 1 tablespoon sugar, maple syrup, or honey  · 1 tablespoon jam or jelly  · 1 half-ounce jelly beans (about 15)  · 1 cup lemonade  Best choices: Dried fruit can be a satisfying sweet. Choose low-fat sweets. And watch your serving sizes!      For more on the DASH eating plan, visit:  www.nhlbi.nih.gov/health/health-topics/topics/dash   Date Last Reviewed: 6/1/2016  © 5612-6350 [employer]. [address] [place] [address], [place] [address]. All rights reserved. This information is not intended as a substitute for professional medical care. Always follow your healthcare professional's instructions.

## 2020-11-22 NOTE — PROGRESS NOTES
Subjective:       Patient ID: Malaika Craven is a 60 y.o. female.    Chief Complaint: Hypertension and Pain ( tingling, burning feeling upper and lower mouth )    HPI     Hypertension:  The patient is coming here today for a follow-up on hypertension, she has been taking amlodipine losartan as directed, she did not bring a log of the blood pressure readings from home.  The patient stated that is doing well, denies any chest pain shortness of breath at this office visit.    Mouth pain:  The patient still complaining of tingling, burning sensation feeling upper and lower mouth, the symptoms are getting worse.  The patient is coming here for assessment.    Vitamin-D deficiency:  The levels for vitamin-D were 9, the patient is currently taking high dosage of vitamin-D, denies any problems taking the medication.    Obesity:  The patient gained 2 lb since her last office visit, stated that she has been eating healthier.    Past medical history, past social history was reviewed and discussed with the patient.    Review of Systems   Constitutional: Negative for activity change, appetite change and chills.   HENT: Negative for congestion and ear discharge.    Eyes: Negative for discharge and itching.   Respiratory: Negative for choking and chest tightness.    Cardiovascular: Negative for chest pain and leg swelling.   Gastrointestinal: Negative for abdominal distention and abdominal pain.   Endocrine: Negative for cold intolerance and heat intolerance.   Genitourinary: Negative for dysuria and flank pain.   Musculoskeletal: Negative for arthralgias and back pain.   Skin: Negative for pallor and rash.   Allergic/Immunologic: Negative for environmental allergies and food allergies.   Neurological: Negative for dizziness and facial asymmetry.   Hematological: Negative for adenopathy. Does not bruise/bleed easily.   Psychiatric/Behavioral: Negative for agitation and confusion.       Objective:      Physical Exam  Vitals  signs and nursing note reviewed.   Constitutional:       General: She is not in acute distress.     Appearance: She is well-developed. She is not diaphoretic.   HENT:      Head: Normocephalic and atraumatic.      Right Ear: External ear normal.      Left Ear: External ear normal.      Nose: Nose normal.      Mouth/Throat:      Pharynx: No oropharyngeal exudate.   Eyes:      General:         Right eye: No discharge.         Left eye: No discharge.      Conjunctiva/sclera: Conjunctivae normal.   Neck:      Musculoskeletal: Neck supple.   Cardiovascular:      Rate and Rhythm: Normal rate and regular rhythm.      Heart sounds: Normal heart sounds. No murmur.   Pulmonary:      Effort: Pulmonary effort is normal. No respiratory distress.      Breath sounds: Normal breath sounds. No wheezing.   Abdominal:      General: There is no distension.      Palpations: There is no mass.   Musculoskeletal:         General: No tenderness or deformity.   Skin:     Coloration: Skin is not pale.      Findings: No erythema.   Neurological:      Cranial Nerves: No cranial nerve deficit.   Psychiatric:         Behavior: Behavior normal.         Thought Content: Thought content normal.         Judgment: Judgment normal.         Assessment:       1. Essential hypertension    2. Vitamin D deficiency    3. Class 2 severe obesity due to excess calories with serious comorbidity and body mass index (BMI) of 35.0 to 35.9 in adult    4. Mouth pain        Plan:       Essential hypertension:  Well controlled    Vitamin D deficiency:  Uncontrolled    Class 2 severe obesity due to excess calories with serious comorbidity and body mass index (BMI) of 35.0 to 35.9 in adult:  Worsening    Mouth pain:  Worsening  -     Ambulatory referral/consult to ENT; Future; Expected date: 11/20/2020      Continue with amlodipine 5 mg and losartan 100 mg daily, continue vitamin-D daily, healthy eating habits, avoid fried foods, red meat and processed starches, will  refer the patient to the ENT secondary to mouth pain.  The patient's BMI has been recorded in the chart. The patient has been provided educational materials regarding the benefits of attaining and maintaining a normal weight. We will continue to address and follow this issue during follow up visits.   Patient agreed with assessment and plan. Patient verbalized understanding.

## 2020-12-02 ENCOUNTER — PATIENT MESSAGE (OUTPATIENT)
Dept: FAMILY MEDICINE | Facility: CLINIC | Age: 60
End: 2020-12-02

## 2020-12-02 RX ORDER — METOPROLOL SUCCINATE 25 MG/1
25 TABLET, EXTENDED RELEASE ORAL DAILY
Qty: 30 TABLET | Refills: 11 | Status: SHIPPED | OUTPATIENT
Start: 2020-12-02 | End: 2021-07-04

## 2020-12-02 NOTE — TELEPHONE ENCOUNTER
Please schedule the patient to see me back in 2 weeks, I started a new medication and see if the medicine is working.  Thank you

## 2020-12-08 ENCOUNTER — OFFICE VISIT (OUTPATIENT)
Dept: OTOLARYNGOLOGY | Facility: CLINIC | Age: 60
End: 2020-12-08
Payer: COMMERCIAL

## 2020-12-08 VITALS — HEIGHT: 62 IN | BODY MASS INDEX: 36.35 KG/M2 | WEIGHT: 197.56 LBS

## 2020-12-08 DIAGNOSIS — K13.79 MOUTH PAIN: ICD-10-CM

## 2020-12-08 PROCEDURE — 99243 PR OFFICE CONSULTATION,LEVEL III: ICD-10-PCS | Mod: S$GLB,,, | Performed by: OTOLARYNGOLOGY

## 2020-12-08 PROCEDURE — 99243 OFF/OP CNSLTJ NEW/EST LOW 30: CPT | Mod: S$GLB,,, | Performed by: OTOLARYNGOLOGY

## 2020-12-08 PROCEDURE — 1125F PR PAIN SEVERITY QUANTIFIED, PAIN PRESENT: ICD-10-PCS | Mod: S$GLB,,, | Performed by: OTOLARYNGOLOGY

## 2020-12-08 PROCEDURE — 99999 PR PBB SHADOW E&M-EST. PATIENT-LVL III: ICD-10-PCS | Mod: PBBFAC,,, | Performed by: OTOLARYNGOLOGY

## 2020-12-08 PROCEDURE — 1125F AMNT PAIN NOTED PAIN PRSNT: CPT | Mod: S$GLB,,, | Performed by: OTOLARYNGOLOGY

## 2020-12-08 PROCEDURE — 3008F PR BODY MASS INDEX (BMI) DOCUMENTED: ICD-10-PCS | Mod: CPTII,S$GLB,, | Performed by: OTOLARYNGOLOGY

## 2020-12-08 PROCEDURE — 3008F BODY MASS INDEX DOCD: CPT | Mod: CPTII,S$GLB,, | Performed by: OTOLARYNGOLOGY

## 2020-12-08 PROCEDURE — 99999 PR PBB SHADOW E&M-EST. PATIENT-LVL III: CPT | Mod: PBBFAC,,, | Performed by: OTOLARYNGOLOGY

## 2020-12-08 RX ORDER — TRIAMCINOLONE ACETONIDE 1 MG/G
PASTE DENTAL
Qty: 5 G | Refills: 2 | Status: SHIPPED | OUTPATIENT
Start: 2020-12-08 | End: 2021-09-21

## 2020-12-08 NOTE — LETTER
December 10, 2020      Vani August MD  [address] [facility] [address] [place] [address]           [place] [facility]  [address] [place] [address]  Phone: [phone]  Fax: [phone]          Patient: Malaika Craven   MR Number: [id number]   YOB: 1960   Date of Visit: 12/8/2020       Dear Dr. Vani August:    Thank you for referring Malaika Craven to me for evaluation. Attached you will find relevant portions of my assessment and plan of care.    If you have questions, please do not hesitate to call me. I look forward to following Malaika Craven along with you.    Sincerely,    Jarrett Zayas MD    Enclosure  CC:  No Recipients    If you would like to receive this communication electronically, please contact [email or web address] or [phone] to request more information on [facility] access.    For providers and/or their staff who would like to refer a patient to [facility], please contact us through our one-stop-shop provider referral line, [facility], at [phone].    If you feel you have received this communication in error or would no longer like to receive these types of communications, please e-mail [email or web address]

## 2020-12-08 NOTE — PROGRESS NOTES
Subjective:       Patient ID: Malaika Craven is a 60 y.o. female.    Chief Complaint: Oral Pain (interior of both lips and frontal gums)    Malaika is here for oral cavity pain.   Length of symptoms: 5 years. Has been stable during this time. She describes a tingling around the inner aspect of her lips and burning around the gums in the anterior oral cavity. She denies pain or irritation of her tongue or pharynx. Has tried OTC dry mouth meds. She has had a dental evaluation a few times which was unremarkable.    This began coincidentally with taking a diet pill Apidex for 1-1.5 years.   She has dryness on a regular basis. She drinks 2 bottle per day of water. 2 cups coffee, 1 32 oz soda, 1-2 glasses of wine in evening.   Therapies tried include: Cymbalta, longterm  Had remote dry eye issues, but no issues currently.     Pertinent meds: 2 BP meds.   Pertinent medical issues: htn    Social History     Tobacco Use   Smoking Status Never Smoker   Smokeless Tobacco Never Used     Social History     Substance and Sexual Activity   Alcohol Use Yes    Comment: 2 drinks per week          Review of Systems   Constitutional: Negative for activity change and appetite change.   Eyes: Negative for discharge.   Respiratory: Negative for difficulty breathing and wheezing   Cardiovascular: Negative for chest pain.   Gastrointestinal: Negative for abdominal distention and abdominal pain.   Endocrine: Negative for cold intolerance and heat intolerance.   Genitourinary: Negative for dysuria.   Musculoskeletal: Negative for gait problem and joint swelling.   Skin: Negative for color change and pallor.   Neurological: Negative for syncope and weakness.   Psychiatric/Behavioral: Negative for agitation and confusion.     Objective:        Constitutional:   She is oriented to person, place, and time. She appears well-developed and well-nourished. She appears alert. She is active. Normal speech.      Head:  Normocephalic and atraumatic.  Head is without TMJ tenderness. No scars. Salivary glands normal.  Facial strength is normal.      Ears:    Right Ear: No drainage or swelling. No middle ear effusion.   Left Ear: No drainage or swelling.  No middle ear effusion.     Nose:  No mucosal edema, rhinorrhea or sinus tenderness. No turbinate hypertrophy.      Mouth/Throat  Oropharynx clear and moist without lesions or asymmetry, normal uvula midline and mirror exam normal. Normal dentition. No uvula swelling, lacerations or trismus. No oropharyngeal exudate. Tonsillar erythema, tonsillar exudate.      Neck:  Full range of motion with neck supple and no adenopathy. Thyroid tenderness is present. No tracheal deviation, no edema, no erythema, normal range of motion, no stridor, no crepitus and no neck rigidity present. No thyroid mass present.     Cardiovascular:   Intact distal pulses and normal pulses.      Pulmonary/Chest:   Effort normal and breath sounds normal. No stridor.     Psychiatric:   Her speech is normal and behavior is normal. Her mood appears not anxious. Her affect is not labile.     Neurological:   She is alert and oriented to person, place, and time. No sensory deficit.     Skin:   No abrasions, lacerations, lesions, or rashes. No abrasion and no bruising noted.         Tests / Results:  I reviewed her CMP and other recent labs which were normal except for Vit D which was low.     Assessment:       1. Mouth pain          Plan:         No lesions or visible abnormalities, reassurance provided. Low suspicion for neuropathic condition as it is limited to specific portions of the oral cavity. I did discuss hydration as a possible contributor to oral cavity irritation as well as soda which can cause local irritation.   Consider periodontal evaluation? If persistent.

## 2021-01-20 ENCOUNTER — PATIENT MESSAGE (OUTPATIENT)
Dept: FAMILY MEDICINE | Facility: CLINIC | Age: 61
End: 2021-01-20

## 2021-07-06 ENCOUNTER — PATIENT MESSAGE (OUTPATIENT)
Dept: FAMILY MEDICINE | Facility: CLINIC | Age: 61
End: 2021-07-06

## 2021-07-07 ENCOUNTER — TELEPHONE (OUTPATIENT)
Dept: FAMILY MEDICINE | Facility: CLINIC | Age: 61
End: 2021-07-07

## 2021-07-07 ENCOUNTER — PATIENT MESSAGE (OUTPATIENT)
Dept: FAMILY MEDICINE | Facility: CLINIC | Age: 61
End: 2021-07-07

## 2021-07-08 ENCOUNTER — PATIENT MESSAGE (OUTPATIENT)
Dept: FAMILY MEDICINE | Facility: CLINIC | Age: 61
End: 2021-07-08

## 2021-07-09 ENCOUNTER — OFFICE VISIT (OUTPATIENT)
Dept: FAMILY MEDICINE | Facility: CLINIC | Age: 61
End: 2021-07-09
Payer: COMMERCIAL

## 2021-07-09 DIAGNOSIS — I10 HYPERTENSION, UNSPECIFIED TYPE: Primary | ICD-10-CM

## 2021-07-09 DIAGNOSIS — T78.3XXD ANGIOEDEMA, SUBSEQUENT ENCOUNTER: ICD-10-CM

## 2021-07-09 PROCEDURE — 99214 PR OFFICE/OUTPT VISIT, EST, LEVL IV, 30-39 MIN: ICD-10-PCS | Mod: 95,,, | Performed by: PHYSICIAN ASSISTANT

## 2021-07-09 PROCEDURE — 99214 OFFICE O/P EST MOD 30 MIN: CPT | Mod: 95,,, | Performed by: PHYSICIAN ASSISTANT

## 2021-07-11 ENCOUNTER — PATIENT MESSAGE (OUTPATIENT)
Dept: FAMILY MEDICINE | Facility: CLINIC | Age: 61
End: 2021-07-11

## 2021-07-12 ENCOUNTER — OFFICE VISIT (OUTPATIENT)
Dept: FAMILY MEDICINE | Facility: CLINIC | Age: 61
End: 2021-07-12
Payer: COMMERCIAL

## 2021-07-12 VITALS
SYSTOLIC BLOOD PRESSURE: 124 MMHG | HEIGHT: 62 IN | RESPIRATION RATE: 18 BRPM | WEIGHT: 188.5 LBS | OXYGEN SATURATION: 98 % | TEMPERATURE: 99 F | DIASTOLIC BLOOD PRESSURE: 82 MMHG | BODY MASS INDEX: 34.69 KG/M2 | HEART RATE: 88 BPM

## 2021-07-12 DIAGNOSIS — I10 ESSENTIAL HYPERTENSION: ICD-10-CM

## 2021-07-12 DIAGNOSIS — Z13.31 POSITIVE DEPRESSION SCREENING: Primary | ICD-10-CM

## 2021-07-12 DIAGNOSIS — T78.3XXA ANGIOEDEMA, INITIAL ENCOUNTER: ICD-10-CM

## 2021-07-12 PROCEDURE — 1126F AMNT PAIN NOTED NONE PRSNT: CPT | Mod: S$GLB,,, | Performed by: NURSE PRACTITIONER

## 2021-07-12 PROCEDURE — 99999 PR PBB SHADOW E&M-EST. PATIENT-LVL V: ICD-10-PCS | Mod: PBBFAC,,, | Performed by: NURSE PRACTITIONER

## 2021-07-12 PROCEDURE — 3074F SYST BP LT 130 MM HG: CPT | Mod: CPTII,S$GLB,, | Performed by: NURSE PRACTITIONER

## 2021-07-12 PROCEDURE — 3008F BODY MASS INDEX DOCD: CPT | Mod: CPTII,S$GLB,, | Performed by: NURSE PRACTITIONER

## 2021-07-12 PROCEDURE — 99214 OFFICE O/P EST MOD 30 MIN: CPT | Mod: S$GLB,,, | Performed by: NURSE PRACTITIONER

## 2021-07-12 PROCEDURE — 99214 PR OFFICE/OUTPT VISIT, EST, LEVL IV, 30-39 MIN: ICD-10-PCS | Mod: S$GLB,,, | Performed by: NURSE PRACTITIONER

## 2021-07-12 PROCEDURE — 3079F DIAST BP 80-89 MM HG: CPT | Mod: CPTII,S$GLB,, | Performed by: NURSE PRACTITIONER

## 2021-07-12 PROCEDURE — 3079F PR MOST RECENT DIASTOLIC BLOOD PRESSURE 80-89 MM HG: ICD-10-PCS | Mod: CPTII,S$GLB,, | Performed by: NURSE PRACTITIONER

## 2021-07-12 PROCEDURE — 3074F PR MOST RECENT SYSTOLIC BLOOD PRESSURE < 130 MM HG: ICD-10-PCS | Mod: CPTII,S$GLB,, | Performed by: NURSE PRACTITIONER

## 2021-07-12 PROCEDURE — 99999 PR PBB SHADOW E&M-EST. PATIENT-LVL V: CPT | Mod: PBBFAC,,, | Performed by: NURSE PRACTITIONER

## 2021-07-12 PROCEDURE — 3008F PR BODY MASS INDEX (BMI) DOCUMENTED: ICD-10-PCS | Mod: CPTII,S$GLB,, | Performed by: NURSE PRACTITIONER

## 2021-07-12 PROCEDURE — 1126F PR PAIN SEVERITY QUANTIFIED, NO PAIN PRESENT: ICD-10-PCS | Mod: S$GLB,,, | Performed by: NURSE PRACTITIONER

## 2021-07-12 RX ORDER — EPINEPHRINE 0.3 MG/.3ML
1 INJECTION SUBCUTANEOUS ONCE
Qty: 2 EACH | Refills: 3 | Status: SHIPPED | OUTPATIENT
Start: 2021-07-12 | End: 2023-02-01

## 2021-07-13 ENCOUNTER — TELEPHONE (OUTPATIENT)
Dept: FAMILY MEDICINE | Facility: CLINIC | Age: 61
End: 2021-07-13

## 2021-07-13 ENCOUNTER — TELEPHONE (OUTPATIENT)
Dept: ALLERGY | Facility: CLINIC | Age: 61
End: 2021-07-13

## 2021-07-13 ENCOUNTER — PATIENT MESSAGE (OUTPATIENT)
Dept: FAMILY MEDICINE | Facility: CLINIC | Age: 61
End: 2021-07-13

## 2021-07-14 ENCOUNTER — TELEPHONE (OUTPATIENT)
Dept: FAMILY MEDICINE | Facility: CLINIC | Age: 61
End: 2021-07-14

## 2021-07-14 ENCOUNTER — PATIENT MESSAGE (OUTPATIENT)
Dept: FAMILY MEDICINE | Facility: CLINIC | Age: 61
End: 2021-07-14

## 2021-07-20 ENCOUNTER — TELEPHONE (OUTPATIENT)
Dept: PSYCHIATRY | Facility: CLINIC | Age: 61
End: 2021-07-20

## 2021-07-21 ENCOUNTER — TELEPHONE (OUTPATIENT)
Dept: PSYCHIATRY | Facility: CLINIC | Age: 61
End: 2021-07-21

## 2021-08-02 ENCOUNTER — PATIENT MESSAGE (OUTPATIENT)
Dept: ADMINISTRATIVE | Facility: HOSPITAL | Age: 61
End: 2021-08-02

## 2021-08-13 ENCOUNTER — OFFICE VISIT (OUTPATIENT)
Dept: FAMILY MEDICINE | Facility: CLINIC | Age: 61
End: 2021-08-13
Payer: COMMERCIAL

## 2021-08-13 VITALS
RESPIRATION RATE: 18 BRPM | DIASTOLIC BLOOD PRESSURE: 68 MMHG | HEART RATE: 90 BPM | HEIGHT: 62 IN | SYSTOLIC BLOOD PRESSURE: 130 MMHG | OXYGEN SATURATION: 95 % | WEIGHT: 189.13 LBS | BODY MASS INDEX: 34.8 KG/M2

## 2021-08-13 DIAGNOSIS — I10 HYPERTENSION, UNSPECIFIED TYPE: ICD-10-CM

## 2021-08-13 DIAGNOSIS — T78.3XXD ANGIOEDEMA, SUBSEQUENT ENCOUNTER: ICD-10-CM

## 2021-08-13 DIAGNOSIS — F41.0 SEVERE ANXIETY WITH PANIC: ICD-10-CM

## 2021-08-13 DIAGNOSIS — Z00.00 ROUTINE GENERAL MEDICAL EXAMINATION AT A HEALTH CARE FACILITY: Primary | ICD-10-CM

## 2021-08-13 PROCEDURE — 1160F RVW MEDS BY RX/DR IN RCRD: CPT | Mod: CPTII,S$GLB,, | Performed by: STUDENT IN AN ORGANIZED HEALTH CARE EDUCATION/TRAINING PROGRAM

## 2021-08-13 PROCEDURE — 1159F PR MEDICATION LIST DOCUMENTED IN MEDICAL RECORD: ICD-10-PCS | Mod: CPTII,S$GLB,, | Performed by: STUDENT IN AN ORGANIZED HEALTH CARE EDUCATION/TRAINING PROGRAM

## 2021-08-13 PROCEDURE — 99396 PR PREVENTIVE VISIT,EST,40-64: ICD-10-PCS | Mod: S$GLB,,, | Performed by: STUDENT IN AN ORGANIZED HEALTH CARE EDUCATION/TRAINING PROGRAM

## 2021-08-13 PROCEDURE — 3008F PR BODY MASS INDEX (BMI) DOCUMENTED: ICD-10-PCS | Mod: CPTII,S$GLB,, | Performed by: STUDENT IN AN ORGANIZED HEALTH CARE EDUCATION/TRAINING PROGRAM

## 2021-08-13 PROCEDURE — 3075F SYST BP GE 130 - 139MM HG: CPT | Mod: CPTII,S$GLB,, | Performed by: STUDENT IN AN ORGANIZED HEALTH CARE EDUCATION/TRAINING PROGRAM

## 2021-08-13 PROCEDURE — 3078F DIAST BP <80 MM HG: CPT | Mod: CPTII,S$GLB,, | Performed by: STUDENT IN AN ORGANIZED HEALTH CARE EDUCATION/TRAINING PROGRAM

## 2021-08-13 PROCEDURE — 1125F PR PAIN SEVERITY QUANTIFIED, PAIN PRESENT: ICD-10-PCS | Mod: CPTII,S$GLB,, | Performed by: STUDENT IN AN ORGANIZED HEALTH CARE EDUCATION/TRAINING PROGRAM

## 2021-08-13 PROCEDURE — 99396 PREV VISIT EST AGE 40-64: CPT | Mod: S$GLB,,, | Performed by: STUDENT IN AN ORGANIZED HEALTH CARE EDUCATION/TRAINING PROGRAM

## 2021-08-13 PROCEDURE — 99999 PR PBB SHADOW E&M-EST. PATIENT-LVL IV: ICD-10-PCS | Mod: PBBFAC,,, | Performed by: STUDENT IN AN ORGANIZED HEALTH CARE EDUCATION/TRAINING PROGRAM

## 2021-08-13 PROCEDURE — 99999 PR PBB SHADOW E&M-EST. PATIENT-LVL IV: CPT | Mod: PBBFAC,,, | Performed by: STUDENT IN AN ORGANIZED HEALTH CARE EDUCATION/TRAINING PROGRAM

## 2021-08-13 PROCEDURE — 1160F PR REVIEW ALL MEDS BY PRESCRIBER/CLIN PHARMACIST DOCUMENTED: ICD-10-PCS | Mod: CPTII,S$GLB,, | Performed by: STUDENT IN AN ORGANIZED HEALTH CARE EDUCATION/TRAINING PROGRAM

## 2021-08-13 PROCEDURE — 1159F MED LIST DOCD IN RCRD: CPT | Mod: CPTII,S$GLB,, | Performed by: STUDENT IN AN ORGANIZED HEALTH CARE EDUCATION/TRAINING PROGRAM

## 2021-08-13 PROCEDURE — 3075F PR MOST RECENT SYSTOLIC BLOOD PRESS GE 130-139MM HG: ICD-10-PCS | Mod: CPTII,S$GLB,, | Performed by: STUDENT IN AN ORGANIZED HEALTH CARE EDUCATION/TRAINING PROGRAM

## 2021-08-13 PROCEDURE — 3008F BODY MASS INDEX DOCD: CPT | Mod: CPTII,S$GLB,, | Performed by: STUDENT IN AN ORGANIZED HEALTH CARE EDUCATION/TRAINING PROGRAM

## 2021-08-13 PROCEDURE — 1125F AMNT PAIN NOTED PAIN PRSNT: CPT | Mod: CPTII,S$GLB,, | Performed by: STUDENT IN AN ORGANIZED HEALTH CARE EDUCATION/TRAINING PROGRAM

## 2021-08-13 PROCEDURE — 3078F PR MOST RECENT DIASTOLIC BLOOD PRESSURE < 80 MM HG: ICD-10-PCS | Mod: CPTII,S$GLB,, | Performed by: STUDENT IN AN ORGANIZED HEALTH CARE EDUCATION/TRAINING PROGRAM

## 2021-08-13 RX ORDER — BUSPIRONE HYDROCHLORIDE 7.5 MG/1
TABLET ORAL
COMMUNITY
Start: 2021-08-05 | End: 2021-09-17 | Stop reason: DRUGHIGH

## 2021-08-13 RX ORDER — NIFEDIPINE 60 MG/1
TABLET, EXTENDED RELEASE ORAL
COMMUNITY
Start: 2021-07-15

## 2021-08-13 RX ORDER — ALPRAZOLAM 0.5 MG/1
TABLET ORAL
COMMUNITY
Start: 2021-08-05 | End: 2021-10-14 | Stop reason: SDUPTHER

## 2021-08-13 RX ORDER — TRAZODONE HYDROCHLORIDE 50 MG/1
TABLET ORAL
COMMUNITY
Start: 2021-08-05 | End: 2021-09-17

## 2021-08-13 RX ORDER — PREDNISONE 20 MG/1
40 TABLET ORAL DAILY
COMMUNITY
Start: 2021-07-19 | End: 2021-08-13

## 2021-08-13 RX ORDER — HYDROCHLOROTHIAZIDE 25 MG/1
25 TABLET ORAL DAILY
COMMUNITY
Start: 2021-07-15 | End: 2022-03-22 | Stop reason: SDUPTHER

## 2021-08-16 ENCOUNTER — TELEPHONE (OUTPATIENT)
Dept: PSYCHIATRY | Facility: CLINIC | Age: 61
End: 2021-08-16

## 2021-09-17 ENCOUNTER — OFFICE VISIT (OUTPATIENT)
Dept: PSYCHIATRY | Facility: CLINIC | Age: 61
End: 2021-09-17
Payer: COMMERCIAL

## 2021-09-17 VITALS
HEART RATE: 83 BPM | HEIGHT: 62 IN | WEIGHT: 188.25 LBS | SYSTOLIC BLOOD PRESSURE: 129 MMHG | DIASTOLIC BLOOD PRESSURE: 81 MMHG | BODY MASS INDEX: 34.64 KG/M2

## 2021-09-17 DIAGNOSIS — F41.0 PANIC DISORDER: ICD-10-CM

## 2021-09-17 DIAGNOSIS — F32.1 CURRENT MODERATE EPISODE OF MAJOR DEPRESSIVE DISORDER WITHOUT PRIOR EPISODE: Primary | ICD-10-CM

## 2021-09-17 DIAGNOSIS — F43.9 TRAUMA AND STRESSOR-RELATED DISORDER: ICD-10-CM

## 2021-09-17 DIAGNOSIS — F41.1 GAD (GENERALIZED ANXIETY DISORDER): ICD-10-CM

## 2021-09-17 PROCEDURE — 3074F PR MOST RECENT SYSTOLIC BLOOD PRESSURE < 130 MM HG: ICD-10-PCS | Mod: CPTII,S$GLB,, | Performed by: PHYSICIAN ASSISTANT

## 2021-09-17 PROCEDURE — 3079F PR MOST RECENT DIASTOLIC BLOOD PRESSURE 80-89 MM HG: ICD-10-PCS | Mod: CPTII,S$GLB,, | Performed by: PHYSICIAN ASSISTANT

## 2021-09-17 PROCEDURE — 99999 PR PBB SHADOW E&M-EST. PATIENT-LVL IV: ICD-10-PCS | Mod: PBBFAC,,, | Performed by: PHYSICIAN ASSISTANT

## 2021-09-17 PROCEDURE — 1160F RVW MEDS BY RX/DR IN RCRD: CPT | Mod: CPTII,S$GLB,, | Performed by: PHYSICIAN ASSISTANT

## 2021-09-17 PROCEDURE — 3008F BODY MASS INDEX DOCD: CPT | Mod: CPTII,S$GLB,, | Performed by: PHYSICIAN ASSISTANT

## 2021-09-17 PROCEDURE — 1160F PR REVIEW ALL MEDS BY PRESCRIBER/CLIN PHARMACIST DOCUMENTED: ICD-10-PCS | Mod: CPTII,S$GLB,, | Performed by: PHYSICIAN ASSISTANT

## 2021-09-17 PROCEDURE — 1159F PR MEDICATION LIST DOCUMENTED IN MEDICAL RECORD: ICD-10-PCS | Mod: CPTII,S$GLB,, | Performed by: PHYSICIAN ASSISTANT

## 2021-09-17 PROCEDURE — 4010F ACE/ARB THERAPY RXD/TAKEN: CPT | Mod: CPTII,S$GLB,, | Performed by: PHYSICIAN ASSISTANT

## 2021-09-17 PROCEDURE — 4010F PR ACE/ARB THEARPY RXD/TAKEN: ICD-10-PCS | Mod: CPTII,S$GLB,, | Performed by: PHYSICIAN ASSISTANT

## 2021-09-17 PROCEDURE — 3008F PR BODY MASS INDEX (BMI) DOCUMENTED: ICD-10-PCS | Mod: CPTII,S$GLB,, | Performed by: PHYSICIAN ASSISTANT

## 2021-09-17 PROCEDURE — 3074F SYST BP LT 130 MM HG: CPT | Mod: CPTII,S$GLB,, | Performed by: PHYSICIAN ASSISTANT

## 2021-09-17 PROCEDURE — 99999 PR PBB SHADOW E&M-EST. PATIENT-LVL IV: CPT | Mod: PBBFAC,,, | Performed by: PHYSICIAN ASSISTANT

## 2021-09-17 PROCEDURE — 90792 PSYCH DIAG EVAL W/MED SRVCS: CPT | Mod: S$GLB,,, | Performed by: PHYSICIAN ASSISTANT

## 2021-09-17 PROCEDURE — 1159F MED LIST DOCD IN RCRD: CPT | Mod: CPTII,S$GLB,, | Performed by: PHYSICIAN ASSISTANT

## 2021-09-17 PROCEDURE — 3079F DIAST BP 80-89 MM HG: CPT | Mod: CPTII,S$GLB,, | Performed by: PHYSICIAN ASSISTANT

## 2021-09-17 PROCEDURE — 90792 PR PSYCHIATRIC DIAGNOSTIC EVALUATION W/MEDICAL SERVICES: ICD-10-PCS | Mod: S$GLB,,, | Performed by: PHYSICIAN ASSISTANT

## 2021-09-17 RX ORDER — SERTRALINE HYDROCHLORIDE 25 MG/1
25 TABLET, FILM COATED ORAL DAILY
Qty: 30 TABLET | Refills: 1 | Status: SHIPPED | OUTPATIENT
Start: 2021-09-17 | End: 2021-10-14 | Stop reason: SDUPTHER

## 2021-09-17 RX ORDER — BUSPIRONE HYDROCHLORIDE 15 MG/1
15 TABLET ORAL 2 TIMES DAILY
COMMUNITY
Start: 2021-08-16 | End: 2021-09-17 | Stop reason: SDUPTHER

## 2021-09-17 RX ORDER — ACETAMINOPHEN 500 MG
1 TABLET ORAL DAILY
COMMUNITY

## 2021-09-17 RX ORDER — TRAZODONE HYDROCHLORIDE 100 MG/1
50 TABLET ORAL NIGHTLY
Start: 2021-09-17 | End: 2022-02-03 | Stop reason: SDUPTHER

## 2021-09-17 RX ORDER — TRAZODONE HYDROCHLORIDE 100 MG/1
100 TABLET ORAL NIGHTLY
COMMUNITY
Start: 2021-08-16 | End: 2021-09-17 | Stop reason: SDUPTHER

## 2021-09-17 RX ORDER — BUSPIRONE HYDROCHLORIDE 10 MG/1
10 TABLET ORAL 2 TIMES DAILY
Start: 2021-09-17 | End: 2022-09-20 | Stop reason: SDUPTHER

## 2021-09-23 ENCOUNTER — TELEPHONE (OUTPATIENT)
Dept: PSYCHIATRY | Facility: CLINIC | Age: 61
End: 2021-09-23

## 2021-09-30 ENCOUNTER — PATIENT MESSAGE (OUTPATIENT)
Dept: FAMILY MEDICINE | Facility: CLINIC | Age: 61
End: 2021-09-30

## 2021-10-13 DIAGNOSIS — Z12.31 OTHER SCREENING MAMMOGRAM: ICD-10-CM

## 2021-10-14 ENCOUNTER — OFFICE VISIT (OUTPATIENT)
Dept: PSYCHIATRY | Facility: CLINIC | Age: 61
End: 2021-10-14
Payer: COMMERCIAL

## 2021-10-14 VITALS
SYSTOLIC BLOOD PRESSURE: 129 MMHG | HEART RATE: 84 BPM | WEIGHT: 185.19 LBS | BODY MASS INDEX: 34.08 KG/M2 | DIASTOLIC BLOOD PRESSURE: 86 MMHG | HEIGHT: 62 IN

## 2021-10-14 DIAGNOSIS — F41.0 PANIC DISORDER: ICD-10-CM

## 2021-10-14 DIAGNOSIS — F41.1 GAD (GENERALIZED ANXIETY DISORDER): Primary | ICD-10-CM

## 2021-10-14 DIAGNOSIS — F43.9 TRAUMA AND STRESSOR-RELATED DISORDER: ICD-10-CM

## 2021-10-14 DIAGNOSIS — F32.1 CURRENT MODERATE EPISODE OF MAJOR DEPRESSIVE DISORDER WITHOUT PRIOR EPISODE: ICD-10-CM

## 2021-10-14 PROCEDURE — 1159F PR MEDICATION LIST DOCUMENTED IN MEDICAL RECORD: ICD-10-PCS | Mod: CPTII,S$GLB,, | Performed by: PHYSICIAN ASSISTANT

## 2021-10-14 PROCEDURE — 99999 PR PBB SHADOW E&M-EST. PATIENT-LVL IV: ICD-10-PCS | Mod: PBBFAC,,, | Performed by: PHYSICIAN ASSISTANT

## 2021-10-14 PROCEDURE — 3074F SYST BP LT 130 MM HG: CPT | Mod: CPTII,S$GLB,, | Performed by: PHYSICIAN ASSISTANT

## 2021-10-14 PROCEDURE — 3079F PR MOST RECENT DIASTOLIC BLOOD PRESSURE 80-89 MM HG: ICD-10-PCS | Mod: CPTII,S$GLB,, | Performed by: PHYSICIAN ASSISTANT

## 2021-10-14 PROCEDURE — 3074F PR MOST RECENT SYSTOLIC BLOOD PRESSURE < 130 MM HG: ICD-10-PCS | Mod: CPTII,S$GLB,, | Performed by: PHYSICIAN ASSISTANT

## 2021-10-14 PROCEDURE — 4010F PR ACE/ARB THEARPY RXD/TAKEN: ICD-10-PCS | Mod: CPTII,S$GLB,, | Performed by: PHYSICIAN ASSISTANT

## 2021-10-14 PROCEDURE — 1160F RVW MEDS BY RX/DR IN RCRD: CPT | Mod: CPTII,S$GLB,, | Performed by: PHYSICIAN ASSISTANT

## 2021-10-14 PROCEDURE — 3008F PR BODY MASS INDEX (BMI) DOCUMENTED: ICD-10-PCS | Mod: CPTII,S$GLB,, | Performed by: PHYSICIAN ASSISTANT

## 2021-10-14 PROCEDURE — 3079F DIAST BP 80-89 MM HG: CPT | Mod: CPTII,S$GLB,, | Performed by: PHYSICIAN ASSISTANT

## 2021-10-14 PROCEDURE — 99999 PR PBB SHADOW E&M-EST. PATIENT-LVL IV: CPT | Mod: PBBFAC,,, | Performed by: PHYSICIAN ASSISTANT

## 2021-10-14 PROCEDURE — 1159F MED LIST DOCD IN RCRD: CPT | Mod: CPTII,S$GLB,, | Performed by: PHYSICIAN ASSISTANT

## 2021-10-14 PROCEDURE — 1160F PR REVIEW ALL MEDS BY PRESCRIBER/CLIN PHARMACIST DOCUMENTED: ICD-10-PCS | Mod: CPTII,S$GLB,, | Performed by: PHYSICIAN ASSISTANT

## 2021-10-14 PROCEDURE — 4010F ACE/ARB THERAPY RXD/TAKEN: CPT | Mod: CPTII,S$GLB,, | Performed by: PHYSICIAN ASSISTANT

## 2021-10-14 PROCEDURE — 99214 OFFICE O/P EST MOD 30 MIN: CPT | Mod: S$GLB,,, | Performed by: PHYSICIAN ASSISTANT

## 2021-10-14 PROCEDURE — 3008F BODY MASS INDEX DOCD: CPT | Mod: CPTII,S$GLB,, | Performed by: PHYSICIAN ASSISTANT

## 2021-10-14 PROCEDURE — 99214 PR OFFICE/OUTPT VISIT, EST, LEVL IV, 30-39 MIN: ICD-10-PCS | Mod: S$GLB,,, | Performed by: PHYSICIAN ASSISTANT

## 2021-10-14 RX ORDER — SERTRALINE HYDROCHLORIDE 50 MG/1
50 TABLET, FILM COATED ORAL DAILY
Qty: 30 TABLET | Refills: 1 | Status: SHIPPED | OUTPATIENT
Start: 2021-10-14 | End: 2021-12-20

## 2021-10-14 RX ORDER — ALPRAZOLAM 0.5 MG/1
0.5 TABLET ORAL DAILY PRN
Qty: 30 TABLET | Refills: 0 | Status: SHIPPED | OUTPATIENT
Start: 2021-10-14 | End: 2022-09-20 | Stop reason: SDUPTHER

## 2021-10-21 ENCOUNTER — OFFICE VISIT (OUTPATIENT)
Dept: PSYCHIATRY | Facility: CLINIC | Age: 61
End: 2021-10-21
Payer: COMMERCIAL

## 2021-10-21 DIAGNOSIS — F43.9 TRAUMA AND STRESSOR-RELATED DISORDER: ICD-10-CM

## 2021-10-21 PROCEDURE — 99999 PR PBB SHADOW E&M-EST. PATIENT-LVL III: CPT | Mod: PBBFAC,,, | Performed by: SOCIAL WORKER

## 2021-10-21 PROCEDURE — 4010F PR ACE/ARB THEARPY RXD/TAKEN: ICD-10-PCS | Mod: CPTII,S$GLB,, | Performed by: SOCIAL WORKER

## 2021-10-21 PROCEDURE — 1159F PR MEDICATION LIST DOCUMENTED IN MEDICAL RECORD: ICD-10-PCS | Mod: CPTII,S$GLB,, | Performed by: SOCIAL WORKER

## 2021-10-21 PROCEDURE — 90791 PSYCH DIAGNOSTIC EVALUATION: CPT | Mod: S$GLB,,, | Performed by: SOCIAL WORKER

## 2021-10-21 PROCEDURE — 1159F MED LIST DOCD IN RCRD: CPT | Mod: CPTII,S$GLB,, | Performed by: SOCIAL WORKER

## 2021-10-21 PROCEDURE — 90791 PR PSYCHIATRIC DIAGNOSTIC EVALUATION: ICD-10-PCS | Mod: S$GLB,,, | Performed by: SOCIAL WORKER

## 2021-10-21 PROCEDURE — 99999 PR PBB SHADOW E&M-EST. PATIENT-LVL III: ICD-10-PCS | Mod: PBBFAC,,, | Performed by: SOCIAL WORKER

## 2021-10-21 PROCEDURE — 4010F ACE/ARB THERAPY RXD/TAKEN: CPT | Mod: CPTII,S$GLB,, | Performed by: SOCIAL WORKER

## 2021-10-25 ENCOUNTER — HOSPITAL ENCOUNTER (EMERGENCY)
Facility: HOSPITAL | Age: 61
Discharge: HOME | End: 2021-10-25
Attending: EMERGENCY MEDICINE
Payer: COMMERCIAL

## 2021-10-25 VITALS
HEART RATE: 77 BPM | TEMPERATURE: 99 F | DIASTOLIC BLOOD PRESSURE: 78 MMHG | HEIGHT: 62 IN | WEIGHT: 170 LBS | SYSTOLIC BLOOD PRESSURE: 150 MMHG | RESPIRATION RATE: 20 BRPM | OXYGEN SATURATION: 96 % | BODY MASS INDEX: 31.28 KG/M2

## 2021-10-25 DIAGNOSIS — E87.6 HYPOKALEMIA: Primary | ICD-10-CM

## 2021-10-25 DIAGNOSIS — R20.2 PARESTHESIA: ICD-10-CM

## 2021-10-25 LAB
ALBUMIN SERPL BCP-MCNC: 4 G/DL (ref 3.5–5.2)
ALP SERPL-CCNC: 78 U/L (ref 55–135)
ALT SERPL W/O P-5'-P-CCNC: 9 U/L (ref 10–44)
ANION GAP SERPL CALC-SCNC: 13 MMOL/L (ref 8–16)
AST SERPL-CCNC: 14 U/L (ref 10–40)
BASOPHILS # BLD AUTO: 0.03 K/UL (ref 0–0.2)
BASOPHILS NFR BLD: 0.4 % (ref 0–1.9)
BILIRUB SERPL-MCNC: 0.6 MG/DL (ref 0.1–1)
BNP SERPL-MCNC: <10 PG/ML (ref 0–99)
BUN SERPL-MCNC: 20 MG/DL (ref 6–20)
CALCIUM SERPL-MCNC: 9.8 MG/DL (ref 8.7–10.5)
CHLORIDE SERPL-SCNC: 99 MMOL/L (ref 95–110)
CO2 SERPL-SCNC: 25 MMOL/L (ref 23–29)
CREAT SERPL-MCNC: 1 MG/DL (ref 0.5–1.4)
DIFFERENTIAL METHOD: ABNORMAL
EOSINOPHIL # BLD AUTO: 0.1 K/UL (ref 0–0.5)
EOSINOPHIL NFR BLD: 1 % (ref 0–8)
ERYTHROCYTE [DISTWIDTH] IN BLOOD BY AUTOMATED COUNT: 12.4 % (ref 11.5–14.5)
EST. GFR  (AFRICAN AMERICAN): >60 ML/MIN/1.73 M^2
EST. GFR  (NON AFRICAN AMERICAN): >60 ML/MIN/1.73 M^2
GLUCOSE SERPL-MCNC: 128 MG/DL (ref 70–110)
HCT VFR BLD AUTO: 39.7 % (ref 37–48.5)
HGB BLD-MCNC: 14.3 G/DL (ref 12–16)
IMM GRANULOCYTES # BLD AUTO: 0.02 K/UL (ref 0–0.04)
IMM GRANULOCYTES NFR BLD AUTO: 0.2 % (ref 0–0.5)
LYMPHOCYTES # BLD AUTO: 2.2 K/UL (ref 1–4.8)
LYMPHOCYTES NFR BLD: 26.2 % (ref 18–48)
MCH RBC QN AUTO: 31.4 PG (ref 27–31)
MCHC RBC AUTO-ENTMCNC: 36 G/DL (ref 32–36)
MCV RBC AUTO: 87 FL (ref 82–98)
MONOCYTES # BLD AUTO: 0.4 K/UL (ref 0.3–1)
MONOCYTES NFR BLD: 4.6 % (ref 4–15)
NEUTROPHILS # BLD AUTO: 5.6 K/UL (ref 1.8–7.7)
NEUTROPHILS NFR BLD: 67.6 % (ref 38–73)
NRBC BLD-RTO: 0 /100 WBC
PLATELET # BLD AUTO: 228 K/UL (ref 150–450)
PMV BLD AUTO: 11.6 FL (ref 9.2–12.9)
POTASSIUM SERPL-SCNC: 2.7 MMOL/L (ref 3.5–5.1)
PROT SERPL-MCNC: 7.4 G/DL (ref 6–8.4)
RBC # BLD AUTO: 4.55 M/UL (ref 4–5.4)
SODIUM SERPL-SCNC: 137 MMOL/L (ref 136–145)
TROPONIN I SERPL DL<=0.01 NG/ML-MCNC: <0.006 NG/ML (ref 0–0.03)
TROPONIN I SERPL DL<=0.01 NG/ML-MCNC: <0.006 NG/ML (ref 0–0.03)
WBC # BLD AUTO: 8.32 K/UL (ref 3.9–12.7)

## 2021-10-25 PROCEDURE — 99284 EMERGENCY DEPT VISIT MOD MDM: CPT | Mod: ,,, | Performed by: EMERGENCY MEDICINE

## 2021-10-25 PROCEDURE — 84484 ASSAY OF TROPONIN QUANT: CPT | Mod: 91 | Performed by: EMERGENCY MEDICINE

## 2021-10-25 PROCEDURE — 93010 EKG 12-LEAD: ICD-10-PCS | Mod: ,,, | Performed by: INTERNAL MEDICINE

## 2021-10-25 PROCEDURE — 80053 COMPREHEN METABOLIC PANEL: CPT | Performed by: EMERGENCY MEDICINE

## 2021-10-25 PROCEDURE — 25000003 PHARM REV CODE 250: Performed by: EMERGENCY MEDICINE

## 2021-10-25 PROCEDURE — 99284 EMERGENCY DEPT VISIT MOD MDM: CPT | Mod: 25

## 2021-10-25 PROCEDURE — 85025 COMPLETE CBC W/AUTO DIFF WBC: CPT | Performed by: EMERGENCY MEDICINE

## 2021-10-25 PROCEDURE — 93005 ELECTROCARDIOGRAM TRACING: CPT

## 2021-10-25 PROCEDURE — 83880 ASSAY OF NATRIURETIC PEPTIDE: CPT | Performed by: EMERGENCY MEDICINE

## 2021-10-25 PROCEDURE — 93010 ELECTROCARDIOGRAM REPORT: CPT | Mod: ,,, | Performed by: INTERNAL MEDICINE

## 2021-10-25 PROCEDURE — 99284 PR EMERGENCY DEPT VISIT,LEVEL IV: ICD-10-PCS | Mod: ,,, | Performed by: EMERGENCY MEDICINE

## 2021-10-25 RX ORDER — POTASSIUM CHLORIDE 20 MEQ/1
40 TABLET, EXTENDED RELEASE ORAL ONCE
Status: COMPLETED | OUTPATIENT
Start: 2021-10-25 | End: 2021-10-25

## 2021-10-25 RX ORDER — POTASSIUM CHLORIDE 20 MEQ/1
20 TABLET, EXTENDED RELEASE ORAL DAILY
Qty: 14 TABLET | Refills: 0 | Status: SHIPPED | OUTPATIENT
Start: 2021-10-25

## 2021-10-25 RX ADMIN — POTASSIUM CHLORIDE 40 MEQ: 1500 TABLET, EXTENDED RELEASE ORAL at 12:10

## 2021-10-27 DIAGNOSIS — Z12.11 COLON CANCER SCREENING: ICD-10-CM

## 2022-01-24 ENCOUNTER — OFFICE VISIT (OUTPATIENT)
Dept: PSYCHIATRY | Facility: CLINIC | Age: 62
End: 2022-01-24
Payer: COMMERCIAL

## 2022-01-24 DIAGNOSIS — F43.21 GRIEF: ICD-10-CM

## 2022-01-24 DIAGNOSIS — F43.9 TRAUMA AND STRESSOR-RELATED DISORDER: Primary | ICD-10-CM

## 2022-01-24 PROCEDURE — 99999 PR PBB SHADOW E&M-EST. PATIENT-LVL II: CPT | Mod: PBBFAC,,, | Performed by: SOCIAL WORKER

## 2022-01-24 PROCEDURE — 90834 PR PSYCHOTHERAPY W/PATIENT, 45 MIN: ICD-10-PCS | Mod: S$GLB,,, | Performed by: SOCIAL WORKER

## 2022-01-24 PROCEDURE — 99999 PR PBB SHADOW E&M-EST. PATIENT-LVL II: ICD-10-PCS | Mod: PBBFAC,,, | Performed by: SOCIAL WORKER

## 2022-01-24 PROCEDURE — 1159F PR MEDICATION LIST DOCUMENTED IN MEDICAL RECORD: ICD-10-PCS | Mod: CPTII,S$GLB,, | Performed by: SOCIAL WORKER

## 2022-01-24 PROCEDURE — 1159F MED LIST DOCD IN RCRD: CPT | Mod: CPTII,S$GLB,, | Performed by: SOCIAL WORKER

## 2022-01-24 PROCEDURE — 90834 PSYTX W PT 45 MINUTES: CPT | Mod: S$GLB,,, | Performed by: SOCIAL WORKER

## 2022-01-24 NOTE — PROGRESS NOTES
Individual Psychotherapy (PhD/LCSW)    1/24/2022    Site:  [facility]         Therapeutic Intervention: Met with patient.  Outpatient - Insight oriented psychotherapy 45 min - CPT code 82696, Outpatient - Behavior modifying psychotherapy 45 min - CPT code 49510 and Outpatient - Supportive psychotherapy 45 min - CPT Code 89447    Chief complaint/reason for encounter: grief, trauma, stress             Interval history and content of current session:  Client was referred to treatment to address grief and trauma.  Client arrived to session on time and was fully engaged.  Client shared that she has been feeling better since her last visit with this .  Client shared that she does have dreams about her late daughter.  She shared that she still has psychosomatic responses when she thinks about her daughter's death.  Client reported that she feels that there was more that she could have done even though logically she knows that there was not.   and client processed the anatomy of anxiety and how to combat automatic negative thoughts.   provided client with handouts for client to review to help her with that task.  Client was receptive and open to feedback.  Client reported that she would like to come in every 3 months or as needed.  Client to follow up as needed.  Per her request.  Treatment plan:  · Target symptoms: anxiety , grief, trauma, stress  · Why chosen therapy is appropriate versus another modality: relevant to diagnosis, patient responds to this modality, evidence based practice  · Outcome monitoring methods: self-report  · Therapeutic intervention type: insight oriented psychotherapy, behavior modifying psychotherapy, supportive psychotherapy    Risk parameters:  Patient reports no suicidal ideation  Patient reports no homicidal ideation  Patient reports no self-injurious behavior  Patient reports no violent behavior          CSSRS was completed: Negative for risk    Verbal  deficits: None    Patient's response to intervention:  The patient's response to intervention is accepting.    Progress toward goals and other mental status changes:  The patient's progress toward goals is fair , good.    Diagnosis:     ICD-10-CM ICD-9-CM   1. Trauma and stressor-related disorder  F43.9 309.81     308.9   2. Grief  F43.21 309.0       Plan:  individual psychotherapy Pt to go to ED or call 911 if symptoms worsen or if she has thoughts of harming self and/or others. Pt verbalized understanding.    Return to clinic: as needed    Length of Service (minutes): 45      Each patient to whom he or she provides medical services by telemedicine is: (1) informed of the relationship between the physician and patient and the respective role of any other health care provider with respect to management of the patient; and (2) notified that he or she may decline to receive medical services by telemedicine and may withdraw from such care at any time.

## 2022-02-03 ENCOUNTER — OFFICE VISIT (OUTPATIENT)
Dept: PSYCHIATRY | Facility: CLINIC | Age: 62
End: 2022-02-03
Payer: COMMERCIAL

## 2022-02-03 VITALS
WEIGHT: 189.25 LBS | HEART RATE: 80 BPM | BODY MASS INDEX: 34.82 KG/M2 | HEIGHT: 62 IN | SYSTOLIC BLOOD PRESSURE: 113 MMHG | DIASTOLIC BLOOD PRESSURE: 65 MMHG

## 2022-02-03 DIAGNOSIS — F41.1 GAD (GENERALIZED ANXIETY DISORDER): ICD-10-CM

## 2022-02-03 DIAGNOSIS — F43.10 PTSD (POST-TRAUMATIC STRESS DISORDER): Primary | ICD-10-CM

## 2022-02-03 DIAGNOSIS — F32.1 CURRENT MODERATE EPISODE OF MAJOR DEPRESSIVE DISORDER WITHOUT PRIOR EPISODE: ICD-10-CM

## 2022-02-03 PROCEDURE — 99214 OFFICE O/P EST MOD 30 MIN: CPT | Mod: S$GLB,,, | Performed by: PHYSICIAN ASSISTANT

## 2022-02-03 PROCEDURE — 3074F PR MOST RECENT SYSTOLIC BLOOD PRESSURE < 130 MM HG: ICD-10-PCS | Mod: CPTII,S$GLB,, | Performed by: PHYSICIAN ASSISTANT

## 2022-02-03 PROCEDURE — 1159F PR MEDICATION LIST DOCUMENTED IN MEDICAL RECORD: ICD-10-PCS | Mod: CPTII,S$GLB,, | Performed by: PHYSICIAN ASSISTANT

## 2022-02-03 PROCEDURE — 1160F PR REVIEW ALL MEDS BY PRESCRIBER/CLIN PHARMACIST DOCUMENTED: ICD-10-PCS | Mod: CPTII,S$GLB,, | Performed by: PHYSICIAN ASSISTANT

## 2022-02-03 PROCEDURE — 3078F DIAST BP <80 MM HG: CPT | Mod: CPTII,S$GLB,, | Performed by: PHYSICIAN ASSISTANT

## 2022-02-03 PROCEDURE — 1160F RVW MEDS BY RX/DR IN RCRD: CPT | Mod: CPTII,S$GLB,, | Performed by: PHYSICIAN ASSISTANT

## 2022-02-03 PROCEDURE — 3074F SYST BP LT 130 MM HG: CPT | Mod: CPTII,S$GLB,, | Performed by: PHYSICIAN ASSISTANT

## 2022-02-03 PROCEDURE — 3078F PR MOST RECENT DIASTOLIC BLOOD PRESSURE < 80 MM HG: ICD-10-PCS | Mod: CPTII,S$GLB,, | Performed by: PHYSICIAN ASSISTANT

## 2022-02-03 PROCEDURE — 99999 PR PBB SHADOW E&M-EST. PATIENT-LVL III: ICD-10-PCS | Mod: PBBFAC,,, | Performed by: PHYSICIAN ASSISTANT

## 2022-02-03 PROCEDURE — 99999 PR PBB SHADOW E&M-EST. PATIENT-LVL III: CPT | Mod: PBBFAC,,, | Performed by: PHYSICIAN ASSISTANT

## 2022-02-03 PROCEDURE — 3008F BODY MASS INDEX DOCD: CPT | Mod: CPTII,S$GLB,, | Performed by: PHYSICIAN ASSISTANT

## 2022-02-03 PROCEDURE — 1159F MED LIST DOCD IN RCRD: CPT | Mod: CPTII,S$GLB,, | Performed by: PHYSICIAN ASSISTANT

## 2022-02-03 PROCEDURE — 99214 PR OFFICE/OUTPT VISIT, EST, LEVL IV, 30-39 MIN: ICD-10-PCS | Mod: S$GLB,,, | Performed by: PHYSICIAN ASSISTANT

## 2022-02-03 PROCEDURE — 3008F PR BODY MASS INDEX (BMI) DOCUMENTED: ICD-10-PCS | Mod: CPTII,S$GLB,, | Performed by: PHYSICIAN ASSISTANT

## 2022-02-03 RX ORDER — SERTRALINE HYDROCHLORIDE 100 MG/1
100 TABLET, FILM COATED ORAL DAILY
Qty: 30 TABLET | Refills: 1 | Status: SHIPPED | OUTPATIENT
Start: 2022-02-03 | End: 2022-03-22 | Stop reason: SDUPTHER

## 2022-02-03 RX ORDER — TRAZODONE HYDROCHLORIDE 100 MG/1
100 TABLET ORAL NIGHTLY
Qty: 30 TABLET | Refills: 1 | Status: SHIPPED | OUTPATIENT
Start: 2022-02-03 | End: 2022-06-17
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
## 2022-02-10 ENCOUNTER — PATIENT MESSAGE (OUTPATIENT)
Dept: ADMINISTRATIVE | Facility: HOSPITAL | Age: 62
End: 2022-02-10
Payer: COMMERCIAL

## 2022-02-18 ENCOUNTER — PATIENT OUTREACH (OUTPATIENT)
Dept: ADMINISTRATIVE | Facility: HOSPITAL | Age: 62
End: 2022-02-18
Payer: COMMERCIAL

## 2022-02-18 NOTE — PROGRESS NOTES
[facility] and [facility] reviewed. No pap smear found.    Called patient. No answer. Left message along with contact information to please call back regarding scheduling/ overdue HM pap smear.

## 2022-03-22 ENCOUNTER — OFFICE VISIT (OUTPATIENT)
Dept: PSYCHIATRY | Facility: CLINIC | Age: 62
End: 2022-03-22
Payer: COMMERCIAL

## 2022-03-22 DIAGNOSIS — F41.1 GAD (GENERALIZED ANXIETY DISORDER): Primary | ICD-10-CM

## 2022-03-22 DIAGNOSIS — F32.1 CURRENT MODERATE EPISODE OF MAJOR DEPRESSIVE DISORDER WITHOUT PRIOR EPISODE: ICD-10-CM

## 2022-03-22 DIAGNOSIS — F43.10 PTSD (POST-TRAUMATIC STRESS DISORDER): ICD-10-CM

## 2022-03-22 PROCEDURE — 1159F PR MEDICATION LIST DOCUMENTED IN MEDICAL RECORD: ICD-10-PCS | Mod: CPTII,95,, | Performed by: PHYSICIAN ASSISTANT

## 2022-03-22 PROCEDURE — 1159F MED LIST DOCD IN RCRD: CPT | Mod: CPTII,95,, | Performed by: PHYSICIAN ASSISTANT

## 2022-03-22 PROCEDURE — 1160F PR REVIEW ALL MEDS BY PRESCRIBER/CLIN PHARMACIST DOCUMENTED: ICD-10-PCS | Mod: CPTII,95,, | Performed by: PHYSICIAN ASSISTANT

## 2022-03-22 PROCEDURE — 1160F RVW MEDS BY RX/DR IN RCRD: CPT | Mod: CPTII,95,, | Performed by: PHYSICIAN ASSISTANT

## 2022-03-22 PROCEDURE — 99214 PR OFFICE/OUTPT VISIT, EST, LEVL IV, 30-39 MIN: ICD-10-PCS | Mod: 95,,, | Performed by: PHYSICIAN ASSISTANT

## 2022-03-22 PROCEDURE — 99214 OFFICE O/P EST MOD 30 MIN: CPT | Mod: 95,,, | Performed by: PHYSICIAN ASSISTANT

## 2022-03-22 RX ORDER — HYDROCHLOROTHIAZIDE 25 MG/1
50 TABLET ORAL DAILY
Start: 2022-03-22 | End: 2022-09-29

## 2022-03-22 RX ORDER — SERTRALINE HYDROCHLORIDE 100 MG/1
100 TABLET, FILM COATED ORAL DAILY
Qty: 30 TABLET | Refills: 2 | Status: SHIPPED | OUTPATIENT
Start: 2022-03-22 | End: 2022-06-21 | Stop reason: SDUPTHER
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
## 2022-03-30 ENCOUNTER — PATIENT MESSAGE (OUTPATIENT)
Dept: ADMINISTRATIVE | Facility: HOSPITAL | Age: 62
End: 2022-03-30
Payer: COMMERCIAL

## 2022-03-30 ENCOUNTER — PATIENT OUTREACH (OUTPATIENT)
Dept: ADMINISTRATIVE | Facility: HOSPITAL | Age: 62
End: 2022-03-30
Payer: COMMERCIAL

## 2022-03-30 NOTE — PROGRESS NOTES
Cervical Cancer GAP report-Left VM and portal message sent out regarding overdue Pap smear and HM.

## 2022-03-30 NOTE — LETTER
April 7, 2022    Malaika Stewart Herber  [address] [place] [address]             [facility]  [address]  [facility] [address]  Phone: [phone] Dear Ms. Craven,    Good morning!!     I am reaching out to you because you are due for a Pap smear. The last pap we have on file for you was from 2016. Have you had a recent pap smear, if so when and where. If you have not had a pap smear, can you schedule one and let me know when it is scheduled for? You are also due for a Mammogram and Colon Cancer screening. Have you had these test done, if so where? If not, can I schedule this for you.      Thanks so much and have a great day!!     Yin Purvis LPN [facility]  [address] [place] [address]  P- [phone]  F- [phone]

## 2022-04-07 NOTE — TELEPHONE ENCOUNTER
"Attempted to outreach patient for pre-visit via "[employer]", no answer after a week. Sending outreach via Mail Out Letter now.  "

## 2022-04-13 ENCOUNTER — PATIENT MESSAGE (OUTPATIENT)
Dept: ADMINISTRATIVE | Facility: HOSPITAL | Age: 62
End: 2022-04-13
Payer: COMMERCIAL

## 2022-04-14 ENCOUNTER — PATIENT OUTREACH (OUTPATIENT)
Dept: ADMINISTRATIVE | Facility: HOSPITAL | Age: 62
End: 2022-04-14
Payer: COMMERCIAL

## 2022-05-10 ENCOUNTER — PATIENT OUTREACH (OUTPATIENT)
Dept: ADMINISTRATIVE | Facility: HOSPITAL | Age: 62
End: 2022-05-10
Payer: COMMERCIAL

## 2022-05-10 ENCOUNTER — PATIENT MESSAGE (OUTPATIENT)
Dept: ADMINISTRATIVE | Facility: HOSPITAL | Age: 62
End: 2022-05-10
Payer: COMMERCIAL

## 2022-05-10 NOTE — PROGRESS NOTES
Cervical Cancer GAP report-Left VM and portal message sent out regarding pt's overdue Pap smear.

## 2022-05-11 ENCOUNTER — PATIENT MESSAGE (OUTPATIENT)
Dept: ADMINISTRATIVE | Facility: HOSPITAL | Age: 62
End: 2022-05-11
Payer: COMMERCIAL

## 2022-05-23 ENCOUNTER — PATIENT MESSAGE (OUTPATIENT)
Dept: ADMINISTRATIVE | Facility: HOSPITAL | Age: 62
End: 2022-05-23
Payer: COMMERCIAL

## 2022-05-31 ENCOUNTER — PATIENT MESSAGE (OUTPATIENT)
Dept: ADMINISTRATIVE | Facility: HOSPITAL | Age: 62
End: 2022-05-31
Payer: COMMERCIAL

## 2022-06-02 ENCOUNTER — PATIENT OUTREACH (OUTPATIENT)
Dept: ADMINISTRATIVE | Facility: HOSPITAL | Age: 62
End: 2022-06-02
Payer: COMMERCIAL

## 2022-06-02 NOTE — PROGRESS NOTES
Pt responded via portal they are agreeable to do home FitKit. FitKit mailed to pt.     FitKit was given to patient on 6/2/2022 8:11 AM

## 2022-06-06 ENCOUNTER — PATIENT OUTREACH (OUTPATIENT)
Dept: ADMINISTRATIVE | Facility: HOSPITAL | Age: 62
End: 2022-06-06
Payer: COMMERCIAL

## 2022-06-09 ENCOUNTER — HOSPITAL ENCOUNTER (OUTPATIENT)
Dept: RADIOLOGY | Facility: CLINIC | Age: 62
Discharge: HOME | End: 2022-06-09
Attending: STUDENT IN AN ORGANIZED HEALTH CARE EDUCATION/TRAINING PROGRAM
Payer: COMMERCIAL

## 2022-06-09 ENCOUNTER — OFFICE VISIT (OUTPATIENT)
Dept: FAMILY MEDICINE | Facility: CLINIC | Age: 62
End: 2022-06-09
Payer: COMMERCIAL

## 2022-06-09 VITALS
SYSTOLIC BLOOD PRESSURE: 118 MMHG | TEMPERATURE: 99 F | HEIGHT: 62 IN | WEIGHT: 172.38 LBS | HEART RATE: 89 BPM | BODY MASS INDEX: 31.72 KG/M2 | OXYGEN SATURATION: 97 % | DIASTOLIC BLOOD PRESSURE: 66 MMHG

## 2022-06-09 DIAGNOSIS — K13.79 MOUTH PAIN: ICD-10-CM

## 2022-06-09 DIAGNOSIS — N95.2 ATROPHIC VAGINITIS: ICD-10-CM

## 2022-06-09 DIAGNOSIS — Z12.4 SCREENING FOR CERVICAL CANCER: ICD-10-CM

## 2022-06-09 DIAGNOSIS — Z00.00 ANNUAL PHYSICAL EXAM: Primary | ICD-10-CM

## 2022-06-09 DIAGNOSIS — Z12.31 OTHER SCREENING MAMMOGRAM: ICD-10-CM

## 2022-06-09 PROCEDURE — 88141 CYTOPATH C/V INTERPRET: CPT | Mod: ,,, | Performed by: PATHOLOGY

## 2022-06-09 PROCEDURE — 99396 PREV VISIT EST AGE 40-64: CPT | Mod: S$GLB,,, | Performed by: PHYSICIAN ASSISTANT

## 2022-06-09 PROCEDURE — 1159F MED LIST DOCD IN RCRD: CPT | Mod: CPTII,S$GLB,, | Performed by: PHYSICIAN ASSISTANT

## 2022-06-09 PROCEDURE — 1159F PR MEDICATION LIST DOCUMENTED IN MEDICAL RECORD: ICD-10-PCS | Mod: CPTII,S$GLB,, | Performed by: PHYSICIAN ASSISTANT

## 2022-06-09 PROCEDURE — 99999 PR PBB SHADOW E&M-EST. PATIENT-LVL III: ICD-10-PCS | Mod: PBBFAC,,, | Performed by: PHYSICIAN ASSISTANT

## 2022-06-09 PROCEDURE — 3074F PR MOST RECENT SYSTOLIC BLOOD PRESSURE < 130 MM HG: ICD-10-PCS | Mod: CPTII,S$GLB,, | Performed by: PHYSICIAN ASSISTANT

## 2022-06-09 PROCEDURE — 77067 SCR MAMMO BI INCL CAD: CPT | Mod: TC,PO

## 2022-06-09 PROCEDURE — 77067 MAMMO DIGITAL SCREENING BILAT WITH TOMO: ICD-10-PCS | Mod: 26,,, | Performed by: RADIOLOGY

## 2022-06-09 PROCEDURE — 99999 PR PBB SHADOW E&M-EST. PATIENT-LVL III: CPT | Mod: PBBFAC,,, | Performed by: PHYSICIAN ASSISTANT

## 2022-06-09 PROCEDURE — 88175 CYTOPATH C/V AUTO FLUID REDO: CPT | Performed by: PATHOLOGY

## 2022-06-09 PROCEDURE — 77067 SCR MAMMO BI INCL CAD: CPT | Mod: 26,,, | Performed by: RADIOLOGY

## 2022-06-09 PROCEDURE — 99396 PR PREVENTIVE VISIT,EST,40-64: ICD-10-PCS | Mod: S$GLB,,, | Performed by: PHYSICIAN ASSISTANT

## 2022-06-09 PROCEDURE — 3078F PR MOST RECENT DIASTOLIC BLOOD PRESSURE < 80 MM HG: ICD-10-PCS | Mod: CPTII,S$GLB,, | Performed by: PHYSICIAN ASSISTANT

## 2022-06-09 PROCEDURE — 3008F BODY MASS INDEX DOCD: CPT | Mod: CPTII,S$GLB,, | Performed by: PHYSICIAN ASSISTANT

## 2022-06-09 PROCEDURE — 88141 PR  CYTOPATH CERV/VAG INTERPRET: ICD-10-PCS | Mod: ,,, | Performed by: PATHOLOGY

## 2022-06-09 PROCEDURE — 87624 HPV HI-RISK TYP POOLED RSLT: CPT | Performed by: PHYSICIAN ASSISTANT

## 2022-06-09 PROCEDURE — 77063 MAMMO DIGITAL SCREENING BILAT WITH TOMO: ICD-10-PCS | Mod: 26,,, | Performed by: RADIOLOGY

## 2022-06-09 PROCEDURE — 3008F PR BODY MASS INDEX (BMI) DOCUMENTED: ICD-10-PCS | Mod: CPTII,S$GLB,, | Performed by: PHYSICIAN ASSISTANT

## 2022-06-09 PROCEDURE — 77063 BREAST TOMOSYNTHESIS BI: CPT | Mod: 26,,, | Performed by: RADIOLOGY

## 2022-06-09 PROCEDURE — 3078F DIAST BP <80 MM HG: CPT | Mod: CPTII,S$GLB,, | Performed by: PHYSICIAN ASSISTANT

## 2022-06-09 PROCEDURE — 3074F SYST BP LT 130 MM HG: CPT | Mod: CPTII,S$GLB,, | Performed by: PHYSICIAN ASSISTANT

## 2022-06-09 RX ORDER — ESTRADIOL 0.1 MG/G
CREAM VAGINAL
Qty: 42.5 G | Refills: 3 | Status: SHIPPED | OUTPATIENT
Start: 2022-06-09 | End: 2022-10-20 | Stop reason: SDUPTHER

## 2022-06-10 NOTE — PROGRESS NOTES
Subjective:       Patient ID: Malaika Craven is a 61 y.o. female.    Chief Complaint: Gynecologic Exam    Patient is  and presents for annual exam.  Will have mammogram today.  Post menopausal and no HRT.  Complains of chronic burning sensation in mouth for years.  States autoimmune was ruled out. Was seen by both dentist and ENT with no issues found. Patients patient medical/surgical, social and family histories have been reviewed      Review of Systems   HENT:        Burning sensation of mouth , gums, tongue    Genitourinary: Positive for dyspareunia and vaginal pain.   All other systems reviewed and are negative.      Objective:      Physical Exam  Exam conducted with a chaperone present.   Constitutional:       General: She is not in acute distress.     Appearance: Normal appearance. She is well-developed. She is not ill-appearing.   HENT:      Head: Normocephalic and atraumatic.   Eyes:      Conjunctiva/sclera: Conjunctivae normal.   Cardiovascular:      Rate and Rhythm: Normal rate and regular rhythm.      Heart sounds: Normal heart sounds.   Pulmonary:      Effort: Pulmonary effort is normal.      Breath sounds: Normal breath sounds.   Chest:   Breasts:      Right: Normal. No axillary adenopathy or supraclavicular adenopathy.      Left: Normal. No axillary adenopathy or supraclavicular adenopathy.       Genitourinary:     Exam position: Supine.      Labia:         Right: No lesion.         Left: No lesion.       Cervix: Normal.      Uterus: Normal.       Adnexa: Right adnexa normal and left adnexa normal.      Comments: Dry atrophic vagina   Musculoskeletal:      Right lower leg: No edema.      Left lower leg: No edema.   Lymphadenopathy:      Upper Body:      Right upper body: No supraclavicular, axillary or pectoral adenopathy.      Left upper body: No supraclavicular, axillary or pectoral adenopathy.   Skin:     General: Skin is warm and dry.   Neurological:      Mental Status: She is alert.  "  Psychiatric:         Mood and Affect: Mood normal.         Behavior: Behavior is cooperative.         Assessment:       1. Annual physical exam    2. Atrophic vaginitis    3. Mouth pain    4. Screening for cervical cancer        Plan:       Malaika was seen today for gynecologic exam.    Diagnoses and all orders for this visit:    Annual physical exam  -     TSH; Future  -     CBC Auto Differential; Future  -     Lipid Panel; Future  -     Comprehensive Metabolic Panel; Future  -     Urinalysis; Future  -     Hemoglobin A1C; Future    Atrophic vaginitis  -     estradioL (ESTRACE) 0.01 % (0.1 mg/gram) vaginal cream; Place 2 g vaginally once daily for 14 days, THEN 1 g once daily for 14 days, THEN 1 g twice a week.    Mouth pain  Questing burning mouth syndrome as a symptom of her menopause   Check additional lab and consider low dose gabapentin qhs for tx   -     VITAMIN B6; Future  -     ZINC; Future  -     VITAMIN B12; Future  -     FOLATE; Future    Screening for cervical cancer  -     Liquid-Based Pap Smear, Screening  -     HPV High Risk Genotypes, PCR           Follow up for fasting lab soon, me in 1mo - check in for mammo today .           Documentation entered by me for this encounter may have been done in part using speech-recognition technology. Although I have made an effort to ensure accuracy, "sound like" errors may exist and should be interpreted in context.    "

## 2022-06-15 LAB
HPV HR 12 DNA SPEC QL NAA+PROBE: POSITIVE
HPV16 AG SPEC QL: NEGATIVE
HPV18 DNA SPEC QL NAA+PROBE: NEGATIVE

## 2022-06-16 DIAGNOSIS — R87.610 ASCUS WITH POSITIVE HIGH RISK HPV CERVICAL: Primary | ICD-10-CM

## 2022-06-16 DIAGNOSIS — R87.810 ASCUS WITH POSITIVE HIGH RISK HPV CERVICAL: Primary | ICD-10-CM

## 2022-06-16 LAB
FINAL PATHOLOGIC DIAGNOSIS: ABNORMAL
Lab: ABNORMAL

## 2022-06-17 ENCOUNTER — TELEPHONE (OUTPATIENT)
Dept: FAMILY MEDICINE | Facility: CLINIC | Age: 62
End: 2022-06-17
Payer: COMMERCIAL

## 2022-06-17 ENCOUNTER — PATIENT MESSAGE (OUTPATIENT)
Dept: FAMILY MEDICINE | Facility: CLINIC | Age: 62
End: 2022-06-17
Payer: COMMERCIAL

## 2022-06-21 ENCOUNTER — OFFICE VISIT (OUTPATIENT)
Dept: PSYCHIATRY | Facility: CLINIC | Age: 62
End: 2022-06-21
Payer: COMMERCIAL

## 2022-06-21 DIAGNOSIS — F41.1 GAD (GENERALIZED ANXIETY DISORDER): Primary | ICD-10-CM

## 2022-06-21 DIAGNOSIS — F43.10 PTSD (POST-TRAUMATIC STRESS DISORDER): ICD-10-CM

## 2022-06-21 DIAGNOSIS — F32.1 CURRENT MODERATE EPISODE OF MAJOR DEPRESSIVE DISORDER WITHOUT PRIOR EPISODE: ICD-10-CM

## 2022-06-21 PROCEDURE — 1159F PR MEDICATION LIST DOCUMENTED IN MEDICAL RECORD: ICD-10-PCS | Mod: CPTII,95,, | Performed by: PHYSICIAN ASSISTANT

## 2022-06-21 PROCEDURE — 1160F PR REVIEW ALL MEDS BY PRESCRIBER/CLIN PHARMACIST DOCUMENTED: ICD-10-PCS | Mod: CPTII,95,, | Performed by: PHYSICIAN ASSISTANT

## 2022-06-21 PROCEDURE — 1159F MED LIST DOCD IN RCRD: CPT | Mod: CPTII,95,, | Performed by: PHYSICIAN ASSISTANT

## 2022-06-21 PROCEDURE — 99214 PR OFFICE/OUTPT VISIT, EST, LEVL IV, 30-39 MIN: ICD-10-PCS | Mod: 95,,, | Performed by: PHYSICIAN ASSISTANT

## 2022-06-21 PROCEDURE — 99214 OFFICE O/P EST MOD 30 MIN: CPT | Mod: 95,,, | Performed by: PHYSICIAN ASSISTANT

## 2022-06-21 PROCEDURE — 1160F RVW MEDS BY RX/DR IN RCRD: CPT | Mod: CPTII,95,, | Performed by: PHYSICIAN ASSISTANT

## 2022-06-21 RX ORDER — TRAZODONE HYDROCHLORIDE 150 MG/1
150 TABLET ORAL NIGHTLY
Start: 2022-06-21 | End: 2022-07-20 | Stop reason: SDUPTHER

## 2022-06-21 RX ORDER — SERTRALINE HYDROCHLORIDE 100 MG/1
100 TABLET, FILM COATED ORAL DAILY
Qty: 30 TABLET | Refills: 2 | Status: SHIPPED | OUTPATIENT
Start: 2022-06-21 | End: 2022-09-13
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
## 2022-07-20 ENCOUNTER — OFFICE VISIT (OUTPATIENT)
Dept: OBSTETRICS AND GYNECOLOGY | Facility: CLINIC | Age: 62
End: 2022-07-20
Payer: COMMERCIAL

## 2022-07-20 VITALS
HEART RATE: 91 BPM | WEIGHT: 197.44 LBS | BODY MASS INDEX: 36.33 KG/M2 | SYSTOLIC BLOOD PRESSURE: 132 MMHG | HEIGHT: 62 IN | DIASTOLIC BLOOD PRESSURE: 88 MMHG

## 2022-07-20 DIAGNOSIS — N95.2 ATROPHIC VAGINITIS: ICD-10-CM

## 2022-07-20 DIAGNOSIS — R87.810 ASCUS WITH POSITIVE HIGH RISK HPV CERVICAL: ICD-10-CM

## 2022-07-20 DIAGNOSIS — R87.610 PAP SMEAR ABNORMALITY OF CERVIX WITH ASCUS FAVORING BENIGN: Primary | ICD-10-CM

## 2022-07-20 DIAGNOSIS — R87.610 ASCUS WITH POSITIVE HIGH RISK HPV CERVICAL: ICD-10-CM

## 2022-07-20 PROCEDURE — 99999 PR PBB SHADOW E&M-EST. PATIENT-LVL IV: ICD-10-PCS | Mod: PBBFAC,,, | Performed by: OBSTETRICS & GYNECOLOGY

## 2022-07-20 PROCEDURE — 99204 OFFICE O/P NEW MOD 45 MIN: CPT | Mod: 25,S$GLB,, | Performed by: OBSTETRICS & GYNECOLOGY

## 2022-07-20 PROCEDURE — 3075F SYST BP GE 130 - 139MM HG: CPT | Mod: CPTII,S$GLB,, | Performed by: OBSTETRICS & GYNECOLOGY

## 2022-07-20 PROCEDURE — 3079F PR MOST RECENT DIASTOLIC BLOOD PRESSURE 80-89 MM HG: ICD-10-PCS | Mod: CPTII,S$GLB,, | Performed by: OBSTETRICS & GYNECOLOGY

## 2022-07-20 PROCEDURE — 3008F PR BODY MASS INDEX (BMI) DOCUMENTED: ICD-10-PCS | Mod: CPTII,S$GLB,, | Performed by: OBSTETRICS & GYNECOLOGY

## 2022-07-20 PROCEDURE — 57454 PR COLPOSC,CERVIX W/ADJ VAG,W/BX & CURRETAG: ICD-10-PCS | Mod: S$GLB,,, | Performed by: OBSTETRICS & GYNECOLOGY

## 2022-07-20 PROCEDURE — 99204 PR OFFICE/OUTPT VISIT, NEW, LEVL IV, 45-59 MIN: ICD-10-PCS | Mod: 25,S$GLB,, | Performed by: OBSTETRICS & GYNECOLOGY

## 2022-07-20 PROCEDURE — 3079F DIAST BP 80-89 MM HG: CPT | Mod: CPTII,S$GLB,, | Performed by: OBSTETRICS & GYNECOLOGY

## 2022-07-20 PROCEDURE — 57454 BX/CURETT OF CERVIX W/SCOPE: CPT | Mod: S$GLB,,, | Performed by: OBSTETRICS & GYNECOLOGY

## 2022-07-20 PROCEDURE — 1159F PR MEDICATION LIST DOCUMENTED IN MEDICAL RECORD: ICD-10-PCS | Mod: CPTII,S$GLB,, | Performed by: OBSTETRICS & GYNECOLOGY

## 2022-07-20 PROCEDURE — 88305 TISSUE EXAM BY PATHOLOGIST: CPT | Mod: 26,,, | Performed by: PATHOLOGY

## 2022-07-20 PROCEDURE — 99999 PR PBB SHADOW E&M-EST. PATIENT-LVL IV: CPT | Mod: PBBFAC,,, | Performed by: OBSTETRICS & GYNECOLOGY

## 2022-07-20 PROCEDURE — 3075F PR MOST RECENT SYSTOLIC BLOOD PRESS GE 130-139MM HG: ICD-10-PCS | Mod: CPTII,S$GLB,, | Performed by: OBSTETRICS & GYNECOLOGY

## 2022-07-20 PROCEDURE — 1159F MED LIST DOCD IN RCRD: CPT | Mod: CPTII,S$GLB,, | Performed by: OBSTETRICS & GYNECOLOGY

## 2022-07-20 PROCEDURE — 88305 TISSUE EXAM BY PATHOLOGIST: CPT | Performed by: PATHOLOGY

## 2022-07-20 PROCEDURE — 3008F BODY MASS INDEX DOCD: CPT | Mod: CPTII,S$GLB,, | Performed by: OBSTETRICS & GYNECOLOGY

## 2022-07-20 PROCEDURE — 88305 TISSUE EXAM BY PATHOLOGIST: ICD-10-PCS | Mod: 26,,, | Performed by: PATHOLOGY

## 2022-07-20 RX ORDER — TRAZODONE HYDROCHLORIDE 150 MG/1
150 TABLET ORAL NIGHTLY
Qty: 30 TABLET | Refills: 2 | Status: SHIPPED | OUTPATIENT
Start: 2022-07-20 | End: 2022-10-18 | Stop reason: SDUPTHER

## 2022-07-20 NOTE — TELEPHONE ENCOUNTER
Medication requested-trazodone 150 mg   Last RX-6-17-22 qty 30  Last office nmmfw-0-13-22  Next office vxhxz-2-99-22

## 2022-07-20 NOTE — PROGRESS NOTES
Subjective:   Chief Complaint:  Abnormal Pap Smear (Abnormal pap with positive HPV. Colpo)      Patient ID: Malaika Craven is a  61 y.o. female.    2022     She presents today due to a recent abnormal Pap smear.    Her Pap smear from 2022 noted ASCUS / HPV was positive, but negative for type 16 and 18.  Previous Pap smear from 08/10/2016 was also positive for ASCUS but was HPV negative. She had a previous abnormal pap smear about 20 years ago and had a colposcopy. No further treatment was required.     She is not on hormone replacement therapy. Her PCP prescribed Estrace but she has not started the medication yet.     She reports some anxiety after her daughter  in a jet ski accident in . She also reports the passing of her father Saturday.     GYN & OB History  No LMP recorded. Patient is postmenopausal.     OB History    Para Term  AB Living   3 2 2   1 1   SAB IAB Ectopic Multiple Live Births   1              # Outcome Date GA Lbr Nacho/2nd Weight Sex Delivery Anes PTL Lv   3 SAB            2 Term     F CS-Unspec      1 Term     F CS-Unspec         Obstetric Comments    section x 2.   SAB x 1.       Past Medical History:   Diagnosis Date    Abnormal Pap smear of cervix     Allergy     Corneal erosion of right eye     Hypertension     Sinus problem         Past Surgical History:   Procedure Laterality Date    BREAST CYST ASPIRATION       SECTION      X 2    CHOLECYSTECTOMY      DILATION AND CURETTAGE OF UTERUS      DNC      EYE SURGERY Right     FOR CORNEAL EROSION    LIPOSUCTION      x 2    Mole Excision Right     Shin    TUBAL LIGATION          Review of Systems  Review of Systems   Constitutional: Negative for activity change, appetite change and fever.   Respiratory: Negative for cough and shortness of breath.    Cardiovascular: Negative for chest pain and leg swelling.   Gastrointestinal: Negative for abdominal pain, diarrhea  and nausea.   Genitourinary: Negative for dysuria and menstrual problem.        GYN ROS per HPI.   Musculoskeletal: Negative for back pain and myalgias.   Integumentary:  Negative for rash.   Neurological: Negative for numbness.           Objective:      Vitals:    07/20/22 1503   BP: 132/88   Pulse: 91       Physical Exam:   Constitutional: She appears well-developed and well-nourished.    HENT:   Head: Normocephalic.     Neck: No thyroid mass and no thyromegaly present.    Cardiovascular: Normal rate, regular rhythm and normal heart sounds.     Pulmonary/Chest: Effort normal and breath sounds normal. No respiratory distress.        Abdominal: Soft. There is no abdominal tenderness.     Genitourinary: The external female genitalia was normal.   Vaginal atrophy noted.                 Musculoskeletal:      Right lower leg: No edema.      Left lower leg: No edema.       Neurological: She is alert.    Skin: Skin is warm and dry.    Psychiatric: Mood normal.         COLPOSCOPY:    PRE-COLPOSCOPY PROCEDURE COUNSELING:  The significance of the patient's abnormal pap test findings, HPV, the need for colposcopy and possible biopsies to determine a diagnosis and plan of care, treatments available, the minimal risks of bleeding and infection with a colposcopy, alternatives to colposcopy were reviewed and discussed with the patient.  Her questions were answered and she is in agreement with the current plan.    Procedure:  Speculum was placed. Cervix completely visualized. The transformation zone clearly visualized. Green filter activated: vascular abnormalities: seen  Green filter disengaged and acetic acid applied in the usual fashion:  AcetoWhite epithelium seen as per diagram.  Mosaic pattern not seen.  Biopsy performed.  ECC done.    Hemostasis was obtained with silver nitrate and direct pressure.  The speculum was removed.  The patient tolerated the procedure well.      Assessment:        1. Pap smear abnormality of  cervix with ASCUS favoring benign    2. ASCUS with positive high risk HPV cervical    3. Atrophic vaginitis         Plan:      Pap smear abnormality of cervix with ASCUS favoring benign    ASCUS with positive high risk HPV cervical  -     Ambulatory referral/consult to Obstetrics / Gynecology    Atrophic vaginitis       Follow up for As needed based on the results of today's testing..     The above was reviewed and discussed with the patient.    She tolerated the colposcopy without difficulty.    POST COLPOSCOPY COUNSELING:   Manage post colposcopy cramping with NSAIDs, Tylenol or Rx per [facility].  Avoid anything in vagina (intercourse, douching, tampons) one week after the procedure.  Expect a clumpy blackish vaginal discharge (Monsel's solution) for several days.  Report bleeding heavier than a period, worsening pain, fever > 101.0 F, or foul-smelling vaginal discharge.    She will plan to initiate the vaginal estrogen previously prescribed.  We will base further evaluation treatment results of today's pathology studies.

## 2022-07-26 LAB
FINAL PATHOLOGIC DIAGNOSIS: NORMAL
GROSS: NORMAL
Lab: NORMAL

## 2022-08-22 ENCOUNTER — TELEPHONE (OUTPATIENT)
Dept: FAMILY MEDICINE | Facility: CLINIC | Age: 62
End: 2022-08-22
Payer: COMMERCIAL

## 2022-09-20 ENCOUNTER — OFFICE VISIT (OUTPATIENT)
Dept: PSYCHIATRY | Facility: CLINIC | Age: 62
End: 2022-09-20
Payer: COMMERCIAL

## 2022-09-20 DIAGNOSIS — F32.1 CURRENT MODERATE EPISODE OF MAJOR DEPRESSIVE DISORDER WITHOUT PRIOR EPISODE: ICD-10-CM

## 2022-09-20 DIAGNOSIS — F41.1 GAD (GENERALIZED ANXIETY DISORDER): Primary | ICD-10-CM

## 2022-09-20 PROCEDURE — 99214 PR OFFICE/OUTPT VISIT, EST, LEVL IV, 30-39 MIN: ICD-10-PCS | Mod: 95,,, | Performed by: PHYSICIAN ASSISTANT

## 2022-09-20 PROCEDURE — 1159F MED LIST DOCD IN RCRD: CPT | Mod: CPTII,95,, | Performed by: PHYSICIAN ASSISTANT

## 2022-09-20 PROCEDURE — 1159F PR MEDICATION LIST DOCUMENTED IN MEDICAL RECORD: ICD-10-PCS | Mod: CPTII,95,, | Performed by: PHYSICIAN ASSISTANT

## 2022-09-20 PROCEDURE — 1160F RVW MEDS BY RX/DR IN RCRD: CPT | Mod: CPTII,95,, | Performed by: PHYSICIAN ASSISTANT

## 2022-09-20 PROCEDURE — 1160F PR REVIEW ALL MEDS BY PRESCRIBER/CLIN PHARMACIST DOCUMENTED: ICD-10-PCS | Mod: CPTII,95,, | Performed by: PHYSICIAN ASSISTANT

## 2022-09-20 PROCEDURE — 99214 OFFICE O/P EST MOD 30 MIN: CPT | Mod: 95,,, | Performed by: PHYSICIAN ASSISTANT

## 2022-09-20 RX ORDER — BUSPIRONE HYDROCHLORIDE 30 MG/1
30 TABLET ORAL 2 TIMES DAILY
Qty: 60 TABLET | Refills: 1
Start: 2022-09-20 | End: 2022-11-02

## 2022-09-20 RX ORDER — ALPRAZOLAM 1 MG/1
1 TABLET ORAL DAILY PRN
Start: 2022-09-20 | End: 2022-11-02
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
## 2022-09-28 ENCOUNTER — TELEPHONE (OUTPATIENT)
Dept: PSYCHIATRY | Facility: CLINIC | Age: 62
End: 2022-09-28
Payer: COMMERCIAL

## 2022-09-28 NOTE — TELEPHONE ENCOUNTER
----- Message from Taylor Nowak PA-C sent at 9/20/2022 11:30 AM CDT -----  Please schedule 3 month f/u virtually

## 2022-09-28 NOTE — TELEPHONE ENCOUNTER
Called pt regarding below message to schedule f/u. Pt states she is currently in the middle of a huge project and will return our call to schedule the appt. Pt states no further questions.

## 2022-09-29 ENCOUNTER — LAB VISIT (OUTPATIENT)
Dept: LAB | Facility: HOSPITAL | Age: 62
End: 2022-09-29
Attending: STUDENT IN AN ORGANIZED HEALTH CARE EDUCATION/TRAINING PROGRAM
Payer: COMMERCIAL

## 2022-09-29 ENCOUNTER — OFFICE VISIT (OUTPATIENT)
Dept: FAMILY MEDICINE | Facility: CLINIC | Age: 62
End: 2022-09-29
Payer: COMMERCIAL

## 2022-09-29 VITALS
SYSTOLIC BLOOD PRESSURE: 128 MMHG | OXYGEN SATURATION: 97 % | HEART RATE: 68 BPM | RESPIRATION RATE: 18 BRPM | BODY MASS INDEX: 37.4 KG/M2 | DIASTOLIC BLOOD PRESSURE: 80 MMHG | WEIGHT: 203.25 LBS | HEIGHT: 62 IN

## 2022-09-29 DIAGNOSIS — R09.81 NASAL CONGESTION: ICD-10-CM

## 2022-09-29 DIAGNOSIS — I10 HYPERTENSION, UNSPECIFIED TYPE: ICD-10-CM

## 2022-09-29 DIAGNOSIS — E66.01 CLASS 2 SEVERE OBESITY WITH SERIOUS COMORBIDITY AND BODY MASS INDEX (BMI) OF 37.0 TO 37.9 IN ADULT, UNSPECIFIED OBESITY TYPE: Primary | ICD-10-CM

## 2022-09-29 DIAGNOSIS — E66.01 CLASS 2 SEVERE OBESITY WITH SERIOUS COMORBIDITY AND BODY MASS INDEX (BMI) OF 37.0 TO 37.9 IN ADULT, UNSPECIFIED OBESITY TYPE: ICD-10-CM

## 2022-09-29 LAB
ALBUMIN SERPL BCP-MCNC: 4.3 G/DL (ref 3.5–5.2)
ALP SERPL-CCNC: 78 U/L (ref 55–135)
ALT SERPL W/O P-5'-P-CCNC: 16 U/L (ref 10–44)
ANION GAP SERPL CALC-SCNC: 14 MMOL/L (ref 8–16)
AST SERPL-CCNC: 14 U/L (ref 10–40)
BILIRUB SERPL-MCNC: 0.5 MG/DL (ref 0.1–1)
BUN SERPL-MCNC: 16 MG/DL (ref 6–20)
CALCIUM SERPL-MCNC: 9.5 MG/DL (ref 8.7–10.5)
CHLORIDE SERPL-SCNC: 100 MMOL/L (ref 95–110)
CO2 SERPL-SCNC: 28 MMOL/L (ref 23–29)
CREAT SERPL-MCNC: 1.1 MG/DL (ref 0.5–1.4)
EST. GFR  (NO RACE VARIABLE): 57.5 ML/MIN/1.73 M^2
ESTIMATED AVG GLUCOSE: 100 MG/DL (ref 68–131)
GLUCOSE SERPL-MCNC: 93 MG/DL (ref 70–110)
HBA1C MFR BLD: 5.1 % (ref 4–5.6)
POTASSIUM SERPL-SCNC: 3.9 MMOL/L (ref 3.5–5.1)
PROT SERPL-MCNC: 7 G/DL (ref 6–8.4)
SODIUM SERPL-SCNC: 142 MMOL/L (ref 136–145)

## 2022-09-29 PROCEDURE — 3074F SYST BP LT 130 MM HG: CPT | Mod: CPTII,S$GLB,, | Performed by: STUDENT IN AN ORGANIZED HEALTH CARE EDUCATION/TRAINING PROGRAM

## 2022-09-29 PROCEDURE — 1160F RVW MEDS BY RX/DR IN RCRD: CPT | Mod: CPTII,S$GLB,, | Performed by: STUDENT IN AN ORGANIZED HEALTH CARE EDUCATION/TRAINING PROGRAM

## 2022-09-29 PROCEDURE — 3079F DIAST BP 80-89 MM HG: CPT | Mod: CPTII,S$GLB,, | Performed by: STUDENT IN AN ORGANIZED HEALTH CARE EDUCATION/TRAINING PROGRAM

## 2022-09-29 PROCEDURE — 1159F PR MEDICATION LIST DOCUMENTED IN MEDICAL RECORD: ICD-10-PCS | Mod: CPTII,S$GLB,, | Performed by: STUDENT IN AN ORGANIZED HEALTH CARE EDUCATION/TRAINING PROGRAM

## 2022-09-29 PROCEDURE — 3074F PR MOST RECENT SYSTOLIC BLOOD PRESSURE < 130 MM HG: ICD-10-PCS | Mod: CPTII,S$GLB,, | Performed by: STUDENT IN AN ORGANIZED HEALTH CARE EDUCATION/TRAINING PROGRAM

## 2022-09-29 PROCEDURE — 1160F PR REVIEW ALL MEDS BY PRESCRIBER/CLIN PHARMACIST DOCUMENTED: ICD-10-PCS | Mod: CPTII,S$GLB,, | Performed by: STUDENT IN AN ORGANIZED HEALTH CARE EDUCATION/TRAINING PROGRAM

## 2022-09-29 PROCEDURE — 80053 COMPREHEN METABOLIC PANEL: CPT | Performed by: STUDENT IN AN ORGANIZED HEALTH CARE EDUCATION/TRAINING PROGRAM

## 2022-09-29 PROCEDURE — 99999 PR PBB SHADOW E&M-EST. PATIENT-LVL V: ICD-10-PCS | Mod: PBBFAC,,, | Performed by: STUDENT IN AN ORGANIZED HEALTH CARE EDUCATION/TRAINING PROGRAM

## 2022-09-29 PROCEDURE — 99214 PR OFFICE/OUTPT VISIT, EST, LEVL IV, 30-39 MIN: ICD-10-PCS | Mod: S$GLB,,, | Performed by: STUDENT IN AN ORGANIZED HEALTH CARE EDUCATION/TRAINING PROGRAM

## 2022-09-29 PROCEDURE — 99999 PR PBB SHADOW E&M-EST. PATIENT-LVL V: CPT | Mod: PBBFAC,,, | Performed by: STUDENT IN AN ORGANIZED HEALTH CARE EDUCATION/TRAINING PROGRAM

## 2022-09-29 PROCEDURE — 3079F PR MOST RECENT DIASTOLIC BLOOD PRESSURE 80-89 MM HG: ICD-10-PCS | Mod: CPTII,S$GLB,, | Performed by: STUDENT IN AN ORGANIZED HEALTH CARE EDUCATION/TRAINING PROGRAM

## 2022-09-29 PROCEDURE — 1159F MED LIST DOCD IN RCRD: CPT | Mod: CPTII,S$GLB,, | Performed by: STUDENT IN AN ORGANIZED HEALTH CARE EDUCATION/TRAINING PROGRAM

## 2022-09-29 PROCEDURE — 3008F BODY MASS INDEX DOCD: CPT | Mod: CPTII,S$GLB,, | Performed by: STUDENT IN AN ORGANIZED HEALTH CARE EDUCATION/TRAINING PROGRAM

## 2022-09-29 PROCEDURE — 99214 OFFICE O/P EST MOD 30 MIN: CPT | Mod: S$GLB,,, | Performed by: STUDENT IN AN ORGANIZED HEALTH CARE EDUCATION/TRAINING PROGRAM

## 2022-09-29 PROCEDURE — 3008F PR BODY MASS INDEX (BMI) DOCUMENTED: ICD-10-PCS | Mod: CPTII,S$GLB,, | Performed by: STUDENT IN AN ORGANIZED HEALTH CARE EDUCATION/TRAINING PROGRAM

## 2022-09-29 PROCEDURE — 83036 HEMOGLOBIN GLYCOSYLATED A1C: CPT | Performed by: STUDENT IN AN ORGANIZED HEALTH CARE EDUCATION/TRAINING PROGRAM

## 2022-09-29 PROCEDURE — 36415 COLL VENOUS BLD VENIPUNCTURE: CPT | Mod: PO | Performed by: STUDENT IN AN ORGANIZED HEALTH CARE EDUCATION/TRAINING PROGRAM

## 2022-09-29 RX ORDER — BUSPIRONE HYDROCHLORIDE 30 MG/1
TABLET ORAL
COMMUNITY
Start: 2022-08-23

## 2022-09-29 RX ORDER — ALPRAZOLAM 1 MG/1
TABLET ORAL
COMMUNITY
Start: 2022-08-23 | End: 2022-11-02

## 2022-09-29 RX ORDER — METOPROLOL SUCCINATE 50 MG/1
TABLET, EXTENDED RELEASE ORAL
COMMUNITY
Start: 2022-08-23

## 2022-09-29 RX ORDER — HYDROCHLOROTHIAZIDE 25 MG/1
25 TABLET ORAL DAILY
Start: 2022-09-29

## 2022-09-29 RX ORDER — HYDROCHLOROTHIAZIDE 50 MG/1
TABLET ORAL
COMMUNITY
Start: 2022-08-23 | End: 2022-09-29 | Stop reason: SDUPTHER

## 2022-09-29 RX ORDER — HYDROCHLOROTHIAZIDE 50 MG/1
50 TABLET ORAL DAILY
COMMUNITY
Start: 2022-05-24 | End: 2022-09-29 | Stop reason: DRUGHIGH

## 2022-09-29 NOTE — PATIENT INSTRUCTIONS
Ozempic- 0.25mg weekly for 4 weeks. Then increase to 0.5mg for 4 weeks. Then 1mg for 4 weeks.                 Miguel Angel Dai,     If you are due for any health screening(s) below please notify me so we can arrange them to be ordered and scheduled to maintain your health. Most healthy patients complete it. Don't lose out on improving your health.     Tests to Keep You Healthy    Mammogram: Met on 6/9/2022  Colon Cancer Screening: ORDERED  Cervical Cancer Screening: Met on 6/9/2022  Last Blood Pressure <= 139/89 (9/29/2022): Yes         Colon Cancer Screening    Of cancers affecting both men and women, colorectal cancer is the third leading cancer killer in the [place]. But it doesnt have to be. Screening can prevent colorectal cancer or find it at an early stage when treatment often leads to a cure.    A colonoscopy is the preferred test for detecting colon cancer. It is needed only once every 10 years if results are negative. While sedated, a flexible, lighted tube with a tiny camera is inserted into the rectum and advanced through the colon to look for cancers. An alternative screening test that is used at home and returned to the lab may also be used. It detects hidden blood in bowel movements which could indicate cancer in the colon. If results are positive, you will need a colonoscopy to determine if the blood is a sign of cancer. This type of follow up (diagnostic) colonoscopy usually requires additional copays as required by your insurance provider. Please contact your PCP if you have any questions.    Although you are still overdue for this important screening, due to the COVID-19 pandemic, we recommend scheduling it in the near future.

## 2022-09-29 NOTE — PROGRESS NOTES
"[facility] CLINIC NOTE    Patient Name: Malaika Craven  YOB: 1960    PRESENTING HISTORY     History of Present Illness:  Ms. Malaika Craven is a 62 y.o. female here with several issues.     Weight gain- she attributes to depression medications.    She is on SSRI, BB.    Mood is stable on current therapy.    Interested in pursuing pharmacotherapy treatment.     Fhx: no thyroid cancer, + hypothyroidism  No personal history of pancreatitis.   Discussed risks of both which she understands and accepts.   Hx gestational diabetes, elevated BG in past.       Separately, complains of chronic and progressive difficulty breathing through her nose. Now breathes out of her mouth.   Used afrin remotely, was "addicted."  Allegra daily.   Tried Flonase for 1-2 months without relief.       Reviewed her medication. She is on HCTZ ostensibly for HTN. 50mg dose seems too high, I think that will lead to excessive hypokalemia without BP benefit.     ROS      OBJECTIVE:   Vital Signs:  Vitals:    09/29/22 1313   BP: 128/80   Pulse: 68   Resp: 18   SpO2: 97%   Weight: 92.2 kg (203 lb 4.2 oz)   Height: 5' 2" (1.575 m)         Physical Exam  Vitals and nursing note reviewed.   Constitutional:       General: She is not in acute distress.     Appearance: She is obese. She is not toxic-appearing or diaphoretic.   HENT:      Nose:      Comments: Minimal space bilateral nares. Mild congestion and mucus present.   TMs normal bilaterally.   Oropharynx grossly WNL  Eyes:      General: No scleral icterus.     Conjunctiva/sclera: Conjunctivae normal.   Neck:      Vascular: No carotid bruit.   Cardiovascular:      Rate and Rhythm: Normal rate and regular rhythm.      Pulses: Normal pulses.      Heart sounds: No murmur heard.  Pulmonary:      Effort: Pulmonary effort is normal. No respiratory distress.      Breath sounds: Normal breath sounds. No wheezing.   Lymphadenopathy:      Cervical: No cervical adenopathy. "       ASSESSMENT & PLAN:     Class 2 severe obesity with serious comorbidity and body mass index (BMI) of 37.0 to 37.9 in adult, unspecified obesity type  -     Hemoglobin A1C; Future; Expected date: 09/29/2022  -     Comprehensive Metabolic Panel; Future; Expected date: 09/29/2022  -     semaglutide (OZEMPIC) 0.25 mg or 0.5 mg(2 mg/1.5 mL) pen injector; Inject 0.25 mg into the skin every 7 days.  Dispense: 4 pen; Refill: 0  We can try to get this approved. Discussed expectations that this is not often covered.     Hypertension, unspecified type  -     hydroCHLOROthiazide (HYDRODIURIL) 25 MG tablet; Take 1 tablet (25 mg total) by mouth once daily. Patient takes two pills daily  Reducing dose    Nasal congestion  -     Ambulatory referral/consult to ENT; Future; Expected date: 10/06/2022   Failed nasal steroid + antihistamine.   ENT evaluation to see if she would be a candidate for turbinate reduction.         Dario Celestin MD   Internal Medicine

## 2022-10-18 DIAGNOSIS — F32.1 CURRENT MODERATE EPISODE OF MAJOR DEPRESSIVE DISORDER WITHOUT PRIOR EPISODE: ICD-10-CM

## 2022-10-18 RX ORDER — SERTRALINE HYDROCHLORIDE 100 MG/1
100 TABLET, FILM COATED ORAL DAILY
Qty: 30 TABLET | Refills: 2 | Status: SHIPPED | OUTPATIENT
Start: 2022-10-18 | End: 2023-01-04

## 2022-10-18 RX ORDER — TRAZODONE HYDROCHLORIDE 150 MG/1
150 TABLET ORAL NIGHTLY
Qty: 30 TABLET | Refills: 2 | Status: SHIPPED | OUTPATIENT
Start: 2022-10-18 | End: 2023-01-04

## 2022-10-18 NOTE — TELEPHONE ENCOUNTER
Rec'd a fax from [facility] by [facility] requesting refills on sertraline 100 mg and trazodone 150 mg. Confirmed with pt that she does want to use this pharmacy.  Last refill: 9/13 sertraline, 7/20 trazodone  Last visit: 9/20  Follow up: none scheduled, I will call back to schedule

## 2022-10-20 DIAGNOSIS — N95.2 ATROPHIC VAGINITIS: ICD-10-CM

## 2022-10-20 RX ORDER — ESTRADIOL 0.1 MG/G
1 CREAM VAGINAL
Qty: 24 G | Refills: 4 | Status: SHIPPED | OUTPATIENT
Start: 2022-10-20 | End: 2023-10-06 | Stop reason: SDUPTHER

## 2022-10-20 NOTE — TELEPHONE ENCOUNTER
----- Message from Jennifer Washington sent at 10/20/2022 10:23 AM CDT -----  Contact: self  Type: Pharmacy Calling to Clarify an RX        Name of Caller: Patient   Pharmacy Name: [facility]   Prescription Name: estradioL (ESTRACE) 0.01 % (0.1 mg/gram) vaginal cream  What do they need to clarify?: Just need to verify if office receive on 10/18/22 PA   Best Call Back Number: [phone]   Additional Information: Plz call pharmacy to  verify that PA was received for medication above. Thanks

## 2022-11-02 ENCOUNTER — OFFICE VISIT (OUTPATIENT)
Dept: OTOLARYNGOLOGY | Facility: CLINIC | Age: 62
End: 2022-11-02
Payer: COMMERCIAL

## 2022-11-02 VITALS — WEIGHT: 196 LBS | HEIGHT: 62 IN | BODY MASS INDEX: 36.07 KG/M2 | TEMPERATURE: 99 F

## 2022-11-02 DIAGNOSIS — J34.3 NASAL TURBINATE HYPERTROPHY: ICD-10-CM

## 2022-11-02 DIAGNOSIS — Z87.898 HISTORY OF ANGIOEDEMA: ICD-10-CM

## 2022-11-02 DIAGNOSIS — J31.0 NONALLERGIC RHINITIS: Primary | ICD-10-CM

## 2022-11-02 DIAGNOSIS — R06.5 XEROSTOMIA DUE TO MOUTH BREATHING: ICD-10-CM

## 2022-11-02 DIAGNOSIS — K11.7 XEROSTOMIA DUE TO MOUTH BREATHING: ICD-10-CM

## 2022-11-02 DIAGNOSIS — T88.7XXA SIDE EFFECT OF MEDICATION: ICD-10-CM

## 2022-11-02 DIAGNOSIS — J34.89 NASAL OBSTRUCTION: ICD-10-CM

## 2022-11-02 PROCEDURE — 3008F BODY MASS INDEX DOCD: CPT | Mod: CPTII,S$GLB,, | Performed by: OTOLARYNGOLOGY

## 2022-11-02 PROCEDURE — 1159F PR MEDICATION LIST DOCUMENTED IN MEDICAL RECORD: ICD-10-PCS | Mod: CPTII,S$GLB,, | Performed by: OTOLARYNGOLOGY

## 2022-11-02 PROCEDURE — 3044F PR MOST RECENT HEMOGLOBIN A1C LEVEL <7.0%: ICD-10-PCS | Mod: CPTII,S$GLB,, | Performed by: OTOLARYNGOLOGY

## 2022-11-02 PROCEDURE — 3008F PR BODY MASS INDEX (BMI) DOCUMENTED: ICD-10-PCS | Mod: CPTII,S$GLB,, | Performed by: OTOLARYNGOLOGY

## 2022-11-02 PROCEDURE — 99999 PR PBB SHADOW E&M-EST. PATIENT-LVL IV: ICD-10-PCS | Mod: PBBFAC,,, | Performed by: OTOLARYNGOLOGY

## 2022-11-02 PROCEDURE — 99244 PR OFFICE CONSULTATION,LEVEL IV: ICD-10-PCS | Mod: S$GLB,,, | Performed by: OTOLARYNGOLOGY

## 2022-11-02 PROCEDURE — 99244 OFF/OP CNSLTJ NEW/EST MOD 40: CPT | Mod: S$GLB,,, | Performed by: OTOLARYNGOLOGY

## 2022-11-02 PROCEDURE — 3044F HG A1C LEVEL LT 7.0%: CPT | Mod: CPTII,S$GLB,, | Performed by: OTOLARYNGOLOGY

## 2022-11-02 PROCEDURE — 99999 PR PBB SHADOW E&M-EST. PATIENT-LVL IV: CPT | Mod: PBBFAC,,, | Performed by: OTOLARYNGOLOGY

## 2022-11-02 PROCEDURE — 1160F PR REVIEW ALL MEDS BY PRESCRIBER/CLIN PHARMACIST DOCUMENTED: ICD-10-PCS | Mod: CPTII,S$GLB,, | Performed by: OTOLARYNGOLOGY

## 2022-11-02 PROCEDURE — 1160F RVW MEDS BY RX/DR IN RCRD: CPT | Mod: CPTII,S$GLB,, | Performed by: OTOLARYNGOLOGY

## 2022-11-02 PROCEDURE — 1159F MED LIST DOCD IN RCRD: CPT | Mod: CPTII,S$GLB,, | Performed by: OTOLARYNGOLOGY

## 2022-11-02 RX ORDER — AZELASTINE HYDROCHLORIDE, FLUTICASONE PROPIONATE 137; 50 UG/1; UG/1
1 SPRAY, METERED NASAL 2 TIMES DAILY
Qty: 23 G | Refills: 11 | Status: SHIPPED | OUTPATIENT
Start: 2022-11-02

## 2022-11-02 NOTE — PROGRESS NOTES
[facility]    Subjective:      Patient: Malaika Craven Patient PCP: Dario Celestin MD         :  1960     Sex:  female      MRN:  [id number]          Date of Visit: 2022      Chief Complaint: Sinus Problem (States has had nasal congestion for about 2 years. Mouth breathes at times. Does have dry mouth both day and night. States wakes with tongue stuck to roof of mouth most mornings. States does snore-never tested for sleep apnea. Also has pain in mouth behind bottom front teeth for 5-7 years. RSI 13/45, GERD  2/5, ESS 15/24)      Patient ID: Malaika Craven is a 62 y.o. female lifelong NON-smoker with hypertension on hydrochlorothiazide and Toprol and nifedipine as well as anxiety/depression on BuSpar Zoloft and Dasa Veronica/trazodone referred to me by Dr. Dario Celestin in consultation for chronic nasal congestion/obstruction of 2 years with no associated sinus pressure pain or purulence.  No loss of smell or taste.  No specific allergy trigger.  Complains of dry mouth worse on wakening with known mouth breathing.  Significant daytime somnolence with an ESS of 15.  No head/sinus imaging.  No sleep study.  She used Afrin several years ago but not in any recent years.  Perhaps 2 years ago she used Flonase for 4 months without benefit.  She is had progressive worsening of her chronic congestion for the last 2 or more years but the symptoms have been present for many years prior.    Prior history of what sounds like angioedema which started right after a severe traumatic experience with the death of her daughter in a jet skiing accident in 2021.  She had significant swelling of the neck and tongue and was admitted to ICU.  She was on losartan at the time but not an ACE inhibitor.  She was kept overnight and discharged next day on prednisone.  She was seen by Dr. Alisha Yoon in [place], [place].  Testing reportedly performed showed no allergy.  I am unable to find any records from this visit in  epic and scan media or by doing a search.  There is no laboratory testing for workup of angioedema or allergy.  She is been on metoprolol for quite some time.  Her allergy list includes atenolol which caused hives and itching.  She also has an allergy with similar reaction to amlodipine.  She was put on anxiety and depression medicines after her traumatic loss by her allergist who felt that her tongue swelling and throat and neck swelling were related to her anxiety.    Review of Systems   HENT:  Positive for congestion, facial swelling, hearing loss and rhinorrhea. Negative for sinus pressure, sneezing and sore throat.    Eyes: Negative.    Respiratory:  Positive for cough and shortness of breath. Negative for chest tightness, wheezing and stridor.    Cardiovascular: Negative.    Gastrointestinal: Negative.    Endocrine: Negative.    Musculoskeletal:  Positive for neck pain.   Skin: Negative.    Allergic/Immunologic: Negative.    Neurological:  Positive for headaches.   Hematological: Negative.    Psychiatric/Behavioral:  Positive for decreased concentration and sleep disturbance. The patient is nervous/anxious.       Past Medical History  She has a past medical history of Abnormal Pap smear of cervix, Allergy, Corneal erosion of right eye, Generalized anxiety disorder, Hypertension, and Sinus problem.    Family / Surgical / Social History  Her family history includes Allergic rhinitis in her sister; Allergies in her sister; Arthritis in her father; Cancer in her maternal grandfather; Heart disease in her mother; Hypertension in her mother; Kidney disease in her paternal grandmother; Liver cancer in her maternal grandmother; Thyroid disease in her father.    Past Surgical History:   Procedure Laterality Date    BREAST CYST ASPIRATION       SECTION      X 2    CHOLECYSTECTOMY      DILATION AND CURETTAGE OF UTERUS      [facility]      EYE SURGERY Right     FOR CORNEAL EROSION    LIPOSUCTION      x 2    Mole  Excision Right     Shin    TUBAL LIGATION         Social History     Tobacco Use    Smoking status: Never    Smokeless tobacco: Never   Substance and Sexual Activity    Alcohol use: Yes     Comment: 2 drinks per week    Drug use: No    Sexual activity: Yes     Partners: Male     Birth control/protection: None       Medications  She has a current medication list which includes the following prescription(s): buspirone, cholecalciferol (vitamin d3), elderberry fruit, epinephrine, estradiol, hydrochlorothiazide, metoprolol succinate, multivitamin, nifedipine, potassium chloride sa, ozempic, sertraline, and trazodone.      Allergies  Review of patient's allergies indicates:   Allergen Reactions    Atenolol Hives, Itching and Swelling    Losartan Swelling and Hives     Other reaction(s): Angioedema    Nsaids (non-steroidal anti-inflammatory drug) Itching and Rash    Tetracyclines Rash    Amlodipine      Hives and swelling on the lower extremities    Lisinopril Hives    Sulfa (sulfonamide antibiotics) Rash       All medications, allergies, and past history have been reviewed.    Objective:      Vitals:  Vitals - 1 value per visit 9/29/2022 11/2/2022 11/2/2022   SYSTOLIC 128 - -   DIASTOLIC 80 - -   Pulse 68 - -   Temp - - 98.6   Resp 18 - -   SPO2 97 - -   Weight (lb) 203.26 - 195.99   Weight (kg) 92.2 - 88.9   Height 62 - 62   BMI (Calculated) 37.2 - 35.8   VISIT REPORT - - -   Pain Score  - 3 -   Some encounter information is confidential and restricted. Go to Review Flowsheets activity to see all data.   Some recent data might be hidden       Body surface area is 1.97 meters squared.    Physical Exam:    GENERAL  APPEARANCE -  alert, appears stated age, cooperative, and moderately obese  BARRIER(S) TO COMMUNICATION -  none VOICE - appropriate for age and gender    INTEGUMENTARY  no suspicious head and neck lesions    HEENT  HEAD: Normocephalic, without obvious abnormality, atraumatic  FACE: INSPECTION - Symmetric, no  signs of trauma, no suspicious lesion(s)  PALPATION -  No masses SALIVARY GLANDS - non-tender with no appreciable mass  STRENGTH - facial symmetry  NECK/THYROID: normal atraumatic, no neck masses, normal thyroid, no jvd    EYES  Normal occular alignment and mobility with no visible nystagmus at rest    EARS/NOSE/MOUTH/THROAT  EARS  PINNAE AND EXTERNAL EARS - no suspicious lesion OTOSCOPIC EXAM (surgical microscopy was not used for visualization/instrumentation): EAR EXAM - Normal ear canals, tympanic membranes and mobility, and middle ear spaces bilaterally.  HEARING - grossly intact to voice/finger rub    NOSE AND SINUSES  EXTERNAL NOSE - Grossly normal for age/sex  SEPTUM - caudal septum grossly midline there is some boggy congestion of the caudal septum TURBINATES - boggy edematous hypertrophy 4+ bilaterally MUCOSA - boggy mucosa decongested exam not performed.  No anterior drainage.      MOUTH AND THROAT   ORAL CAVITY, LIPS, TEETH, GUMS & TONGUE - moist, no suspicious lesions  OROPHARYNX /TONSILS/PHARYNGEAL WALLS/HYPOPHARYNX - no erythema or exudates  NASOPHARYNX - limited mirror exam - unable to visualize due to anatomy/gag  LARYNX -  - limited mirror exam - unable to visualize due to anatomy/gag      CHEST AND LUNG   INSPECTION & AUSCULTATION - normal effort, no stridor    CARDIOVASCULAR  AUSCULTATION & PERIPHERAL VASCULAR - regular rate and rhythm.    NEUROLOGIC  MENTAL STATUS - alert, interactive CRANIAL NERVES - normal    LYMPHATIC  HEAD AND NECK - non-palpable; SUPRACLAVICULAR - deferred; AXILLARY - deferred; INGUINAL - deferred; LIVER/SPLEEN - deferred        Procedure(s):  None    Labs:  WBC   Date Value Ref Range Status   10/25/2021 8.32 3.90 - 12.70 K/uL Final     Hemoglobin   Date Value Ref Range Status   10/25/2021 14.3 12.0 - 16.0 g/dL Final     Platelets   Date Value Ref Range Status   10/25/2021 228 150 - 450 K/uL Final     Creatinine   Date Value Ref Range Status   09/29/2022 1.1 0.5 - 1.4 mg/dL  Final     TSH   Date Value Ref Range Status   10/07/2020 0.856 0.400 - 4.000 uIU/mL Final     Glucose   Date Value Ref Range Status   09/29/2022 93 70 - 110 mg/dL Final     Hemoglobin A1C   Date Value Ref Range Status   09/29/2022 5.1 4.0 - 5.6 % Final     Comment:     ADA Screening Guidelines:  5.7-6.4%  Consistent with prediabetes  >or=6.5%  Consistent with diabetes    High levels of fetal hemoglobin interfere with the HbA1C  assay. Heterozygous hemoglobin variants (HbS, HgC, etc)do  not significantly interfere with this assay.   However, presence of multiple variants may affect accuracy.           Assessment:      Problem List Items Addressed This Visit    None  Visit Diagnoses       Nonallergic rhinitis    -  Primary    Nasal obstruction        Nasal turbinate hypertrophy        History of angioedema        Xerostomia due to mouth breathing        Side effect of medication                     Plan:      Given the inadequate response to fluticasone use for 4 months in the past we would like to add a nasal antihistamine.  I think the combination of twice daily nasal antihistamine and nasal steroid found in Dymista could be an ideal solution to treat both the chronic watery edematous nasal drainage as well as chronic nasal obstruction.  Surgical treatment with either in office or OR turbinate reduction or other treatments may prove necessary but for now saline, sinus rinses and twice daily Dymista is recommended.  Follow-up in 2-3 weeks to reassess including possible nasal endoscopy and less likely sinus imaging (no symptoms of chronic or recurrent sinusitis) prior to surgical planning.    We will also discuss home sleep apnea testing giving the significant daytime somnolence.  Identification and treatment of sleep apnea will be necessary prior to any anesthesia in the operating room.    Answers submitted by the patient for this visit:  Review of Symptoms Questionnaire  (Submitted on 10/31/2022)  Fatigue  (Tiredness)?: Yes  Snoring?: Yes  Urinating too frequently?: Yes  Muscle aches / pain?: Yes  Feeling depressed?: Yes

## 2022-11-02 NOTE — PATIENT INSTRUCTIONS
Given the inadequate response to fluticasone use for 4 months in the past we would like to add a nasal antihistamine.  I think the combination of twice daily nasal antihistamine and nasal steroid found in Dymista could be an ideal solution to treat both the chronic watery edematous nasal drainage as well as chronic nasal obstruction.  Surgical treatment with either in office or OR turbinate reduction or other treatments may prove necessary but for now saline, sinus rinses and twice daily Dymista is recommended.  Follow-up in 2-3 weeks to reassess including possible nasal endoscopy and less likely sinus imaging (no symptoms of chronic or recurrent sinusitis) prior to surgical planning.    We will also discuss home sleep apnea testing giving the significant daytime somnolence.  Identification and treatment of sleep apnea will be necessary prior to any anesthesia in the operating room.    INTRANASAL STEROID SPRAYS      Intranasal steroid sprays are available both by prescription and over-the-counter both in generic and brand name preparations.  They are all very similar in efficacy and side effect profiles.  Over-the-counter and prescription intranasal steroids include fluticasone propionate (Flonase), fluticasone furoate (Sensimist), triamcinolone (Nasacort), and budesonide (Rhinocort).  While these medications are available as prescriptions as well there are few nasal steroids in her available by prescription only and include mometasone (Nasonex), flunisolide (Nasarel), and beclomethasone (QNASL).    Nasal steroids or the foundation of treatment of both allergy and other inflammatory conditions of the nose and sinuses.  They are safe for regular use and while there are many side effects listed most of these are steroid class effects and not typically encountered.  Typical side effects include dryness and even ulceration and bleeding of the nose.  These side effects can be minimized by proper application and proper  moisturization with saline and saline gel.    Sometimes changing between 1 brand of nasal steroid and another can result in improved control of symptoms especially after long term use of one particular nasal steroid.    Proper application of the nasal steroid spray is accomplished by spraying towards the I/ear on the same side with the tip of the superior just barely inside the nostril with the chin slightly downward.  Any dripping should be gently inhaled not sniff test backwards into the throat.  Labeled instructions should be followed.        NASAL SALINE    Still saline comes in many preparations including sprays/mists, gels, and rinses.  Different preparations served different purposes.  Saline spray helps to briefly moisturize the nose and help clear mucus.  Saline gels coat the nose for longer protective benefit of keeping the linings the nose moist.  Saline rinses clear the nose and sinuses and a more thorough way in her best used for significant postnasal drip and sinus complaints.  A combination of saline sprays/mists, gels and rinses should be used to address routine nasal clearing and dryness issues as well as flushing for better control of allergy and postnasal drip symptoms.  There is no real risk of over use of nasal saline products.  Saline sprays do not have any of the potential rebound or addiction of nasal decongestant sprays.  Nasal saline sprays and rinses should be used prior to the application of any medicated nasal sprays such as nasal steroids or nasal antihistamine sprays.        SINUS RINSE INSTRUCTIONS    Nasal Saline Irrigation Instructions  You can wash your nasal and sinus passages using nasal saline (salt water) irrigation. This   is simple and effective. Follow the instructions below, as well as the ones provided by your   physician.  Supplies  First, you will need a nasal saline irrigation bottle and rinse solution.   You can purchase nasal rinse kits that include these items  (such as   NeilMed®, Ayr®, Simply Saline®, Ocean Complete®) at most drug   stores. You can also make your own saline irrigation solution by   adding kosher (non-iodine) salt and baking soda to distilled water.   Your physician may tell you to add medications like a steroid or   antibiotic to the rinse as needed.  Steps for nasal irrigation  Step 1. Fill the bottle  ? Wash your hands.  ? Fill the irrigation bottle with lukewarm distilled water or boiled water that has cooled.  Step 2. Mix the solution  ? Put the saline and salt packet contents into the bottle.  ? Tighten the top of the bottle and shake it gently to dissolve the mixture.  ? If you are making your own solution:   - Add 1/4 to 1/2 teaspoon of baking soda and 1/8 teaspoon of kosher (non-iodine) salt   into the bottle.   - Tighten the top of the bottle.   - Shake the bottle gently to dissolve the mixture.  Step 3. Get into position  ?  front of the sink.  ? Unless you were instructed to use another position, bend forward.   Then tilt your face down about 45 degrees so that you are looking   down into the sink.  ? Gently place the spout of the saline bottle against 1 of your nostrils.  [facility]  CARE AND TREATMENT  Patient Education  ©2018 [employer]  ©2018 [employer]  Step 4. Rinse  ? Breathing through your mouth, gently squeeze the   bottle. This will squirt the solution into your nostril. The   solution will start to drain from the other nostril. Some   may drain from your mouth. This is normal.  ? Use 2 ounces (half of the bottle) on each nostril.  ? Afterwards, you may need to blow your nose gently to   help drain any solution that is left behind.  Step 5. Repeat  ? Repeat steps 3 and 4 with the other nostril.  You can watch a video to learn how to do nasal saline irrigation. Go to [email or web address] and   search for NeilMed Sinus Rinse.  Step 6.  Clean the bottle and cap. Air dry the  Sinus Rinse bottle, cap, and tube on a clean paper towel, a lint free towel, or use NeilMed® NasaDOCK® or NasaDOCK plus (sold separately) to store the bottle, cap and tube.  Please read Warnings before using.  Our recommendation is to replace the bottle every three months.      NEILMED CLEAING INSTRUCTIONS    It is very important to keep these devices clean and free from any contamination. Replace the bottle every 3 months.  NasaDock Plus  NasaDock NeilMed® SINUS RINSE Squeeze Bottle: Please perform routine inspections of the bottle and tube for any discolorations and cracks. If there are any visual signs of deterioration or permanent color changes, please clean thoroughly. If the discolorations remain after cleansing, discard the items and purchase new ones. Please follow these instructions after each use of the product. Be sure to replace your product after three months.  Step 1: Rinse the cap, tube and bottle using running water. Fill the bottle with distilled, micro-filtered (through 0.2 micron), reverse osmosis filtered, commercially bottled or previously boiled and cooled down water at lukewarm or body temperature..  Step 2: Add a few drops of dish washing liquid or baby shampoo.  Step 3: Attach the cap and tube to the bottle; hold your finger over the opening in the cap and shake the bottle vigorously.  Step 4: Squeeze the bottle hard to allow the soapy solution to clean the interior of the tube and the cap. Empty out the bottle completely.  Step 5: Rinse the soap from the bottle, cap and tube thoroughly and place the items on a clean paper towel to dry or use the preferred NasaDOCK® or NasaDOCK plus.    The NasaDOCK® is a simple, hygienic way to dry and store the SINUS RINSE bottle, cap and tube. NasaDOCK® comes with various hanging options and is available in different colors. Our newest model also offers storage for our SINUS RINSE mixture packets. We strongly suggest using NasaDOCK® as an  inexpensive, easy way to dry the cap, tube and SINUS RINSE bottle.        Cleaning:  Do not use a  to clean the inside of a bottle. While our bottle is  safe, a  will not adequately clean the SINUS RINSE bottle. The water jets in dishwashers cannot enter the narrow neck of the bottle, and portions of the bottles interior will not be cleaned thoroughly. Additional methods of cleaning the bottle include the use of concentrated white vinegar or isopropyl alcohol (70% concentration), followed by scrubbing and rinsing as described above.       Microwave Disinfection  Clean the device with soap and water as mentioned above and shake off the excess water. Now place the bottle, cap and tube in the microwave for 40 seconds. This will disinfect the bottle, cap and tube. If the microwave has been used recently, please make sure that the inside of the microwave has cooled back down to room temperature before using it to disinfect the bottle.    NeilMed NasaFlo® Neti Pot Users:  Use the same procedure as above.    Sinugator® Cleaning Directions:  Clean the Sinugator® by running plain water and dry with a clean lint free towel and then air dry the unit by keeping it open to the air. The nasal  tip, blue reservoir and white soft tube can be disinfected by cleaning with soap and water and shaking off the excess water before placing in the home microwave for 60 seconds. Clean the entire unit with a few drops of dishwashing liquid and water every three days to keep the unit clean. As a fi nal rinse to wash off any residual soap or tap water, use either distilled, micro-filtered (through 0.2 micron filter), commercially bottled or previously boiled & cooled down water. Please make sure to rinse thoroughly during each wash so no soap is left behind. DO NOT place the white motor unit in microwave for disinfection. Because of the units stainless steel components, this can cause damage  or fire hazards.    General Principles of Maintenance & Storage:  When permissible use a microwave periodically to disinfect devices. Always store NeilMed® products in a cool and dry place with adequate ventilation. NasaDOCK® or NasaDOCK plus offer a simple hygienic way to air dry & neatly store the bottle, cap, tube and NasaFlo. Do not store the bottle with the cap screwed on, unless both are dry. Do not store the wet parts in a sealed plastic bag. If you travel before they are dry, wrap parts separately in paper towels. Hand soap or shampoo can be used for cleaning parts while away from home.        USE ONLY AS DIRECTED, IF SYMPTOMS PERSIST SEE YOUR DOCTOR/HEALTHCARE PROFESSIONAL. ALWAYS READ THE LABEL.

## 2022-11-03 ENCOUNTER — PATIENT MESSAGE (OUTPATIENT)
Dept: ADMINISTRATIVE | Facility: HOSPITAL | Age: 62
End: 2022-11-03
Payer: COMMERCIAL

## 2022-11-22 ENCOUNTER — PATIENT MESSAGE (OUTPATIENT)
Dept: FAMILY MEDICINE | Facility: CLINIC | Age: 62
End: 2022-11-22
Payer: COMMERCIAL

## 2022-11-22 NOTE — TELEPHONE ENCOUNTER
Call placed to patient for notification that Dr. Celestin is out of the office until 11-28-22. Patient states okay to wait and follow up with Dr. Celestin once he has returned to office.

## 2022-12-03 ENCOUNTER — PATIENT MESSAGE (OUTPATIENT)
Dept: ADMINISTRATIVE | Facility: HOSPITAL | Age: 62
End: 2022-12-03
Payer: COMMERCIAL

## 2022-12-06 ENCOUNTER — PATIENT MESSAGE (OUTPATIENT)
Dept: ADMINISTRATIVE | Facility: HOSPITAL | Age: 62
End: 2022-12-06
Payer: COMMERCIAL

## 2022-12-30 ENCOUNTER — PATIENT MESSAGE (OUTPATIENT)
Dept: ADMINISTRATIVE | Facility: HOSPITAL | Age: 62
End: 2022-12-30
Payer: COMMERCIAL

## 2023-01-30 ENCOUNTER — PATIENT MESSAGE (OUTPATIENT)
Dept: FAMILY MEDICINE | Facility: CLINIC | Age: 63
End: 2023-01-30
Payer: COMMERCIAL

## 2023-01-31 ENCOUNTER — PATIENT MESSAGE (OUTPATIENT)
Dept: ADMINISTRATIVE | Facility: HOSPITAL | Age: 63
End: 2023-01-31
Payer: COMMERCIAL

## 2023-01-31 DIAGNOSIS — Z12.11 SCREENING FOR COLON CANCER: ICD-10-CM

## 2023-03-14 ENCOUNTER — PATIENT MESSAGE (OUTPATIENT)
Dept: FAMILY MEDICINE | Facility: CLINIC | Age: 63
End: 2023-03-14
Payer: COMMERCIAL

## 2023-03-14 DIAGNOSIS — Z00.00 ROUTINE GENERAL MEDICAL EXAMINATION AT A HEALTH CARE FACILITY: Primary | ICD-10-CM

## 2023-03-20 DIAGNOSIS — Z01.89 PATIENT REQUEST FOR DIAGNOSTIC TESTING: Primary | ICD-10-CM

## 2023-03-21 ENCOUNTER — LAB VISIT (OUTPATIENT)
Dept: LAB | Facility: HOSPITAL | Age: 63
End: 2023-03-21
Attending: STUDENT IN AN ORGANIZED HEALTH CARE EDUCATION/TRAINING PROGRAM
Payer: COMMERCIAL

## 2023-03-21 DIAGNOSIS — Z01.89 PATIENT REQUEST FOR DIAGNOSTIC TESTING: ICD-10-CM

## 2023-03-21 DIAGNOSIS — Z00.00 ROUTINE GENERAL MEDICAL EXAMINATION AT A HEALTH CARE FACILITY: ICD-10-CM

## 2023-03-21 LAB
ALBUMIN SERPL BCP-MCNC: 3.9 G/DL (ref 3.5–5.2)
ALP SERPL-CCNC: 70 U/L (ref 55–135)
ALT SERPL W/O P-5'-P-CCNC: 13 U/L (ref 10–44)
ANION GAP SERPL CALC-SCNC: 11 MMOL/L (ref 8–16)
AST SERPL-CCNC: 16 U/L (ref 10–40)
BASOPHILS # BLD AUTO: 0.04 K/UL (ref 0–0.2)
BASOPHILS NFR BLD: 0.6 % (ref 0–1.9)
BILIRUB SERPL-MCNC: 0.5 MG/DL (ref 0.1–1)
BUN SERPL-MCNC: 14 MG/DL (ref 6–20)
CALCIUM SERPL-MCNC: 9.8 MG/DL (ref 8.7–10.5)
CHLORIDE SERPL-SCNC: 103 MMOL/L (ref 95–110)
CHOLEST SERPL-MCNC: 204 MG/DL (ref 120–199)
CHOLEST/HDLC SERPL: 3.6 {RATIO} (ref 2–5)
CO2 SERPL-SCNC: 27 MMOL/L (ref 23–29)
CREAT SERPL-MCNC: 0.9 MG/DL (ref 0.5–1.4)
DIFFERENTIAL METHOD: NORMAL
EOSINOPHIL # BLD AUTO: 0.3 K/UL (ref 0–0.5)
EOSINOPHIL NFR BLD: 4.8 % (ref 0–8)
ERYTHROCYTE [DISTWIDTH] IN BLOOD BY AUTOMATED COUNT: 12.6 % (ref 11.5–14.5)
EST. GFR  (NO RACE VARIABLE): >60 ML/MIN/1.73 M^2
GLUCOSE SERPL-MCNC: 112 MG/DL (ref 70–110)
HCT VFR BLD AUTO: 42.6 % (ref 37–48.5)
HDLC SERPL-MCNC: 56 MG/DL (ref 40–75)
HDLC SERPL: 27.5 % (ref 20–50)
HGB BLD-MCNC: 13.9 G/DL (ref 12–16)
IMM GRANULOCYTES # BLD AUTO: 0.02 K/UL (ref 0–0.04)
IMM GRANULOCYTES NFR BLD AUTO: 0.3 % (ref 0–0.5)
LDLC SERPL CALC-MCNC: 133.8 MG/DL (ref 63–159)
LYMPHOCYTES # BLD AUTO: 1.8 K/UL (ref 1–4.8)
LYMPHOCYTES NFR BLD: 27.3 % (ref 18–48)
MCH RBC QN AUTO: 31 PG (ref 27–31)
MCHC RBC AUTO-ENTMCNC: 32.6 G/DL (ref 32–36)
MCV RBC AUTO: 95 FL (ref 82–98)
MONOCYTES # BLD AUTO: 0.3 K/UL (ref 0.3–1)
MONOCYTES NFR BLD: 4.6 % (ref 4–15)
NEUTROPHILS # BLD AUTO: 4.2 K/UL (ref 1.8–7.7)
NEUTROPHILS NFR BLD: 62.4 % (ref 38–73)
NONHDLC SERPL-MCNC: 148 MG/DL
NRBC BLD-RTO: 0 /100 WBC
PLATELET # BLD AUTO: 252 K/UL (ref 150–450)
PMV BLD AUTO: 11.3 FL (ref 9.2–12.9)
POTASSIUM SERPL-SCNC: 4.7 MMOL/L (ref 3.5–5.1)
PROT SERPL-MCNC: 7.1 G/DL (ref 6–8.4)
RBC # BLD AUTO: 4.48 M/UL (ref 4–5.4)
SODIUM SERPL-SCNC: 141 MMOL/L (ref 136–145)
TRIGL SERPL-MCNC: 71 MG/DL (ref 30–150)
TSH SERPL DL<=0.005 MIU/L-ACNC: 2.14 UIU/ML (ref 0.4–4)
WBC # BLD AUTO: 6.73 K/UL (ref 3.9–12.7)

## 2023-03-21 PROCEDURE — 85025 COMPLETE CBC W/AUTO DIFF WBC: CPT | Performed by: STUDENT IN AN ORGANIZED HEALTH CARE EDUCATION/TRAINING PROGRAM

## 2023-03-21 PROCEDURE — 80053 COMPREHEN METABOLIC PANEL: CPT | Performed by: STUDENT IN AN ORGANIZED HEALTH CARE EDUCATION/TRAINING PROGRAM

## 2023-03-21 PROCEDURE — 80061 LIPID PANEL: CPT | Performed by: STUDENT IN AN ORGANIZED HEALTH CARE EDUCATION/TRAINING PROGRAM

## 2023-03-21 PROCEDURE — 84443 ASSAY THYROID STIM HORMONE: CPT | Performed by: STUDENT IN AN ORGANIZED HEALTH CARE EDUCATION/TRAINING PROGRAM

## 2023-03-21 PROCEDURE — 36415 COLL VENOUS BLD VENIPUNCTURE: CPT | Mod: PO | Performed by: STUDENT IN AN ORGANIZED HEALTH CARE EDUCATION/TRAINING PROGRAM

## 2023-03-29 ENCOUNTER — OFFICE VISIT (OUTPATIENT)
Dept: FAMILY MEDICINE | Facility: CLINIC | Age: 63
End: 2023-03-29
Payer: COMMERCIAL

## 2023-03-29 VITALS
WEIGHT: 203.94 LBS | HEART RATE: 105 BPM | RESPIRATION RATE: 18 BRPM | SYSTOLIC BLOOD PRESSURE: 134 MMHG | DIASTOLIC BLOOD PRESSURE: 80 MMHG | HEIGHT: 62 IN | OXYGEN SATURATION: 95 % | BODY MASS INDEX: 37.53 KG/M2

## 2023-03-29 DIAGNOSIS — F41.0 SEVERE ANXIETY WITH PANIC: ICD-10-CM

## 2023-03-29 DIAGNOSIS — Z00.00 ROUTINE GENERAL MEDICAL EXAMINATION AT A HEALTH CARE FACILITY: Primary | ICD-10-CM

## 2023-03-29 DIAGNOSIS — I10 ESSENTIAL HYPERTENSION: ICD-10-CM

## 2023-03-29 DIAGNOSIS — M35.00 SICCA, UNSPECIFIED TYPE: ICD-10-CM

## 2023-03-29 PROCEDURE — 1159F PR MEDICATION LIST DOCUMENTED IN MEDICAL RECORD: ICD-10-PCS | Mod: CPTII,S$GLB,, | Performed by: STUDENT IN AN ORGANIZED HEALTH CARE EDUCATION/TRAINING PROGRAM

## 2023-03-29 PROCEDURE — 3075F PR MOST RECENT SYSTOLIC BLOOD PRESS GE 130-139MM HG: ICD-10-PCS | Mod: CPTII,S$GLB,, | Performed by: STUDENT IN AN ORGANIZED HEALTH CARE EDUCATION/TRAINING PROGRAM

## 2023-03-29 PROCEDURE — 3008F PR BODY MASS INDEX (BMI) DOCUMENTED: ICD-10-PCS | Mod: CPTII,S$GLB,, | Performed by: STUDENT IN AN ORGANIZED HEALTH CARE EDUCATION/TRAINING PROGRAM

## 2023-03-29 PROCEDURE — 99214 OFFICE O/P EST MOD 30 MIN: CPT | Mod: S$GLB,,, | Performed by: STUDENT IN AN ORGANIZED HEALTH CARE EDUCATION/TRAINING PROGRAM

## 2023-03-29 PROCEDURE — 3079F DIAST BP 80-89 MM HG: CPT | Mod: CPTII,S$GLB,, | Performed by: STUDENT IN AN ORGANIZED HEALTH CARE EDUCATION/TRAINING PROGRAM

## 2023-03-29 PROCEDURE — 99214 PR OFFICE/OUTPT VISIT, EST, LEVL IV, 30-39 MIN: ICD-10-PCS | Mod: S$GLB,,, | Performed by: STUDENT IN AN ORGANIZED HEALTH CARE EDUCATION/TRAINING PROGRAM

## 2023-03-29 PROCEDURE — 93005 ELECTROCARDIOGRAM TRACING: CPT | Mod: S$GLB,,, | Performed by: STUDENT IN AN ORGANIZED HEALTH CARE EDUCATION/TRAINING PROGRAM

## 2023-03-29 PROCEDURE — 99999 PR PBB SHADOW E&M-EST. PATIENT-LVL V: ICD-10-PCS | Mod: PBBFAC,,, | Performed by: STUDENT IN AN ORGANIZED HEALTH CARE EDUCATION/TRAINING PROGRAM

## 2023-03-29 PROCEDURE — 93010 EKG 12-LEAD: ICD-10-PCS | Mod: S$GLB,,, | Performed by: INTERNAL MEDICINE

## 2023-03-29 PROCEDURE — 1159F MED LIST DOCD IN RCRD: CPT | Mod: CPTII,S$GLB,, | Performed by: STUDENT IN AN ORGANIZED HEALTH CARE EDUCATION/TRAINING PROGRAM

## 2023-03-29 PROCEDURE — 3079F PR MOST RECENT DIASTOLIC BLOOD PRESSURE 80-89 MM HG: ICD-10-PCS | Mod: CPTII,S$GLB,, | Performed by: STUDENT IN AN ORGANIZED HEALTH CARE EDUCATION/TRAINING PROGRAM

## 2023-03-29 PROCEDURE — 93005 EKG 12-LEAD: ICD-10-PCS | Mod: S$GLB,,, | Performed by: STUDENT IN AN ORGANIZED HEALTH CARE EDUCATION/TRAINING PROGRAM

## 2023-03-29 PROCEDURE — 1160F RVW MEDS BY RX/DR IN RCRD: CPT | Mod: CPTII,S$GLB,, | Performed by: STUDENT IN AN ORGANIZED HEALTH CARE EDUCATION/TRAINING PROGRAM

## 2023-03-29 PROCEDURE — 3075F SYST BP GE 130 - 139MM HG: CPT | Mod: CPTII,S$GLB,, | Performed by: STUDENT IN AN ORGANIZED HEALTH CARE EDUCATION/TRAINING PROGRAM

## 2023-03-29 PROCEDURE — 99999 PR PBB SHADOW E&M-EST. PATIENT-LVL V: CPT | Mod: PBBFAC,,, | Performed by: STUDENT IN AN ORGANIZED HEALTH CARE EDUCATION/TRAINING PROGRAM

## 2023-03-29 PROCEDURE — 3008F BODY MASS INDEX DOCD: CPT | Mod: CPTII,S$GLB,, | Performed by: STUDENT IN AN ORGANIZED HEALTH CARE EDUCATION/TRAINING PROGRAM

## 2023-03-29 PROCEDURE — 93010 ELECTROCARDIOGRAM REPORT: CPT | Mod: S$GLB,,, | Performed by: INTERNAL MEDICINE

## 2023-03-29 PROCEDURE — 1160F PR REVIEW ALL MEDS BY PRESCRIBER/CLIN PHARMACIST DOCUMENTED: ICD-10-PCS | Mod: CPTII,S$GLB,, | Performed by: STUDENT IN AN ORGANIZED HEALTH CARE EDUCATION/TRAINING PROGRAM

## 2023-03-29 RX ORDER — HYDROCHLOROTHIAZIDE 50 MG/1
TABLET ORAL
COMMUNITY
Start: 2023-03-02 | End: 2023-03-29 | Stop reason: DRUGHIGH

## 2023-03-29 NOTE — MEDICAL/APP STUDENT
Subjective     Patient ID: Malaika Craven is a 62 y.o. female.    Chief Complaint: Follow-up    Presents with a new complaint of mouth symptoms. She describes a constant numbing, burning sensation of the lips and floor of the mouth that started more than 5 years ago. Things have not progressed. She has tried several OTC meds to no avail. She denies any cavities or abnormalities of the mouth. She's had an autoimmune workup in the past that was negative.     HTN - 150s/90s consistently at home. Compliant with HCTZ 25mg and nifedipine 60mg. Dealing with external stressors including working 2 jobs and taking care of her adopted 8 year old.     Weight loss - Arranged to have ozempic injections with outside clinic.     Depression/anxiety with panic - Has seen psych. Mood improved. Feels things are heading in the right direction.       Follow-up  Pertinent negatives include no abdominal pain, arthralgias, chest pain, headaches, myalgias, nausea, numbness, sore throat, vomiting or weakness.   Review of Systems   Constitutional:  Negative for unexpected weight change.   HENT:  Negative for dental problem, ear pain, mouth dryness, nosebleeds, sore throat, tinnitus and voice change.    Eyes:  Negative for photophobia, pain, discharge, redness, itching and visual disturbance.   Respiratory:  Negative for shortness of breath.    Cardiovascular:  Negative for chest pain and leg swelling.   Gastrointestinal:  Negative for abdominal pain, constipation, diarrhea, nausea and vomiting.   Genitourinary:  Negative for difficulty urinating and flank pain.   Musculoskeletal:  Negative for arthralgias and myalgias.   Neurological:  Negative for weakness, numbness and headaches.   Psychiatric/Behavioral:  Negative for agitation, behavioral problems and confusion.         Objective     Physical Exam  Constitutional:       General: She is not in acute distress.     Appearance: She is obese.   HENT:      Head: Normocephalic and  atraumatic.      Right Ear: External ear normal.      Left Ear: External ear normal.      Nose: Nose normal.      Mouth/Throat:      Mouth: Mucous membranes are moist.      Pharynx: Oropharynx is clear. No oropharyngeal exudate or posterior oropharyngeal erythema.      Comments: No obvious signs of salivary gland enlargement. Sufficient sublingual salivary pooling.  Eyes:      General:         Right eye: No discharge.         Left eye: No discharge.      Extraocular Movements: Extraocular movements intact.   Cardiovascular:      Rate and Rhythm: Normal rate and regular rhythm.      Pulses: Normal pulses.      Heart sounds: Normal heart sounds. No murmur heard.  Pulmonary:      Effort: Pulmonary effort is normal. No respiratory distress.      Breath sounds: Normal breath sounds. No wheezing.   Abdominal:      Palpations: Abdomen is soft.   Musculoskeletal:      Cervical back: Neck supple.      Right lower leg: No edema.      Left lower leg: No edema.   Skin:     General: Skin is warm.   Neurological:      General: No focal deficit present.      Mental Status: She is alert and oriented to person, place, and time.      Sensory: No sensory deficit.      Motor: No weakness.   Psychiatric:         Behavior: Behavior normal.         Thought Content: Thought content normal.          Assessment and Plan     Problem List Items Addressed This Visit          Cardiac/Vascular    Essential hypertension    Relevant Orders    BASIC METABOLIC PANEL     Other Visit Diagnoses       Sicca, unspecified type    -  Primary    Relevant Orders    ANTI -SSA ANTIBODY    ANTI-SSB ANTIBODY    Severe anxiety with panic        Relevant Orders    IN OFFICE EKG 12-LEAD (to [place])            Sicca, unspecified type  -     Essential hypertension  - BP consistently 130s/80s in office. Fine with continuing current antihypertensive regimen.     Severe anxiety with panic  -

## 2023-03-29 NOTE — PROGRESS NOTES
"[facility] CLINIC NOTE    Patient Name: Malaika Craven  YOB: 1960    PRESENTING HISTORY     History of Present Illness:  Ms. Malaika Craven is a 62 y.o. female here for annual.     HTN- Higher at home, better here. Cuff accuracy has not been confirmed.     She is doing better with help of psychology. Still has some things which are known triggers and she avoids.     Working on weight loss, will be getting outside [facility].     The 10-year ASCVD risk score (Marisabel FITZGERALD, et al., 2019) is: 4.9%    Values used to calculate the score:      Age: 62 years      Sex: Female      Is Non- : No      Diabetic: No      Tobacco smoker: No      Systolic Blood Pressure: 134 mmHg      Is BP treated: Yes      HDL Cholesterol: 56 mg/dL      Total Cholesterol: 204 mg/dL      C/o longstganding issue with burning pain in her mouth. A/w dry mouth. Checked for sjogren's in past, 6 years ago.   No dental caries.     ROS      OBJECTIVE:   Vital Signs:  Vitals:    03/29/23 1306   BP: 134/80   Pulse: 105   Resp: 18   SpO2: 95%   Weight: 92.5 kg (203 lb 14.8 oz)   Height: 5' 2" (1.575 m)          Physical Exam: Normal, no change.     Physical Exam    ASSESSMENT & PLAN:     Routine general medical examination at a health care facility    Sicca, unspecified type  -     ANTI -SSA ANTIBODY; Future; Expected date: 03/29/2023  -     ANTI-SSB ANTIBODY; Future; Expected date: 03/29/2023    Essential hypertension  -     BASIC METABOLIC PANEL; Future; Expected date: 03/29/2023  Continue current medications    Severe anxiety with panic  -     IN OFFICE EKG 12-LEAD (to [place])  Improved. EKG on QT prolonging agents.        Dario Celestin MD   Internal Medicine            "

## 2023-04-04 ENCOUNTER — PATIENT MESSAGE (OUTPATIENT)
Dept: FAMILY MEDICINE | Facility: CLINIC | Age: 63
End: 2023-04-04
Payer: COMMERCIAL

## 2023-04-04 DIAGNOSIS — R94.31 NONSPECIFIC ABNORMAL ELECTROCARDIOGRAM (ECG) (EKG): Primary | ICD-10-CM

## 2023-04-04 DIAGNOSIS — R94.31 ABNORMAL ELECTROCARDIOGRAM (ECG) (EKG): ICD-10-CM

## 2023-04-04 DIAGNOSIS — R94.31 ABNORMAL EKG: Primary | ICD-10-CM

## 2023-04-11 ENCOUNTER — PATIENT MESSAGE (OUTPATIENT)
Dept: ADMINISTRATIVE | Facility: HOSPITAL | Age: 63
End: 2023-04-11
Payer: COMMERCIAL

## 2023-04-17 ENCOUNTER — TELEPHONE (OUTPATIENT)
Dept: CARDIOLOGY | Facility: HOSPITAL | Age: 63
End: 2023-04-17

## 2023-04-17 NOTE — TELEPHONE ENCOUNTER
[facility]   Will need to register on the first floor at the main entrance.   Patient advised that arrival time is 8:00am.  Patient advised that she may be here about 3.5-4 hours, and may want to bring something to occupy their time, as there will be periods of waiting.    Patient advised, may take her medications prior to testing if you need to.  Advised if she needs to eat to take her medications, please keep it light, like toast and juice.    Patient advised to avoid all caffeine 12 hours prior to testing.  This includes decaf tea and coffee.    Will provide peanut butter crackers for a snack after stress test.  If patient would prefer something else, please bring a snack from home.    Wear comfortable clothing.   No lotions, oils, or powders to the upper chest area. May wear deodorant.    No metal jewelry, buttons, or zippers to the upper body.  Patient verbalizes understanding of instructions.

## 2023-04-18 ENCOUNTER — HOSPITAL ENCOUNTER (OUTPATIENT)
Dept: RADIOLOGY | Facility: HOSPITAL | Age: 63
Discharge: HOME | End: 2023-04-18
Attending: STUDENT IN AN ORGANIZED HEALTH CARE EDUCATION/TRAINING PROGRAM
Payer: COMMERCIAL

## 2023-04-18 ENCOUNTER — HOSPITAL ENCOUNTER (OUTPATIENT)
Dept: CARDIOLOGY | Facility: HOSPITAL | Age: 63
Discharge: HOME | End: 2023-04-18
Attending: STUDENT IN AN ORGANIZED HEALTH CARE EDUCATION/TRAINING PROGRAM
Payer: COMMERCIAL

## 2023-04-18 DIAGNOSIS — R94.31 NONSPECIFIC ABNORMAL ELECTROCARDIOGRAM (ECG) (EKG): ICD-10-CM

## 2023-04-18 LAB
CV PHARM DOSE: 0.4 MG
CV STRESS BASE HR: 97 BPM
DIASTOLIC BLOOD PRESSURE: 84 MMHG
EJECTION FRACTION- HIGH: 65 %
END DIASTOLIC INDEX-HIGH: 153 ML/M2
END DIASTOLIC INDEX-LOW: 93 ML/M2
END SYSTOLIC INDEX-HIGH: 71 ML/M2
END SYSTOLIC INDEX-LOW: 31 ML/M2
NUC REST DIASTOLIC VOLUME INDEX: 62
NUC REST EJECTION FRACTION: 71
NUC REST SYSTOLIC VOLUME INDEX: 18
NUC STRESS DIASTOLIC VOLUME INDEX: 61
NUC STRESS EJECTION FRACTION: 74 %
NUC STRESS SYSTOLIC VOLUME INDEX: 16
OHS CV CPX 1 MINUTE RECOVERY HEART RATE: 120 BPM
OHS CV CPX 85 PERCENT MAX PREDICTED HEART RATE MALE: 130
OHS CV CPX MAX PREDICTED HEART RATE: 153
OHS CV CPX PATIENT IS FEMALE: 1
OHS CV CPX PATIENT IS MALE: 0
OHS CV CPX PEAK DIASTOLIC BLOOD PRESSURE: 100 MMHG
OHS CV CPX PEAK HEAR RATE: 120 BPM
OHS CV CPX PEAK RATE PRESSURE PRODUCT: NORMAL
OHS CV CPX PEAK SYSTOLIC BLOOD PRESSURE: 140 MMHG
OHS CV CPX PERCENT MAX PREDICTED HEART RATE ACHIEVED: 78
OHS CV CPX RATE PRESSURE PRODUCT PRESENTING: NORMAL
RETIRED EF AND QEF - SEE NOTES: 53 %
SYSTOLIC BLOOD PRESSURE: 128 MMHG

## 2023-04-18 PROCEDURE — 93017 CV STRESS TEST TRACING ONLY: CPT

## 2023-04-18 PROCEDURE — 93016 CV STRESS TEST SUPVJ ONLY: CPT | Mod: ,,, | Performed by: NURSE PRACTITIONER

## 2023-04-18 PROCEDURE — A9502 TC99M TETROFOSMIN: HCPCS

## 2023-04-18 PROCEDURE — 78452 HT MUSCLE IMAGE SPECT MULT: CPT | Mod: 26,,, | Performed by: INTERNAL MEDICINE

## 2023-04-18 PROCEDURE — 78452 NUCLEAR STRESS - CARDIOLOGY INTERPRETED (CUPID ONLY): ICD-10-PCS | Mod: 26,,, | Performed by: INTERNAL MEDICINE

## 2023-04-18 PROCEDURE — 93018 CV STRESS TEST I&R ONLY: CPT | Mod: ,,, | Performed by: INTERNAL MEDICINE

## 2023-04-18 PROCEDURE — 93016 NUCLEAR STRESS - CARDIOLOGY INTERPRETED (CUPID ONLY): ICD-10-PCS | Mod: ,,, | Performed by: NURSE PRACTITIONER

## 2023-04-18 PROCEDURE — 93018 NUCLEAR STRESS - CARDIOLOGY INTERPRETED (CUPID ONLY): ICD-10-PCS | Mod: ,,, | Performed by: INTERNAL MEDICINE

## 2023-04-18 RX ORDER — REGADENOSON 0.08 MG/ML
0.4 INJECTION, SOLUTION INTRAVENOUS ONCE
Status: COMPLETED | OUTPATIENT
Start: 2023-04-18 | End: 2023-04-18

## 2023-04-18 RX ORDER — AMINOPHYLLINE 25 MG/ML
50 INJECTION, SOLUTION INTRAVENOUS
Status: DISCONTINUED | OUTPATIENT
Start: 2023-04-18 | End: 2023-04-18

## 2023-04-18 RX ORDER — AMINOPHYLLINE 25 MG/ML
50 INJECTION, SOLUTION INTRAVENOUS
Status: COMPLETED | OUTPATIENT
Start: 2023-04-18 | End: 2023-04-18

## 2023-04-18 RX ADMIN — REGADENOSON 0.4 MG: 0.08 INJECTION, SOLUTION INTRAVENOUS at 10:04

## 2023-04-18 RX ADMIN — AMINOPHYLLINE 50 MG: 25 INJECTION, SOLUTION INTRAVENOUS at 10:04

## 2023-05-17 ENCOUNTER — PATIENT MESSAGE (OUTPATIENT)
Dept: ADMINISTRATIVE | Facility: HOSPITAL | Age: 63
End: 2023-05-17
Payer: COMMERCIAL

## 2023-06-14 DIAGNOSIS — Z12.31 OTHER SCREENING MAMMOGRAM: ICD-10-CM

## 2023-06-20 ENCOUNTER — PATIENT MESSAGE (OUTPATIENT)
Dept: ADMINISTRATIVE | Facility: HOSPITAL | Age: 63
End: 2023-06-20
Payer: COMMERCIAL

## 2023-07-12 ENCOUNTER — HOSPITAL ENCOUNTER (OUTPATIENT)
Dept: RADIOLOGY | Facility: CLINIC | Age: 63
Discharge: HOME | End: 2023-07-12
Attending: STUDENT IN AN ORGANIZED HEALTH CARE EDUCATION/TRAINING PROGRAM
Payer: COMMERCIAL

## 2023-07-12 DIAGNOSIS — Z12.31 OTHER SCREENING MAMMOGRAM: ICD-10-CM

## 2023-07-12 PROCEDURE — 77067 SCR MAMMO BI INCL CAD: CPT | Mod: 26,,, | Performed by: RADIOLOGY

## 2023-07-12 PROCEDURE — 77063 BREAST TOMOSYNTHESIS BI: CPT | Mod: 26,,, | Performed by: RADIOLOGY

## 2023-07-12 PROCEDURE — 77067 SCR MAMMO BI INCL CAD: CPT | Mod: TC,PO

## 2023-07-12 PROCEDURE — 77063 MAMMO DIGITAL SCREENING BILAT WITH TOMO: ICD-10-PCS | Mod: 26,,, | Performed by: RADIOLOGY

## 2023-07-12 PROCEDURE — 77067 MAMMO DIGITAL SCREENING BILAT WITH TOMO: ICD-10-PCS | Mod: 26,,, | Performed by: RADIOLOGY

## 2023-10-06 DIAGNOSIS — N95.2 ATROPHIC VAGINITIS: ICD-10-CM

## 2023-10-06 NOTE — TELEPHONE ENCOUNTER
No care due was identified.  [facility] Embedded Care Due Messages. Reference number: [id number].   10/06/2023 3:03:15 PM CDT

## 2023-10-10 RX ORDER — ESTRADIOL 0.1 MG/G
1 CREAM VAGINAL
Qty: 24 G | Refills: 3 | Status: SHIPPED | OUTPATIENT
Start: 2023-10-12 | End: 2023-10-25 | Stop reason: SDUPTHER

## 2023-10-25 ENCOUNTER — TELEPHONE (OUTPATIENT)
Dept: FAMILY MEDICINE | Facility: CLINIC | Age: 63
End: 2023-10-25
Payer: COMMERCIAL

## 2023-10-25 DIAGNOSIS — N95.2 ATROPHIC VAGINITIS: ICD-10-CM

## 2023-10-25 RX ORDER — ESTRADIOL 0.1 MG/G
1 CREAM VAGINAL
Qty: 42.5 G | Refills: 1 | Status: SHIPPED | OUTPATIENT
Start: 2023-10-26 | End: 2023-10-25

## 2023-10-25 RX ORDER — ESTRADIOL 0.1 MG/G
1 CREAM VAGINAL
Qty: 42.5 G | Refills: 1 | Status: SHIPPED | OUTPATIENT
Start: 2023-10-26 | End: 2024-09-26

## 2023-10-25 NOTE — TELEPHONE ENCOUNTER
----- Message from Hilaria Barroso sent at 10/25/2023  2:48 PM CDT -----  Contact: Phamacy  estradioL (ESTRACE) 0.01 % (0.1 mg/gram) vaginal cream      Type:  Pharmacy Calling to Clarify an RX    Name of Caller:Pharmacy     Pharmacy Name:    [facility] [address]  [facility] [address]  Phone: [phone] Fax: [phone]      Prescription Name:estradioL (ESTRACE) 0.01 % (0.1 mg/gram) vaginal cream      What do they need to clarify?:permission to adjust size to 42.5 grams    Best Call Back Number:see above    Additional Information: Please advise

## 2023-10-25 NOTE — TELEPHONE ENCOUNTER
On call refill request.     Call placed to pharmacy, spoke to certified pharmacy technician (Vani ALEJO) who states pharmacy received prescription for Estradiol Vaginal Cream with quantity of 24 grams.  Pharmacy does not carry a 24 gram size tube.  Pharmacy states they have 42.5 gram tube in stock, and patient will need this amount for a 30 day supply.  Pharmacy is requesting to have prescription changed to 42.5 gram quantity.  States will need new prescription e-scribed.  Please advise. Thank you.     Preferred pharmacy:   [facility] [address]  [facility] [address]  Phone: [phone] Fax: [phone]

## 2024-01-05 ENCOUNTER — PATIENT MESSAGE (OUTPATIENT)
Dept: ADMINISTRATIVE | Facility: HOSPITAL | Age: 64
End: 2024-01-05
Payer: COMMERCIAL

## 2024-02-15 ENCOUNTER — OFFICE VISIT (OUTPATIENT)
Dept: URGENT CARE | Facility: CLINIC | Age: 64
End: 2024-02-15
Payer: COMMERCIAL

## 2024-02-15 VITALS
RESPIRATION RATE: 16 BRPM | HEART RATE: 86 BPM | DIASTOLIC BLOOD PRESSURE: 87 MMHG | BODY MASS INDEX: 32.78 KG/M2 | WEIGHT: 185 LBS | TEMPERATURE: 99 F | SYSTOLIC BLOOD PRESSURE: 138 MMHG | OXYGEN SATURATION: 97 % | HEIGHT: 63 IN

## 2024-02-15 DIAGNOSIS — J02.9 SORE THROAT: Primary | ICD-10-CM

## 2024-02-15 DIAGNOSIS — B34.9 ACUTE VIRAL SYNDROME: ICD-10-CM

## 2024-02-15 DIAGNOSIS — Z20.822 EXPOSURE TO COVID-19 VIRUS: ICD-10-CM

## 2024-02-15 LAB
CTP QC/QA: YES
SARS-COV-2 AG RESP QL IA.RAPID: NEGATIVE

## 2024-02-15 PROCEDURE — 99204 OFFICE O/P NEW MOD 45 MIN: CPT | Mod: S$GLB,,, | Performed by: STUDENT IN AN ORGANIZED HEALTH CARE EDUCATION/TRAINING PROGRAM

## 2024-02-15 PROCEDURE — 87811 SARS-COV-2 COVID19 W/OPTIC: CPT | Mod: QW,S$GLB,, | Performed by: STUDENT IN AN ORGANIZED HEALTH CARE EDUCATION/TRAINING PROGRAM

## 2024-02-15 NOTE — PROGRESS NOTES
"Subjective:      Patient ID: Malaika Craven is a 63 y.o. female.    Vitals:  height is 5' 3" (1.6 m) and weight is 83.9 kg (185 lb). Her oral temperature is 98.7 °F (37.1 °C). Her blood pressure is 138/87 and her pulse is 86. Her respiration is 16 and oxygen saturation is 97%.     Chief Complaint: Sore Throat    Patient is a 63-year-old female with a past medical history of anxiety, depression, and hypertension who presents to clinic for evaluation of sore throat and body aches.  Patient reports possible COVID like symptoms.  Patient reports she is fully vaccinated.  Patient reports positive sick exposure as her mother and  both have COVID.  Patient reports that her symptoms began yesterday afternoon.  Patient states that she has taken over-the-counter cough drops with moderate relief to symptoms.  Patient states that she has only experienced that of a sore throat, generalized body aches, and slight headaches.  Patient denies any acute fatigue, fever or chills, ear pain, nasal sinus congestion, chest pain or palpitations, shortness of breath or cough, abdominal pain, nausea or vomiting, diarrhea, urinary symptoms, rash, dizziness, or change in mentation.    Sore Throat   This is a new problem. The current episode started yesterday. The problem has been gradually worsening. There has been no fever. The pain is at a severity of 0/10. The patient is experiencing no pain. Associated symptoms include headaches. Pertinent negatives include no abdominal pain, congestion, coughing, diarrhea, drooling, ear pain, shortness of breath or vomiting. Treatments tried: Cough drops. The treatment provided no relief.       Constitution: Negative. Negative for chills, sweating, fatigue and fever.   HENT:  Positive for sore throat. Negative for ear pain, drooling and congestion.    Neck: neck negative.   Cardiovascular: Negative.  Negative for chest pain and palpitations.   Eyes: Negative.    Respiratory: Negative.  " Negative for chest tightness, cough and shortness of breath.    Gastrointestinal: Negative.  Negative for abdominal pain, nausea, vomiting and diarrhea.   Endocrine: negative.   Genitourinary: Negative.  Negative for dysuria, frequency and urgency.   Musculoskeletal:  Positive for muscle ache.   Skin: Negative.  Negative for color change, pale, rash and erythema.   Allergic/Immunologic: Negative.    Neurological:  Positive for headaches. Negative for dizziness, light-headedness, passing out, disorientation and altered mental status.   Hematologic/Lymphatic: Negative.    Psychiatric/Behavioral: Negative.  Negative for altered mental status, disorientation and confusion.       Objective:     Physical Exam   Constitutional: She is oriented to person, place, and time. She appears well-developed. She is cooperative.  Non-toxic appearance. She does not appear ill. No distress.   HENT:   Head: Normocephalic and atraumatic.   Ears:   Right Ear: Hearing, tympanic membrane, external ear and ear canal normal.   Left Ear: Hearing, tympanic membrane, external ear and ear canal normal.   Nose: Nose normal. No mucosal edema, rhinorrhea, nasal deformity or congestion. No epistaxis. Right sinus exhibits no maxillary sinus tenderness and no frontal sinus tenderness. Left sinus exhibits no maxillary sinus tenderness and no frontal sinus tenderness.   Mouth/Throat: Uvula is midline and mucous membranes are normal. Mucous membranes are moist. No trismus in the jaw. Normal dentition. No uvula swelling. Posterior oropharyngeal erythema (Mildly erythemic without cobblestoning or exudate) present. No oropharyngeal exudate. Oropharynx is clear.   Eyes: Conjunctivae and lids are normal. Pupils are equal, round, and reactive to light. Right eye exhibits no discharge. Left eye exhibits no discharge. No scleral icterus.   Neck: Trachea normal and phonation normal. Neck supple. No neck rigidity present.   Cardiovascular: Normal rate, regular  rhythm, normal heart sounds and normal pulses.   Pulmonary/Chest: Effort normal and breath sounds normal. No respiratory distress. She has no wheezes. She has no rhonchi. She has no rales.   Abdominal: Normal appearance and bowel sounds are normal. She exhibits no distension. Soft. There is no abdominal tenderness.   Musculoskeletal: Normal range of motion.         General: Normal range of motion.      Cervical back: She exhibits no tenderness.   Lymphadenopathy:     She has no cervical adenopathy.   Neurological: She is alert and oriented to person, place, and time. She exhibits normal muscle tone.   Skin: Skin is warm, dry, intact, not diaphoretic, not pale and no rash. Capillary refill takes less than 2 seconds. No erythema   Psychiatric: Her speech is normal and behavior is normal. Judgment and thought content normal.   Nursing note and vitals reviewed.      Assessment:     1. Sore throat    2. Exposure to COVID-19 virus    3. Acute viral syndrome        Plan:       Sore throat  -     SARS Coronavirus 2 Antigen, POCT Manual Read    Exposure to COVID-19 virus    Acute viral syndrome                Labs:  COVID negative.  Patient refused need for influenza or rapid strep testing.    Recommend retesting for COVID within 24-72 hours.    Patient refused need for any symptomatic treatment.  Patient reports will treat over-the-counter.    Tylenol per package instructions for any pain or fever.    Throat lozenges per package instructions for any sore throat.    Warm salt water gargles every 2-3 hours as needed for sore throat.    Follow-up with PCP in 1-2 days.    Return to clinic as needed.    To ED for any new or acutely worsening symptoms.    Patient in agreement with plan of care.    DISCLAIMER: Please note that my documentation in this Electronic Healthcare Record was produced using speech recognition software and therefore may contain errors related to that software system.These could include grammar, punctuation  and spelling errors or the inclusion/exclusion of phrases that were not intended. Garbled syntax, mangled pronouns, and other bizarre constructions may be attributed to that software system.

## 2024-02-18 ENCOUNTER — OFFICE VISIT (OUTPATIENT)
Dept: URGENT CARE | Facility: CLINIC | Age: 64
End: 2024-02-18
Payer: COMMERCIAL

## 2024-02-18 VITALS
BODY MASS INDEX: 32.78 KG/M2 | HEART RATE: 97 BPM | RESPIRATION RATE: 18 BRPM | OXYGEN SATURATION: 98 % | SYSTOLIC BLOOD PRESSURE: 130 MMHG | HEIGHT: 63 IN | WEIGHT: 185 LBS | TEMPERATURE: 98 F | DIASTOLIC BLOOD PRESSURE: 88 MMHG

## 2024-02-18 DIAGNOSIS — R30.0 BURNING WITH URINATION: ICD-10-CM

## 2024-02-18 DIAGNOSIS — R05.9 COUGH, UNSPECIFIED TYPE: Primary | ICD-10-CM

## 2024-02-18 DIAGNOSIS — R09.81 CONGESTION OF NASAL SINUS: ICD-10-CM

## 2024-02-18 LAB
BILIRUB UR QL STRIP: NEGATIVE
CTP QC/QA: YES
GLUCOSE UR QL STRIP: NEGATIVE
KETONES UR QL STRIP: NEGATIVE
LEUKOCYTE ESTERASE UR QL STRIP: NEGATIVE
PH, POC UA: 6
POC BLOOD, URINE: NEGATIVE
POC NITRATES, URINE: NEGATIVE
PROT UR QL STRIP: NEGATIVE
SARS-COV-2 AG RESP QL IA.RAPID: NEGATIVE
SP GR UR STRIP: 1.02 (ref 1–1.03)
UROBILINOGEN UR STRIP-ACNC: 0.2 (ref 0.1–1.1)

## 2024-02-18 PROCEDURE — 87811 SARS-COV-2 COVID19 W/OPTIC: CPT | Mod: QW,S$GLB,, | Performed by: NURSE PRACTITIONER

## 2024-02-18 PROCEDURE — 99214 OFFICE O/P EST MOD 30 MIN: CPT | Mod: S$GLB,,, | Performed by: NURSE PRACTITIONER

## 2024-02-18 PROCEDURE — 81003 URINALYSIS AUTO W/O SCOPE: CPT | Mod: QW,S$GLB,, | Performed by: NURSE PRACTITIONER

## 2024-02-18 RX ORDER — OLOPATADINE HYDROCHLORIDE AND MOMETASONE FUROATE 25; 665 UG/1; UG/1
1 SPRAY, METERED NASAL 2 TIMES DAILY PRN
Qty: 29 G | Refills: 0 | Status: SHIPPED | OUTPATIENT
Start: 2024-02-18 | End: 2024-03-19

## 2024-02-18 RX ORDER — BENZONATATE 200 MG/1
200 CAPSULE ORAL 3 TIMES DAILY PRN
Qty: 10 CAPSULE | Refills: 0 | Status: SHIPPED | OUTPATIENT
Start: 2024-02-18 | End: 2024-06-03

## 2024-02-18 RX ORDER — FLUCONAZOLE 150 MG/1
150 TABLET ORAL DAILY
Qty: 1 TABLET | Refills: 0 | Status: SHIPPED | OUTPATIENT
Start: 2024-02-18 | End: 2024-02-19

## 2024-02-18 NOTE — PROGRESS NOTES
"Subjective:      Patient ID: Malaika Craven is a 63 y.o. female.    Vitals:  height is 5' 3" (1.6 m) and weight is 83.9 kg (185 lb). Her oral temperature is 97.5 °F (36.4 °C). Her blood pressure is 130/88 and her pulse is 97. Her respiration is 18 and oxygen saturation is 98%.     Chief Complaint: Cough and Vaginal Itching    Cough  This is a new problem. The current episode started in the past 7 days (5 days). The problem has been gradually worsening. The problem occurs every few minutes. The cough is Non-productive. Associated symptoms include ear pain, headaches, myalgias, nasal congestion, postnasal drip and a sore throat. The symptoms are aggravated by lying down. She has tried nothing for the symptoms. The treatment provided no relief.   Vaginal Itching  Associated symptoms include headaches and a sore throat.     HENT:  Positive for ear pain, postnasal drip and sore throat.    Respiratory:  Positive for cough.    Musculoskeletal:  Positive for muscle ache.   Neurological:  Positive for headaches.    Objective:     Physical Exam    Assessment:     No diagnosis found.    Plan:       There are no diagnoses linked to this encounter.                "

## 2024-02-18 NOTE — PROGRESS NOTES
CHIEF COMPLAINT  Chief Complaint   Patient presents with    Cough    Vaginal Itching       HPI  Malaika Gardner a 63 y.o. female who presents with c/o non productive cough for 3 days with sinus congestion. Pt reports her  and mother both currently have covid. Pt reports she had a positive covid test at home this morning. Denies fever, rash, chest pain, sob, abdominal pain, n/v/d. Pt reports she has not taken any OTC meds for symptoms. Pt reports she was treated a few weeks ago for an URI with steroids and antibiotics. Pt reports she completed the antibiotics 1 week ago and has noticed intermittent vaginal burning for the past few days. Denies rash, discharge, hematuria. Reports burning with urination but reports burning and slight itching constantly. Pt reports she did start a hormone cream 1 week ago for vaginal dryness. Pt also here requesting refill of ryaltris for rhinitis.       CURRENT MEDICATIONS  Current Outpatient Medications on File Prior to Visit   Medication Sig Dispense Refill    azelastine-fluticasone (DYMISTA) 137-50 mcg/spray Spry nassal spray 1 spray by Each Nostril route 2 (two) times daily. 23 g 11    busPIRone (BUSPAR) 30 MG Tab       cholecalciferol, vitamin D3, (VITAMIN D3) 50 mcg (2,000 unit) Cap Take 1 capsule by mouth once daily. One in AM and one in PM      ELDERBERRY FRUIT ORAL Take by mouth.      estradioL (ESTRACE) 0.01 % (0.1 mg/gram) vaginal cream Place 1 g vaginally twice a week. 42.5 g 1    hydroCHLOROthiazide (HYDRODIURIL) 25 MG tablet Take 1 tablet (25 mg total) by mouth once daily. Patient takes two pills daily      metoprolol succinate (TOPROL-XL) 50 MG 24 hr tablet       multivitamin capsule Take 1 capsule by mouth once daily.      NIFEdipine (ADALAT CC) 60 MG TbSR TAKE 1 TABLET BY MOUTH EVERY DAY ON AN EMPTY STOMACH      potassium chloride SA (K-DUR,KLOR-CON) 20 MEQ tablet Take 1 tablet (20 mEq total) by mouth once daily. 14 tablet 0    sertraline (ZOLOFT) 100 MG  tablet Take 1 tablet by mouth once daily. 30 tablet 1    traZODone (DESYREL) 150 MG tablet Take 1 tablet by mouth every evening. 30 tablet 1    EPINEPHrine (EPIPEN) 0.3 mg/0.3 mL AtIn Inject 0.3 mLs (0.3 mg total) into the muscle once. for 1 dose 2 each 3     No current facility-administered medications on file prior to visit.       ALLERGIES  Review of patient's allergies indicates:   Allergen Reactions    Atenolol Hives, Itching and Swelling    Losartan Swelling and Hives     Other reaction(s): Angioedema    Nsaids (non-steroidal anti-inflammatory drug) Itching and Rash    Tetracyclines Rash    Amlodipine      Hives and swelling on the lower extremities    Lisinopril Hives    Sulfa (sulfonamide antibiotics) Rash       Immunization History   Administered Date(s) Administered    Influenza - Quadrivalent 2019    Influenza - Quadrivalent - PF *Preferred* (6 months and older) 2018    Tdap 2018       PAST MEDICAL HISTORY  Past Medical History:   Diagnosis Date    Abnormal Pap smear of cervix     Allergy     Corneal erosion of right eye     Generalized anxiety disorder     Hypertension     Sinus problem        SURGICAL HISTORY  Past Surgical History:   Procedure Laterality Date    BREAST CYST ASPIRATION       SECTION      X 2    CHOLECYSTECTOMY      DILATION AND CURETTAGE OF UTERUS      DNC      EYE SURGERY Right     FOR CORNEAL EROSION    LIPOSUCTION      x 2    Mole Excision Right     Shin    TUBAL LIGATION         SOCIAL HISTORY  Social History     Socioeconomic History    Marital status:    Occupational History     Employer: [employer]   Tobacco Use    Smoking status: Never    Smokeless tobacco: Never   Substance and Sexual Activity    Alcohol use: Yes     Comment: 2 drinks per week    Drug use: No    Sexual activity: Yes     Partners: Male     Birth control/protection: None       FAMILY HISTORY  Family History   Problem Relation Age of Onset    Heart disease  Mother     Hypertension Mother     Arthritis Father     Thyroid disease Father     Kidney disease Paternal Grandmother     Liver cancer Maternal Grandmother     Cancer Maternal Grandfather     Allergies Sister     Allergic rhinitis Sister     Angioedema Neg Hx     Asthma Neg Hx     Atopy Neg Hx     Eczema Neg Hx     Immunodeficiency Neg Hx     Rhinitis Neg Hx     Urticaria Neg Hx     Amblyopia Neg Hx     Blindness Neg Hx     Cataracts Neg Hx     Diabetes Neg Hx     Glaucoma Neg Hx     Macular degeneration Neg Hx     Retinal detachment Neg Hx     Strabismus Neg Hx     Stroke Neg Hx        REVIEW OF SYSTEMS  Constitutional: No fever, chills, or weakness.  Eyes: No redness, pain, or discharge  HENT: No ear pain, no headache, + nasal congestion, no throat pain  Respiratory: +non productive cough,no wheezing or shortness of breath  Cardiovascular: No chest pain, palpitations or edema  GI: No abdominal pain, nausea, vomiting or diarrhea  Gu: No dysuria, no hematuria, or discharge + burning with urination and itching  Skin: No rash or abrasion  Neurologic: No focal weakness or sensory changes.  All systems otherwise negative except as noted in the Review of Systems and History of Present Illness      PHYSICAL EXAM  Reviewed Triage Note  VITAL SIGNS: 130/88  Constitutional: Well developed, well nourished, Alert and oriented x3, No acute distress, non-toxic appearance.  HENT: Normocephalic, Atraumatic, Bilateral external ears normal,bilateral ear canals clear, external nose negative, oropharynx moist, No oral exudates, edema or erythema  Eyes: PERRL, EOMI, Conjunctiva normal, No discharge.  Neck: Normal range of motion, no tenderness, supple, no carotid bruits  Respiratory: Normal breath sounds, no respiratory distress, no wheezing, no rhonchi, no rales  Cardiovascular: HR 97, normal rhythm, no murmurs, no rubs, no gallops.  Integument: Warm, Dry, No erythema, no rash  Neurologic: Normal motor function, normal sensory  function. No focal deficits noted. Intact distal pulses  Psychiatric: Affect normal, judgment normal, mood normal      LABS  Pertinent labs reviewed. (see chart for details)  Covid negative  UA negative        MEDICAL DECISION MAKING  Physical exam findings and lab results discussed with patient. No acute emergent medical condition identified at this time to warrant further testing. Will dispo home with instructions to follow up with PCP tomorrow. Script for diflucan, tessalon and ryaltris sent to pharmacy of choice with instructions on usage. Instructions given to stop vaginal cream until symptoms resolve and follow up with PCP if symptoms return with usage of hormone cream.  Pt agrees with plan of care.     DISPOSITION  Patient discharged in stable condition       CLINICAL IMPRESSION:  The primary encounter diagnosis was Cough, unspecified type. Diagnoses of Burning with urination and Congestion of nasal sinus were also pertinent to this visit.    Patient advised to follow-up with your PCP within 3 days for BP re-check if Blood Pressure was >120/80 without history of hypertension.

## 2024-03-05 ENCOUNTER — PATIENT MESSAGE (OUTPATIENT)
Dept: ADMINISTRATIVE | Facility: HOSPITAL | Age: 64
End: 2024-03-05
Payer: COMMERCIAL

## 2024-03-06 DIAGNOSIS — Z12.11 SCREENING FOR COLON CANCER: ICD-10-CM

## 2024-03-26 LAB — HEMOCCULT STL QL IA: POSITIVE

## 2024-03-27 ENCOUNTER — OFFICE VISIT (OUTPATIENT)
Dept: GASTROENTEROLOGY | Facility: CLINIC | Age: 64
End: 2024-03-27
Payer: COMMERCIAL

## 2024-03-27 ENCOUNTER — PATIENT MESSAGE (OUTPATIENT)
Dept: FAMILY MEDICINE | Facility: CLINIC | Age: 64
End: 2024-03-27
Payer: COMMERCIAL

## 2024-03-27 VITALS
DIASTOLIC BLOOD PRESSURE: 81 MMHG | HEIGHT: 63 IN | WEIGHT: 183 LBS | HEART RATE: 91 BPM | BODY MASS INDEX: 32.43 KG/M2 | SYSTOLIC BLOOD PRESSURE: 148 MMHG

## 2024-03-27 DIAGNOSIS — Z12.11 COLON CANCER SCREENING: Primary | ICD-10-CM

## 2024-03-27 DIAGNOSIS — E66.9 CLASS 1 OBESITY WITHOUT SERIOUS COMORBIDITY WITH BODY MASS INDEX (BMI) OF 32.0 TO 32.9 IN ADULT, UNSPECIFIED OBESITY TYPE: ICD-10-CM

## 2024-03-27 DIAGNOSIS — K59.03 DRUG-INDUCED CONSTIPATION: ICD-10-CM

## 2024-03-27 DIAGNOSIS — R19.5 POSITIVE FIT (FECAL IMMUNOCHEMICAL TEST): ICD-10-CM

## 2024-03-27 DIAGNOSIS — R19.5 POSITIVE FIT (FECAL IMMUNOCHEMICAL TEST): Primary | ICD-10-CM

## 2024-03-27 PROCEDURE — 99203 OFFICE O/P NEW LOW 30 MIN: CPT | Mod: S$GLB,,,

## 2024-03-27 PROCEDURE — 3077F SYST BP >= 140 MM HG: CPT | Mod: CPTII,S$GLB,,

## 2024-03-27 PROCEDURE — 99999 PR PBB SHADOW E&M-EST. PATIENT-LVL III: CPT | Mod: PBBFAC,,,

## 2024-03-27 PROCEDURE — 3079F DIAST BP 80-89 MM HG: CPT | Mod: CPTII,S$GLB,,

## 2024-03-27 PROCEDURE — 3008F BODY MASS INDEX DOCD: CPT | Mod: CPTII,S$GLB,,

## 2024-03-27 RX ORDER — SOD SULF/POT CHLORIDE/MAG SULF 1.479 G
12 TABLET ORAL DAILY
Qty: 24 TABLET | Refills: 0 | Status: SHIPPED | OUTPATIENT
Start: 2024-03-27 | End: 2024-06-03

## 2024-03-27 NOTE — PROGRESS NOTES
Subjective:       Patient ID: Malaika Craven is a 63 y.o. female Body mass index is 32.41 kg/m².    Chief Complaint: positive cologuard    This patient is new to me.  Referring Provider: Dr. Dario Celestin for positive FIT.     Patient seen for colorectal cancer screening, average risk, age appropriate, first occurrence, with no associated signs/symptoms (see ROS), and no alleviating/exacerbating factors. Denies family history of colon cancer/polyps.     Pos. FIT test     Patient is currently taking Ozempic.      Review of Systems   Constitutional:  Positive for appetite change (d/t ozempic). Negative for activity change, fever and unexpected weight change.   HENT:  Negative for sore throat and trouble swallowing.    Respiratory:  Negative for cough and shortness of breath.    Cardiovascular:  Negative for chest pain.   Gastrointestinal:  Positive for constipation (d/t ozempic). Negative for abdominal distention, abdominal pain, anal bleeding, blood in stool, diarrhea, nausea, rectal pain and vomiting.   All other systems reviewed and are negative.      No LMP recorded. Patient is postmenopausal.  Past Medical History:   Diagnosis Date    Abnormal Pap smear of cervix     Allergy     Corneal erosion of right eye     Generalized anxiety disorder     Hypertension     Sinus problem      Past Surgical History:   Procedure Laterality Date    BREAST CYST ASPIRATION       SECTION      X 2    CHOLECYSTECTOMY      DILATION AND CURETTAGE OF UTERUS      DNC      EYE SURGERY Right     FOR CORNEAL EROSION    LIPOSUCTION      x 2    Mole Excision Right     Shin    TUBAL LIGATION       Family History   Problem Relation Age of Onset    Heart disease Mother     Hypertension Mother     Arthritis Father     Thyroid disease Father     Kidney disease Paternal Grandmother     Liver cancer Maternal Grandmother     Cancer Maternal Grandfather     Allergies Sister     Allergic rhinitis Sister     Angioedema Neg Hx      Asthma Neg Hx     Atopy Neg Hx     Eczema Neg Hx     Immunodeficiency Neg Hx     Rhinitis Neg Hx     Urticaria Neg Hx     Amblyopia Neg Hx     Blindness Neg Hx     Cataracts Neg Hx     Diabetes Neg Hx     Glaucoma Neg Hx     Macular degeneration Neg Hx     Retinal detachment Neg Hx     Strabismus Neg Hx     Stroke Neg Hx      Social History     Tobacco Use    Smoking status: Never    Smokeless tobacco: Never   Substance Use Topics    Alcohol use: Yes     Comment: 2 drinks per week    Drug use: No     Wt Readings from Last 10 Encounters:   03/27/24 83 kg (182 lb 15.7 oz)   02/18/24 83.9 kg (185 lb)   02/15/24 83.9 kg (185 lb)   03/29/23 92.5 kg (203 lb 14.8 oz)   11/02/22 88.9 kg (195 lb 15.8 oz)   09/29/22 92.2 kg (203 lb 4.2 oz)   07/20/22 89.5 kg (197 lb 6.8 oz)   06/09/22 78.2 kg (172 lb 6.4 oz)   10/25/21 77.1 kg (170 lb)   08/13/21 85.8 kg (189 lb 2.5 oz)     Lab Results   Component Value Date    WBC 6.73 03/21/2023    HGB 13.9 03/21/2023    HCT 42.6 03/21/2023    MCV 95 03/21/2023     03/21/2023     CMP  Sodium   Date Value Ref Range Status   03/21/2023 141 136 - 145 mmol/L Final     Potassium   Date Value Ref Range Status   03/21/2023 4.7 3.5 - 5.1 mmol/L Final     Chloride   Date Value Ref Range Status   03/21/2023 103 95 - 110 mmol/L Final     CO2   Date Value Ref Range Status   03/21/2023 27 23 - 29 mmol/L Final     Glucose   Date Value Ref Range Status   03/21/2023 112 (H) 70 - 110 mg/dL Final     BUN   Date Value Ref Range Status   03/21/2023 14 6 - 20 mg/dL Final     Creatinine   Date Value Ref Range Status   03/21/2023 0.9 0.5 - 1.4 mg/dL Final     Calcium   Date Value Ref Range Status   03/21/2023 9.8 8.7 - 10.5 mg/dL Final     Total Protein   Date Value Ref Range Status   03/21/2023 7.1 6.0 - 8.4 g/dL Final     Albumin   Date Value Ref Range Status   03/21/2023 3.9 3.5 - 5.2 g/dL Final     Total Bilirubin   Date Value Ref Range Status   03/21/2023 0.5 0.1 - 1.0 mg/dL Final     Comment:     For  "infants and newborns, interpretation of results should be based  on gestational age, weight and in agreement with clinical  observations.    Premature Infant recommended reference ranges:  Up to 24 hours.............<8.0 mg/dL  Up to 48 hours............<12.0 mg/dL  3-5 days..................<15.0 mg/dL  6-29 days.................<15.0 mg/dL       Alkaline Phosphatase   Date Value Ref Range Status   03/21/2023 70 55 - 135 U/L Final     AST   Date Value Ref Range Status   03/21/2023 16 10 - 40 U/L Final     ALT   Date Value Ref Range Status   03/21/2023 13 10 - 44 U/L Final     Anion Gap   Date Value Ref Range Status   03/21/2023 11 8 - 16 mmol/L Final     eGFR if    Date Value Ref Range Status   10/25/2021 >60.0 >60 mL/min/1.73 m^2 Final     eGFR if non    Date Value Ref Range Status   10/25/2021 >60.0 >60 mL/min/1.73 m^2 Final     Comment:     Calculation used to obtain the estimated glomerular filtration  rate (eGFR) is the CKD-EPI equation.        Lab Results   Component Value Date    AMYLASE 73 06/17/2011     Lab Results   Component Value Date    LIPASE 15 06/17/2011     No results found for: "LIPASERES"  Lab Results   Component Value Date    TSH 2.144 03/21/2023       Reviewed prior medical records including radiology report of x-ray chest 10/16/2020, x-ray chest 10/25/2021, ultrasound abdominal limited 06/17/2011 & endoscopy history (see surgical history).    Objective:      Physical Exam  Vitals and nursing note reviewed.   Constitutional:       Appearance: She is obese. She is not ill-appearing.   HENT:      Head: Normocephalic and atraumatic.      Mouth/Throat:      Mouth: Mucous membranes are moist.      Pharynx: Oropharynx is clear.   Eyes:      Conjunctiva/sclera: Conjunctivae normal.   Cardiovascular:      Rate and Rhythm: Normal rate and regular rhythm.      Pulses: Normal pulses.   Pulmonary:      Effort: Pulmonary effort is normal. No respiratory distress.   Abdominal: "      General: Bowel sounds are normal. There is no distension.      Palpations: Abdomen is soft.      Tenderness: There is no abdominal tenderness.   Skin:     General: Skin is warm and dry.      Capillary Refill: Capillary refill takes 2 to 3 seconds.   Neurological:      Mental Status: She is alert and oriented to person, place, and time.         Assessment:       1. Colon cancer screening    2. Positive FIT (fecal immunochemical test)    3. Class 1 obesity without serious comorbidity with body mass index (BMI) of 32.0 to 32.9 in adult, unspecified obesity type    4. Drug-induced constipation        Plan:       Colon cancer screening, Positive FIT (fecal immunochemical test)  - schedule Colonoscopy, discussed procedure with the patient, including risks and benefits, patient verbalized understanding  Recommend high fiber diet (20-30 grams of fiber daily)/OTC fiber supplements daily as directed, such as Benefiber or Metamucil.     -     Ambulatory referral/consult to Gastroenterology  -     sod sulf-pot chloride-mag sulf (SUTAB) 1.479-0.188- 0.225 gram tablet; Take 12 tablets by mouth once daily. Take according to package instructions with indicated amount of water.  Dispense: 24 tablet; Refill: 0    Class 1 obesity without serious comorbidity with body mass index (BMI) of 32.0 to 32.9 in adult, unspecified obesity type  - Continue Ozempic as ordered by ordering MD  - Increase exercise as tolerated to assist with weight loss  - Aim for high protein foods to help you feel dior for longer    Drug-induced constipation   - Recommend daily exercise as tolerated, adequate water intake (six 8-oz glasses of water daily), and high fiber diet. OTC fiber supplements are recommended if diet does not reach daily fiber goal (20-30 grams daily), such as Metamucil, Citrucel, or FiberCon (take as directed, separate from other oral medications by >2 hours).  -Recommend taking an OTC stool softener such as Colace as directed to  avoid hard stools and straining with bowel movements PRN  -Recommend trying OTC MiraLax once daily (17g PO) as directed  - If no improvement with above recommendations, try intermittently dosed Dulcolax OTC as directed (every 3-4  days) PRN to facilitate bowel movements  -If still no improvement with these measures, call/follow-up  - If these measures do not help control constipation - consider stopping GLP-1 medications    No follow-ups on file.      If no improvement in symptoms or symptoms worsen, call/follow-up at clinic or go to ER.       ARELI Liang, HERNANDEZP-C    Encounter includes face to face time and non-face to face time preparing to see the patient (eg, review of tests), obtaining and/or reviewing separately obtained history, documenting clinical information in the electronic or other health record, independently interpreting results (not separately reported) and communicating results to the patient/family/caregiver, or care coordination (not separately reported).     A dictation software program was used for this note. Please expect some simple typographical errors in this note.

## 2024-03-27 NOTE — PATIENT INSTRUCTIONS
Colonoscopy with Dr Gibran Murguia daily to help with ozempic constipation  Metamucil capsules as directed on bottle   Increase water intake

## 2024-04-05 ENCOUNTER — LAB VISIT (OUTPATIENT)
Dept: LAB | Facility: HOSPITAL | Age: 64
End: 2024-04-05
Attending: STUDENT IN AN ORGANIZED HEALTH CARE EDUCATION/TRAINING PROGRAM
Payer: COMMERCIAL

## 2024-04-05 ENCOUNTER — OFFICE VISIT (OUTPATIENT)
Dept: FAMILY MEDICINE | Facility: CLINIC | Age: 64
End: 2024-04-05
Payer: COMMERCIAL

## 2024-04-05 VITALS
HEART RATE: 102 BPM | OXYGEN SATURATION: 99 % | DIASTOLIC BLOOD PRESSURE: 72 MMHG | BODY MASS INDEX: 32.54 KG/M2 | TEMPERATURE: 99 F | SYSTOLIC BLOOD PRESSURE: 122 MMHG | WEIGHT: 183.63 LBS | HEIGHT: 63 IN

## 2024-04-05 DIAGNOSIS — Z00.00 ROUTINE GENERAL MEDICAL EXAMINATION AT A HEALTH CARE FACILITY: ICD-10-CM

## 2024-04-05 DIAGNOSIS — F32.5 MAJOR DEPRESSIVE DISORDER WITH SINGLE EPISODE, IN FULL REMISSION: ICD-10-CM

## 2024-04-05 DIAGNOSIS — Z12.31 ENCOUNTER FOR SCREENING MAMMOGRAM FOR MALIGNANT NEOPLASM OF BREAST: ICD-10-CM

## 2024-04-05 DIAGNOSIS — Z00.00 ROUTINE GENERAL MEDICAL EXAMINATION AT A HEALTH CARE FACILITY: Primary | ICD-10-CM

## 2024-04-05 DIAGNOSIS — I10 ESSENTIAL HYPERTENSION: ICD-10-CM

## 2024-04-05 DIAGNOSIS — E66.9 CLASS 1 OBESITY WITHOUT SERIOUS COMORBIDITY WITH BODY MASS INDEX (BMI) OF 32.0 TO 32.9 IN ADULT, UNSPECIFIED OBESITY TYPE: ICD-10-CM

## 2024-04-05 PROCEDURE — 83036 HEMOGLOBIN GLYCOSYLATED A1C: CPT | Performed by: STUDENT IN AN ORGANIZED HEALTH CARE EDUCATION/TRAINING PROGRAM

## 2024-04-05 PROCEDURE — 80061 LIPID PANEL: CPT | Performed by: STUDENT IN AN ORGANIZED HEALTH CARE EDUCATION/TRAINING PROGRAM

## 2024-04-05 PROCEDURE — 3078F DIAST BP <80 MM HG: CPT | Mod: CPTII,S$GLB,, | Performed by: STUDENT IN AN ORGANIZED HEALTH CARE EDUCATION/TRAINING PROGRAM

## 2024-04-05 PROCEDURE — 99396 PREV VISIT EST AGE 40-64: CPT | Mod: S$GLB,,, | Performed by: STUDENT IN AN ORGANIZED HEALTH CARE EDUCATION/TRAINING PROGRAM

## 2024-04-05 PROCEDURE — 3074F SYST BP LT 130 MM HG: CPT | Mod: CPTII,S$GLB,, | Performed by: STUDENT IN AN ORGANIZED HEALTH CARE EDUCATION/TRAINING PROGRAM

## 2024-04-05 PROCEDURE — 85025 COMPLETE CBC W/AUTO DIFF WBC: CPT | Performed by: STUDENT IN AN ORGANIZED HEALTH CARE EDUCATION/TRAINING PROGRAM

## 2024-04-05 PROCEDURE — 1159F MED LIST DOCD IN RCRD: CPT | Mod: CPTII,S$GLB,, | Performed by: STUDENT IN AN ORGANIZED HEALTH CARE EDUCATION/TRAINING PROGRAM

## 2024-04-05 PROCEDURE — 3008F BODY MASS INDEX DOCD: CPT | Mod: CPTII,S$GLB,, | Performed by: STUDENT IN AN ORGANIZED HEALTH CARE EDUCATION/TRAINING PROGRAM

## 2024-04-05 PROCEDURE — 80053 COMPREHEN METABOLIC PANEL: CPT | Performed by: STUDENT IN AN ORGANIZED HEALTH CARE EDUCATION/TRAINING PROGRAM

## 2024-04-05 PROCEDURE — 36415 COLL VENOUS BLD VENIPUNCTURE: CPT | Mod: PO | Performed by: STUDENT IN AN ORGANIZED HEALTH CARE EDUCATION/TRAINING PROGRAM

## 2024-04-05 PROCEDURE — 3044F HG A1C LEVEL LT 7.0%: CPT | Mod: CPTII,S$GLB,, | Performed by: STUDENT IN AN ORGANIZED HEALTH CARE EDUCATION/TRAINING PROGRAM

## 2024-04-05 PROCEDURE — 99999 PR PBB SHADOW E&M-EST. PATIENT-LVL V: CPT | Mod: PBBFAC,,, | Performed by: STUDENT IN AN ORGANIZED HEALTH CARE EDUCATION/TRAINING PROGRAM

## 2024-04-05 PROCEDURE — 1160F RVW MEDS BY RX/DR IN RCRD: CPT | Mod: CPTII,S$GLB,, | Performed by: STUDENT IN AN ORGANIZED HEALTH CARE EDUCATION/TRAINING PROGRAM

## 2024-04-05 RX ORDER — TIRZEPATIDE 2.5 MG/.5ML
INJECTION, SOLUTION SUBCUTANEOUS
COMMUNITY
Start: 2024-04-01 | End: 2024-06-03

## 2024-04-05 RX ORDER — FEXOFENADINE HCL 60 MG
TABLET ORAL
COMMUNITY

## 2024-04-05 RX ORDER — OLOPATADINE HYDROCHLORIDE AND MOMETASONE FUROATE 25; 665 UG/1; UG/1
SPRAY, METERED NASAL
COMMUNITY
Start: 2024-03-01

## 2024-04-06 LAB
ALBUMIN SERPL BCP-MCNC: 4.1 G/DL (ref 3.5–5.2)
ALP SERPL-CCNC: 79 U/L (ref 55–135)
ALT SERPL W/O P-5'-P-CCNC: 29 U/L (ref 10–44)
ANION GAP SERPL CALC-SCNC: 10 MMOL/L (ref 8–16)
AST SERPL-CCNC: 21 U/L (ref 10–40)
BASOPHILS # BLD AUTO: 0.07 K/UL (ref 0–0.2)
BASOPHILS NFR BLD: 0.8 % (ref 0–1.9)
BILIRUB SERPL-MCNC: 0.5 MG/DL (ref 0.1–1)
BUN SERPL-MCNC: 13 MG/DL (ref 8–23)
CALCIUM SERPL-MCNC: 10.2 MG/DL (ref 8.7–10.5)
CHLORIDE SERPL-SCNC: 101 MMOL/L (ref 95–110)
CHOLEST SERPL-MCNC: 192 MG/DL (ref 120–199)
CHOLEST/HDLC SERPL: 3.8 {RATIO} (ref 2–5)
CO2 SERPL-SCNC: 28 MMOL/L (ref 23–29)
CREAT SERPL-MCNC: 0.9 MG/DL (ref 0.5–1.4)
DIFFERENTIAL METHOD BLD: NORMAL
EOSINOPHIL # BLD AUTO: 0.3 K/UL (ref 0–0.5)
EOSINOPHIL NFR BLD: 3.3 % (ref 0–8)
ERYTHROCYTE [DISTWIDTH] IN BLOOD BY AUTOMATED COUNT: 13.2 % (ref 11.5–14.5)
EST. GFR  (NO RACE VARIABLE): >60 ML/MIN/1.73 M^2
ESTIMATED AVG GLUCOSE: 100 MG/DL (ref 68–131)
GLUCOSE SERPL-MCNC: 97 MG/DL (ref 70–110)
HBA1C MFR BLD: 5.1 % (ref 4–5.6)
HCT VFR BLD AUTO: 45.3 % (ref 37–48.5)
HDLC SERPL-MCNC: 51 MG/DL (ref 40–75)
HDLC SERPL: 26.6 % (ref 20–50)
HGB BLD-MCNC: 15.1 G/DL (ref 12–16)
IMM GRANULOCYTES # BLD AUTO: 0.02 K/UL (ref 0–0.04)
IMM GRANULOCYTES NFR BLD AUTO: 0.2 % (ref 0–0.5)
LDLC SERPL CALC-MCNC: 123.6 MG/DL (ref 63–159)
LYMPHOCYTES # BLD AUTO: 2.7 K/UL (ref 1–4.8)
LYMPHOCYTES NFR BLD: 32.1 % (ref 18–48)
MCH RBC QN AUTO: 30.8 PG (ref 27–31)
MCHC RBC AUTO-ENTMCNC: 33.3 G/DL (ref 32–36)
MCV RBC AUTO: 92 FL (ref 82–98)
MONOCYTES # BLD AUTO: 0.5 K/UL (ref 0.3–1)
MONOCYTES NFR BLD: 5.7 % (ref 4–15)
NEUTROPHILS # BLD AUTO: 4.9 K/UL (ref 1.8–7.7)
NEUTROPHILS NFR BLD: 57.9 % (ref 38–73)
NONHDLC SERPL-MCNC: 141 MG/DL
NRBC BLD-RTO: 0 /100 WBC
PLATELET # BLD AUTO: 272 K/UL (ref 150–450)
PMV BLD AUTO: 12.2 FL (ref 9.2–12.9)
POTASSIUM SERPL-SCNC: 3.3 MMOL/L (ref 3.5–5.1)
PROT SERPL-MCNC: 7.8 G/DL (ref 6–8.4)
RBC # BLD AUTO: 4.9 M/UL (ref 4–5.4)
SODIUM SERPL-SCNC: 139 MMOL/L (ref 136–145)
TRIGL SERPL-MCNC: 87 MG/DL (ref 30–150)
WBC # BLD AUTO: 8.41 K/UL (ref 3.9–12.7)

## 2024-04-08 DIAGNOSIS — E87.6 HYPOKALEMIA: Primary | ICD-10-CM

## 2024-04-08 RX ORDER — POTASSIUM CHLORIDE 750 MG/1
20 CAPSULE, EXTENDED RELEASE ORAL DAILY
Qty: 180 CAPSULE | Refills: 4 | Status: SHIPPED | OUTPATIENT
Start: 2024-04-08 | End: 2025-07-02

## 2024-04-11 PROBLEM — F32.5 MAJOR DEPRESSIVE DISORDER WITH SINGLE EPISODE, IN FULL REMISSION: Status: ACTIVE | Noted: 2024-04-11

## 2024-04-11 PROBLEM — E66.9 CLASS 1 OBESITY WITHOUT SERIOUS COMORBIDITY WITH BODY MASS INDEX (BMI) OF 32.0 TO 32.9 IN ADULT, UNSPECIFIED OBESITY TYPE: Status: ACTIVE | Noted: 2024-04-11

## 2024-04-11 PROBLEM — E66.811 CLASS 1 OBESITY WITHOUT SERIOUS COMORBIDITY WITH BODY MASS INDEX (BMI) OF 32.0 TO 32.9 IN ADULT, UNSPECIFIED OBESITY TYPE: Status: ACTIVE | Noted: 2024-04-11

## 2024-04-11 NOTE — PROGRESS NOTES
"[place] MEDICINE CLINIC NOTE    Patient Name: Malaika Craven  YOB: 1960    PRESENTING HISTORY     History of Present Illness:  Ms. Malaika Craven is a 63 y.o. female here for routine f/u.     She is feeling well and without complaint.     On CCB and thiazide. BP at target.     On zepbound for weight loss. Has stabilized.     Reviewed recommended cancer screenings, needs Pap smear repeat and mammogram.     Depression is doing much better, off medication.     ROS      OBJECTIVE:   Vital Signs:  Vitals:    04/05/24 1451   BP: 122/72   Pulse: 102   Temp: 99.2 °F (37.3 °C)   TempSrc: Oral   SpO2: 99%   Weight: 83.3 kg (183 lb 10.3 oz)   Height: 5' 3" (1.6 m)          Physical Exam: Normal, no change.     Physical Exam    ASSESSMENT & PLAN:     Routine general medical examination at a health care facility  -     Lipid Panel; Future; Expected date: 04/05/2024  -     Hemoglobin A1C; Future; Expected date: 04/05/2024  -     Comprehensive Metabolic Panel; Future; Expected date: 04/05/2024  -     CBC Auto Differential; Future; Expected date: 04/05/2024  -     Mammo Digital Screening Bilat w/ Dirk; Future; Expected date: 04/05/2024  -     Ambulatory referral/consult to Gynecology; Future; Expected date: 04/12/2024    Encounter for screening mammogram for malignant neoplasm of breast  Upcoming.     Class 1 obesity without serious comorbidity with body mass index (BMI) of 32.0 to 32.9 in adult, unspecified obesity type  Stable, continue current medications    Major depressive disorder with single episode, in full remission  Resolved.     Essential hypertension  Stable, continue current medications             Dario Celestin  Internal Medicine            "

## 2024-05-08 ENCOUNTER — TELEPHONE (OUTPATIENT)
Dept: SURGERY | Facility: CLINIC | Age: 64
End: 2024-05-08
Payer: COMMERCIAL

## 2024-05-08 ENCOUNTER — HOSPITAL ENCOUNTER (OUTPATIENT)
Facility: HOSPITAL | Age: 64
Discharge: HOME | End: 2024-05-08
Attending: INTERNAL MEDICINE | Admitting: INTERNAL MEDICINE
Payer: COMMERCIAL

## 2024-05-08 ENCOUNTER — ANESTHESIA EVENT (OUTPATIENT)
Dept: ENDOSCOPY | Facility: HOSPITAL | Age: 64
End: 2024-05-08
Payer: COMMERCIAL

## 2024-05-08 ENCOUNTER — ANESTHESIA (OUTPATIENT)
Dept: ENDOSCOPY | Facility: HOSPITAL | Age: 64
End: 2024-05-08
Payer: COMMERCIAL

## 2024-05-08 DIAGNOSIS — K63.89 COLONIC MASS: Primary | ICD-10-CM

## 2024-05-08 DIAGNOSIS — R19.5 POSITIVE FIT (FECAL IMMUNOCHEMICAL TEST): ICD-10-CM

## 2024-05-08 LAB
CREAT SERPL-MCNC: 0.8 MG/DL (ref 0.5–1.4)
EST. GFR  (NO RACE VARIABLE): >60 ML/MIN/1.73 M^2

## 2024-05-08 PROCEDURE — 45385 COLONOSCOPY W/LESION REMOVAL: CPT | Mod: ,,, | Performed by: INTERNAL MEDICINE

## 2024-05-08 PROCEDURE — 45381 COLONOSCOPY SUBMUCOUS NJX: CPT | Performed by: INTERNAL MEDICINE

## 2024-05-08 PROCEDURE — 27201012 HC FORCEPS, HOT/COLD, DISP: Performed by: INTERNAL MEDICINE

## 2024-05-08 PROCEDURE — 37000009 HC ANESTHESIA EA ADD 15 MINS: Performed by: INTERNAL MEDICINE

## 2024-05-08 PROCEDURE — 25000003 PHARM REV CODE 250: Performed by: NURSE ANESTHETIST, CERTIFIED REGISTERED

## 2024-05-08 PROCEDURE — 27201028 HC NEEDLE, SCLERO: Performed by: INTERNAL MEDICINE

## 2024-05-08 PROCEDURE — 82565 ASSAY OF CREATININE: CPT | Performed by: INTERNAL MEDICINE

## 2024-05-08 PROCEDURE — 45380 COLONOSCOPY AND BIOPSY: CPT | Mod: 59,,, | Performed by: INTERNAL MEDICINE

## 2024-05-08 PROCEDURE — 36415 COLL VENOUS BLD VENIPUNCTURE: CPT | Performed by: INTERNAL MEDICINE

## 2024-05-08 PROCEDURE — 25000003 PHARM REV CODE 250: Performed by: INTERNAL MEDICINE

## 2024-05-08 PROCEDURE — D9220A PRA ANESTHESIA: Mod: 33,ANES,, | Performed by: ANESTHESIOLOGY

## 2024-05-08 PROCEDURE — 45385 COLONOSCOPY W/LESION REMOVAL: CPT | Performed by: INTERNAL MEDICINE

## 2024-05-08 PROCEDURE — 27201089 HC SNARE, DISP (ANY): Performed by: INTERNAL MEDICINE

## 2024-05-08 PROCEDURE — 45381 COLONOSCOPY SUBMUCOUS NJX: CPT | Mod: ,,, | Performed by: INTERNAL MEDICINE

## 2024-05-08 PROCEDURE — D9220A PRA ANESTHESIA: Mod: 33,CRNA,, | Performed by: NURSE ANESTHETIST, CERTIFIED REGISTERED

## 2024-05-08 PROCEDURE — 45380 COLONOSCOPY AND BIOPSY: CPT | Mod: 59 | Performed by: INTERNAL MEDICINE

## 2024-05-08 PROCEDURE — 37000008 HC ANESTHESIA 1ST 15 MINUTES: Performed by: INTERNAL MEDICINE

## 2024-05-08 RX ORDER — PROPOFOL 10 MG/ML
VIAL (ML) INTRAVENOUS
Status: DISCONTINUED | OUTPATIENT
Start: 2024-05-08 | End: 2024-05-08

## 2024-05-08 RX ORDER — SODIUM CHLORIDE 9 MG/ML
INJECTION, SOLUTION INTRAVENOUS CONTINUOUS
Status: DISCONTINUED | OUTPATIENT
Start: 2024-05-08 | End: 2024-05-08 | Stop reason: HOSPADM

## 2024-05-08 RX ORDER — LIDOCAINE HYDROCHLORIDE 20 MG/ML
INJECTION, SOLUTION EPIDURAL; INFILTRATION; INTRACAUDAL; PERINEURAL
Status: DISCONTINUED | OUTPATIENT
Start: 2024-05-08 | End: 2024-05-08

## 2024-05-08 RX ADMIN — Medication 50 MG: at 10:05

## 2024-05-08 RX ADMIN — LIDOCAINE HYDROCHLORIDE 40 MG: 20 INJECTION, SOLUTION EPIDURAL; INFILTRATION; INTRACAUDAL; PERINEURAL at 10:05

## 2024-05-08 RX ADMIN — SODIUM CHLORIDE: 9 INJECTION, SOLUTION INTRAVENOUS at 09:05

## 2024-05-08 NOTE — PLAN OF CARE
Vss, daisy po fluids, denies pain, ambulates easily. IV removed, catheter intact. Discharge instructions provided and states understanding. States ready to go home.  Discharged from facility with family per wheelchair.

## 2024-05-08 NOTE — TRANSFER OF CARE
"Anesthesia Transfer of Care Note    Patient: Malaika Craven    Procedure(s) Performed: Procedure(s) (LRB):  COLONOSCOPY (N/A)    Patient location: GI    Anesthesia Type: general    Transport from OR: Transported from OR on room air with adequate spontaneous ventilation    Post pain: adequate analgesia    Post assessment: no apparent anesthetic complications    Post vital signs: stable    Level of consciousness: awake, alert and oriented    Nausea/Vomiting: no nausea/vomiting    Complications: none    Transfer of care protocol was followed      Last vitals: Visit Vitals  /71 (BP Location: Left arm, Patient Position: Lying)   Pulse 86   Temp 36.7 °C (98.1 °F) (Skin)   Resp 16   Ht 5' 3" (1.6 m)   Wt 81.6 kg (180 lb)   SpO2 96%   Breastfeeding No   BMI 31.89 kg/m²     "

## 2024-05-08 NOTE — H&P
[facility]ology Note    CC: + FIT    HPI 63 y.o. female presents for colonoscopy due to positive FIT teswt    Past Medical History:   Diagnosis Date    Abnormal Pap smear of cervix     Allergy     Corneal erosion of right eye 1994    Generalized anxiety disorder     Hypertension     Sinus problem        Allergies and Medications reviewed     Review of Systems  General ROS: negative for - chills, fever or weight loss  Cardiovascular ROS: no chest pain or dyspnea on exertion  Gastrointestinal ROS: no vomiting    Physical Examination  There were no vitals taken for this visit.  General appearance: alert, cooperative, no distress  HENT: Normocephalic, atraumatic, neck symmetrical, no nasal discharge, sclera anicteric   Lungs: clear to auscultation bilaterally, symmetric chest wall expansion bilaterally  Heart: regular rate and rhythm without rub; no displacement of the PMI   Abdomen: soft  Extremities: extremities symmetric; no clubbing, cyanosis, or edema        Labs:  Lab Results   Component Value Date    WBC 8.41 04/05/2024    HGB 15.1 04/05/2024    HCT 45.3 04/05/2024    MCV 92 04/05/2024     04/05/2024             Assessment:   63 y.o. female presents for colonoscopy    Plan:  -Proceed to colonoscopy     Radhika Leary MD  [facility]  [address], [address]  [place] [address]  Office: [phone]  Fax: [phone]

## 2024-05-08 NOTE — [employer]ATION PATIENT INSTRUCTIONS
Discharge Summary/Instructions after an Endoscopic Procedure  Patient Name: Malaika Craven  Patient MRN: [id number]  Patient YOB: 1960  Wednesday, May 8, 2024  Radhika Leary MD  Dear patient,  As a result of recent federal legislation (The Federal Cures Act), you may   receive lab or pathology results from your procedure in your [facility]   account before your physician is able to contact you. Your physician or   their representative will relay the results to you with their   recommendations at their soonest availability.  Thank you,  RESTRICTIONS:  During your procedure today, you received medications for sedation.  These   medications may affect your judgment, balance and coordination.  Therefore,   for 24 hours, you have the following restrictions:   - DO NOT drive a car, operate machinery, make legal/financial decisions,   sign important papers or drink alcohol.    ACTIVITY:  Today: no heavy lifting, straining or running due to procedural   sedation/anesthesia.  The following day: return to full activity including work.  DIET:  Eat and drink normally unless instructed otherwise.     TREATMENT FOR COMMON SIDE EFFECTS:  - Mild abdominal pain, nausea, belching, bloating or excessive gas:  rest,   eat lightly and use a heating pad.  - Sore Throat: treat with throat lozenges and/or gargle with warm salt   water.  - Because air was used during the procedure, expelling large amounts of air   from your rectum or belching is normal.  - If a bowel prep was taken, you may not have a bowel movement for 1-3 days.    This is normal.  SYMPTOMS TO WATCH FOR AND REPORT TO YOUR PHYSICIAN:  1. Abdominal pain or bloating, other than gas cramps.  2. Chest pain.  3. Back pain.  4. Signs of infection such as: chills or fever occurring within 24 hours   after the procedure.  5. Rectal bleeding, which would show as bright red, maroon, or black stools.   (A tablespoon of blood from the rectum is not serious,  especially if   hemorrhoids are present.)  6. Vomiting.  7. Weakness or dizziness.  GO DIRECTLY TO THE NEAREST EMERGENCY ROOM IF YOU HAVE ANY OF THE FOLLOWING:      Difficulty breathing              Chills and/or fever over 101 F   Persistent vomiting and/or vomiting blood   Severe abdominal pain   Severe chest pain   Black, tarry stools   Bleeding- more than one tablespoon   Any other symptom or condition that you feel may need urgent attention  Your doctor recommends these additional instructions:  If any biopsies were taken, your doctors clinic will contact you in 1 to 2   weeks with any results.  - Discharge patient to home (with escort).   - Patient has a contact number available for emergencies.  The signs and   symptoms of potential delayed complications were discussed with the   patient.  Return to normal activities tomorrow.  Written discharge   instructions were provided to the patient.   - Resume previous diet.   - Continue present medications.   - Await pathology results.   - Repeat colonoscopy date to be determined after pending pathology results   are reviewed for surveillance based on pathology results.   - Refer to a colo-rectal surgeon at appointment to be scheduled.   -MRI pelvis  -CT chest/abdomen/pelvis for staging  -CEA  For questions, problems or results please call your physician - Radhika Leary MD at Work:  [phone].  [facility], EMERGENCY ROOM PHONE NUMBER: [phone]  IF A COMPLICATION OR EMERGENCY SITUATION ARISES AND YOU ARE UNABLE TO REACH   YOUR PHYSICIAN - GO DIRECTLY TO THE EMERGENCY ROOM.  Radhika Leary MD  5/8/2024 10:41:43 AM  This report has been verified and signed electronically.  Dear patient,  As a result of recent federal legislation (The Federal Cures Act), you may   receive lab or pathology results from your procedure in your [facility]   account before your physician is able to contact you. Your physician or   their  representative will relay the results to you with their   recommendations at their soonest availability.  Thank you,  PROVATION

## 2024-05-08 NOTE — H&P (VIEW-ONLY)
Subjective     Patient ID: Malaika Craven is a 63 y.o. female.    Chief Complaint: Consult (Colonic mass)    HPI   this is a pleasant 63-year-old female who was referred to me for evaluation of a rectosigmoid mass.  Patient had screening colonoscopy performed yesterday secondary to abnormal home test results.  Unfortunately at the time of colonoscopy a rectosigmoid mass was discovered which certainly has the appearance of malignancy.  Biopsies were taken and are pending.  She was referred to me for evaluation.  Patient has no pain or bleeding.  Has no significant complaints otherwise.  Patient was had previous cholecystectomy no other significant abdominal surgery.  She has a past medical history significant for hypertension and diabetes.  CT scan of the chest abdomen and pelvis are pending as was MRI      Review of Systems   Constitutional:  Negative for activity change and appetite change.   Respiratory:  Negative for apnea and chest tightness.    Cardiovascular:  Negative for chest pain and claudication.   Gastrointestinal:  Negative for abdominal distention and abdominal pain.          Objective     Physical Exam  Vitals reviewed.   Cardiovascular:      Rate and Rhythm: Normal rate.      Pulses: Normal pulses.   Pulmonary:      Effort: Pulmonary effort is normal.   Abdominal:      General: Abdomen is flat. There is no distension.      Tenderness: There is no abdominal tenderness.      Hernia: No hernia is present.   Musculoskeletal:         General: Normal range of motion.   Skin:     General: Skin is warm.   Neurological:      General: No focal deficit present.      Mental Status: She is alert.   Psychiatric:         Mood and Affect: Mood normal.                    Assessment and Plan     1. Colonic mass  -     Ambulatory referral/consult to Colorectal Surgery  -     CT Chest Abdomen Pelvis With IV Contrast (XPD) Routine Oral Contrast; Future; Expected date: 05/09/2024        Discussion with patient.  I  have explained to her that she does have a likely malignant appearing lesion in the rectum rectosigmoid area.  Lengthy discussion with the patient regarding multiple issues.  Most importantly would need to determine and confirm that this is malignancy.  Certainly does have the appearance of a malignancy.  Once the pathology has been confirmed will also be important to determine the exact location of the tumor.  I will want to perform a flexible sigmoidoscopy on her to confirm my opinion as to the location of the tumor.  Patient will also need an MRI to further evaluate location of the lesion in relation to the peritoneal reflection.  MRI will also evaluate depth of invasion as well as for any enlarged or abnormal appearing lymph nodes.  If this is a rectal cancer and there is mesorectal fascia involvement or abnormal appearing lymph nodes explained to patient that she would likely be set up to begin total neoadjuvant treatment with radiation followed by chemotherapy.  Discussed with the patient that if location of the tumor was found to be in the colon itself would likely proceed with surgery 1st followed by chemotherapy if indicated.  Lengthy discussion regarding surgical approaches as well as outcomes.  Patient expressed understanding.  We will proceed with a CEA as well as CT scan of the chest abdomen pelvis and MRI.  Patient will return to my office in 2 weeks         No follow-ups on file.

## 2024-05-08 NOTE — TELEPHONE ENCOUNTER
I called patient and scheduled her to see Dr. Montoya on Thursday, 5/9/24 @ 11am in [place].  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

## 2024-05-08 NOTE — ANESTHESIA POSTPROCEDURE EVALUATION
Anesthesia Post Evaluation    Patient: Malaika Craven    Procedure(s) Performed: Procedure(s) (LRB):  COLONOSCOPY (N/A)    Final Anesthesia Type: general      Patient location during evaluation: PACU  Patient participation: Yes- Able to Participate  Level of consciousness: awake and alert  Post-procedure vital signs: reviewed and stable  Pain management: adequate  Airway patency: patent    PONV status at discharge: No PONV  Anesthetic complications: no      Cardiovascular status: blood pressure returned to baseline  Respiratory status: unassisted  Hydration status: euvolemic  Follow-up not needed.              Vitals Value Taken Time   /80 05/08/24 1047   Temp 36.7 °C (98.1 °F) 05/08/24 1042   Pulse 84 05/08/24 1047   Resp 16 05/08/24 1047   SpO2 96 % 05/08/24 1047         Event Time   Out of Recovery 11:16:18         Pain/Char Score: Char Score: 10 (5/8/2024 10:43 AM)

## 2024-05-08 NOTE — ANESTHESIA PREPROCEDURE EVALUATION
05/08/2024  Malaika Craven is a 63 y.o., female.      Pre-op Assessment    I have reviewed the Patient Summary Reports.     I have reviewed the Nursing Notes. I have reviewed the NPO Status.   I have reviewed the Medications.     Review of Systems  Anesthesia Hx:  No problems with previous Anesthesia                Cardiovascular:     Hypertension                                  Hypertension         Pulmonary:  Pulmonary Normal                       Neurological:  Neurology Normal                                      Psych:  Psychiatric History  depression                Physical Exam  General: Well nourished    Airway:  Mallampati: II   Mouth Opening: Normal  TM Distance: Normal  Neck ROM: Normal ROM        Anesthesia Plan  Type of Anesthesia, risks & benefits discussed:    Anesthesia Type: Gen Natural Airway  Intra-op Monitoring Plan: Standard ASA Monitors  Induction:  IV  Informed Consent: Informed consent signed with the Patient and all parties understand the risks and agree with anesthesia plan.  All questions answered.   ASA Score: 2    Ready For Surgery From Anesthesia Perspective.     .

## 2024-05-09 ENCOUNTER — LAB VISIT (OUTPATIENT)
Dept: LAB | Facility: HOSPITAL | Age: 64
End: 2024-05-09
Attending: INTERNAL MEDICINE
Payer: COMMERCIAL

## 2024-05-09 ENCOUNTER — OFFICE VISIT (OUTPATIENT)
Dept: SURGERY | Facility: CLINIC | Age: 64
End: 2024-05-09
Payer: COMMERCIAL

## 2024-05-09 VITALS
OXYGEN SATURATION: 96 % | WEIGHT: 180 LBS | TEMPERATURE: 98 F | RESPIRATION RATE: 16 BRPM | SYSTOLIC BLOOD PRESSURE: 132 MMHG | BODY MASS INDEX: 31.89 KG/M2 | DIASTOLIC BLOOD PRESSURE: 80 MMHG | HEART RATE: 84 BPM | HEIGHT: 63 IN

## 2024-05-09 VITALS
DIASTOLIC BLOOD PRESSURE: 82 MMHG | WEIGHT: 181.69 LBS | HEIGHT: 63 IN | BODY MASS INDEX: 32.19 KG/M2 | HEART RATE: 89 BPM | SYSTOLIC BLOOD PRESSURE: 139 MMHG | TEMPERATURE: 97 F

## 2024-05-09 DIAGNOSIS — K63.89 COLONIC MASS: ICD-10-CM

## 2024-05-09 LAB — CEA SERPL-MCNC: 2 NG/ML (ref 0–5)

## 2024-05-09 PROCEDURE — 3079F DIAST BP 80-89 MM HG: CPT | Mod: CPTII,S$GLB,, | Performed by: SURGERY

## 2024-05-09 PROCEDURE — 3044F HG A1C LEVEL LT 7.0%: CPT | Mod: CPTII,S$GLB,, | Performed by: SURGERY

## 2024-05-09 PROCEDURE — 99205 OFFICE O/P NEW HI 60 MIN: CPT | Mod: S$GLB,,, | Performed by: SURGERY

## 2024-05-09 PROCEDURE — 1159F MED LIST DOCD IN RCRD: CPT | Mod: CPTII,S$GLB,, | Performed by: SURGERY

## 2024-05-09 PROCEDURE — 3008F BODY MASS INDEX DOCD: CPT | Mod: CPTII,S$GLB,, | Performed by: SURGERY

## 2024-05-09 PROCEDURE — 99999 PR PBB SHADOW E&M-EST. PATIENT-LVL V: CPT | Mod: PBBFAC,,, | Performed by: SURGERY

## 2024-05-09 PROCEDURE — 3075F SYST BP GE 130 - 139MM HG: CPT | Mod: CPTII,S$GLB,, | Performed by: SURGERY

## 2024-05-09 PROCEDURE — 82378 CARCINOEMBRYONIC ANTIGEN: CPT | Performed by: INTERNAL MEDICINE

## 2024-05-09 PROCEDURE — 36415 COLL VENOUS BLD VENIPUNCTURE: CPT | Mod: PO | Performed by: INTERNAL MEDICINE

## 2024-05-11 ENCOUNTER — HOSPITAL ENCOUNTER (OUTPATIENT)
Dept: RADIOLOGY | Facility: HOSPITAL | Age: 64
Discharge: HOME | End: 2024-05-11
Attending: SURGERY
Payer: COMMERCIAL

## 2024-05-11 DIAGNOSIS — K63.89 COLONIC MASS: ICD-10-CM

## 2024-05-11 PROCEDURE — 71260 CT THORAX DX C+: CPT | Mod: 26,,, | Performed by: RADIOLOGY

## 2024-05-11 PROCEDURE — A9698 NON-RAD CONTRAST MATERIALNOC: HCPCS

## 2024-05-11 PROCEDURE — 74177 CT ABD & PELVIS W/CONTRAST: CPT | Mod: 26,,, | Performed by: RADIOLOGY

## 2024-05-11 PROCEDURE — 74177 CT ABD & PELVIS W/CONTRAST: CPT | Mod: TC

## 2024-05-11 PROCEDURE — 25500020 PHARM REV CODE 255

## 2024-05-11 RX ADMIN — IOHEXOL 75 ML: 350 INJECTION, SOLUTION INTRAVENOUS at 12:05

## 2024-05-11 RX ADMIN — IOHEXOL 1000 ML: 9 SOLUTION ORAL at 12:05

## 2024-05-13 ENCOUNTER — PATIENT MESSAGE (OUTPATIENT)
Dept: SURGERY | Facility: CLINIC | Age: 64
End: 2024-05-13
Payer: COMMERCIAL

## 2024-05-14 ENCOUNTER — TELEPHONE (OUTPATIENT)
Dept: SURGERY | Facility: CLINIC | Age: 64
End: 2024-05-14
Payer: COMMERCIAL

## 2024-05-14 ENCOUNTER — TELEPHONE (OUTPATIENT)
Dept: SURGERY | Facility: HOSPITAL | Age: 64
End: 2024-05-14
Payer: COMMERCIAL

## 2024-05-14 DIAGNOSIS — C18.7 MALIGNANT NEOPLASM OF SIGMOID COLON: Primary | ICD-10-CM

## 2024-05-14 NOTE — TELEPHONE ENCOUNTER
I scheduled patient to see Dr. Montoya on Tuesday, 5/21/24 @ 2:15pm in [place] and notified her on MyChart.  Nicholas

## 2024-05-14 NOTE — PROGRESS NOTES
Spoke with patient regarding CT results, have placed on urgent referral to oncology for further evaluation of lung/adrenal lesions.

## 2024-05-14 NOTE — TELEPHONE ENCOUNTER
Reviewed findings on ct with pt.  Will arrange PEt/CT scan  WIll arrange consultation with oncology  Pt will f/u with me in office next week.

## 2024-05-15 DIAGNOSIS — C18.9 MALIGNANT NEOPLASM OF COLON, UNSPECIFIED PART OF COLON: Primary | ICD-10-CM

## 2024-05-16 ENCOUNTER — PATIENT MESSAGE (OUTPATIENT)
Dept: SURGERY | Facility: CLINIC | Age: 64
End: 2024-05-16
Payer: COMMERCIAL

## 2024-05-16 ENCOUNTER — HOSPITAL ENCOUNTER (OUTPATIENT)
Dept: RADIOLOGY | Facility: HOSPITAL | Age: 64
Discharge: HOME | End: 2024-05-16
Attending: INTERNAL MEDICINE
Payer: COMMERCIAL

## 2024-05-16 ENCOUNTER — TELEPHONE (OUTPATIENT)
Dept: SURGERY | Facility: CLINIC | Age: 64
End: 2024-05-16
Payer: COMMERCIAL

## 2024-05-16 DIAGNOSIS — C20 RECTAL CANCER: Primary | ICD-10-CM

## 2024-05-16 DIAGNOSIS — K63.89 COLONIC MASS: ICD-10-CM

## 2024-05-16 PROCEDURE — 72197 MRI PELVIS W/O & W/DYE: CPT | Mod: TC

## 2024-05-16 PROCEDURE — 72197 MRI PELVIS W/O & W/DYE: CPT | Mod: 26,,, | Performed by: RADIOLOGY

## 2024-05-16 PROCEDURE — 25500020 PHARM REV CODE 255

## 2024-05-16 PROCEDURE — A9585 GADOBUTROL INJECTION: HCPCS

## 2024-05-16 RX ORDER — SODIUM CHLORIDE, SODIUM LACTATE, POTASSIUM CHLORIDE, CALCIUM CHLORIDE 600; 310; 30; 20 MG/100ML; MG/100ML; MG/100ML; MG/100ML
INJECTION, SOLUTION INTRAVENOUS CONTINUOUS
Status: CANCELLED | OUTPATIENT
Start: 2024-05-16

## 2024-05-16 RX ORDER — GADOBUTROL 604.72 MG/ML
INJECTION INTRAVENOUS
Status: COMPLETED
Start: 2024-05-16 | End: 2024-05-16

## 2024-05-16 RX ORDER — SODIUM CHLORIDE 0.9 % (FLUSH) 0.9 %
10 SYRINGE (ML) INJECTION
Status: CANCELLED | OUTPATIENT
Start: 2024-05-16

## 2024-05-16 RX ADMIN — GADOBUTROL 8 ML: 604.72 INJECTION INTRAVENOUS at 06:05

## 2024-05-16 NOTE — TELEPHONE ENCOUNTER
LMOR @ 1:04pm for the patient to call me back toschedule a PET CT FDG Skull Base to Mid Thigh @ [facility] Radiology per Dr. Montoya.  Nicholas

## 2024-05-16 NOTE — TELEPHONE ENCOUNTER
Patient called back and I informed her that the first available is on Wednesday, 6/5/24 @ 2pm @ [facility] and she wants it done sooner.  I informed her that I'll call [facility] to see if they can do it asap.  I'll call her back to advise.  Nicholas

## 2024-05-16 NOTE — TELEPHONE ENCOUNTER
I called patient to inform her of the PET CT Scan on Wednesday, 5/22/24 @ 9:45am @ [facility] on the 1st floor.  She needs to arrive @ 9:15am and fast for 6hrs prior.  Moleculint message sent as well.  Patient understood.  Nicholas

## 2024-05-17 ENCOUNTER — TELEPHONE (OUTPATIENT)
Dept: HEMATOLOGY/ONCOLOGY | Facility: CLINIC | Age: 64
End: 2024-05-17
Payer: COMMERCIAL

## 2024-05-17 NOTE — NURSING
Oncology Navigation   Intake  Date of Diagnosis: 05/10/24  Cancer Type: GI (Malignant neoplasm of colon, unspecified part of colon)  Type of Referral: Internal (Dr Oumar Montoya)  Date of Referral: 05/15/24  Initial Nurse Navigator Contact: 05/17/24  Referral to Initial Contact Timeline (days): 2  First Appointment Available: 05/20/24  Appointment Date: 05/23/24 (Dr Khoobehi)  First Available Date vs. Scheduled Date (days): 3  Reason if booked > 7 days after scheduling: Additional tests/procedures     Treatment  Current Status: Staging work-up    Surgical Oncologist: Dr Oumar Montoya    Medical Oncologist: Dr Aurash Khoobehi  Consult Date: 05/23/24       Procedures: PET scan  PET Scan Schedule Date: 05/22/24                 Acuity      Follow Up  No follow-ups on file.

## 2024-05-17 NOTE — TELEPHONE ENCOUNTER
----- Message from Andree Woods RN sent at 5/17/2024  8:15 AM CDT -----  Regarding: Referrals  Good morning,  There is a patient in the workqueue here Ms. Pat Craven, she would like to see [facility] in [place] if she can be seen by 5/23, if not she wants to come to [place].  Can you please reach out to the patient to schedule her?  Referral from Dr. Montoya.  She has  PET scheduled 5/23 and she has a EGD with Dr. Montoya scheduled 5/20.  Path is in.

## 2024-05-20 ENCOUNTER — ANESTHESIA (OUTPATIENT)
Dept: ENDOSCOPY | Facility: HOSPITAL | Age: 64
End: 2024-05-20
Payer: COMMERCIAL

## 2024-05-20 ENCOUNTER — HOSPITAL ENCOUNTER (OUTPATIENT)
Facility: HOSPITAL | Age: 64
Discharge: HOME | End: 2024-05-20
Attending: SURGERY | Admitting: SURGERY
Payer: COMMERCIAL

## 2024-05-20 ENCOUNTER — ANESTHESIA EVENT (OUTPATIENT)
Dept: ENDOSCOPY | Facility: HOSPITAL | Age: 64
End: 2024-05-20
Payer: COMMERCIAL

## 2024-05-20 DIAGNOSIS — C20 RECTAL CANCER: ICD-10-CM

## 2024-05-20 PROCEDURE — 25000003 PHARM REV CODE 250: Performed by: SURGERY

## 2024-05-20 PROCEDURE — D9220A PRA ANESTHESIA: Mod: CRNA,,, | Performed by: NURSE ANESTHETIST, CERTIFIED REGISTERED

## 2024-05-20 PROCEDURE — 37000008 HC ANESTHESIA 1ST 15 MINUTES: Performed by: SURGERY

## 2024-05-20 PROCEDURE — 25000003 PHARM REV CODE 250: Performed by: NURSE ANESTHETIST, CERTIFIED REGISTERED

## 2024-05-20 PROCEDURE — D9220A PRA ANESTHESIA: Mod: ANES,,, | Performed by: ANESTHESIOLOGY

## 2024-05-20 PROCEDURE — 45330 DIAGNOSTIC SIGMOIDOSCOPY: CPT | Performed by: SURGERY

## 2024-05-20 PROCEDURE — 45330 DIAGNOSTIC SIGMOIDOSCOPY: CPT | Mod: ,,, | Performed by: SURGERY

## 2024-05-20 RX ORDER — SODIUM CHLORIDE 9 MG/ML
INJECTION, SOLUTION INTRAVENOUS CONTINUOUS
Status: DISCONTINUED | OUTPATIENT
Start: 2024-05-20 | End: 2024-05-20 | Stop reason: HOSPADM

## 2024-05-20 RX ORDER — LIDOCAINE HYDROCHLORIDE 20 MG/ML
INJECTION, SOLUTION EPIDURAL; INFILTRATION; INTRACAUDAL; PERINEURAL
Status: DISCONTINUED | OUTPATIENT
Start: 2024-05-20 | End: 2024-05-20

## 2024-05-20 RX ORDER — SODIUM CHLORIDE 0.9 % (FLUSH) 0.9 %
10 SYRINGE (ML) INJECTION
Status: DISCONTINUED | OUTPATIENT
Start: 2024-05-20 | End: 2024-05-20 | Stop reason: HOSPADM

## 2024-05-20 RX ORDER — PROPOFOL 10 MG/ML
VIAL (ML) INTRAVENOUS
Status: DISCONTINUED | OUTPATIENT
Start: 2024-05-20 | End: 2024-05-20

## 2024-05-20 RX ADMIN — Medication 100 MG: at 07:05

## 2024-05-20 RX ADMIN — SODIUM CHLORIDE: 9 INJECTION, SOLUTION INTRAVENOUS at 06:05

## 2024-05-20 RX ADMIN — Medication 20 MG: at 07:05

## 2024-05-20 RX ADMIN — LIDOCAINE HYDROCHLORIDE 100 MG: 20 INJECTION, SOLUTION EPIDURAL; INFILTRATION; INTRACAUDAL; PERINEURAL at 07:05

## 2024-05-20 NOTE — PLAN OF CARE
Patient awake, alert, and oriented.  No complaints of pain; abdomen soft and tender.  Patient passing flatus without difficulty.  Vital signs stable.  Patient tolerated po fluids well.  Dr. Montoya spoke with patient prior to discharge.  Patient verbalize  understanding of all discharge instructions given.  Patient discharged to home accompanied by family

## 2024-05-20 NOTE — PROVATION PATIENT INSTRUCTIONS
Discharge Summary/Instructions after an Endoscopic Procedure  Patient Name: Malaika Craven  Patient MRN: [id number]  Patient YOB: 1960  Monday, May 20, 2024  Oumar Montoya MD  Dear patient,  As a result of recent federal legislation (The Federal Cures Act), you may   receive lab or pathology results from your procedure in your [facility]   account before your physician is able to contact you. Your physician or   their representative will relay the results to you with their   recommendations at their soonest availability.  Thank you,  RESTRICTIONS:  During your procedure today, you received medications for sedation.  These   medications may affect your judgment, balance and coordination.  Therefore,   for 24 hours, you have the following restrictions:   - DO NOT drive a car, operate machinery, make legal/financial decisions,   sign important papers or drink alcohol.    ACTIVITY:  Today: no heavy lifting, straining or running due to procedural   sedation/anesthesia.  The following day: return to full activity including work.  DIET:  Eat and drink normally unless instructed otherwise.     TREATMENT FOR COMMON SIDE EFFECTS:  - Mild abdominal pain, nausea, belching, bloating or excessive gas:  rest,   eat lightly and use a heating pad.  - Sore Throat: treat with throat lozenges and/or gargle with warm salt   water.  - Because air was used during the procedure, expelling large amounts of air   from your rectum or belching is normal.  - If a bowel prep was taken, you may not have a bowel movement for 1-3 days.    This is normal.  SYMPTOMS TO WATCH FOR AND REPORT TO YOUR PHYSICIAN:  1. Abdominal pain or bloating, other than gas cramps.  2. Chest pain.  3. Back pain.  4. Signs of infection such as: chills or fever occurring within 24 hours   after the procedure.  5. Rectal bleeding, which would show as bright red, maroon, or black stools.   (A tablespoon of blood from the rectum is not serious, especially if    Pt not currently open to [facility]  Pt reports she may be open to Critical access hospital and is agreeable with referral to same  Referral made via [facility]  hemorrhoids are present.)  6. Vomiting.  7. Weakness or dizziness.  GO DIRECTLY TO THE NEAREST EMERGENCY ROOM IF YOU HAVE ANY OF THE FOLLOWING:      Difficulty breathing              Chills and/or fever over 101 F   Persistent vomiting and/or vomiting blood   Severe abdominal pain   Severe chest pain   Black, tarry stools   Bleeding- more than one tablespoon   Any other symptom or condition that you feel may need urgent attention  Your doctor recommends these additional instructions:  If any biopsies were taken, your doctors clinic will contact you in 1 to 2   weeks with any results.  - Discharge patient to home (ambulatory).   - Resume regular diet.   - Repeat flexible sigmoidoscopy at appointment to be scheduled for screening   purposes.  For questions, problems or results please call your physician - Oumar Montoya MD at Work:  [phone].  [facility], EMERGENCY ROOM PHONE NUMBER: [phone]  IF A COMPLICATION OR EMERGENCY SITUATION ARISES AND YOU ARE UNABLE TO REACH   YOUR PHYSICIAN - GO DIRECTLY TO THE EMERGENCY ROOM.  Oumar Montoya MD  5/20/2024 7:14:20 AM  This report has been verified and signed electronically.  Dear patient,  As a result of recent federal legislation (The Federal Cures Act), you may   receive lab or pathology results from your procedure in your [facility]   account before your physician is able to contact you. Your physician or   their representative will relay the results to you with their   recommendations at their soonest availability.  Thank you,  PROVATION

## 2024-05-20 NOTE — TRANSFER OF CARE
Anesthesia Transfer of Care Note    Patient: Malaika Craven    Procedure(s) Performed: Procedure(s) (LRB):  SIGMOIDOSCOPY, FLEXIBLE(Fleets) (N/A)    Patient location: PACU    Anesthesia Type: general    Transport from OR: Transported from OR on room air with adequate spontaneous ventilation    Post pain: adequate analgesia    Post assessment: no apparent anesthetic complications and tolerated procedure well    Post vital signs: stable    Level of consciousness: sedated and responds to stimulation    Nausea/Vomiting: no nausea/vomiting    Complications: none    Transfer of care protocol was followed      Last vitals: Visit Vitals  Breastfeeding No

## 2024-05-20 NOTE — ANESTHESIA POSTPROCEDURE EVALUATION
Anesthesia Post Evaluation    Patient: Malaika Craven    Procedure(s) Performed: Procedure(s) (LRB):  SIGMOIDOSCOPY, FLEXIBLE(Flesilvia) (N/A)    Final Anesthesia Type: general      Patient location during evaluation: PACU  Patient participation: Yes- Able to Participate  Level of consciousness: awake and alert  Post-procedure vital signs: reviewed and stable  Pain management: adequate  Airway patency: patent    PONV status at discharge: No PONV  Anesthetic complications: no      Cardiovascular status: blood pressure returned to baseline  Respiratory status: unassisted  Hydration status: euvolemic  Follow-up not needed.              Vitals Value Taken Time   /89 05/20/24 0733   Temp   05/20/24 1032   Pulse 70 05/20/24 0733   Resp 16 05/20/24 0733   SpO2 96 % 05/20/24 0733         Event Time   Out of Recovery 07:45:15         Pain/Char Score: Char Score: 10 (5/20/2024  7:33 AM)

## 2024-05-20 NOTE — ANESTHESIA PREPROCEDURE EVALUATION
05/20/2024  Malaika Craven is a 63 y.o., female.      Pre-op Assessment    I have reviewed the Patient Summary Reports.     I have reviewed the Nursing Notes. I have reviewed the NPO Status.   I have reviewed the Medications.     Review of Systems  Anesthesia Hx:  No problems with previous Anesthesia                Cardiovascular:     Hypertension                                  Hypertension         Pulmonary:  Pulmonary Normal                       Endocrine:        Obesity / BMI > 30  Psych:  Psychiatric History  depression                Physical Exam  General: Well nourished    Airway:  Mallampati: II   Mouth Opening: Normal  TM Distance: Normal  Neck ROM: Normal ROM        Anesthesia Plan  Type of Anesthesia, risks & benefits discussed:    Anesthesia Type: Gen Natural Airway  Intra-op Monitoring Plan: Standard ASA Monitors  Induction:  IV  Informed Consent: Informed consent signed with the Patient and all parties understand the risks and agree with anesthesia plan.  All questions answered.   ASA Score: 2    Ready For Surgery From Anesthesia Perspective.     .

## 2024-05-21 ENCOUNTER — OFFICE VISIT (OUTPATIENT)
Dept: SURGERY | Facility: CLINIC | Age: 64
End: 2024-05-21
Payer: COMMERCIAL

## 2024-05-21 VITALS
HEART RATE: 70 BPM | RESPIRATION RATE: 16 BRPM | TEMPERATURE: 98 F | OXYGEN SATURATION: 96 % | DIASTOLIC BLOOD PRESSURE: 89 MMHG | SYSTOLIC BLOOD PRESSURE: 159 MMHG

## 2024-05-21 VITALS — BODY MASS INDEX: 32.61 KG/M2 | TEMPERATURE: 99 F | HEIGHT: 63 IN | WEIGHT: 184.06 LBS

## 2024-05-21 DIAGNOSIS — R91.8 MASS OF LOWER LOBE OF LEFT LUNG: ICD-10-CM

## 2024-05-21 DIAGNOSIS — C20 RECTAL CANCER: Primary | ICD-10-CM

## 2024-05-21 PROCEDURE — 1159F MED LIST DOCD IN RCRD: CPT | Mod: CPTII,S$GLB,, | Performed by: SURGERY

## 2024-05-21 PROCEDURE — 3044F HG A1C LEVEL LT 7.0%: CPT | Mod: CPTII,S$GLB,, | Performed by: SURGERY

## 2024-05-21 PROCEDURE — 99213 OFFICE O/P EST LOW 20 MIN: CPT | Mod: S$GLB,,, | Performed by: SURGERY

## 2024-05-21 PROCEDURE — 3008F BODY MASS INDEX DOCD: CPT | Mod: CPTII,S$GLB,, | Performed by: SURGERY

## 2024-05-21 PROCEDURE — 99999 PR PBB SHADOW E&M-EST. PATIENT-LVL III: CPT | Mod: PBBFAC,,, | Performed by: SURGERY

## 2024-05-22 ENCOUNTER — HOSPITAL ENCOUNTER (OUTPATIENT)
Dept: RADIOLOGY | Facility: HOSPITAL | Age: 64
Discharge: HOME | End: 2024-05-22
Attending: SURGERY
Payer: COMMERCIAL

## 2024-05-22 DIAGNOSIS — C20 RECTAL CANCER: ICD-10-CM

## 2024-05-22 LAB — GLUCOSE SERPL-MCNC: 105 MG/DL (ref 70–110)

## 2024-05-22 PROCEDURE — 78815 PET IMAGE W/CT SKULL-THIGH: CPT | Mod: 26,PI,, | Performed by: STUDENT IN AN ORGANIZED HEALTH CARE EDUCATION/TRAINING PROGRAM

## 2024-05-22 PROCEDURE — 78815 PET IMAGE W/CT SKULL-THIGH: CPT | Mod: TC,PI,PN

## 2024-05-22 PROCEDURE — A9552 F18 FDG: HCPCS | Mod: PN | Performed by: SURGERY

## 2024-05-22 RX ORDER — FLUDEOXYGLUCOSE F18 500 MCI/ML
11.9 INJECTION INTRAVENOUS
Status: COMPLETED | OUTPATIENT
Start: 2024-05-22 | End: 2024-05-22

## 2024-05-22 RX ADMIN — FLUDEOXYGLUCOSE F-18 11.9 MILLICURIE: 500 INJECTION INTRAVENOUS at 09:05

## 2024-05-22 NOTE — PROGRESS NOTES
63-year-old female with whom I am familiar.  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  Since her previous visit with me she has had a CT scan which did demonstrate a 2.2 cm lesion in the left lower lobe of the lung.  No evidence of liver metastasis.  There was also a 4 x 3 cm indeterminate left adrenal nodule.  Patient had MRI suggestive of T1-T2 lesion in the proximal rectum.  To lymph nodes identified measuring 4 and 5 mm.  She has had a flex sig with the tumor identified just above the 3rd valve at a proximally 12 cm from the anal verge.    Afebrile vital signs stable   Abdomen: Soft nontender nondistended     Lab Results   Component Value Date    CEA 2.0 05/09/2024       Discussion with the patient and her .  We will address her tumor as if it was a rectal tumor.  Prior to any definitive recommendations will need to further evaluate the lung and adrenal lesions.  Given normal CEA and lack of liver involvement I suspect that this may be a 2nd primary.  We will arrange for a PET-CT scan as well as CT-guided biopsy of the lung lesion.  Consultation with Oncology has been arranged.  Patient will return to clinic in 1-2 weeks for further discussion after presentation tumor board and further workup

## 2024-05-23 ENCOUNTER — TELEPHONE (OUTPATIENT)
Dept: SURGERY | Facility: CLINIC | Age: 64
End: 2024-05-23
Payer: COMMERCIAL

## 2024-05-23 ENCOUNTER — OFFICE VISIT (OUTPATIENT)
Dept: HEMATOLOGY/ONCOLOGY | Facility: CLINIC | Age: 64
End: 2024-05-23
Payer: COMMERCIAL

## 2024-05-23 ENCOUNTER — PATIENT MESSAGE (OUTPATIENT)
Dept: HEMATOLOGY/ONCOLOGY | Facility: CLINIC | Age: 64
End: 2024-05-23

## 2024-05-23 VITALS
WEIGHT: 184.94 LBS | TEMPERATURE: 98 F | BODY MASS INDEX: 32.77 KG/M2 | HEART RATE: 73 BPM | RESPIRATION RATE: 18 BRPM | OXYGEN SATURATION: 92 % | DIASTOLIC BLOOD PRESSURE: 70 MMHG | HEIGHT: 63 IN | SYSTOLIC BLOOD PRESSURE: 150 MMHG

## 2024-05-23 DIAGNOSIS — C18.7 MALIGNANT NEOPLASM OF SIGMOID COLON: ICD-10-CM

## 2024-05-23 PROCEDURE — 3008F BODY MASS INDEX DOCD: CPT | Mod: CPTII,S$GLB,, | Performed by: INTERNAL MEDICINE

## 2024-05-23 PROCEDURE — 3077F SYST BP >= 140 MM HG: CPT | Mod: CPTII,S$GLB,, | Performed by: INTERNAL MEDICINE

## 2024-05-23 PROCEDURE — 3044F HG A1C LEVEL LT 7.0%: CPT | Mod: CPTII,S$GLB,, | Performed by: INTERNAL MEDICINE

## 2024-05-23 PROCEDURE — 99999 PR PBB SHADOW E&M-EST. PATIENT-LVL IV: CPT | Mod: PBBFAC,,, | Performed by: INTERNAL MEDICINE

## 2024-05-23 PROCEDURE — 99205 OFFICE O/P NEW HI 60 MIN: CPT | Mod: S$GLB,,, | Performed by: INTERNAL MEDICINE

## 2024-05-23 PROCEDURE — 3078F DIAST BP <80 MM HG: CPT | Mod: CPTII,S$GLB,, | Performed by: INTERNAL MEDICINE

## 2024-05-23 NOTE — Clinical Note
IR biopsy is scheduled in 3 weeks in June. She needs it sooner and is willing to go anywhere. Can we get it sooner?

## 2024-05-23 NOTE — TELEPHONE ENCOUNTER
I called patient to set up the PET CT Scan and she stated that it was done yesterday in [place].  She was seen by Dr. Khoobehi yesterday and he wants her CT guided biopsy done sooner than 6/14/24.   I sent a message to Lakeisha Blackman to ask if she can get the CT guided biopsy sooner than 6/14/24.  Nicholas

## 2024-05-23 NOTE — TELEPHONE ENCOUNTER
I called patient to make sure that she was aware that the CT Guided biopsy was rescheduled to Tuesday, 5/28/24 @ 9am @ [facility].  She stated that she received a call to arrive 30 minutes earlier.  I sent a message to Lakeisha Blackman asking her to see if she could get in sooner than 6/14/24.  Nicholas

## 2024-05-23 NOTE — TELEPHONE ENCOUNTER
I called patient to ask her to come in @ 1pm on Thursday, 5/30/24 to see Dr. Montoya in [place] instead of 4:15pm.  Patient accepted the 1pm appointment time.  Nicholas

## 2024-05-23 NOTE — TELEPHONE ENCOUNTER
JHON for Lakeisha Blackman to see if she can get this patients CT guided biopsy sooner than 6/14/24 per Dr. Khoobehi.  Nicholas

## 2024-05-27 DIAGNOSIS — Z01.818 PRE-PROCEDURAL EXAMINATION: Primary | ICD-10-CM

## 2024-05-28 ENCOUNTER — HOSPITAL ENCOUNTER (OUTPATIENT)
Dept: RADIOLOGY | Facility: HOSPITAL | Age: 64
Discharge: HOME | End: 2024-05-28
Attending: SURGERY
Payer: COMMERCIAL

## 2024-05-28 ENCOUNTER — HOSPITAL ENCOUNTER (OUTPATIENT)
Dept: RADIOLOGY | Facility: HOSPITAL | Age: 64
Discharge: HOME | End: 2024-05-28
Attending: RADIOLOGY
Payer: COMMERCIAL

## 2024-05-28 VITALS
DIASTOLIC BLOOD PRESSURE: 64 MMHG | OXYGEN SATURATION: 100 % | HEART RATE: 90 BPM | RESPIRATION RATE: 11 BRPM | SYSTOLIC BLOOD PRESSURE: 139 MMHG

## 2024-05-28 DIAGNOSIS — C20 RECTAL CANCER: ICD-10-CM

## 2024-05-28 DIAGNOSIS — R91.8 MASS OF LOWER LOBE OF LEFT LUNG: ICD-10-CM

## 2024-05-28 PROCEDURE — 25000003 PHARM REV CODE 250: Performed by: RADIOLOGY

## 2024-05-28 PROCEDURE — 71046 X-RAY EXAM CHEST 2 VIEWS: CPT | Mod: TC

## 2024-05-28 PROCEDURE — 27200937 CT BIOPSY LUNG W/ GUIDANCE

## 2024-05-28 PROCEDURE — 88342 IMHCHEM/IMCYTCHM 1ST ANTB: CPT | Performed by: PATHOLOGY

## 2024-05-28 PROCEDURE — 71046 X-RAY EXAM CHEST 2 VIEWS: CPT | Mod: 26,76,, | Performed by: RADIOLOGY

## 2024-05-28 PROCEDURE — 27200940 CT BIOPSY LUNG W/ GUIDANCE

## 2024-05-28 PROCEDURE — 88341 IMHCHEM/IMCYTCHM EA ADD ANTB: CPT | Mod: 26,,, | Performed by: PATHOLOGY

## 2024-05-28 PROCEDURE — 32408 CORE NDL BX LNG/MED PERQ: CPT | Mod: LT | Performed by: RADIOLOGY

## 2024-05-28 PROCEDURE — 88305 TISSUE EXAM BY PATHOLOGIST: CPT | Mod: 26,,, | Performed by: PATHOLOGY

## 2024-05-28 PROCEDURE — 88305 TISSUE EXAM BY PATHOLOGIST: CPT | Performed by: PATHOLOGY

## 2024-05-28 PROCEDURE — 71046 X-RAY EXAM CHEST 2 VIEWS: CPT | Mod: 26,,, | Performed by: RADIOLOGY

## 2024-05-28 PROCEDURE — 88342 IMHCHEM/IMCYTCHM 1ST ANTB: CPT | Mod: 26,,, | Performed by: PATHOLOGY

## 2024-05-28 PROCEDURE — 88341 IMHCHEM/IMCYTCHM EA ADD ANTB: CPT | Performed by: PATHOLOGY

## 2024-05-28 RX ORDER — LIDOCAINE HYDROCHLORIDE 10 MG/ML
1 INJECTION INFILTRATION; PERINEURAL ONCE
Status: COMPLETED | OUTPATIENT
Start: 2024-05-28 | End: 2024-05-28

## 2024-05-28 RX ORDER — MIDAZOLAM HYDROCHLORIDE 2 MG/2ML
INJECTION, SOLUTION INTRAMUSCULAR; INTRAVENOUS
Status: DISCONTINUED
Start: 2024-05-28 | End: 2024-05-28 | Stop reason: WASHOUT

## 2024-05-28 RX ORDER — FENTANYL CITRATE 50 UG/ML
INJECTION, SOLUTION INTRAMUSCULAR; INTRAVENOUS
Status: DISCONTINUED
Start: 2024-05-28 | End: 2024-05-28 | Stop reason: WASHOUT

## 2024-05-28 RX ADMIN — LIDOCAINE HYDROCHLORIDE 1 ML: 10 INJECTION, SOLUTION INFILTRATION; PERINEURAL at 10:05

## 2024-05-28 NOTE — Clinical Note
Left: Back.   Scrubbed with Chlorohexidine.    Skin prep dry before draping.  Prepped by: Martinez Ren MD 5/28/2024 10:12 AM.

## 2024-05-28 NOTE — NURSING
Post procedure xray done and then repeated after 60 mins. No SOB noted. Pt amb in halls without distress.   Specimen sent to lab with pathology orders.   Pt given list of reasons to return to ED and verbalizes understanding. Left with .

## 2024-05-30 ENCOUNTER — TELEPHONE (OUTPATIENT)
Dept: SURGERY | Facility: CLINIC | Age: 64
End: 2024-05-30

## 2024-05-30 ENCOUNTER — OFFICE VISIT (OUTPATIENT)
Dept: SURGERY | Facility: CLINIC | Age: 64
End: 2024-05-30
Payer: COMMERCIAL

## 2024-05-30 VITALS
DIASTOLIC BLOOD PRESSURE: 88 MMHG | WEIGHT: 184.31 LBS | HEART RATE: 82 BPM | SYSTOLIC BLOOD PRESSURE: 156 MMHG | HEIGHT: 63 IN | BODY MASS INDEX: 32.66 KG/M2 | TEMPERATURE: 97 F

## 2024-05-30 DIAGNOSIS — C20 RECTAL CANCER: Primary | ICD-10-CM

## 2024-05-30 PROCEDURE — 3044F HG A1C LEVEL LT 7.0%: CPT | Mod: CPTII,S$GLB,, | Performed by: SURGERY

## 2024-05-30 PROCEDURE — 99213 OFFICE O/P EST LOW 20 MIN: CPT | Mod: S$GLB,,, | Performed by: SURGERY

## 2024-05-30 PROCEDURE — 3077F SYST BP >= 140 MM HG: CPT | Mod: CPTII,S$GLB,, | Performed by: SURGERY

## 2024-05-30 PROCEDURE — 3079F DIAST BP 80-89 MM HG: CPT | Mod: CPTII,S$GLB,, | Performed by: SURGERY

## 2024-05-30 PROCEDURE — 1159F MED LIST DOCD IN RCRD: CPT | Mod: CPTII,S$GLB,, | Performed by: SURGERY

## 2024-05-30 PROCEDURE — 99999 PR PBB SHADOW E&M-EST. PATIENT-LVL IV: CPT | Mod: PBBFAC,,, | Performed by: SURGERY

## 2024-05-30 PROCEDURE — 3008F BODY MASS INDEX DOCD: CPT | Mod: CPTII,S$GLB,, | Performed by: SURGERY

## 2024-05-30 NOTE — TELEPHONE ENCOUNTER
I called patient to inform her that the MRI is scheduled @ [facility] on Sunday, 6/2/24 @ 4pm .  She needs to arrive @ 3:30pm and check in through the Emergency Room @ [facility].  Patient understood.  Nicholas

## 2024-05-30 NOTE — PROGRESS NOTES
Pleasant 64 yo F who I am familiar with.  Pt with known proximal rectal cancer.  Separate pulmonary lesion was biopsied earlier this week.  Results pending.  Awaiting Adrenal MRI.   Discussed in MDT this week.    Recommended discussino in thoracic tumor board which takes place next week  Will await results of adrenal lesion workup.    Will call pt next week to discuss recommendations

## 2024-05-31 ENCOUNTER — TELEPHONE (OUTPATIENT)
Dept: SURGERY | Facility: CLINIC | Age: 64
End: 2024-05-31
Payer: COMMERCIAL

## 2024-05-31 LAB
FINAL PATHOLOGIC DIAGNOSIS: NORMAL
GROSS: NORMAL
Lab: NORMAL
MICROSCOPIC EXAM: NORMAL

## 2024-05-31 NOTE — TELEPHONE ENCOUNTER
----- Message from Hilaria Barroso sent at 5/31/2024  1:14 PM CDT -----  Contact: Patient  Type:  Needs Medical Advice    Who Called: Patient     Would the patient rather a call back or a response via [facility]? Call    Best Call Back Number: [phone] (work)    Additional Information: Patient is requesting to speak with Mannie

## 2024-05-31 NOTE — TELEPHONE ENCOUNTER
I called patient and she stated that her biopsy results are back.  The cancer has metastatized from her colon to her lung.  She wanted Dr. Montoya to look at the results.  I informed her that I'll forward the message to him.  Nicholas franco to Pathology, Radiology Lung, biopsy not tranplant  Order: [id number]  Status: Final result       Visible to patient: Yes (seen)       Next appt: 06/02/2024 at 04:00 PM in Radiology (EH MRI1 500 LB LIMIT)    0 Result Notes      Component 3 d ago   Final Pathologic Diagnosis LUNG BIOPSY:  METASTATIC ADENOCARCINOMA OF COLONIC ORIGIN   Comment: Interp By Nabil Pack M.D., Signed on 05/31/2024 at 11:47   [facility]/clinic label MRN:  [id number]  Pathology label MRN:  [id number]    The specimen is received in formalin lacking a written label, but indicated on the requisition as &quot;long, biopsy not transplant&quot;. The specimen consists of a tan-white soft tissue core measuring 0.1 cm in diameter and 0.6 cm in length. The  specimen is submitted entirely in cassette JNX--1-A.    Nika Blanco S  Gross Technologist   Microscopic Exam Adenocarcinoma here has not appearance typical that seen with carcinoma of colonic origin.  Such an origin is supported by positivity for cytokeratin 20 and CDX2 with negativity for cytokeratin 7 and TTF.  The positive and negative controls stained  appropriately.   Disclaimer Unless the case is a 'gross only' or additional testing only, the final diagnosis for each specimen is based on a microscopic examination of appropriate tissue sections.   [facility]

## 2024-06-02 ENCOUNTER — HOSPITAL ENCOUNTER (OUTPATIENT)
Dept: RADIOLOGY | Facility: HOSPITAL | Age: 64
Discharge: HOME | End: 2024-06-02
Attending: SURGERY
Payer: COMMERCIAL

## 2024-06-02 DIAGNOSIS — C20 RECTAL CANCER: ICD-10-CM

## 2024-06-02 PROCEDURE — 74183 MRI ABD W/O CNTR FLWD CNTR: CPT | Mod: 26,,, | Performed by: RADIOLOGY

## 2024-06-02 PROCEDURE — 25500020 PHARM REV CODE 255

## 2024-06-02 PROCEDURE — A9585 GADOBUTROL INJECTION: HCPCS

## 2024-06-02 PROCEDURE — 74183 MRI ABD W/O CNTR FLWD CNTR: CPT | Mod: TC

## 2024-06-02 RX ORDER — GADOBUTROL 604.72 MG/ML
INJECTION INTRAVENOUS
Status: COMPLETED
Start: 2024-06-02 | End: 2024-06-02

## 2024-06-02 RX ADMIN — GADOBUTROL 8 ML: 604.72 INJECTION INTRAVENOUS at 05:06

## 2024-06-03 ENCOUNTER — PATIENT MESSAGE (OUTPATIENT)
Dept: SURGERY | Facility: CLINIC | Age: 64
End: 2024-06-03
Payer: COMMERCIAL

## 2024-06-03 NOTE — PROGRESS NOTES
Service Date:  5/23/24    Chief Complaint: Colon Cancer (NP)    Malaika Craven is a 63 y.o. 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  She also had a CT scan done which showed a 2.2 centimeter lesion in the left lower lobe of the lung.  No evidence of liver Mets on CT scan.  Patient MRI of the pelvis which suggested a T1-T2 lesion in the proximal rectum.  2 lymph nodes were identified measuring 4 and 5 millimeters.  She had a flex sig which ultimately led to the diagnosis.  She is here to discuss treatment options.  She is recovered from her procedure.  Has no complaints me.  Has noticed blood in her stool.  CEA level is normal    Review of Systems   Constitutional: Negative.    HENT: Negative.     Eyes: Negative.    Respiratory: Negative.     Cardiovascular: Negative.    Gastrointestinal: Negative.    Endocrine: Negative.    Genitourinary: Negative.    Musculoskeletal: Negative.    Integumentary:  Negative.   Neurological: Negative.    Hematological: Negative.    Psychiatric/Behavioral: Negative.          Current Outpatient Medications   Medication Instructions    azelastine-fluticasone (DYMISTA) 137-50 mcg/spray Spry nassal spray 1 spray, Each Nostril, 2 times daily    benzonatate (TESSALON) 200 mg, Oral, 3 times daily PRN    ELDERBERRY FRUIT ORAL Oral    EPINEPHrine (EPIPEN) 0.3 mg, Intramuscular, Once    estradioL (ESTRACE) 1 g, Vaginal, Twice weekly    fexofenadine (ALLEGRA ALLERGY) 60 MG tablet     hydroCHLOROthiazide (HYDRODIURIL) 25 mg, Oral, Daily, Patient takes two pills daily    multivitamin capsule 1 capsule, Oral, Daily    NIFEdipine (ADALAT CC) 60 MG TbSR TAKE 1 TABLET BY MOUTH EVERY DAY ON AN EMPTY STOMACH    potassium chloride (MICRO-K) 10 MEQ CpSR 20 mEq, Oral, Daily    RYALTRIS 665-25 mcg/spray Spry     sod sulf-pot chloride-mag sulf (SUTAB) 1.479-0.188- 0.225 gram tablet 12 tablets, Oral, Daily, Take according to package instructions with indicated amount of  water.    ZEPBOUND 2.5 mg/0.5 mL PnIj         Past Medical History:   Diagnosis Date    Abnormal Pap smear of cervix     Allergy     Corneal erosion of right eye     Generalized anxiety disorder     Hypertension     Rectal mass     Sinus problem         Past Surgical History:   Procedure Laterality Date    BREAST CYST ASPIRATION       SECTION      X 2    CHOLECYSTECTOMY      COLONOSCOPY N/A 2024    Procedure: COLONOSCOPY;  Surgeon: Radhika Leary MD;  Location: [facility];  Service: Endoscopy;  Laterality: N/A;    DILATION AND CURETTAGE OF UTERUS      DNC      EYE SURGERY Right     FOR CORNEAL EROSION    FLEXIBLE SIGMOIDOSCOPY N/A 2024    Procedure: SIGMOIDOSCOPY, FLEXIBLE(Fleets);  Surgeon: Oumar Montoya MD;  Location: [facility] ENDO;  Service: General;  Laterality: N/A;    LIPOSUCTION      x 2    Mole Excision Right     Shin    TUBAL LIGATION          Family History   Problem Relation Name Age of Onset    Heart disease Mother      Hypertension Mother      Arthritis Father      Thyroid disease Father      Kidney disease Paternal Grandmother      Liver cancer Maternal Grandmother      Cancer Maternal Grandfather      Allergies Sister      Allergic rhinitis Sister      Angioedema Neg Hx      Asthma Neg Hx      Atopy Neg Hx      Eczema Neg Hx      Immunodeficiency Neg Hx      Rhinitis Neg Hx      Urticaria Neg Hx      Amblyopia Neg Hx      Blindness Neg Hx      Cataracts Neg Hx      Diabetes Neg Hx      Glaucoma Neg Hx      Macular degeneration Neg Hx      Retinal detachment Neg Hx      Strabismus Neg Hx      Stroke Neg Hx         Social History     Tobacco Use    Smoking status: Never    Smokeless tobacco: Never   Substance Use Topics    Alcohol use: Yes     Comment: 2 drinks per week    Drug use: No         Vitals:    24 0938   BP: (!) 150/70   Pulse: 73   Resp: 18   Temp: 98.4 °F (36.9 °C)        Physical Exam:  BP (!) 150/70 (BP Location: Right arm, Patient Position: Sitting, BP  "Method: Large (Manual))   Pulse 73   Temp 98.4 °F (36.9 °C) (Temporal)   Resp 18   Ht 5' 3" (1.6 m)   Wt 83.9 kg (184 lb 15.5 oz)   SpO2 (!) 92%   BMI 32.77 kg/m²     Physical Exam  Constitutional:       Appearance: Normal appearance.   HENT:      Head: Normocephalic and atraumatic.      Nose: Nose normal.      Mouth/Throat:      Mouth: Mucous membranes are moist.      Pharynx: Oropharynx is clear.   Eyes:      Conjunctiva/sclera: Conjunctivae normal.   Cardiovascular:      Rate and Rhythm: Normal rate and regular rhythm.      Heart sounds: Normal heart sounds.   Pulmonary:      Effort: Pulmonary effort is normal.      Breath sounds: Normal breath sounds.   Abdominal:      General: Abdomen is flat. Bowel sounds are normal.      Palpations: Abdomen is soft.   Musculoskeletal:         General: Normal range of motion.      Cervical back: Normal range of motion and neck supple.   Skin:     General: Skin is warm and dry.   Neurological:      General: No focal deficit present.      Mental Status: She is alert and oriented to person, place, and time. Mental status is at baseline.   Psychiatric:         Mood and Affect: Mood normal.          Labs:  Lab Results   Component Value Date    WBC 6.25 05/28/2024    RBC 4.52 05/28/2024    HGB 14.1 05/28/2024    HCT 41.5 05/28/2024    MCV 92 05/28/2024    MCH 31.2 (H) 05/28/2024    MCHC 34.0 05/28/2024    RDW 13.4 05/28/2024     05/28/2024    MPV 11.5 05/28/2024    GRAN 3.4 05/28/2024    GRAN 55.0 05/28/2024    LYMPH 2.1 05/28/2024    LYMPH 33.1 05/28/2024    MONO 0.3 05/28/2024    MONO 5.0 05/28/2024    EOS 0.3 05/28/2024    BASO 0.05 05/28/2024    EOSINOPHIL 5.3 05/28/2024    BASOPHIL 0.8 05/28/2024     Sodium   Date Value Ref Range Status   04/05/2024 139 136 - 145 mmol/L Final     Potassium   Date Value Ref Range Status   04/05/2024 3.3 (L) 3.5 - 5.1 mmol/L Final     Chloride   Date Value Ref Range Status   04/05/2024 101 95 - 110 mmol/L Final     CO2   Date Value " Ref Range Status   04/05/2024 28 23 - 29 mmol/L Final     Glucose   Date Value Ref Range Status   04/05/2024 97 70 - 110 mg/dL Final     BUN   Date Value Ref Range Status   04/05/2024 13 8 - 23 mg/dL Final     Creatinine   Date Value Ref Range Status   05/08/2024 0.8 0.5 - 1.4 mg/dL Final     Calcium   Date Value Ref Range Status   04/05/2024 10.2 8.7 - 10.5 mg/dL Final     Total Protein   Date Value Ref Range Status   04/05/2024 7.8 6.0 - 8.4 g/dL Final     Albumin   Date Value Ref Range Status   04/05/2024 4.1 3.5 - 5.2 g/dL Final     Total Bilirubin   Date Value Ref Range Status   04/05/2024 0.5 0.1 - 1.0 mg/dL Final     Comment:     For infants and newborns, interpretation of results should be based  on gestational age, weight and in agreement with clinical  observations.    Premature Infant recommended reference ranges:  Up to 24 hours.............<8.0 mg/dL  Up to 48 hours............<12.0 mg/dL  3-5 days..................<15.0 mg/dL  6-29 days.................<15.0 mg/dL       Alkaline Phosphatase   Date Value Ref Range Status   04/05/2024 79 55 - 135 U/L Final     AST   Date Value Ref Range Status   04/05/2024 21 10 - 40 U/L Final     ALT   Date Value Ref Range Status   04/05/2024 29 10 - 44 U/L Final     Anion Gap   Date Value Ref Range Status   04/05/2024 10 8 - 16 mmol/L Final     eGFR if    Date Value Ref Range Status   10/25/2021 >60.0 >60 mL/min/1.73 m^2 Final     eGFR if non    Date Value Ref Range Status   10/25/2021 >60.0 >60 mL/min/1.73 m^2 Final     Comment:     Calculation used to obtain the estimated glomerular filtration  rate (eGFR) is the CKD-EPI equation.          A/P:    Rectal adenocarcinoma   -unsure if she has metastatic disease to the lung or a lung primary  -I will order a biopsy of this lung lesion and see her back after results are in   -I will also present her case in our tumor board as this will require multidisciplinary approach  -CEA level is  normal so will not be reliable during treatment  -return to clinic after biopsy results and tumor board presentation      Aurash Khoobehi, MD  Hematology and Oncology

## 2024-06-04 ENCOUNTER — TUMOR BOARD CONFERENCE (OUTPATIENT)
Facility: CLINIC | Age: 64
End: 2024-06-04
Payer: COMMERCIAL

## 2024-06-04 NOTE — PROGRESS NOTES
06/04/24 1157   [facility] Details   Presenting Hospital / Clinic [facility]   Virtual Tumor Board Conference In person   Presenter Dr. A Khoobehi MD   Date Presented to Tumor Board 06/04/24   Specialties Present Medical Oncology;Hematology;Surgical Oncology;Pathology;Navigation;Radiation Oncology;Radiology;Pulmonology;Nutrition   Cancer Type   Cancer Type Colon cancer   Recommended Plan (General)   Recommended Plan Note 1st option:  Neoadjuvant chemotherapy followed by lung tumor resection followed by definitive local therapy to primary malignancy.  2nd option: SBRT to pulmonary met followed by neoadjuvant chemotherapy followed by definitive local therapy.

## 2024-06-04 NOTE — PROGRESS NOTES
06/04/24 1157   [facility] [place] Details   Presenting Hospital / Clinic [facility]   Virtual Tumor Board Conference In person   Presenter Dr. A Khoobehi MD   Date Presented to Tumor Board 06/04/24   Specialties Present Medical Oncology;Hematology;Surgical Oncology;Pathology;Navigation;Radiation Oncology;Radiology;Pulmonology;Nutrition   Presentation at Cancer Conference Prospective   Cancer Type   Cancer Type Colon cancer   Recommended Plan (General)   Recommended Plan Note 1st option:  Neoadjuvant chemotherapy followed by lung tumor resection followed by definitive local therapy to primary malignancy.  2nd option: SBRT to pulmonary met followed by neoadjuvant chemotherapy followed by definitive local therapy.

## 2024-06-05 ENCOUNTER — TUMOR BOARD CONFERENCE (OUTPATIENT)
Dept: HEMATOLOGY/ONCOLOGY | Facility: CLINIC | Age: 64
End: 2024-06-05
Payer: COMMERCIAL

## 2024-06-07 ENCOUNTER — OFFICE VISIT (OUTPATIENT)
Dept: HEMATOLOGY/ONCOLOGY | Facility: CLINIC | Age: 64
End: 2024-06-07
Payer: COMMERCIAL

## 2024-06-07 VITALS
RESPIRATION RATE: 14 BRPM | DIASTOLIC BLOOD PRESSURE: 64 MMHG | HEART RATE: 70 BPM | OXYGEN SATURATION: 96 % | BODY MASS INDEX: 33.28 KG/M2 | WEIGHT: 187.81 LBS | TEMPERATURE: 98 F | HEIGHT: 63 IN | SYSTOLIC BLOOD PRESSURE: 134 MMHG

## 2024-06-07 DIAGNOSIS — C18.7 MALIGNANT NEOPLASM OF SIGMOID COLON: Primary | ICD-10-CM

## 2024-06-07 DIAGNOSIS — C78.02 MALIGNANT NEOPLASM METASTATIC TO LEFT LUNG: ICD-10-CM

## 2024-06-07 PROCEDURE — 99215 OFFICE O/P EST HI 40 MIN: CPT | Mod: S$GLB,,, | Performed by: INTERNAL MEDICINE

## 2024-06-07 PROCEDURE — 3078F DIAST BP <80 MM HG: CPT | Mod: CPTII,S$GLB,, | Performed by: INTERNAL MEDICINE

## 2024-06-07 PROCEDURE — 1160F RVW MEDS BY RX/DR IN RCRD: CPT | Mod: CPTII,S$GLB,, | Performed by: INTERNAL MEDICINE

## 2024-06-07 PROCEDURE — 3044F HG A1C LEVEL LT 7.0%: CPT | Mod: CPTII,S$GLB,, | Performed by: INTERNAL MEDICINE

## 2024-06-07 PROCEDURE — 1159F MED LIST DOCD IN RCRD: CPT | Mod: CPTII,S$GLB,, | Performed by: INTERNAL MEDICINE

## 2024-06-07 PROCEDURE — 3075F SYST BP GE 130 - 139MM HG: CPT | Mod: CPTII,S$GLB,, | Performed by: INTERNAL MEDICINE

## 2024-06-07 PROCEDURE — 3008F BODY MASS INDEX DOCD: CPT | Mod: CPTII,S$GLB,, | Performed by: INTERNAL MEDICINE

## 2024-06-07 PROCEDURE — 99999 PR PBB SHADOW E&M-EST. PATIENT-LVL V: CPT | Mod: PBBFAC,,, | Performed by: INTERNAL MEDICINE

## 2024-06-07 RX ORDER — TRAZODONE HYDROCHLORIDE 150 MG/1
150 TABLET ORAL NIGHTLY
COMMUNITY

## 2024-06-07 NOTE — PROGRESS NOTES
Service Date:  6/7/24    Chief Complaint: No chief complaint on file.    Malaika Craven is a 63 y.o. 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  She also had a CT scan done which showed a 2.2 centimeter lesion in the left lower lobe of the lung.  No evidence of liver Mets on CT scan.  Patient MRI of the pelvis which suggested a T1-T2 lesion in the proximal rectum.  2 lymph nodes were identified measuring 4 and 5 millimeters.  She had a flex sig which ultimately led to the diagnosis.  She is here to discuss treatment options.  She is recovered from her procedure.  Has no complaints me.  Has noticed blood in her stool.  CEA level is normal    Review of Systems   Constitutional: Negative.  Negative for appetite change and unexpected weight change.   HENT: Negative.  Negative for mouth sores.    Eyes: Negative.  Negative for visual disturbance.   Respiratory: Negative.  Negative for cough and shortness of breath.    Cardiovascular: Negative.  Negative for chest pain.   Gastrointestinal: Negative.  Negative for abdominal pain and diarrhea.   Endocrine: Negative.    Genitourinary: Negative.  Negative for frequency.   Musculoskeletal: Negative.  Negative for back pain.   Integumentary:  Negative for rash. Negative.   Neurological: Negative.  Negative for headaches.   Hematological: Negative.  Negative for adenopathy.   Psychiatric/Behavioral: Negative.          Current Outpatient Medications   Medication Instructions    ELDERBERRY FRUIT ORAL Oral    fexofenadine (ALLEGRA ALLERGY) 60 MG tablet     hydroCHLOROthiazide (HYDRODIURIL) 25 mg, Oral, Daily, Patient takes two pills daily    multivit-minerals/folic acid (ADULT ONE DAILY MULTIVITAMIN ORAL) 1 tablet, Oral, Daily PRN    multivitamin capsule 1 capsule, Oral, Daily    NIFEdipine (ADALAT CC) 60 MG TbSR TAKE 1 TABLET BY MOUTH EVERY DAY ON AN EMPTY STOMACH    OMEPRAZOLE ORAL 0.5 mg, Oral, Daily PRN    potassium chloride (MICRO-K) 10 MEQ  CpSR 20 mEq, Oral, Daily    RYALTRIS 665-25 mcg/spray Spry     traZODone (DESYREL) 150 mg, Oral, Nightly        Past Medical History:   Diagnosis Date    Abnormal Pap smear of cervix     Allergy     Corneal erosion of right eye     Generalized anxiety disorder     Hypertension     Rectal mass     Sinus problem         Past Surgical History:   Procedure Laterality Date    BREAST CYST ASPIRATION       SECTION      X 2    CHOLECYSTECTOMY      COLONOSCOPY N/A 2024    Procedure: COLONOSCOPY;  Surgeon: Radhika Leary MD;  Location: [facility] ENDO;  Service: Endoscopy;  Laterality: N/A;    DILATION AND CURETTAGE OF UTERUS      [facility]      EYE SURGERY Right     FOR CORNEAL EROSION    FLEXIBLE SIGMOIDOSCOPY N/A 2024    Procedure: SIGMOIDOSCOPY, FLEXIBLE(Flesilvia);  Surgeon: Oumar Montoya MD;  Location: [facility];  Service: General;  Laterality: N/A;    LIPOSUCTION      x 2    Mole Excision Right     Shin    TUBAL LIGATION          Family History   Problem Relation Name Age of Onset    Heart disease Mother      Hypertension Mother      Arthritis Father      Thyroid disease Father      Kidney disease Paternal Grandmother      Liver cancer Maternal Grandmother      Cancer Maternal Grandfather      Allergies Sister      Allergic rhinitis Sister      Angioedema Neg Hx      Asthma Neg Hx      Atopy Neg Hx      Eczema Neg Hx      Immunodeficiency Neg Hx      Rhinitis Neg Hx      Urticaria Neg Hx      Amblyopia Neg Hx      Blindness Neg Hx      Cataracts Neg Hx      Diabetes Neg Hx      Glaucoma Neg Hx      Macular degeneration Neg Hx      Retinal detachment Neg Hx      Strabismus Neg Hx      Stroke Neg Hx         Social History     Tobacco Use    Smoking status: Never    Smokeless tobacco: Never   Substance Use Topics    Alcohol use: Yes     Comment: 2 drinks per week    Drug use: No         Vitals:    24 1001   BP: 134/64   Pulse: 70   Resp: 14   Temp: 97.9 °F (36.6 °C)        Physical Exam:  BP  "134/64 (BP Location: Left arm, Patient Position: Sitting, BP Method: Large (Automatic))   Pulse 70   Temp 97.9 °F (36.6 °C) (Temporal)   Resp 14   Ht 5' 3" (1.6 m)   Wt 85.2 kg (187 lb 13.3 oz)   SpO2 96%   BMI 33.27 kg/m²     Physical Exam  Constitutional:       Appearance: Normal appearance.   HENT:      Head: Normocephalic and atraumatic.      Nose: Nose normal.      Mouth/Throat:      Mouth: Mucous membranes are moist.      Pharynx: Oropharynx is clear.   Eyes:      Conjunctiva/sclera: Conjunctivae normal.   Cardiovascular:      Rate and Rhythm: Normal rate and regular rhythm.      Heart sounds: Normal heart sounds.   Pulmonary:      Effort: Pulmonary effort is normal.      Breath sounds: Normal breath sounds.   Abdominal:      General: Abdomen is flat. Bowel sounds are normal.      Palpations: Abdomen is soft.   Musculoskeletal:         General: Normal range of motion.      Cervical back: Normal range of motion and neck supple.   Skin:     General: Skin is warm and dry.   Neurological:      General: No focal deficit present.      Mental Status: She is alert and oriented to person, place, and time. Mental status is at baseline.   Psychiatric:         Mood and Affect: Mood normal.          Labs:  Lab Results   Component Value Date    WBC 6.25 05/28/2024    RBC 4.52 05/28/2024    HGB 14.1 05/28/2024    HCT 41.5 05/28/2024    MCV 92 05/28/2024    MCH 31.2 (H) 05/28/2024    MCHC 34.0 05/28/2024    RDW 13.4 05/28/2024     05/28/2024    MPV 11.5 05/28/2024    GRAN 3.4 05/28/2024    GRAN 55.0 05/28/2024    LYMPH 2.1 05/28/2024    LYMPH 33.1 05/28/2024    MONO 0.3 05/28/2024    MONO 5.0 05/28/2024    EOS 0.3 05/28/2024    BASO 0.05 05/28/2024    EOSINOPHIL 5.3 05/28/2024    BASOPHIL 0.8 05/28/2024     Sodium   Date Value Ref Range Status   04/05/2024 139 136 - 145 mmol/L Final     Potassium   Date Value Ref Range Status   04/05/2024 3.3 (L) 3.5 - 5.1 mmol/L Final     Chloride   Date Value Ref Range Status "   04/05/2024 101 95 - 110 mmol/L Final     CO2   Date Value Ref Range Status   04/05/2024 28 23 - 29 mmol/L Final     Glucose   Date Value Ref Range Status   04/05/2024 97 70 - 110 mg/dL Final     BUN   Date Value Ref Range Status   04/05/2024 13 8 - 23 mg/dL Final     Creatinine   Date Value Ref Range Status   05/08/2024 0.8 0.5 - 1.4 mg/dL Final     Calcium   Date Value Ref Range Status   04/05/2024 10.2 8.7 - 10.5 mg/dL Final     Total Protein   Date Value Ref Range Status   04/05/2024 7.8 6.0 - 8.4 g/dL Final     Albumin   Date Value Ref Range Status   04/05/2024 4.1 3.5 - 5.2 g/dL Final     Total Bilirubin   Date Value Ref Range Status   04/05/2024 0.5 0.1 - 1.0 mg/dL Final     Comment:     For infants and newborns, interpretation of results should be based  on gestational age, weight and in agreement with clinical  observations.    Premature Infant recommended reference ranges:  Up to 24 hours.............<8.0 mg/dL  Up to 48 hours............<12.0 mg/dL  3-5 days..................<15.0 mg/dL  6-29 days.................<15.0 mg/dL       Alkaline Phosphatase   Date Value Ref Range Status   04/05/2024 79 55 - 135 U/L Final     AST   Date Value Ref Range Status   04/05/2024 21 10 - 40 U/L Final     ALT   Date Value Ref Range Status   04/05/2024 29 10 - 44 U/L Final     Anion Gap   Date Value Ref Range Status   04/05/2024 10 8 - 16 mmol/L Final     eGFR if    Date Value Ref Range Status   10/25/2021 >60.0 >60 mL/min/1.73 m^2 Final     eGFR if non    Date Value Ref Range Status   10/25/2021 >60.0 >60 mL/min/1.73 m^2 Final     Comment:     Calculation used to obtain the estimated glomerular filtration  rate (eGFR) is the CKD-EPI equation.          A/P:    Rectal adenocarcinoma   -patient with confirmed metastatic solitary lesion in the lung.  I will get an MRI of the liver to rule out any liver Mets as well.  I discussed the case in tumor board.  I also discussed it with Dr. Montoya in  Dr. Melvin.  The plan is to try neoadjuvant treatment for at least 3 cycles with FOLFOX and Avastin.  I will add on any targeted therapy after [facility] results are in.  Afterwards we will re-scanned her and discuss moving forward with radiation therapy versus resection of the metastatic disease which would require a lower lobe lobectomy.  I will refer her back to Dr. Montoya to get a port placed.  I will see her back for the 1st treatment along with lab work.  I will set her up with chemotherapy school.  She had a lot of questions and felt that they were all answered after her visit with me.      Aurash Khoobehi, MD  Hematology and Oncology

## 2024-06-07 NOTE — Clinical Note
Auth for chemo. Refer to gen surg for port (Dr Montoya). Chemo school. RTC for first treatment. Tempus testing on pathology from 5/8/24 and liquid biopsy as well.

## 2024-06-07 NOTE — NURSING
[facility]      GI MULTIDISCIPLINARY TUMOR BOARD      Date: 06/05/2024  MRN: [id number]     Cancer Site: Rectosigmoid     No data recorded   Metastatic Site(s): Lung     Other Metastatic Site: Lung     Cancer Staging Complete: No       Presenting Hospital / Clinic: [facility]     Virtual Tumor Board Conference: In person       PRESENTER:   Presenter: Oumar Montoya MD     Specialties Present: Medical Oncology; Hematology; Radiation Oncology; Surgical Oncology; Pathology; Navigation; Research; Radiology; Integrative Oncology; Gastrointestinal       PATIENT SUMMARY:   Malaika Craven is a 63 y.o. 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  She also had a CT scan done which showed a 2.2 centimeter lesion in the left lower lobe of the lung.  No evidence of liver Mets on CT scan.  Patient MRI of the pelvis which suggested a T1-T2 lesion in the proximal rectum.  2 lymph nodes were identified measuring 4 and 5 millimeters.  She had a flex sig which ultimately led to the diagnosis.  She is here to discuss treatment options.  She is recovered from her procedure.  Has no complaints me.  Has noticed blood in her stool.  CEA level is normal     Background Information  Patient Status: a new patient  Reason for Consultation: Initial Presentation  Biopsy Date: 05/08/24  Biopsy Results: Cancer  Treatment to Date: None         BOARD RECOMMENDATIONS:   Recommended Plan (General)  Recommended Plan: Chemotherapy  Recommended Plan Note: Chemotherapy, re-evaluate, see Dr. Melvin for lung nodule         Consult: Surgery, Hem/Onc, Rad/Onc, GI, and Radiology                 [facility] Treatment Guidelines reviewed and care planned is consistent with guidelines.   (i.e., NCCN, NCI, PD, ACO, AUA, etc.)    PRESENTATION AT CANCER CONFERENCE: Prospective         CLINICAL TRIAL ELIGIBILITY:  Not Eligible      Oumar  MD Lindsay

## 2024-06-07 NOTE — PLAN OF CARE
START ON PATHWAY REGIMEN - Colorectal    [id number]        Oxaliplatin       Leucovorin       Fluorouracil       Fluorouracil     **Always confirm dose/schedule in your pharmacy ordering system**    Patient Characteristics:  Distant Metastases, Resectable, Neoadjuvant Therapy Planned  Tumor Location: Colon  Therapeutic Status: Distant Metastases    Intent of Therapy:  Curative Intent, Discussed with Patient

## 2024-06-10 ENCOUNTER — TELEPHONE (OUTPATIENT)
Dept: SURGERY | Facility: CLINIC | Age: 64
End: 2024-06-10
Payer: COMMERCIAL

## 2024-06-10 ENCOUNTER — PATIENT MESSAGE (OUTPATIENT)
Dept: HEMATOLOGY/ONCOLOGY | Facility: CLINIC | Age: 64
End: 2024-06-10
Payer: COMMERCIAL

## 2024-06-10 DIAGNOSIS — C78.02 MALIGNANT NEOPLASM METASTATIC TO LEFT LUNG: Primary | ICD-10-CM

## 2024-06-10 NOTE — TELEPHONE ENCOUNTER
----- Message from Doris Vasquez sent at 6/10/2024  9:11 AM CDT -----  Regarding: Referral needing to be scheduled  Good afternoon,    Patient needs to be scheduled for a port placement. Patient has seen Dr Montoya in the past and would like to see him again. The next established patient appt to schedule was not till July 30th. Patient is suppose to be start chemo in 2 weeks. Please assist with a sooner appt. Thanks so much

## 2024-06-10 NOTE — TELEPHONE ENCOUNTER
Spoke with patient, states she is going out of town and returning on the 27th of June so anytime from the 28 th on she would like to get it done but asap.  She prefers to have it done in [place].  Advised I will send her message to Mannie and he can get her booked when he returns tomorrow.  Patient voiced understanding.

## 2024-06-11 ENCOUNTER — DOCUMENTATION ONLY (OUTPATIENT)
Dept: HEMATOLOGY/ONCOLOGY | Facility: CLINIC | Age: 64
End: 2024-06-11
Payer: COMMERCIAL

## 2024-06-11 DIAGNOSIS — C78.02 MALIGNANT NEOPLASM METASTATIC TO LEFT LUNG: Primary | ICD-10-CM

## 2024-06-11 DIAGNOSIS — Z01.818 PRE-OP EVALUATION: ICD-10-CM

## 2024-06-13 ENCOUNTER — TELEPHONE (OUTPATIENT)
Dept: HEMATOLOGY/ONCOLOGY | Facility: CLINIC | Age: 64
End: 2024-06-13

## 2024-06-13 ENCOUNTER — OFFICE VISIT (OUTPATIENT)
Dept: HEMATOLOGY/ONCOLOGY | Facility: CLINIC | Age: 64
End: 2024-06-13
Payer: COMMERCIAL

## 2024-06-13 ENCOUNTER — DOCUMENTATION ONLY (OUTPATIENT)
Dept: HEMATOLOGY/ONCOLOGY | Facility: CLINIC | Age: 64
End: 2024-06-13

## 2024-06-13 VITALS
TEMPERATURE: 98 F | BODY MASS INDEX: 33.43 KG/M2 | HEIGHT: 63 IN | HEART RATE: 83 BPM | DIASTOLIC BLOOD PRESSURE: 66 MMHG | OXYGEN SATURATION: 97 % | SYSTOLIC BLOOD PRESSURE: 139 MMHG | RESPIRATION RATE: 16 BRPM | WEIGHT: 188.69 LBS

## 2024-06-13 DIAGNOSIS — C78.02 MALIGNANT NEOPLASM METASTATIC TO LEFT LUNG: Primary | ICD-10-CM

## 2024-06-13 DIAGNOSIS — C18.7 MALIGNANT NEOPLASM OF SIGMOID COLON: ICD-10-CM

## 2024-06-13 PROCEDURE — 3078F DIAST BP <80 MM HG: CPT | Mod: CPTII,S$GLB,, | Performed by: NURSE PRACTITIONER

## 2024-06-13 PROCEDURE — 3008F BODY MASS INDEX DOCD: CPT | Mod: CPTII,S$GLB,, | Performed by: NURSE PRACTITIONER

## 2024-06-13 PROCEDURE — 99215 OFFICE O/P EST HI 40 MIN: CPT | Mod: S$GLB,,, | Performed by: NURSE PRACTITIONER

## 2024-06-13 PROCEDURE — 1160F RVW MEDS BY RX/DR IN RCRD: CPT | Mod: CPTII,S$GLB,, | Performed by: NURSE PRACTITIONER

## 2024-06-13 PROCEDURE — 99999 PR PBB SHADOW E&M-EST. PATIENT-LVL V: CPT | Mod: PBBFAC,,, | Performed by: NURSE PRACTITIONER

## 2024-06-13 PROCEDURE — 3075F SYST BP GE 130 - 139MM HG: CPT | Mod: CPTII,S$GLB,, | Performed by: NURSE PRACTITIONER

## 2024-06-13 PROCEDURE — 1159F MED LIST DOCD IN RCRD: CPT | Mod: CPTII,S$GLB,, | Performed by: NURSE PRACTITIONER

## 2024-06-13 PROCEDURE — 3044F HG A1C LEVEL LT 7.0%: CPT | Mod: CPTII,S$GLB,, | Performed by: NURSE PRACTITIONER

## 2024-06-13 RX ORDER — ONDANSETRON HYDROCHLORIDE 8 MG/1
8 TABLET, FILM COATED ORAL EVERY 8 HOURS PRN
Qty: 30 TABLET | Refills: 2 | Status: SHIPPED | OUTPATIENT
Start: 2024-06-13 | End: 2025-06-13

## 2024-06-13 RX ORDER — POLYETHYLENE GLYCOL 3350 17 G/17G
17 POWDER, FOR SOLUTION ORAL DAILY
COMMUNITY

## 2024-06-13 RX ORDER — OLANZAPINE 5 MG/1
TABLET ORAL
Qty: 6 TABLET | Refills: 11 | Status: SHIPPED | OUTPATIENT
Start: 2024-06-13

## 2024-06-13 RX ORDER — ALPRAZOLAM 0.5 MG/1
0.5 TABLET ORAL 3 TIMES DAILY
COMMUNITY
End: 2024-06-13 | Stop reason: SDUPTHER

## 2024-06-13 RX ORDER — PROCHLORPERAZINE MALEATE 5 MG
10 TABLET ORAL EVERY 6 HOURS PRN
Qty: 20 TABLET | Refills: 5 | Status: SHIPPED | OUTPATIENT
Start: 2024-06-13

## 2024-06-13 RX ORDER — ALPRAZOLAM 0.5 MG/1
0.5 TABLET ORAL 3 TIMES DAILY
Qty: 90 TABLET | Refills: 0 | Status: SHIPPED | OUTPATIENT
Start: 2024-06-13

## 2024-06-13 NOTE — PROGRESS NOTES
FOLLOW-UP APPOINTMENT    PATIENT:   Malaika Craven  :    1960  MR#:    [id number]  DATE OF VISIT:  2024      Chief Complaint:  metastatic colon cancer    HPI:   Ms. Malaika Craven presents today for chemotherapy education.  She will be starting treatment with FOLFOX/Avastin every 2 weeks  for the above diagnosis.       Review of Systems   Constitutional:  Positive for fatigue.   HENT:  Negative.     Eyes: Negative.    Respiratory: Negative.     Cardiovascular: Negative.    Gastrointestinal: Negative.    Endocrine: Negative.    Genitourinary: Negative.     Musculoskeletal: Negative.    Skin: Negative.    Neurological: Negative.    Hematological: Negative.    Psychiatric/Behavioral: Negative.       Oncology History   Malignant neoplasm of sigmoid colon   2024 Initial Diagnosis    Malignant neoplasm of sigmoid colon     2024 Cancer Staged    Staging form: Colon and Rectum, AJCC 8th Edition  - Clinical: Stage Unknown (cTX, cN0, cM1)     2024 -  Chemotherapy    Treatment Summary   Plan Name: OP GI mFOLFOX6 (oxaliplatin leucovorin fluorouracil) with bevacizumab Q2W  Treatment Goal: Curative  Status: Active  Start Date: 2024 (Planned)  End Date: 2025 (Planned)  Provider: Aurash Khoobehi, MD  Chemotherapy: FLUOROURACIL 5 G/100 ML IV SOLN, 400 mg/m2, Intravenous, Clinic/HOD 1 time, 0 of 24 cycles  OXALIPLATIN CHEMO INFUSION, 85 mg/m2, Intravenous, Clinic/HOD 1 time, 0 of 24 cycles  FLUOROURACIL FOLFOX/FOLFIRI PUMP ORDERABLE, 2,400 mg/m2, Intravenous, Clinic/HOD 1 time, 0 of 24 cycles  BEVACIZUMAB-AWWB INFUSION, 5 mg/kg, Intravenous, Clinic/HOD 1 time, 0 of 24 cycles     Malignant neoplasm metastatic to left lung   2024 -  Chemotherapy    Treatment Summary   Plan Name: OP GI mFOLFOX6 (oxaliplatin leucovorin fluorouracil) with bevacizumab Q2W  Treatment Goal: Curative  Status: Active  Start Date: 2024 (Planned)  End Date: 2025 (Planned)  Provider: Aurash Khoobehi,  MD  Chemotherapy: FLUOROURACIL 5 G/100 ML IV SOLN, 400 mg/m2, Intravenous, Clinic/HOD 1 time, 0 of 24 cycles  OXALIPLATIN CHEMO INFUSION, 85 mg/m2, Intravenous, Clinic/HOD 1 time, 0 of 24 cycles  FLUOROURACIL FOLFOX/FOLFIRI PUMP ORDERABLE, 2,400 mg/m2, Intravenous, Clinic/HOD 1 time, 0 of 24 cycles  BEVACIZUMAB-AWWB INFUSION, 5 mg/kg, Intravenous, Clinic/HOD 1 time, 0 of 24 cycles     2024 Initial Diagnosis    Malignant neoplasm metastatic to left lung         Patient Active Problem List   Diagnosis    Menopause    Essential hypertension    Abnormal EKG    History of gestational diabetes    Class 1 obesity without serious comorbidity with body mass index (BMI) of 32.0 to 32.9 in adult, unspecified obesity type    Major depressive disorder with single episode, in full remission    Malignant neoplasm of sigmoid colon    Malignant neoplasm metastatic to left lung       Past Medical History:   Diagnosis Date    Abnormal Pap smear of cervix     Allergy     Corneal erosion of right eye     Generalized anxiety disorder     Hypertension     Rectal mass     Sinus problem        Past Surgical History:   Procedure Laterality Date    BREAST CYST ASPIRATION       SECTION      X 2    CHOLECYSTECTOMY      COLONOSCOPY N/A 2024    Procedure: COLONOSCOPY;  Surgeon: Radhika Leary MD;  Location: [facility];  Service: Endoscopy;  Laterality: N/A;    DILATION AND CURETTAGE OF UTERUS      [facility]      EYE SURGERY Right     FOR CORNEAL EROSION    FLEXIBLE SIGMOIDOSCOPY N/A 2024    Procedure: SIGMOIDOSCOPY, FLEXIBLE(Fleets);  Surgeon: Oumar Montoya MD;  Location: [facility];  Service: General;  Laterality: N/A;    LIPOSUCTION      x 2    Mole Excision Right     Shin    TUBAL LIGATION         Social History     Socioeconomic History    Marital status:    Occupational History     Employer: [employer]   Tobacco Use    Smoking status: Never    Smokeless tobacco:  Never   Substance and Sexual Activity    Alcohol use: Yes     Comment: 2 drinks per week    Drug use: No    Sexual activity: Yes     Partners: Male     Birth control/protection: None     Social Determinants of Health     Financial Resource Strain: Low Risk  (5/31/2024)    Overall Financial Resource Strain (CARDIA)     Difficulty of Paying Living Expenses: Not hard at all   Food Insecurity: Food Insecurity Present (5/31/2024)    Hunger Vital Sign     Worried About Running Out of Food in the Last Year: Never true     Ran Out of Food in the Last Year: Often true   Physical Activity: Inactive (5/31/2024)    Exercise Vital Sign     Days of Exercise per Week: 0 days     Minutes of Exercise per Session: 0 min   Stress: Stress Concern Present (5/31/2024)    [employer] - Occupational Stress Questionnaire     Feeling of Stress : Rather much   Housing Stability: Unknown (5/31/2024)    Housing Stability Vital Sign     Unable to Pay for Housing in the Last Year: No       Family History   Problem Relation Name Age of Onset    Heart disease Mother      Hypertension Mother      Arthritis Father      Thyroid disease Father      Kidney disease Paternal Grandmother      Liver cancer Maternal Grandmother      Cancer Maternal Grandfather      Allergies Sister      Allergic rhinitis Sister      Angioedema Neg Hx      Asthma Neg Hx      Atopy Neg Hx      Eczema Neg Hx      Immunodeficiency Neg Hx      Rhinitis Neg Hx      Urticaria Neg Hx      Amblyopia Neg Hx      Blindness Neg Hx      Cataracts Neg Hx      Diabetes Neg Hx      Glaucoma Neg Hx      Macular degeneration Neg Hx      Retinal detachment Neg Hx      Strabismus Neg Hx      Stroke Neg Hx           Current Outpatient Medications:     ELDERBERRY FRUIT ORAL, Take by mouth., Disp: , Rfl:     fexofenadine (ALLEGRA ALLERGY) 60 MG tablet, , Disp: , Rfl:     hydroCHLOROthiazide (HYDRODIURIL) 25 MG tablet, Take 1 tablet  "(25 mg total) by mouth once daily. Patient takes two pills daily, Disp: , Rfl:     multivit-minerals/folic acid (ADULT ONE DAILY MULTIVITAMIN ORAL), Take 1 tablet by mouth daily as needed., Disp: , Rfl:     multivitamin capsule, Take 1 capsule by mouth once daily., Disp: , Rfl:     NIFEdipine (ADALAT CC) 60 MG TbSR, TAKE 1 TABLET BY MOUTH EVERY DAY ON AN EMPTY STOMACH, Disp: , Rfl:     potassium chloride (MICRO-K) 10 MEQ CpSR, Take 2 capsules (20 mEq total) by mouth once daily. for 450 doses, Disp: 180 capsule, Rfl: 4    RYALTRIS 665-25 mcg/spray Spry, , Disp: , Rfl:     traZODone (DESYREL) 150 MG tablet, Take 150 mg by mouth every evening., Disp: , Rfl:     ALPRAZolam (XANAX) 0.5 MG tablet, Take 0.5 mg by mouth 3 (three) times daily., Disp: , Rfl:     OMEPRAZOLE ORAL, Take 0.5 mg by mouth daily as needed. (Patient not taking: Reported on 6/13/2024), Disp: , Rfl:     polyethylene glycol (GLYCOLAX) 17 gram/dose powder, Take 17 g by mouth once daily., Disp: , Rfl:     Review of patient's allergies indicates:   Allergen Reactions    Atenolol Hives, Itching and Swelling    Losartan Swelling and Hives     Other reaction(s): Angioedema    Tetracyclines Rash    Amlodipine      Hives and swelling on the lower extremities    Lisinopril Hives    Sulfa (sulfonamide antibiotics) Rash       Physcial Examination  VITAL SIGNS:    Body surface area is 1.95 meters squared.   Pain Assessment: No pain   Vitals:    06/13/24 1306   BP: 139/66   Pulse: 83   Resp: 16   Temp: 97.8 °F (36.6 °C)   TempSrc: Temporal   SpO2: 97%   Weight: 85.6 kg (188 lb 11.4 oz)   Height: 5' 3" (1.6 m)   PainSc: 0-No pain          Wt Readings from Last 5 Encounters:   06/13/24 85.6 kg (188 lb 11.4 oz)   06/07/24 85.2 kg (187 lb 13.3 oz)   05/30/24 83.6 kg (184 lb 4.9 oz)   05/23/24 83.9 kg (184 lb 15.5 oz)   05/21/24 83.5 kg (184 lb 1.4 oz)       GENERAL:  Malaika Craven is healthy-appearing 63 y.o. female, in no distress.   EYES:   Pupils " equal, round, reactive.  Conjunctivae, sclera and lids normal.  HEENT: Head normocephalic and atraumatic, without alopecia.  Oropharynx is unremarkable.  No icterus, jaundice, stomatitis, mucositis, or ulceration is noted.  Ears are clear and unremarkable.  Nose, nares, and septum are unremarkable.    NECK:   No masses.  Thyroid and trachea are normal.    BREASTS:  Deferred.  RESPIRATORY: Clear to auscultation bilaterally.  Symmetrically effortless expansion.  No wheezing and no stridor.    CV: Heart reveals regular rate and rhythm without murmur, rub, or gallops.  ABDOMEN: Soft, non-tender.  No masses, no hernias, and no rebound or rigidity are noted.  /RECTAL:  Deferred.  LYMPHATICS: No preauricular, submandibular, cervical, supraclavicular, axillary, lymphadenopathy.  MUSCULOSKELETAL:Fair musculature, no atrophy.  No arthritic changes.  No edema or cyanosis. Back is without gross abnormal curvature.   NEUROLOGICAL: Cranial nerves II-XII grossly intact.  Motor and sensory exam intact.  SKIN:   No lesions, bruises, petechiae or rashes.  Good turgor.    PSYCHIATRIC: Patient is alert and oriented to time, place and person.  Mood and affect are appropriate.         Laboratory and Radiology   Lab Results   Component Value Date    WBC 6.25 05/28/2024    RBC 4.52 05/28/2024    HGB 14.1 05/28/2024    HCT 41.5 05/28/2024    MCV 92 05/28/2024    MCH 31.2 (H) 05/28/2024    MCHC 34.0 05/28/2024    RDW 13.4 05/28/2024     05/28/2024    MPV 11.5 05/28/2024    GRAN 3.4 05/28/2024    GRAN 55.0 05/28/2024    LYMPH 2.1 05/28/2024    LYMPH 33.1 05/28/2024    MONO 0.3 05/28/2024    MONO 5.0 05/28/2024    EOS 0.3 05/28/2024    BASO 0.05 05/28/2024    EOSINOPHIL 5.3 05/28/2024    BASOPHIL 0.8 05/28/2024     BMP  Lab Results   Component Value Date     04/05/2024    K 3.3 (L) 04/05/2024     04/05/2024    CO2 28 04/05/2024    BUN 13 04/05/2024    CREATININE 0.8 05/08/2024    CALCIUM 10.2 04/05/2024    ANIONGAP 10  04/05/2024    ESTGFRAFRICA >60.0 10/25/2021    EGFRNONAA >60.0 10/25/2021     Lab Results   Component Value Date    ALT 29 04/05/2024    AST 21 04/05/2024    ALKPHOS 79 04/05/2024    BILITOT 0.5 04/05/2024     Results for orders placed or performed during the hospital encounter of 05/28/24 (from the past 2160 hour(s))   CT Biopsy Lung w/ guidance    Narrative    EXAMINATION:  CT BIOPSY LUNG W/ GUIDANCE    CLINICAL HISTORY:  Malignant neoplasm of rectumThere is irregular 2.2 cm left lower lobe mass suspicious for neoplasm .;lung mass    TECHNIQUE:  Standard CT of the chest without contrast. All CT scans at this facility use dose modulation, iterative reconstruction and/or weight based dosing when appropriate to reduce radiation dose to as low as reasonably achievable.    COMPARISON:  CT 05/11/2024.    FINDINGS:  The procedure was explained in detail and informed consent was obtained.  A time-out procedure was performed.    Utilizing sterile technique and cutaneous anesthesia 1 20 gauge core biopsy samples of the lung mass were obtained.  The sample was placed in formalin and delivered to pathology.    The patient tolerated the procedure well with no significant complication.  Post biopsy CT revealed trace postprocedural pneumothorax measuring 1-2% of the lung volume.    Conscious sedation was not administered.  Pulse oximetry, heart rate and BP were continuously monitored by an independent trained nurse present.  The positions been 20 minutes of face-to-face time with the patient.      Impression    Successful CT guided lung mass biopsy with trace postprocedural pneumothorax.    Close interval follow-up with inspiratory and expiratory chest x-rays.      Electronically signed by: Martinez Ren  Date:    05/28/2024  Time:    11:21   Results for orders placed or performed during the hospital encounter of 05/11/24 (from the past 2160 hour(s))   CT Chest Abdomen Pelvis With IV Contrast (XPD) Routine Oral Contrast     Narrative    EXAMINATION:  CT CHEST ABDOMEN PELVIS WITH IV CONTRAST (XPD)    CLINICAL HISTORY:  Colon cancer, metastatic, staging;Rectal sigmoid mass; Other specified diseases of intestine    TECHNIQUE:  Low dose axial images, sagittal and coronal reformations were obtained from the thoracic inlet to the pubic symphysis following the IV administration of 1000 mL of Omnipaque 350 and the oral administration of 75 mL Omnipaque 350    COMPARISON:  None    FINDINGS:  Visualized structures base of the neck are unremarkable appearance.  There is no significant hilar or mediastinal adenopathy.  There is no pleural or pericardial effusion.  There are mild dependent hypoventilatory changes in the lungs.  There is a 2.2 by 1.4 cm by 2.2 cm left lower lobe mass posteriorly for example axial series 6, image 290, sagittal series 604, image 225 irregular margins.    Abdomen and pelvis; the liver is unremarkable appearance.  No masses.    Cholecystectomy clips.  No biliary duct dilatation    Spleen not enlarged    Right adrenal gland unremarkable appearance    Four by 3 cm indeterminate left adrenal nodule with some internal density or enhancement suspicious for.    Abdominal aorta no aneurysm    Pancreas unremarkable appearance    Stomach, bowel, mesentery; small duodenal diverticulum.  Normal appearing appendix.  Diverticulosis without CT findings of acute diverticulitis.  No free intraperitoneal air or fluid.  Small hiatal hernia..  Questionable mild wall thickening versus incomplete distension sigmoid colon for example series 3, image 134.  There is slight presacral/pre coccygeal fat stranding.  Very mildly prominent perirectal nodes near rectosigmoid junction for example axial series 3, image 129 short axis diameter of 5 mm.    Reproductive organs; uterus and adnexal region unremarkable appearance    Kidneys, ureters, bladder; symmetrical renal enhancement and no hydronephrosis.  8 cm left renal cyst.  Small subcentimeter  hypodensities in the kidneys bilaterally suggesting cysts.  No suspicious appearing renal mass.  Urinary bladder    Osseous structures; degenerative changes without obvious aggressive appearing osseous lesion      Impression    In this patient with history of mass near rectosigmoid junction images question mild wall thickening but may be due to the incomplete distention.  There is adjacent mildly prominent 5 mm short axis diameter lymph node, and there is mild presacral/pre coccygeal fat stranding    There is irregular 2.2 cm left lower lobe mass suspicious for neoplasm and there is left adrenal mass which although not fully characterize is also suspicious for neoplasm.  Further evaluation is suggested.  Adrenal lesion could be evaluated by CT or MRI adrenal mass protocol and findings also could be evaluated by PET CT as well as tissue sampling    Additional findings as detailed above including renal cysts.  Small hiatal hernia.  Diverticulosis.      Electronically signed by: Sherita Mijares MD  Date:    05/12/2024  Time:    16:07     Results for orders placed or performed during the hospital encounter of 05/22/24 (from the past 2160 hour(s))   NM PET CT FDG Skull Base to Mid Thigh    Impression    1. Hypermetabolic rectal mass in keeping with known rectal cancer.  No evidence of abdominopelvic metastasis.  2. Hypermetabolic left lower lobe pulmonary nodule concerning for neoplasm.  Finding may represent metastatic disease or primary bronchogenic malignancy.  Consider correlation with tissue sampling.  3. Small focus of mild uptake in the left hilum, nonspecific and could be reactive or metastatic.  4. Additional findings as above.      Electronically signed by: Chandana Isaac  Date:    05/22/2024  Time:    12:51     Results for orders placed or performed during the hospital encounter of 06/02/24 (from the past 2160 hour(s))   MRI Abdomen W WO Contrast    Impression    Left adrenal mass.  Primary differential is  myelolipoma versus adenoma.  Much less common would include a collision lesion, with adrenal metastasis and one of these benign entities present.  Initial follow-up in 6 months is recommended.      Electronically signed by: Martinez Pierce MD  Date:    06/03/2024  Time:    08:39   Results for orders placed or performed during the hospital encounter of 05/16/24 (from the past 2160 hour(s))   MRI Pelvis W WO Contrast    Impression    Distal colon mass located mostly above the peritoneal reflection, compatible neoplasm.  No evidence for extramural invasion.  No definite abnormal lymph nodes, noting 2 borderline superior rectal lymph nodes described above.    *MR STAGE    T: T1/T2 (tumor confined to rectal wall)    N: X samara statement: 2 superior rectal lymph nodes measuring 4 and 5 mm.    *MRF: clear    Sphincter Involvement: No    Suspicious Extramesorectal Lymph Nodes: No    EMVI: No      Electronically signed by: Martinez Pierce MD  Date:    05/17/2024  Time:    13:52       Pathology  Pathology Results  (Last 10 years)                 05/28/24 1045  Specimen to Pathology, Radiology Lung, biopsy not tranplant Final result    Narrative:  Release to patient->Immediate       07/20/22 1530  Specimen to Pathology, Ob/Gyn Final result    Narrative:  Number of Specimens:->2   ------------------------->-------------------------   Spec 1 Procedure:->Cervix biopsy   Spec 1 Location:->10:00   Spec 1 Description:->Cervical biopsy   ------------------------->-------------------------   Spec 2 Procedure:->Endocervix Curettage   Spec 2 Description:->Endocervical curettage       06/09/22 1530  Liquid-Based Pap Smear, Screening (Abnormal) Final result    Narrative:  Release to patient->Immediate               TITLE: PLAN OF CARE FOR THE CHEMOTHERAPY PATIENT / TEACHING PROTOCOL    PURPOSE: To involve the patient / significant other in the plan of care and to provide teaching to the significant other & patient receiving  chemotherapy.    LEVEL: Independent.    CONTENT: The Plan of Care for the chemotherapy patient is individualized and appropriate to the patients needs, strengths, limitations, & goals.  Education includes information regarding chemotherapy side effects, the treatment itself, and self-care  Activities.    GOAL / OUTCOME STANDARDS    PHYSIOLOGIC: The client will remain free or experience minimal side effects or toxicities throughout the chemotherapy treatment period.     PSYCHOLOGIC: The client/significant others will demonstrate positive coping mechanisms in relation to chemotherapy and its side effects.      COGINITIVE: The client/significant others will verbalize understanding of self-care measure to avoid/minimize side effects of the chemotherapy regime.    EVALUATION / COMMENT KEY:    V = Audiovisual/Video  S = Successfully meets outcome  N = Needs further instruction  NA = Not applicable to the patient  P = Previous knowledge  U = Unable to comprehend  * = See progress notes          PLAN OF CARE  INFORMATION TO BE DELIVERED / NURSING INTERVENTIONS DATE EVALUATION   Assessment of client/caregiver,         knowledge of cancer diagnosis,         and chemotherapy as a treatment. 1a. Evaluate patient/caregiver learning ability    b. Plan teaching sessions with patient/caregiver according to needs and present anxiety level/ability to learn.    c. Provide Chemotherapy Education Packet,        Mouth Care Protocol,         Specific Patient Education Sheets. 06/13/2024 S   Individual chemotherapy treatment         plan. 2a. Review of Chemotherapy Education handout from [email or web address]            06/13/2024   S   Knowledge Deficit & Self-Management of general side effects common to all chemotherapy:  Nausea/Vomiting  b.   Diarrhea  Mouth Care  Dental care  Constipation  Hair Loss  Potential for infection  Fatigue   3a. Reinforce that the majority of side effects from chemotherapy are reversible and are  controlled both in  the hospital and at home        (blood counts recover, hair grows back).   b.  Refer to the following for reinforcement of         information post-treatment:  Mouth Care Protocol.  Bowel Protocol for constipation or diarrhea.  3.  Drug Specific Chemotherapy Information Sheets for each medication patient receiving.    06/13/2024     S     PLAN OF CARE  INFORMATION TO BE DELIVERED / NURSING INTERVENTIONS DATE EVALUATION   h. Potential for bleeding         i. Potential anemia/fatigue         j. Potential sunburn         k. Birth control measures  l. Safety measures post treatment 4.  Chemotherapy Home Care Instruction  and Safety Information Sheet.  A. patient/caregivers to thoroughly cook shellfish (shrimp, crab, etc) to decrease the chance of infection.    B.  Use sunscreen and protective clothing while in the sun.   06/13/2024      Knowledge deficit & Self Management of Drug Specific  Side Effects.    BLADDER EFFECTS        (Hemorrhagic Cystitis)                  Preventable with adequate hydration; occurs 2-3 days or more post treatment.   1.  Instruct patient to:  a.   Void at least every 2 hours; increase intake.  b.   DO NOT hold urine; go when urge is felt.  c.    Empty bladder at bedtime and on         awakening.  d.   Observe for color changes (red to tea           colored), amount and frequency changes.  e.   Notify oncologist of any abnormalities           in urine or voiding or if you cannot               drink adequate fluids.   06/13/2024   S   b.   CHANGES IN URINE        COLOR:      1.   Instruct patient:  a.   Most evident in first 2-3 voidings after           administration.  Lasts less than 24 hours.  If urine is discolored 2 or more days post- treatment, notify oncologist.      06/13/2024 S   c.    KIDNEY EFFECTS           (Nephrotoxicity)   1.  Instruct patient to:  a.   Drink 8-16 glasses of fluid/day the day   pre-treatment and 3-4 days post-treatment to maintain hydration; the best way to  minimize kidney problems.  b.   Notify oncologist immediately if unable to drink fluids or if changes are noted in urinary elimination.     06/13/2024   S   PULMONARY TOXICITY    Instruct patient to report symptoms such as shortness of breath, chest pain, shallow breathing, or chest wall discomfort to physician.  Reinforce preventative measures used by the health care team.  Baseline and periodic PFT and chest x-ray.   06/13/2024   S     PLAN OF CARE INFORMATION TO BE DELIVERED / NURSING INTERVENTIONS DATE EVALUATION   NERVE & MUSCLE EFFECTS (neurotoxocity; neuropathy, possible visual/hearing changes)        Instruct patient to:    Report numbness or tingling of the hands/feet, loss of fine motor movement (buttoning shirt, tying shoelaces), or gait changes to your oncologist.  If numbness/tingling are present:  protect feet with shoes at all times.  Use gloves for washing dishes/gardening & potholders in kitchen.       06/13/2024   S   CARDIOTOXICITY  Decreased effectiveness of             cardiac function. Effective are                  cumulative and irreversible.                                    CARDIAC ARRYTHMIAS              4   Instruct:  Heart function may be tested before treatment and perdiocally during treatment.  Notify oncologist of irregular pulse, palpitations, shortness of breath, or swelling in lower extremities/feet.          Taxol and Taxotere can cause arrhythmias on infusion that resolve once infusion discontinued. Instruct nurse if any irregularity felt.    06/13/2024   S   EXTRAVASTION  Occurs when vesicants leak outside of vein and cause damage to the skin and underlying tissues.   Reinforce preventive measures used to avoid complications.  Fresh IV site or central line monitored continuously with vesicant IVP.  Continuous infusion via central line site and blood return monitored periodically around the clock.  Instruct to:  Notify nurse of any discomfort, burning, stinging, etc. at IV site  during chemotherapy administration.  Notify oncologist of any redness, pain, or swelling at IV site after discharge from hospital.   06/13/2024   S   HYPERSENSITIVITY can happen with any medication.   Instruct patient:  Nurse is with them during the initial part of treatment and will be close by to monitor.  Pre-medication ordered by the oncologist must be taken on time. If doses are missed, treatment will need to be re-scheduled.  Skin redness, itching, or hives appearing after discharge should be reported to oncologist. 06/13/2024   S       PLAN OF CARE INFORMATION TO BE DELIVERED / NURSING INTERVENTIONS DATE EVALUATION   FLU-LIKE SYNDROME      Instruct patient symptoms are hard to prevent and may include fever, shaking chills, muscle and body aches.  Taking prescribed medications from physician if needed.  Adequate fluids are important.    Reinforce the need to call if temperature is         elevated to 100.4 or more  06/13/2024   S   HAND-FOOT SYNDROME  causes painful, symmetric swelling and redness of palms and soles                  Instruct patient to report any numbness or tingling in the hands or feet.  Explain prevention techniques, such as     Use heavy moisturizers to lessen skin dryness and itching, but to avoid if skin is cracked or broken  Bathe in tepid water, use non-perfumed soap, and wash gently. Baths with oatmeal or diluted baking soda may be soothing.  Avoid tight fitting shoes and repetitive actions, such as rubbing hands or applying pressure to hands/feet.  Review measures to take should syndrome occur:  Cold compresses and elevation for          edema  Pain medications and other measures as ordered by oncologist.   4.   Syndrome resolves few weeks after therapy. 06/13/2024   S   5. DISCHARGE PLANNING /        EDUCATION 1.    Explain importance of compliance with follow- up  tests (CBC, CMP).  2.    Verify patient/caregiver know:  a.    Oncologists office phone number.  b.    Dates of  follow-up appointments.  c.    Prescriptions given for nausea  3.   Review side effects to monitor and notify          oncologist about.  4.   Reinforce the need for patient and caregivers to:  a.    Review information given.  b.    Call oncologists office with questions          or symptoms  5.   Provide [facility] [place] [employer]ure make referrals if needed for financial or .   06/13/2024   S     PROGRESS NOTES: I met with the patient  today for chemotherapy education. she will be starting treatment with FOLFOX every 2 weeks  . We discussed the mechanism of action, potential side effects of this treatment as well as ways she can manage them at home. Some of these side effects include but or not limited to fever, nausea, vomiting, decreased appetite, fatigue, weakness, cytopenias, myalgia/arthralgia, constipation, diarrhea, bleeding, headache, shortness of breath, nail changes, taste change, hair thinning/loss, mood disturbances, or edema. We also discussed dietary modifications she should make although this will be discussed in more detail with the dietician. she was provided with anti-emetic medication, a copy of all of the information we discussed today as well as our contact information. she will be provided a schedule on his first day of treatment. We will obtain labs on a weekly basis and the patient will follow-up with the physician for toxicity monitoring throughout treatment. All questions were answered and an informed consent was obtained. she was reminded to certainly contact us sooner if needed.  Attached to the patients folder and discussed with the patient the 24 hour/ 7 days a week after hours telephone number for the physician.  Patient notified to call anytime 24/7 because their is a physician on call for any problems that may arise.  Patient also notified to report to [facility] ER if they can not get in touch with a physician after hours.  Discussed the five wishes booklet  with the patient and their family.           Assessment/Plan     ICD-10-CM ICD-9-CM   1. Malignant neoplasm of sigmoid colon  C18.7 153.3    -see MD prior to cycle 1        Medications Ordered:  Zofran 8mg 1 tab PO Q8h prn nausea  Compazine 10 mg PO Q4-6h prn nausea  Emla cream: Apply to port site 30min prior to treatment and cover with saran wrap  Claritin 10mg PO QD day of chemotherapy and for 5 days after Neulasta to help prevent bone pain  OTC NSAIDS  Take 2 PO QD day of and for 5 days after Neulasta to help prevent bone pain  OTC Imodium as directed  OTC MiraLax 17 g daily for constipation    * please notify clinic or report to the emergency department for fever of 100.4 grade  * avoid tobacco and alcohol use  * maintain a healthy diet and good oral hydration  * good blood pressure and blood sugar control is important.  Please keep all followups with your primary care provider  *good hand hygiene  * please call clinic with any questions or concerns  * please take all medications as directed     Patient is in agreement with the proposed treatment plan. All questions were answered to the patient's satisfaction. Patient knows to call clinic for any new or worsening symptoms and if anything is needed before the next clinic visit.    Ave Newman NP-[facility]  Hematology & Medical Oncology     Collaborating physician, Dr. Blanco.    50 minutes of total time spent on the encounter, which includes face-to-face time and non face-to-face time preparing to see the patient (e.g., review of tests), obtaining and/or reviewing separately obtain history, documenting clinical information in the electronic or other health record, independently interpreting results (not separately reported) and communicating results to the patient/family/caregiver or care coordination (not separately reported).  Electronically signed by: Ave ORTIZ

## 2024-06-13 NOTE — TELEPHONE ENCOUNTER
Called and spoke with patient and advised that a PGX panel is needed and added appointment prior to her MRI on Friday 6/14/24. Patient verbalized understanding.

## 2024-06-13 NOTE — PROGRESS NOTES
Shabnam blood specimen sent via [employer] tracking# [id number] Dropped at [place] location [employer] box.

## 2024-06-13 NOTE — NURSING
I met with Mrs Craven for chemotherapy education during visit with SERGIO Daniel.   will be treated with FOLFOX+AVASTIN .  We discussed potential side effects of this treatment as well as the Supportive Services we offer for cancer care. [employer] education was verbalized with patient regarding each chemotherapy drug.  Patient notified to call anytime 24/7 advised their is a physician on call for any problems that may arise or to report to [facility] / [facility] ER or the nearest ER for any emergencies. The patient verbalized understanding to all instructions given   Treatment Summary   Plan Name: OP GI [id number] (oxaliplatin leucovorin fluorouracil) with bevacizumab Q2W  Treatment Goal: Curative  Status: Active  Start Date: 6/14/2024 (Planned)  End Date: 5/4/2025 (Planned)  Provider: Aurash Khoobehi, MD  Chemotherapy: fluorouraciL injection 780 mg, 400 mg/m2 = 780 mg, Intravenous, Clinic/HOD 1 time, 0 of 24 cycles    oxaliplatin (ELOXATIN) 85 mg/m2 = 166 mg in dextrose 5 % (D5W) 533.2 mL chemo infusion, 85 mg/m2 = 166 mg, Intravenous, Clinic/HOD 1 time, 0 of 24 cycles    fluorouracil (Adrucil) 2,400 mg/m2 = 4,680 mg in sodium chloride 0.9% 100 mL chemo infusion, 2,400 mg/m2 = 4,680 mg, Intravenous, Clinic/HOD 1 time, 0 of 24 cycles    bevacizumab-awwb (MVASI) 5 mg/kg = 425 mg in sodium chloride 0.9% 100 mL infusion, 5 mg/kg = 425 mg, Intravenous, Clinic/HOD 1 time, 0 of 24 cycles    Oncology Navigation   Intake  Date of Diagnosis: 05/10/24  Cancer Type: -- (Colon Ca with Lung Mets)  Type of Referral: Internal (Dr Oumar Montoya)  Date of Referral: 05/15/24  Initial Nurse Navigator Contact: 05/17/24  Referral to Initial Contact Timeline (days): 2  First Appointment Available: 05/20/24  Appointment Date: 05/23/24 (Dr Khoobehi)  First Available Date vs. Scheduled Date (days): 3  Reason if booked > 7 days after scheduling: Additional tests/procedures     Treatment  Current Status: Active  Date Presented to Tumor Board:  24    Surgical Oncologist: Dr Oumar Montoya    Medical Oncologist: Dr Aurash Khoobehi  Consult Date: 24  Chemotherapy: Planned  Chemotherapy Regimen: FOLFOX + AVASTIN       Procedures: PET scan  PET Scan Schedule Date: 24                 Acuity  Treatment Tolerability: Has not started treatment yet/treatment fully completed and side effects resolved  ECO  Comorbidities in Medical History: 2  Hospitalization Within the Past Month: 0   Needed: 0  Support: 0  Verbalizes Financial Concerns: 0  Transportation: 0  Navigation Acuity: 2     Follow Up  No follow-ups on file.

## 2024-06-14 ENCOUNTER — HOSPITAL ENCOUNTER (OUTPATIENT)
Dept: RADIOLOGY | Facility: HOSPITAL | Age: 64
Discharge: HOME | End: 2024-06-14
Attending: INTERNAL MEDICINE
Payer: COMMERCIAL

## 2024-06-14 ENCOUNTER — PATIENT MESSAGE (OUTPATIENT)
Dept: SURGERY | Facility: CLINIC | Age: 64
End: 2024-06-14
Payer: COMMERCIAL

## 2024-06-14 ENCOUNTER — DOCUMENTATION ONLY (OUTPATIENT)
Dept: HEMATOLOGY/ONCOLOGY | Facility: CLINIC | Age: 64
End: 2024-06-14
Payer: COMMERCIAL

## 2024-06-14 DIAGNOSIS — Z45.2 ENCOUNTER FOR INSERTION OF VENOUS ACCESS PORT: Primary | ICD-10-CM

## 2024-06-14 DIAGNOSIS — C18.7 MALIGNANT NEOPLASM OF SIGMOID COLON: ICD-10-CM

## 2024-06-14 PROCEDURE — A9585 GADOBUTROL INJECTION: HCPCS | Performed by: INTERNAL MEDICINE

## 2024-06-14 PROCEDURE — 74183 MRI ABD W/O CNTR FLWD CNTR: CPT | Mod: TC

## 2024-06-14 PROCEDURE — 74183 MRI ABD W/O CNTR FLWD CNTR: CPT | Mod: 26,,, | Performed by: RADIOLOGY

## 2024-06-14 PROCEDURE — 25500020 PHARM REV CODE 255: Performed by: INTERNAL MEDICINE

## 2024-06-14 RX ORDER — SODIUM CHLORIDE 9 MG/ML
INJECTION, SOLUTION INTRAVENOUS CONTINUOUS
OUTPATIENT
Start: 2024-06-14

## 2024-06-14 RX ORDER — CEFAZOLIN SODIUM 2 G/50ML
2 SOLUTION INTRAVENOUS
OUTPATIENT
Start: 2024-06-14

## 2024-06-14 RX ORDER — GADOBUTROL 604.72 MG/ML
8 INJECTION INTRAVENOUS
Status: COMPLETED | OUTPATIENT
Start: 2024-06-14 | End: 2024-06-14

## 2024-06-14 RX ADMIN — GADOBUTROL 8 ML: 604.72 INJECTION INTRAVENOUS at 07:06

## 2024-06-14 NOTE — PROGRESS NOTES
[facility] Fax confirmation received.

## 2024-06-18 DIAGNOSIS — C78.02 MALIGNANT NEOPLASM METASTATIC TO LEFT LUNG: Primary | ICD-10-CM

## 2024-06-18 RX ORDER — LIDOCAINE AND PRILOCAINE 25; 25 MG/G; MG/G
CREAM TOPICAL
Qty: 30 G | Refills: 0 | Status: SHIPPED | OUTPATIENT
Start: 2024-06-18

## 2024-06-21 ENCOUNTER — TELEPHONE (OUTPATIENT)
Dept: SURGERY | Facility: CLINIC | Age: 64
End: 2024-06-21
Payer: COMMERCIAL

## 2024-06-21 ENCOUNTER — TELEPHONE (OUTPATIENT)
Dept: HEMATOLOGY/ONCOLOGY | Facility: CLINIC | Age: 64
End: 2024-06-21
Payer: COMMERCIAL

## 2024-06-21 ENCOUNTER — PATIENT MESSAGE (OUTPATIENT)
Dept: SURGERY | Facility: CLINIC | Age: 64
End: 2024-06-21
Payer: COMMERCIAL

## 2024-06-21 NOTE — TELEPHONE ENCOUNTER
Called patient's Rx plan and spoke with Arianna and initiated new PA for magic mouthwash since they were unable to locate PA submitted via Cover My Meds on 6/17/24.

## 2024-06-21 NOTE — TELEPHONE ENCOUNTER
----- Message from Amy Mena sent at 6/21/2024  9:54 AM CDT -----  Type: Needs Medical Advice  Who Called:  Patient   Symptoms (please be specific):    How long has patient had these symptoms:    Pharmacy name and phone #:    Best Call Back Number: [phone]  Additional Information: Patient is requesting a call back from the nurse.

## 2024-06-23 ENCOUNTER — ANESTHESIA EVENT (OUTPATIENT)
Dept: SURGERY | Facility: HOSPITAL | Age: 64
End: 2024-06-23
Payer: COMMERCIAL

## 2024-06-24 ENCOUNTER — ANESTHESIA (OUTPATIENT)
Dept: SURGERY | Facility: HOSPITAL | Age: 64
End: 2024-06-24
Payer: COMMERCIAL

## 2024-06-24 ENCOUNTER — HOSPITAL ENCOUNTER (OUTPATIENT)
Facility: HOSPITAL | Age: 64
Discharge: HOME | End: 2024-06-24
Attending: SURGERY | Admitting: SURGERY
Payer: COMMERCIAL

## 2024-06-24 DIAGNOSIS — Z01.818 PREOP TESTING: Primary | ICD-10-CM

## 2024-06-24 DIAGNOSIS — Z01.818 PREOP TESTING: ICD-10-CM

## 2024-06-24 DIAGNOSIS — Z45.2 ENCOUNTER FOR INSERTION OF VENOUS ACCESS PORT: ICD-10-CM

## 2024-06-24 PROCEDURE — 36000706: Performed by: SURGERY

## 2024-06-24 PROCEDURE — 36000707: Performed by: SURGERY

## 2024-06-24 PROCEDURE — 71000039 HC RECOVERY, EACH ADD'L HOUR: Performed by: SURGERY

## 2024-06-24 PROCEDURE — 37000009 HC ANESTHESIA EA ADD 15 MINS: Performed by: SURGERY

## 2024-06-24 PROCEDURE — 63600175 PHARM REV CODE 636 W HCPCS: Performed by: STUDENT IN AN ORGANIZED HEALTH CARE EDUCATION/TRAINING PROGRAM

## 2024-06-24 PROCEDURE — 71000015 HC POSTOP RECOV 1ST HR: Performed by: SURGERY

## 2024-06-24 PROCEDURE — 71000033 HC RECOVERY, INTIAL HOUR: Performed by: SURGERY

## 2024-06-24 PROCEDURE — 37000008 HC ANESTHESIA 1ST 15 MINUTES: Performed by: SURGERY

## 2024-06-24 PROCEDURE — 63600175 PHARM REV CODE 636 W HCPCS: Mod: JZ,JG | Performed by: SURGERY

## 2024-06-24 PROCEDURE — 25000003 PHARM REV CODE 250: Performed by: SURGERY

## 2024-06-24 PROCEDURE — C1788 PORT, INDWELLING, IMP: HCPCS | Performed by: SURGERY

## 2024-06-24 PROCEDURE — 94799 UNLISTED PULMONARY SVC/PX: CPT

## 2024-06-24 PROCEDURE — 36561 INSERT TUNNELED CV CATH: CPT | Mod: RT,,, | Performed by: SURGERY

## 2024-06-24 PROCEDURE — 25000003 PHARM REV CODE 250: Performed by: STUDENT IN AN ORGANIZED HEALTH CARE EDUCATION/TRAINING PROGRAM

## 2024-06-24 PROCEDURE — 63600175 PHARM REV CODE 636 W HCPCS: Performed by: SURGERY

## 2024-06-24 PROCEDURE — 25000003 PHARM REV CODE 250: Performed by: ANESTHESIOLOGY

## 2024-06-24 DEVICE — POWERPORT M.R.I. IMPLANTABLE PORT WITH ATTACHABLE 8F CHRONOFLEX OPEN-ENDED SINGLE-LUMEN VENOUS CATHETER INTERMEDIATE KIT  (WITH SUTURE PLUGS)
Type: IMPLANTABLE DEVICE | Site: CHEST  WALL | Status: FUNCTIONAL
Brand: POWERPORT M.R.I., CHRONOFLEX

## 2024-06-24 RX ORDER — ONDANSETRON HYDROCHLORIDE 2 MG/ML
INJECTION, SOLUTION INTRAVENOUS
Status: DISCONTINUED | OUTPATIENT
Start: 2024-06-24 | End: 2024-06-24

## 2024-06-24 RX ORDER — HYDROMORPHONE HYDROCHLORIDE 2 MG/ML
0.2 INJECTION, SOLUTION INTRAMUSCULAR; INTRAVENOUS; SUBCUTANEOUS EVERY 5 MIN PRN
Status: DISCONTINUED | OUTPATIENT
Start: 2024-06-24 | End: 2024-06-24 | Stop reason: HOSPADM

## 2024-06-24 RX ORDER — DEXAMETHASONE SODIUM PHOSPHATE 4 MG/ML
INJECTION, SOLUTION INTRA-ARTICULAR; INTRALESIONAL; INTRAMUSCULAR; INTRAVENOUS; SOFT TISSUE
Status: DISCONTINUED | OUTPATIENT
Start: 2024-06-24 | End: 2024-06-24

## 2024-06-24 RX ORDER — ONDANSETRON HYDROCHLORIDE 2 MG/ML
4 INJECTION, SOLUTION INTRAVENOUS EVERY 12 HOURS PRN
Status: CANCELLED | OUTPATIENT
Start: 2024-06-24

## 2024-06-24 RX ORDER — PROPOFOL 10 MG/ML
VIAL (ML) INTRAVENOUS CONTINUOUS PRN
Status: DISCONTINUED | OUTPATIENT
Start: 2024-06-24 | End: 2024-06-24

## 2024-06-24 RX ORDER — BUPIVACAINE HYDROCHLORIDE 2.5 MG/ML
INJECTION, SOLUTION EPIDURAL; INFILTRATION; INTRACAUDAL
Status: DISCONTINUED | OUTPATIENT
Start: 2024-06-24 | End: 2024-06-24 | Stop reason: HOSPADM

## 2024-06-24 RX ORDER — LIDOCAINE HYDROCHLORIDE 10 MG/ML
0.5 INJECTION, SOLUTION EPIDURAL; INFILTRATION; INTRACAUDAL; PERINEURAL ONCE
Status: DISCONTINUED | OUTPATIENT
Start: 2024-06-24 | End: 2024-06-24 | Stop reason: HOSPADM

## 2024-06-24 RX ORDER — SODIUM CHLORIDE, SODIUM LACTATE, POTASSIUM CHLORIDE, CALCIUM CHLORIDE 600; 310; 30; 20 MG/100ML; MG/100ML; MG/100ML; MG/100ML
INJECTION, SOLUTION INTRAVENOUS CONTINUOUS
Status: DISCONTINUED | OUTPATIENT
Start: 2024-06-24 | End: 2024-06-24 | Stop reason: HOSPADM

## 2024-06-24 RX ORDER — MIDAZOLAM HYDROCHLORIDE 1 MG/ML
INJECTION, SOLUTION INTRAMUSCULAR; INTRAVENOUS
Status: DISCONTINUED | OUTPATIENT
Start: 2024-06-24 | End: 2024-06-24

## 2024-06-24 RX ORDER — KETOROLAC TROMETHAMINE 30 MG/ML
INJECTION, SOLUTION INTRAMUSCULAR; INTRAVENOUS
Status: DISCONTINUED | OUTPATIENT
Start: 2024-06-24 | End: 2024-06-24

## 2024-06-24 RX ORDER — ACETAMINOPHEN 10 MG/ML
INJECTION, SOLUTION INTRAVENOUS
Status: DISCONTINUED | OUTPATIENT
Start: 2024-06-24 | End: 2024-06-24

## 2024-06-24 RX ORDER — LIDOCAINE HYDROCHLORIDE 20 MG/ML
INJECTION INTRAVENOUS
Status: DISCONTINUED | OUTPATIENT
Start: 2024-06-24 | End: 2024-06-24

## 2024-06-24 RX ORDER — ONDANSETRON HYDROCHLORIDE 2 MG/ML
4 INJECTION, SOLUTION INTRAVENOUS ONCE
Status: DISCONTINUED | OUTPATIENT
Start: 2024-06-24 | End: 2024-06-24 | Stop reason: HOSPADM

## 2024-06-24 RX ORDER — BUPIVACAINE HCL/EPINEPHRINE 0.25-.0005
VIAL (ML) INJECTION
Status: DISCONTINUED | OUTPATIENT
Start: 2024-06-24 | End: 2024-06-24 | Stop reason: HOSPADM

## 2024-06-24 RX ORDER — SODIUM CHLORIDE 9 MG/ML
INJECTION, SOLUTION INTRAVENOUS CONTINUOUS
Status: DISCONTINUED | OUTPATIENT
Start: 2024-06-24 | End: 2024-06-24 | Stop reason: HOSPADM

## 2024-06-24 RX ORDER — HEPARIN SODIUM (PORCINE) LOCK FLUSH IV SOLN 100 UNIT/ML 100 UNIT/ML
SOLUTION INTRAVENOUS
Status: DISCONTINUED | OUTPATIENT
Start: 2024-06-24 | End: 2024-06-24 | Stop reason: HOSPADM

## 2024-06-24 RX ORDER — FENTANYL CITRATE 50 UG/ML
INJECTION, SOLUTION INTRAMUSCULAR; INTRAVENOUS
Status: DISCONTINUED | OUTPATIENT
Start: 2024-06-24 | End: 2024-06-24

## 2024-06-24 RX ORDER — PROCHLORPERAZINE EDISYLATE 5 MG/ML
5 INJECTION INTRAMUSCULAR; INTRAVENOUS EVERY 4 HOURS PRN
Status: DISCONTINUED | OUTPATIENT
Start: 2024-06-24 | End: 2024-06-24 | Stop reason: HOSPADM

## 2024-06-24 RX ORDER — OXYCODONE AND ACETAMINOPHEN 5; 325 MG/1; MG/1
1 TABLET ORAL EVERY 4 HOURS PRN
Qty: 30 TABLET | Refills: 0 | Status: SHIPPED | OUTPATIENT
Start: 2024-06-24

## 2024-06-24 RX ORDER — OXYCODONE HYDROCHLORIDE 5 MG/1
5 TABLET ORAL
Status: DISCONTINUED | OUTPATIENT
Start: 2024-06-24 | End: 2024-06-24 | Stop reason: HOSPADM

## 2024-06-24 RX ORDER — MEPERIDINE HYDROCHLORIDE 50 MG/ML
12.5 INJECTION INTRAMUSCULAR; INTRAVENOUS; SUBCUTANEOUS ONCE
Status: DISCONTINUED | OUTPATIENT
Start: 2024-06-24 | End: 2024-06-24 | Stop reason: HOSPADM

## 2024-06-24 RX ORDER — DIPHENHYDRAMINE HYDROCHLORIDE 50 MG/ML
25 INJECTION INTRAMUSCULAR; INTRAVENOUS EVERY 6 HOURS PRN
Status: DISCONTINUED | OUTPATIENT
Start: 2024-06-24 | End: 2024-06-24 | Stop reason: HOSPADM

## 2024-06-24 RX ORDER — SODIUM CHLORIDE 9 MG/ML
INJECTION, SOLUTION INTRAVENOUS CONTINUOUS
Status: CANCELLED | OUTPATIENT
Start: 2024-06-24

## 2024-06-24 RX ORDER — FENTANYL CITRATE 50 UG/ML
25 INJECTION, SOLUTION INTRAMUSCULAR; INTRAVENOUS EVERY 5 MIN PRN
Status: DISCONTINUED | OUTPATIENT
Start: 2024-06-24 | End: 2024-06-24 | Stop reason: HOSPADM

## 2024-06-24 RX ORDER — KETAMINE HCL IN 0.9 % NACL 50 MG/5 ML
SYRINGE (ML) INTRAVENOUS
Status: DISCONTINUED | OUTPATIENT
Start: 2024-06-24 | End: 2024-06-24

## 2024-06-24 RX ORDER — OXYCODONE HYDROCHLORIDE 5 MG/1
5 TABLET ORAL EVERY 4 HOURS PRN
Status: CANCELLED | OUTPATIENT
Start: 2024-06-24

## 2024-06-24 RX ADMIN — LIDOCAINE HYDROCHLORIDE 60 MG: 20 INJECTION, SOLUTION INTRAVENOUS at 12:06

## 2024-06-24 RX ADMIN — Medication 10 MG: at 12:06

## 2024-06-24 RX ADMIN — ACETAMINOPHEN 1000 MG: 10 INJECTION, SOLUTION INTRAVENOUS at 12:06

## 2024-06-24 RX ADMIN — MIDAZOLAM 2 MG: 1 INJECTION INTRAMUSCULAR; INTRAVENOUS at 11:06

## 2024-06-24 RX ADMIN — PROPOFOL 100 MCG/KG/MIN: 10 INJECTION, EMULSION INTRAVENOUS at 12:06

## 2024-06-24 RX ADMIN — DEXAMETHASONE SODIUM PHOSPHATE 4 MG: 4 INJECTION, SOLUTION INTRA-ARTICULAR; INTRALESIONAL; INTRAMUSCULAR; INTRAVENOUS; SOFT TISSUE at 12:06

## 2024-06-24 RX ADMIN — ONDANSETRON 4 MG: 2 INJECTION INTRAMUSCULAR; INTRAVENOUS at 11:06

## 2024-06-24 RX ADMIN — FENTANYL CITRATE 25 MCG: 50 INJECTION, SOLUTION INTRAMUSCULAR; INTRAVENOUS at 12:06

## 2024-06-24 RX ADMIN — CEFAZOLIN 2 G: 2 INJECTION, POWDER, FOR SOLUTION INTRAMUSCULAR; INTRAVENOUS at 12:06

## 2024-06-24 RX ADMIN — SODIUM CHLORIDE, SODIUM GLUCONATE, SODIUM ACETATE, POTASSIUM CHLORIDE AND MAGNESIUM CHLORIDE: 526; 502; 368; 37; 30 INJECTION, SOLUTION INTRAVENOUS at 10:06

## 2024-06-24 RX ADMIN — GLYCOPYRROLATE 0.2 MG: 0.2 INJECTION, SOLUTION INTRAMUSCULAR; INTRAVITREAL at 12:06

## 2024-06-24 RX ADMIN — OXYCODONE 5 MG: 5 TABLET ORAL at 12:06

## 2024-06-24 RX ADMIN — KETOROLAC TROMETHAMINE 15 MG: 30 INJECTION, SOLUTION INTRAMUSCULAR; INTRAVENOUS at 12:06

## 2024-06-24 NOTE — DISCHARGE INSTRUCTIONS
"Discharge Instructions: After Your Surgery/Procedure  Youve just had surgery. During surgery you were given medicine called anesthesia to keep you relaxed and free of pain. After surgery you may have some pain or nausea. This is common. Here are some tips for feeling better and getting well after surgery.     Stay on schedule with your medication.   Going home  Your doctor or nurse will show you how to take care of yourself when you go home. He or she will also answer your questions. Have an adult family member or friend drive you home.      For your safety we recommend these precaution for the first 24 hours after your procedure:  Do not drive or use heavy equipment.  Do not make important decisions or sign legal papers.  Do not drink alcohol.  Have someone stay with you, if needed. He or she can watch for problems and help keep you safe.  Your concentration, balance, coordination, and judgement may be impaired for many hours after anesthesia.  Use caution when ambulating or standing up.     You may feel weak and "washed out" after anesthesia and surgery.      Subtle residual effects of general anesthesia or sedation with regional / local anesthesia can last more than 24 hours.  Rest for the remainder of the day or longer if your Doctor/Surgeon has advised you to do so.  Although you may feel normal within the first 24 hours, your reflexes and mental ability may be impaired without you realizing it.  You may feel dizzy, lightheaded or sleepy for 24 hours or longer.      Be sure to go to all follow-up visits with your doctor. And rest after your surgery for as long as your doctor tells you to.  Coping with pain  If you have pain after surgery, pain medicine will help you feel better. Take it as told, before pain becomes severe. Also, ask your doctor or pharmacist about other ways to control pain. This might be with heat, ice, or relaxation. And follow any other instructions your surgeon or nurse gives you.  Tips " for taking pain medicine  To get the best relief possible, remember these points:  Pain medicines can upset your stomach. Taking them with a little food may help.  Most pain relievers taken by mouth need at least 20 to 30 minutes to start to work.  Taking medicine on a schedule can help you remember to take it. Try to time your medicine so that you can take it before starting an activity. This might be before you get dressed, go for a walk, or sit down for dinner.  Constipation is a common side effect of pain medicines. Call your doctor before taking any medicines such as laxatives or stool softeners to help ease constipation. Also ask if you should skip any foods. Drinking lots of fluids and eating foods such as fruits and vegetables that are high in fiber can also help. Remember, do not take laxatives unless your surgeon has prescribed them.  Drinking alcohol and taking pain medicine can cause dizziness and slow your breathing. It can even be deadly. Do not drink alcohol while taking pain medicine.  Pain medicine can make you react more slowly to things. Do not drive or run machinery while taking pain medicine.  Your health care provider may tell you to take acetaminophen to help ease your pain. Ask him or her how much you are supposed to take each day. Acetaminophen or other pain relievers may interact with your prescription medicines or other over-the-counter (OTC) drugs. Some prescription medicines have acetaminophen and other ingredients. Using both prescription and OTC acetaminophen for pain can cause you to overdose. Read the labels on your OTC medicines with care. This will help you to clearly know the list of ingredients, how much to take, and any warnings. It may also help you not take too much acetaminophen. If you have questions or do not understand the information, ask your pharmacist or health care provider to explain it to you before you take the OTC medicine.  Managing nausea  Some people have an  upset stomach after surgery. This is often because of anesthesia, pain, or pain medicine, or the stress of surgery. These tips will help you handle nausea and eat healthy foods as you get better. If you were on a special food plan before surgery, ask your doctor if you should follow it while you get better. These tips may help:  Do not push yourself to eat. Your body will tell you when to eat and how much.  Start off with clear liquids and soup. They are easier to digest.  Next try semi-solid foods, such as mashed potatoes, applesauce, and gelatin, as you feel ready.  Slowly move to solid foods. Dont eat fatty, rich, or spicy foods at first.  Do not force yourself to have 3 large meals a day. Instead eat smaller amounts more often.  Take pain medicines with a small amount of solid food, such as crackers or toast, to avoid nausea.     Call your surgeon if  You still have pain an hour after taking medicine. The medicine may not be strong enough.  You feel too sleepy, dizzy, or groggy. The medicine may be too strong.  You have side effects like nausea, vomiting, or skin changes, such as rash, itching, or hives.       If you have obstructive sleep apnea  You were given anesthesia medicine during surgery to keep you comfortable and free of pain. After surgery, you may have more apnea spells because of this medicine and other medicines you were given. The spells may last longer than usual.   At home:  Keep using the continuous positive airway pressure (CPAP) device when you sleep. Unless your health care provider tells you not to, use it when you sleep, day or night. CPAP is a common device used to treat obstructive sleep apnea.  Talk with your provider before taking any pain medicine, muscle relaxants, or sedatives. Your provider will tell you about the possible dangers of taking these medicines.  © 1347-5395 [employer]. [address] [place] [address], [place] [address]. All rights reserved. This information is  not intended as a substitute for professional medical care. Always follow your healthcare professional's instructions.          Using an Incentive Spirometer    An incentive spirometer is a device that helps you do deep breathing exercises. These exercises expand your lungs, aid in circulation, and help prevent pneumonia. Deep breathing exercises also help you breathe better and improve the function of your lungs by:  Keeping your lungs clear  Strengthening your breathing muscles  Helping prevent respiratory complications or problems  The incentive spirometer gives you a way to take an active part in recover. A nurse or therapist will teach you breathing exercises. To do these exercises, you will breathe in through your mouth and not your nose. The incentive spirometer only works correctly if you breathe in through your mouth.  Steps to clear lungs  Step 1. Exhale normally. Then, inhale normally.  Relax and breathe out.  Step 2. Place your lips tightly around the mouthpiece.  Make sure the device is upright and not tilted.  Step 3. Inhale as much air as you can through the mouthpiece (don't breath through your nose).  Inhale slowly and deeply.  Hold your breath long enough to keep the balls or disk raised for at least 3 to 5 seconds, or as instructed by your healthcare provider.  Some spirometers have an indicator to let you know that you are breathing in too fast. If the indicator goes off, breathe in more slowly.  Step 4. Repeat the exercise regularly.  Do this exercise every hour while you're awake, or as instructed by your healthcare provider.  If you were taught deep breathing and coughing exercises, do them regularly as instructed by your healthcare provider.           Post op instructions for prevention of DVT  What is deep vein thrombosis?  Deep vein thrombosis (DVT) is the medical term for blood clots in the deep veins of the leg.  These blood clots can be dangerous.  A DVT can block a blood vessel and keep  blood from getting where it needs to go.  Another problem is that the clot can travel to other parts of the body such as the lungs.  A clot that travels to the lungs is called a pulmonary embolus (PE) and can cause serious problems with breathing which can lead to death.  Am I at risk for DVT/PE?  If you are not very active, you are at risk of DVT.  Anyone confined to bed, sitting for long periods of time, recovering from surgery, etc. increases the risk of DVT.  Other risk factors are cancer diagnosis, certain medications, estrogen replacement in any form,older age, obesity, pregnancy, smoking, history of clotting disorders, and dehydration.  How will I know if I have a DVT?  Swelling in the lower leg  Pain  Warmth, redness, hardness or bulging of the vein  If you have any of these symptoms, call your doctors office right away.  Some people will not have any symptoms until the clot moves to the lungs.  What are the symptoms of a PE?  Panting, shortness of breath, or trouble breathing  Sharp, knife-like chest pain when you breathe  Coughing or coughing up blood  Rapid heartbeat  If you have any of these symptoms or get worse quickly, call 911 for emergency treatment.  How can I prevent a DVT?  Avoid long periods of inactivity and dont cross your legs--get up and walk around every hour or so.  Stay active--walking after surgery is highly encouraged.  This means you should get out of the house and walk in the neighborhood.  Going up and down stairs will not impair healing (unless advised against such activity by your doctor).    Drink plenty of noncaffeinated, nonalcoholic fluids each day to prevent dehydration.  Wear special support stockings, if they have been advised by your doctor.  If you travel, stop at least once an hour and walk around.  Avoid smoking (assistance with stopping is available through your healthcare provider)  Always notify your doctor if you are not able to follow the post operative  instructions that are given to you at the time of discharge.  It may be necessary to prescribe one of the medications available to prevent DVT.          We hope your stay was comfortable as you heal now, mend and rest.    For we have enjoyed taking care of you by giving your our best.    And as you get better, by regaining your health and strength;   We count it as a privilege to have served you and hope your time at [facility] was well spent.      Thank  You!!!

## 2024-06-24 NOTE — PLAN OF CARE
VSS. NAD. Resp E/U. Calm and cooperative. Speech appropriate. Tolerating clear liquids. Denies nausea. Denies pain. IV patent and SL. Dressing x2 to Rt chest CDI. Port a Cath placement verified by xray. Family notified of patient status. All safety measures taken. Bed in low position. Bedrails up x2. Bed locked. Cleared by Anesthesia.

## 2024-06-24 NOTE — TRANSFER OF CARE
Anesthesia Transfer of Care Note    Patient: Malaika Craven    Procedure(s) Performed: Procedure(s) (LRB):  [id number] (N/A)    Patient location: PACU    Anesthesia Type: MAC    Transport from OR: Transported from OR on room air with adequate spontaneous ventilation    Post pain: adequate analgesia    Post assessment: no apparent anesthetic complications and tolerated procedure well    Post vital signs: stable    Level of consciousness: awake, oriented and lethargic    Nausea/Vomiting: no nausea/vomiting    Complications: none    Transfer of care protocol was followed      Last vitals: Visit Vitals  BP (!) 148/81 (BP Location: Right arm, Patient Position: Lying)   Pulse 73   Temp 36.4 °C (97.6 °F)   Resp 16   Wt 85.3 kg (188 lb)   SpO2 99%   Breastfeeding No   BMI 33.30 kg/m²

## 2024-06-24 NOTE — PLAN OF CARE
Patient awake, alert, oriented. Vital signs stable. Patient verbalizes readiness for discharge. Discharge instructions reviewed with patient and patient's spouse. Patient and patient's spouse verbalized understanding. IV removed, catheter intact. Dressings clean, dry, and intact. All belongings returned to patient. Patient transferred to car via wheelchair per rocio Ovalle. Safety maintained.

## 2024-06-24 NOTE — OP NOTE
Date of procedure: June 24, 2024    Staff surgeon: Dr. Oumar Montoya     Preoperative diagnosis:  Colon cancer     Postop diagnosis: The same     Procedure:  Right internal jugular Port-A-Cath placement     Anesthesia:  Local MAC     Indication for procedure:   63-year-old female presented with recent diagnosis of metastatic colon cancer.  Cancer was metastatic to the lung.  She was scheduled for chemotherapy and will need a port for chemotherapy     Description of procedure:   Following signing informed consent she was taken the operating room placed supine position.  Monitored anesthesia was administered.  Both arms were tucked.  Neck and chest were prepped and draped in standard fashion.  Time-out procedure was performed.  Ultrasound guidance was used to confirm a patent right internal jugular vein.  Local was injected into the area.  I have sent an access to the right internal jugular vein with 1 percutaneous take.  Guidewire was placed and visualized under ultrasound guidance.  Fluoroscopy was used to confirm appropriate position.  Next the area in the right chest is injected with lidocaine to provide local anesthesia.  I make an incision and carried down through the deep dermal tissue down to the fascia over the pectoralis muscle.  I raised the flap that will easily hold the port.  Next the tunneled area was used to bring the catheter from the incision in the chest out through the insertion site in the neck.  Having brought the catheter through here the tunneling sheath and dilator were placed over the guidewire and typical Seldinger technique.  This was done under live fluoroscopy.  The dilator was removed and a catheter was placed through the tunneling sheath.  Next the tunneling sheath was removed.  Using fluoroscopic visualization I pulled back the catheter positioning at the presumed confluence with the subclavian vein.  Next I cut the distal tip of the catheter and secured the catheter to the port.  The  port was then secured to the chest wall with 2-0 Vicryl suture.  Hemostasis was checked for and obtained.  The wound and chest closed with 2 layers with 3-0 Vicryl 4-0 Monocryl.  The incision in the neck was closed with 4-0 Monocryl.  Port was checked and flushes and aspirates easily.  There were no immediate complications.  Blood loss of 15 cc.  If x-ray in recovery looks okay patient can use the port.

## 2024-06-24 NOTE — H&P
[facility]  History & Physical     Subjective:     Chief Complaint/Reason for Admission: port     History of Present Illness:   Patient 63 y.o. female presents for port.  Pt in need of port for chemotherapy.  NO significanty PMHx or PShx.     Patient Active Problem List    Diagnosis Date Noted    Malignant neoplasm of sigmoid colon 06/07/2024    Malignant neoplasm metastatic to left lung 06/07/2024    Class 1 obesity without serious comorbidity with body mass index (BMI) of 32.0 to 32.9 in adult, unspecified obesity type 04/11/2024    Major depressive disorder with single episode, in full remission 04/11/2024    History of gestational diabetes 11/22/2016    Abnormal EKG 11/09/2016    Essential hypertension 11/08/2016    Menopause 05/19/2014        Medications Prior to Admission   Medication Sig Dispense Refill Last Dose    ALPRAZolam (XANAX) 0.5 MG tablet Take 1 tablet (0.5 mg total) by mouth 3 (three) times daily. 90 tablet 0 6/24/2024    hydroCHLOROthiazide (HYDRODIURIL) 25 MG tablet Take 1 tablet (25 mg total) by mouth once daily. Patient takes two pills daily   6/23/2024    NIFEdipine (ADALAT CC) 60 MG TbSR TAKE 1 TABLET BY MOUTH EVERY DAY ON AN EMPTY STOMACH   6/24/2024    RYALTRIS 665-25 mcg/spray Spry    Past Month    traZODone (DESYREL) 150 MG tablet Take 150 mg by mouth every evening.   6/20/2024    diphenhydrAMINE-aluminum-magnesium hydroxide-simethicone-LIDOcaine viscous HCl 2% Swish and spit 15 mLs every 4 (four) hours as needed. 400 each 0 Unknown    ELDERBERRY FRUIT ORAL Take by mouth.   6/18/2024    fexofenadine (ALLEGRA ALLERGY) 60 MG tablet    6/22/2024    LIDOcaine-prilocaine (EMLA) cream Apply topically as needed. 30 g 0 Unknown    multivit-minerals/folic acid (ADULT ONE DAILY MULTIVITAMIN ORAL) Take 1 tablet by mouth daily as needed.   6/19/2024    multivitamin capsule Take 1 capsule by mouth once daily.   6/19/2024    OLANZapine (ZYPREXA) 5 MG tablet Take 1 tablet by  mouth nightly on days 1-3 of each chemotherapy cycle. 6 tablet 11 Unknown    OMEPRAZOLE ORAL Take 0.5 mg by mouth daily as needed. (Patient not taking: Reported on 2024)   Unknown    ondansetron (ZOFRAN) 8 MG tablet Take 1 tablet (8 mg total) by mouth every 8 (eight) hours as needed. 30 tablet 2 Unknown    polyethylene glycol (GLYCOLAX) 17 gram/dose powder Take 17 g by mouth once daily.   Unknown    potassium chloride (MICRO-K) 10 MEQ CpSR Take 2 capsules (20 mEq total) by mouth once daily. for 450 doses 180 capsule 4 2024    prochlorperazine (COMPAZINE) 5 MG tablet Take 2 tablets (10 mg total) by mouth every 6 (six) hours as needed for Nausea. 20 tablet 5 Unknown     Review of patient's allergies indicates:   Allergen Reactions    Atenolol Hives, Itching and Swelling    Losartan Swelling and Hives     Other reaction(s): Angioedema    Tetracyclines Rash    Amlodipine      Hives and swelling on the lower extremities    Lisinopril Hives    Sulfa (sulfonamide antibiotics) Rash        Past Medical History:   Diagnosis Date    Abnormal Pap smear of cervix     Allergy     Corneal erosion of right eye     Generalized anxiety disorder     Hypertension     Rectal mass     Sinus problem       Past Surgical History:   Procedure Laterality Date    BREAST CYST ASPIRATION       SECTION      X 2    CHOLECYSTECTOMY      COLONOSCOPY N/A 2024    Procedure: COLONOSCOPY;  Surgeon: Radhika Leary MD;  Location: [facility];  Service: Endoscopy;  Laterality: N/A;    DILATION AND CURETTAGE OF UTERUS      [facility]      EYE SURGERY Right     FOR CORNEAL EROSION    FLEXIBLE SIGMOIDOSCOPY N/A 2024    Procedure: SIGMOIDOSCOPY, FLEXIBLE(Fleets);  Surgeon: Oumar Montoya MD;  Location: [facility];  Service: General;  Laterality: N/A;    LIPOSUCTION      x 2    Mole Excision Right     Shin    TUBAL LIGATION          Social History     Tobacco Use    Smoking status: Never    Smokeless tobacco: Never   Substance  Use Topics    Alcohol use: Yes     Comment: 2 drinks per week        Review of Systems:  A comprehensive review of systems was negative.    OBJECTIVE:     Patient Vitals for the past 8 hrs:   BP Temp Pulse Resp SpO2 Weight   06/24/24 1012 (!) 148/81 97.6 °F (36.4 °C) 73 16 99 % 85.3 kg (188 lb)     AAOx3  Soft/nd/nt  No resp distress    Data Review:      ASSESSMENT/PLAN:     There are no hospital problems to display for this patient.  St IV colon cancer    Plan:  TO have port today

## 2024-06-24 NOTE — BRIEF OP NOTE
[facility]  Brief Operative Note    Surgery Date: 6/24/2024     Surgeons and Role:     * Oumar Montoya MD - Primary    Assisting Surgeon: None    Pre-op Diagnosis:  Encounter for insertion of venous access port [Z45.2]    Post-op Diagnosis:  Post-Op Diagnosis Codes:     * Encounter for insertion of venous access port [Z45.2]    Procedure(s) (LRB):  [id number] (N/A)    Anesthesia: General    Operative Findings: Normal venous anatomy.  R IJ port placement without event.       Estimated Blood Loss: 15 cc's           Specimens:   Specimen (24h ago, onward)      None              Discharge Note    OUTCOME: Patient tolerated treatment/procedure well without complication and is now ready for discharge.    DISPOSITION: Home or Self Care    FINAL DIAGNOSIS:  Malignant neoplasm of sigmoid colon    FOLLOWUP: In clinic    DISCHARGE INSTRUCTIONS:    Discharge Procedure Orders   Diet general     Call MD for:  extreme fatigue     Call MD for:  persistent dizziness or light-headedness     Call MD for:  hives     Call MD for:  redness, tenderness, or signs of infection (pain, swelling, redness, odor or green/yellow discharge around incision site)     Call MD for:  difficulty breathing, headache or visual disturbances     Call MD for:  severe uncontrolled pain     Call MD for:  persistent nausea and vomiting     Call MD for:  temperature >100.4     Remove dressing in 48 hours     Activity as tolerated

## 2024-06-24 NOTE — ANESTHESIA PREPROCEDURE EVALUATION
06/24/2024  Malaika Craven is a 63 y.o., female.      Pre-op Assessment    I have reviewed the Patient Summary Reports.     I have reviewed the Nursing Notes. I have reviewed the NPO Status.   I have reviewed the Medications.     Review of Systems  Anesthesia Hx:  No problems with previous Anesthesia             Denies Family Hx of Anesthesia complications.    Denies Personal Hx of Anesthesia complications.                    Hematology/Oncology:                      Current/Recent Cancer.            Oncology Comments: Sigmoid CA     Cardiovascular:     Hypertension              ECG has been reviewed.                    Hypertension         Pulmonary:  Pulmonary Normal                       Endocrine:        Obesity / BMI > 30  Psych:  Psychiatric History  depression                Physical Exam  General: Cooperative, Alert and Oriented    Airway:  Mallampati: II   Mouth Opening: Normal  TM Distance: Normal  Neck ROM: Normal ROM        Anesthesia Plan  Type of Anesthesia, risks & benefits discussed:    Anesthesia Type: MAC  Intra-op Monitoring Plan: Standard ASA Monitors  Induction:  IV  Informed Consent: Informed consent signed with the Patient and all parties understand the risks and agree with anesthesia plan.  All questions answered.   ASA Score: 3  Anesthesia Plan Notes: Patient prefers MAC    Ready For Surgery From Anesthesia Perspective.     .

## 2024-06-25 ENCOUNTER — TELEPHONE (OUTPATIENT)
Dept: HEMATOLOGY/ONCOLOGY | Facility: CLINIC | Age: 64
End: 2024-06-25
Payer: COMMERCIAL

## 2024-06-25 VITALS
SYSTOLIC BLOOD PRESSURE: 144 MMHG | BODY MASS INDEX: 33.3 KG/M2 | TEMPERATURE: 98 F | OXYGEN SATURATION: 97 % | RESPIRATION RATE: 16 BRPM | HEART RATE: 79 BPM | DIASTOLIC BLOOD PRESSURE: 84 MMHG | WEIGHT: 188 LBS

## 2024-06-25 NOTE — ANESTHESIA POSTPROCEDURE EVALUATION
Anesthesia Post Evaluation    Patient: Malaika Craven    Procedure(s) Performed: Procedure(s) (LRB):  [id number] (N/A)    Final Anesthesia Type: MAC      Patient location during evaluation: PACU  Patient participation: Yes- Able to Participate  Level of consciousness: awake and alert  Post-procedure vital signs: reviewed and stable  Pain management: adequate  Airway patency: patent    PONV status at discharge: No PONV  Anesthetic complications: no      Cardiovascular status: blood pressure returned to baseline  Respiratory status: unassisted  Hydration status: euvolemic  Follow-up not needed.              Vitals Value Taken Time   /84 06/24/24 1410   Temp 36.6 °C (97.9 °F) 06/24/24 1345   Pulse 79 06/24/24 1410   Resp 16 06/24/24 1410   SpO2 97 % 06/24/24 1410         Event Time   Out of Recovery 13:51:00         Pain/Char Score: Pain Rating Prior to Med Admin: 2 (6/24/2024 12:52 PM)  Pain Rating Post Med Admin: 2 (6/24/2024 12:52 PM)  Char Score: 10 (6/24/2024  1:45 PM)  Modified Char Score: 20 (6/24/2024  2:15 PM)

## 2024-06-25 NOTE — PROGRESS NOTES
6/25/24 Pain medicine is effective and very pleased. Stated very pleased with care given.  All staff were kind and helpful. Pt. And/or family member states they have read and understand all discharge instructions. They stated they know how to reach out to MD for any needs.

## 2024-06-25 NOTE — TELEPHONE ENCOUNTER
Spoke with Ms. Herber to schedule for Thoracic surgery referral.  Patient declined stating that she is starting Chemotherapy.    ----- Message from Marilu Escobar RN sent at 6/24/2024  3:43 PM CDT -----  Regarding: Referral  This was in the workqueue asking for Ngozi.

## 2024-06-26 ENCOUNTER — TELEPHONE (OUTPATIENT)
Dept: HEMATOLOGY/ONCOLOGY | Facility: CLINIC | Age: 64
End: 2024-06-26
Payer: COMMERCIAL

## 2024-06-26 ENCOUNTER — PATIENT MESSAGE (OUTPATIENT)
Dept: HEMATOLOGY/ONCOLOGY | Facility: CLINIC | Age: 64
End: 2024-06-26
Payer: COMMERCIAL

## 2024-06-26 NOTE — TELEPHONE ENCOUNTER
Spoke with pt to answer questions regarding medications (emla cream and nausea meds) including when to take and use. No further concerns.

## 2024-07-02 ENCOUNTER — TELEPHONE (OUTPATIENT)
Dept: HEMATOLOGY/ONCOLOGY | Facility: CLINIC | Age: 64
End: 2024-07-02
Payer: COMMERCIAL

## 2024-07-02 ENCOUNTER — LAB VISIT (OUTPATIENT)
Dept: LAB | Facility: HOSPITAL | Age: 64
End: 2024-07-02
Attending: INTERNAL MEDICINE
Payer: COMMERCIAL

## 2024-07-02 ENCOUNTER — OFFICE VISIT (OUTPATIENT)
Dept: HEMATOLOGY/ONCOLOGY | Facility: CLINIC | Age: 64
End: 2024-07-02
Payer: COMMERCIAL

## 2024-07-02 VITALS
BODY MASS INDEX: 32.5 KG/M2 | HEART RATE: 78 BPM | DIASTOLIC BLOOD PRESSURE: 64 MMHG | SYSTOLIC BLOOD PRESSURE: 144 MMHG | OXYGEN SATURATION: 97 % | TEMPERATURE: 98 F | RESPIRATION RATE: 17 BRPM | HEIGHT: 63 IN | WEIGHT: 183.44 LBS

## 2024-07-02 DIAGNOSIS — C18.7 MALIGNANT NEOPLASM OF SIGMOID COLON: Primary | ICD-10-CM

## 2024-07-02 DIAGNOSIS — R74.01 TRANSAMINITIS: ICD-10-CM

## 2024-07-02 DIAGNOSIS — C78.02 MALIGNANT NEOPLASM METASTATIC TO LEFT LUNG: ICD-10-CM

## 2024-07-02 DIAGNOSIS — C18.7 MALIGNANT NEOPLASM OF SIGMOID COLON: ICD-10-CM

## 2024-07-02 LAB
ALBUMIN SERPL BCP-MCNC: 3.8 G/DL (ref 3.5–5.2)
ALP SERPL-CCNC: 121 U/L (ref 55–135)
ALT SERPL W/O P-5'-P-CCNC: 336 U/L (ref 10–44)
ANION GAP SERPL CALC-SCNC: 9 MMOL/L (ref 8–16)
AST SERPL-CCNC: 100 U/L (ref 10–40)
BASOPHILS # BLD AUTO: 0.06 K/UL (ref 0–0.2)
BASOPHILS NFR BLD: 0.9 % (ref 0–1.9)
BILIRUB SERPL-MCNC: 0.7 MG/DL (ref 0.1–1)
BUN SERPL-MCNC: 15 MG/DL (ref 8–23)
CALCIUM SERPL-MCNC: 10 MG/DL (ref 8.7–10.5)
CHLORIDE SERPL-SCNC: 106 MMOL/L (ref 95–110)
CO2 SERPL-SCNC: 24 MMOL/L (ref 23–29)
CREAT SERPL-MCNC: 0.9 MG/DL (ref 0.5–1.4)
DIFFERENTIAL METHOD BLD: ABNORMAL
EOSINOPHIL # BLD AUTO: 0.4 K/UL (ref 0–0.5)
EOSINOPHIL NFR BLD: 5.1 % (ref 0–8)
ERYTHROCYTE [DISTWIDTH] IN BLOOD BY AUTOMATED COUNT: 13.3 % (ref 11.5–14.5)
EST. GFR  (NO RACE VARIABLE): >60 ML/MIN/1.73 M^2
GLUCOSE SERPL-MCNC: 113 MG/DL (ref 70–110)
HCT VFR BLD AUTO: 43.2 % (ref 37–48.5)
HGB BLD-MCNC: 14.4 G/DL (ref 12–16)
IMM GRANULOCYTES # BLD AUTO: 0.01 K/UL (ref 0–0.04)
IMM GRANULOCYTES NFR BLD AUTO: 0.1 % (ref 0–0.5)
LYMPHOCYTES # BLD AUTO: 1.9 K/UL (ref 1–4.8)
LYMPHOCYTES NFR BLD: 27.7 % (ref 18–48)
MCH RBC QN AUTO: 31.2 PG (ref 27–31)
MCHC RBC AUTO-ENTMCNC: 33.3 G/DL (ref 32–36)
MCV RBC AUTO: 94 FL (ref 82–98)
MONOCYTES # BLD AUTO: 0.4 K/UL (ref 0.3–1)
MONOCYTES NFR BLD: 6 % (ref 4–15)
NEUTROPHILS # BLD AUTO: 4.1 K/UL (ref 1.8–7.7)
NEUTROPHILS NFR BLD: 60.2 % (ref 38–73)
NRBC BLD-RTO: 0 /100 WBC
PLATELET # BLD AUTO: 207 K/UL (ref 150–450)
PMV BLD AUTO: 11.4 FL (ref 9.2–12.9)
POTASSIUM SERPL-SCNC: 4.4 MMOL/L (ref 3.5–5.1)
PROT SERPL-MCNC: 7.5 G/DL (ref 6–8.4)
RBC # BLD AUTO: 4.61 M/UL (ref 4–5.4)
SODIUM SERPL-SCNC: 139 MMOL/L (ref 136–145)
WBC # BLD AUTO: 6.83 K/UL (ref 3.9–12.7)

## 2024-07-02 PROCEDURE — 1159F MED LIST DOCD IN RCRD: CPT | Mod: CPTII,S$GLB,, | Performed by: INTERNAL MEDICINE

## 2024-07-02 PROCEDURE — 3044F HG A1C LEVEL LT 7.0%: CPT | Mod: CPTII,S$GLB,, | Performed by: INTERNAL MEDICINE

## 2024-07-02 PROCEDURE — 36415 COLL VENOUS BLD VENIPUNCTURE: CPT | Performed by: INTERNAL MEDICINE

## 2024-07-02 PROCEDURE — 80053 COMPREHEN METABOLIC PANEL: CPT | Performed by: INTERNAL MEDICINE

## 2024-07-02 PROCEDURE — 3078F DIAST BP <80 MM HG: CPT | Mod: CPTII,S$GLB,, | Performed by: INTERNAL MEDICINE

## 2024-07-02 PROCEDURE — 3008F BODY MASS INDEX DOCD: CPT | Mod: CPTII,S$GLB,, | Performed by: INTERNAL MEDICINE

## 2024-07-02 PROCEDURE — 3077F SYST BP >= 140 MM HG: CPT | Mod: CPTII,S$GLB,, | Performed by: INTERNAL MEDICINE

## 2024-07-02 PROCEDURE — 99999 PR PBB SHADOW E&M-EST. PATIENT-LVL V: CPT | Mod: PBBFAC,,, | Performed by: INTERNAL MEDICINE

## 2024-07-02 PROCEDURE — 99215 OFFICE O/P EST HI 40 MIN: CPT | Mod: S$GLB,,, | Performed by: INTERNAL MEDICINE

## 2024-07-02 PROCEDURE — 85025 COMPLETE CBC W/AUTO DIFF WBC: CPT | Performed by: INTERNAL MEDICINE

## 2024-07-02 RX ORDER — PROCHLORPERAZINE EDISYLATE 5 MG/ML
10 INJECTION INTRAMUSCULAR; INTRAVENOUS ONCE AS NEEDED
Status: CANCELLED | OUTPATIENT
Start: 2024-07-02

## 2024-07-02 RX ORDER — FLUOROURACIL 50 MG/ML
400 INJECTION, SOLUTION INTRAVENOUS
Status: CANCELLED | OUTPATIENT
Start: 2024-07-02

## 2024-07-02 RX ORDER — EPINEPHRINE 0.3 MG/.3ML
0.3 INJECTION SUBCUTANEOUS ONCE AS NEEDED
Status: CANCELLED | OUTPATIENT
Start: 2024-07-02

## 2024-07-02 RX ORDER — SODIUM CHLORIDE 0.9 % (FLUSH) 0.9 %
10 SYRINGE (ML) INJECTION
Status: CANCELLED | OUTPATIENT
Start: 2024-07-02

## 2024-07-02 RX ORDER — SODIUM CHLORIDE 0.9 % (FLUSH) 0.9 %
10 SYRINGE (ML) INJECTION
Status: CANCELLED | OUTPATIENT
Start: 2024-07-04

## 2024-07-02 RX ORDER — PROCHLORPERAZINE EDISYLATE 5 MG/ML
10 INJECTION INTRAMUSCULAR; INTRAVENOUS ONCE AS NEEDED
Status: CANCELLED | OUTPATIENT
Start: 2024-07-04

## 2024-07-02 RX ORDER — DIPHENHYDRAMINE HYDROCHLORIDE 50 MG/ML
50 INJECTION INTRAMUSCULAR; INTRAVENOUS ONCE AS NEEDED
Status: CANCELLED | OUTPATIENT
Start: 2024-07-02

## 2024-07-02 RX ORDER — HEPARIN 100 UNIT/ML
500 SYRINGE INTRAVENOUS
Status: CANCELLED | OUTPATIENT
Start: 2024-07-04

## 2024-07-02 RX ORDER — HEPARIN 100 UNIT/ML
500 SYRINGE INTRAVENOUS
Status: CANCELLED | OUTPATIENT
Start: 2024-07-02

## 2024-07-02 NOTE — TELEPHONE ENCOUNTER
Left vm asking for return call if further information/action is needed, but confirmed that call was received.    ----- Message from Perry Pringle Patient Care Assistant sent at 7/2/2024  3:42 PM CDT -----  Contact: HUSAM/Castro Mchugh  Type: Needs Medical Advice  Who Called: Barber Cash   Best Call Back Number: [phone]  Inquiry/Question: Palma is calling to advise the pt has been approved for chemo therapy and case management services. Thank you~

## 2024-07-02 NOTE — PROGRESS NOTES
Service Date:  7/2/24    Chief Complaint: Colon Cancer (Labs  UA  chemo clear/  Pt stated she did not take BP rx this am     feeling stressed about tomm )    Malaika Craven is a 63 y.o. 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  She also had a CT scan done which showed a 2.2 centimeter lesion in the left lower lobe of the lung.  No evidence of liver Mets on MRI.    Here for neoadjuvant FOLFOX with Avastin.  No complaints.  Does mentioned taking Percocet every 4-5 hours and also takes Tylenol as well.  Did this after having her port placed.  Helped her with the pain from the port.  Has not taken any medication for pain since 2 days ago.    Review of Systems   Constitutional: Negative.  Negative for appetite change and unexpected weight change.   HENT: Negative.  Negative for mouth sores.    Eyes: Negative.  Negative for visual disturbance.   Respiratory: Negative.  Negative for cough and shortness of breath.    Cardiovascular: Negative.  Negative for chest pain.   Gastrointestinal: Negative.  Negative for abdominal pain and diarrhea.   Endocrine: Negative.    Genitourinary: Negative.  Negative for frequency.   Musculoskeletal: Negative.  Negative for back pain.   Integumentary:  Negative for rash. Negative.   Neurological: Negative.  Negative for headaches.   Hematological: Negative.  Negative for adenopathy.   Psychiatric/Behavioral: Negative.          Current Outpatient Medications   Medication Instructions    ALPRAZolam (XANAX) 0.5 mg, Oral, 3 times daily    diphenhydrAMINE-aluminum-magnesium hydroxide-simethicone-LIDOcaine viscous HCl 2% 15 mLs, Swish & Spit, Every 4 hours PRN    ELDERBERRY FRUIT ORAL Oral    fexofenadine (ALLEGRA ALLERGY) 60 MG tablet     hydroCHLOROthiazide (HYDRODIURIL) 25 mg, Oral, Daily, Patient takes two pills daily    LIDOcaine-prilocaine (EMLA) cream Topical (Top), As needed (PRN)    multivit-minerals/folic acid (ADULT ONE DAILY MULTIVITAMIN ORAL) 1  tablet, Oral, Daily PRN    multivitamin capsule 1 capsule, Oral, Daily    NIFEdipine (ADALAT CC) 60 MG TbSR TAKE 1 TABLET BY MOUTH EVERY DAY ON AN EMPTY STOMACH    OLANZapine (ZYPREXA) 5 MG tablet Take 1 tablet by mouth nightly on days 1-3 of each chemotherapy cycle.    OMEPRAZOLE ORAL 0.5 mg, Oral, Daily PRN    ondansetron (ZOFRAN) 8 mg, Oral, Every 8 hours PRN    oxyCODONE-acetaminophen (PERCOCET) 5-325 mg per tablet 1 tablet, Oral, Every 4 hours PRN    polyethylene glycol (GLYCOLAX) 17 g, Oral, Daily    potassium chloride (MICRO-K) 10 MEQ CpSR 20 mEq, Oral, Daily    prochlorperazine (COMPAZINE) 10 mg, Oral, Every 6 hours PRN    RYALTRIS 665-25 mcg/spray Spry     traZODone (DESYREL) 150 mg, Oral, Nightly        Past Medical History:   Diagnosis Date    Abnormal Pap smear of cervix     Allergy     Corneal erosion of right eye     Generalized anxiety disorder     Hypertension     Rectal mass     Sinus problem         Past Surgical History:   Procedure Laterality Date    BREAST CYST ASPIRATION       SECTION      X 2    CHOLECYSTECTOMY      COLONOSCOPY N/A 2024    Procedure: COLONOSCOPY;  Surgeon: Radhika Leary MD;  Location: [facility];  Service: Endoscopy;  Laterality: N/A;    DILATION AND CURETTAGE OF UTERUS      [facility]      EYE SURGERY Right     FOR CORNEAL EROSION    FLEXIBLE SIGMOIDOSCOPY N/A 2024    Procedure: SIGMOIDOSCOPY, FLEXIBLE(Fleets);  Surgeon: Oumar Montoya MD;  Location: [facility] ENDO;  Service: General;  Laterality: N/A;    INSERTION OF TUNNELED CENTRAL VENOUS CATHETER (CVC) WITH SUBCUTANEOUS PORT N/A 2024    Procedure: [id number];  Surgeon: Oumar Montyoa MD;  Location: [facility] OR;  Service: General;  Laterality: N/A;    LIPOSUCTION      x 2    Mole Excision Right     Shin    TUBAL LIGATION          Family History   Problem Relation Name Age of Onset    Heart disease Mother      Hypertension Mother      Arthritis Father      Thyroid disease Father       "Kidney disease Paternal Grandmother      Liver cancer Maternal Grandmother      Cancer Maternal Grandfather      Allergies Sister      Allergic rhinitis Sister      Angioedema Neg Hx      Asthma Neg Hx      Atopy Neg Hx      Eczema Neg Hx      Immunodeficiency Neg Hx      Rhinitis Neg Hx      Urticaria Neg Hx      Amblyopia Neg Hx      Blindness Neg Hx      Cataracts Neg Hx      Diabetes Neg Hx      Glaucoma Neg Hx      Macular degeneration Neg Hx      Retinal detachment Neg Hx      Strabismus Neg Hx      Stroke Neg Hx         Social History     Tobacco Use    Smoking status: Never    Smokeless tobacco: Never   Substance Use Topics    Alcohol use: Yes     Comment: 2 drinks per week    Drug use: No     Comment: MEDICAL MARIJUANA         Vitals:    07/02/24 1150   BP: (!) 144/64   Pulse:    Resp:    Temp:         Physical Exam:  BP (!) 144/64 (BP Location: Right arm, Patient Position: Sitting, BP Method: Large (Automatic))   Pulse 78   Temp 97.8 °F (36.6 °C) (Temporal)   Resp 17   Ht 5' 3" (1.6 m)   Wt 83.2 kg (183 lb 6.8 oz)   SpO2 97%   BMI 32.49 kg/m²     Physical Exam  Constitutional:       Appearance: Normal appearance.   HENT:      Head: Normocephalic and atraumatic.      Nose: Nose normal.      Mouth/Throat:      Mouth: Mucous membranes are moist.      Pharynx: Oropharynx is clear.   Eyes:      Conjunctiva/sclera: Conjunctivae normal.   Cardiovascular:      Rate and Rhythm: Normal rate and regular rhythm.      Heart sounds: Normal heart sounds.   Pulmonary:      Effort: Pulmonary effort is normal.      Breath sounds: Normal breath sounds.   Abdominal:      General: Abdomen is flat. Bowel sounds are normal.      Palpations: Abdomen is soft.   Musculoskeletal:         General: Normal range of motion.      Cervical back: Normal range of motion and neck supple.   Skin:     General: Skin is warm and dry.   Neurological:      General: No focal deficit present.      Mental Status: She is alert and oriented to " person, place, and time. Mental status is at baseline.   Psychiatric:         Mood and Affect: Mood normal.          Labs:  Lab Results   Component Value Date    WBC 6.83 07/02/2024    RBC 4.61 07/02/2024    HGB 14.4 07/02/2024    HCT 43.2 07/02/2024    MCV 94 07/02/2024    MCH 31.2 (H) 07/02/2024    MCHC 33.3 07/02/2024    RDW 13.3 07/02/2024     07/02/2024    MPV 11.4 07/02/2024    GRAN 4.1 07/02/2024    GRAN 60.2 07/02/2024    LYMPH 1.9 07/02/2024    LYMPH 27.7 07/02/2024    MONO 0.4 07/02/2024    MONO 6.0 07/02/2024    EOS 0.4 07/02/2024    BASO 0.06 07/02/2024    EOSINOPHIL 5.1 07/02/2024    BASOPHIL 0.9 07/02/2024     Sodium   Date Value Ref Range Status   07/02/2024 139 136 - 145 mmol/L Final     Potassium   Date Value Ref Range Status   07/02/2024 4.4 3.5 - 5.1 mmol/L Final     Chloride   Date Value Ref Range Status   07/02/2024 106 95 - 110 mmol/L Final     CO2   Date Value Ref Range Status   07/02/2024 24 23 - 29 mmol/L Final     Glucose   Date Value Ref Range Status   07/02/2024 113 (H) 70 - 110 mg/dL Final     BUN   Date Value Ref Range Status   07/02/2024 15 8 - 23 mg/dL Final     Creatinine   Date Value Ref Range Status   07/02/2024 0.9 0.5 - 1.4 mg/dL Final     Calcium   Date Value Ref Range Status   07/02/2024 10.0 8.7 - 10.5 mg/dL Final     Total Protein   Date Value Ref Range Status   07/02/2024 7.5 6.0 - 8.4 g/dL Final     Albumin   Date Value Ref Range Status   07/02/2024 3.8 3.5 - 5.2 g/dL Final     Total Bilirubin   Date Value Ref Range Status   07/02/2024 0.7 0.1 - 1.0 mg/dL Final     Comment:     For infants and newborns, interpretation of results should be based  on gestational age, weight and in agreement with clinical  observations.    Premature Infant recommended reference ranges:  Up to 24 hours.............<8.0 mg/dL  Up to 48 hours............<12.0 mg/dL  3-5 days..................<15.0 mg/dL  6-29 days.................<15.0 mg/dL       Alkaline Phosphatase   Date Value Ref  Range Status   07/02/2024 121 55 - 135 U/L Final     AST   Date Value Ref Range Status   07/02/2024 100 (H) 10 - 40 U/L Final     ALT   Date Value Ref Range Status   07/02/2024 336 (H) 10 - 44 U/L Final     Anion Gap   Date Value Ref Range Status   07/02/2024 9 8 - 16 mmol/L Final     eGFR if    Date Value Ref Range Status   10/25/2021 >60.0 >60 mL/min/1.73 m^2 Final     eGFR if non    Date Value Ref Range Status   10/25/2021 >60.0 >60 mL/min/1.73 m^2 Final     Comment:     Calculation used to obtain the estimated glomerular filtration  rate (eGFR) is the CKD-EPI equation.          A/P:    Rectal adenocarcinoma   -patient with confirmed metastatic solitary lesion in the lung.  -MRI liver did not show any liver Mets  -I discussed the case in tumor board.  I also discussed it with Dr. Montoya and Dr. Melvin.  The plan is to try neoadjuvant treatment for at least 3 cycles with FOLFOX and Avastin.  I will add on any targeted therapy after [facility] results are in.  Afterwards we will re-scanned her and discuss moving forward with radiation therapy versus resection of the metastatic disease which would require a lower lobe lobectomy.    -okay for cycle 1   -return to clinic in 2 weeks for cycle 2     Transaminitis   -I suspect that this may be due to Tylenol and Percocet that she was taking after her recent port placement.  She states that she was taking a Percocet every 4-5 hours and would occasionally take Tylenol as well extra strength.  -I asked her to stop taking these pain medication to take Advil if she is having any pain.  I will monitor LFTs closely while she is on 5 FU    Aurash Khoobehi, MD  Hematology and Oncology

## 2024-07-03 ENCOUNTER — DOCUMENTATION ONLY (OUTPATIENT)
Facility: CLINIC | Age: 64
End: 2024-07-03
Payer: COMMERCIAL

## 2024-07-03 ENCOUNTER — INFUSION (OUTPATIENT)
Dept: INFUSION THERAPY | Facility: HOSPITAL | Age: 64
End: 2024-07-03
Attending: INTERNAL MEDICINE
Payer: COMMERCIAL

## 2024-07-03 VITALS
HEART RATE: 93 BPM | DIASTOLIC BLOOD PRESSURE: 85 MMHG | WEIGHT: 183.13 LBS | RESPIRATION RATE: 17 BRPM | SYSTOLIC BLOOD PRESSURE: 130 MMHG | OXYGEN SATURATION: 98 % | TEMPERATURE: 98 F | HEIGHT: 63 IN | BODY MASS INDEX: 32.45 KG/M2

## 2024-07-03 DIAGNOSIS — C78.02 MALIGNANT NEOPLASM METASTATIC TO LEFT LUNG: Primary | ICD-10-CM

## 2024-07-03 DIAGNOSIS — C18.7 MALIGNANT NEOPLASM OF SIGMOID COLON: ICD-10-CM

## 2024-07-03 PROCEDURE — 96415 CHEMO IV INFUSION ADDL HR: CPT

## 2024-07-03 PROCEDURE — 96367 TX/PROPH/DG ADDL SEQ IV INF: CPT

## 2024-07-03 PROCEDURE — 96416 CHEMO PROLONG INFUSE W/PUMP: CPT

## 2024-07-03 PROCEDURE — 96417 CHEMO IV INFUS EACH ADDL SEQ: CPT

## 2024-07-03 PROCEDURE — 96368 THER/DIAG CONCURRENT INF: CPT

## 2024-07-03 PROCEDURE — 25000003 PHARM REV CODE 250: Performed by: INTERNAL MEDICINE

## 2024-07-03 PROCEDURE — 63600175 PHARM REV CODE 636 W HCPCS: Performed by: INTERNAL MEDICINE

## 2024-07-03 PROCEDURE — 96413 CHEMO IV INFUSION 1 HR: CPT

## 2024-07-03 PROCEDURE — 96411 CHEMO IV PUSH ADDL DRUG: CPT

## 2024-07-03 RX ORDER — DIPHENHYDRAMINE HYDROCHLORIDE 50 MG/ML
50 INJECTION INTRAMUSCULAR; INTRAVENOUS ONCE AS NEEDED
Status: DISCONTINUED | OUTPATIENT
Start: 2024-07-03 | End: 2024-07-03 | Stop reason: HOSPADM

## 2024-07-03 RX ORDER — PROCHLORPERAZINE EDISYLATE 5 MG/ML
10 INJECTION INTRAMUSCULAR; INTRAVENOUS ONCE AS NEEDED
Status: DISCONTINUED | OUTPATIENT
Start: 2024-07-03 | End: 2024-07-03 | Stop reason: HOSPADM

## 2024-07-03 RX ORDER — SODIUM CHLORIDE 0.9 % (FLUSH) 0.9 %
10 SYRINGE (ML) INJECTION
Status: DISCONTINUED | OUTPATIENT
Start: 2024-07-03 | End: 2024-07-03 | Stop reason: HOSPADM

## 2024-07-03 RX ORDER — FLUOROURACIL 50 MG/ML
400 INJECTION, SOLUTION INTRAVENOUS
Status: COMPLETED | OUTPATIENT
Start: 2024-07-03 | End: 2024-07-03

## 2024-07-03 RX ORDER — EPINEPHRINE 0.3 MG/.3ML
0.3 INJECTION SUBCUTANEOUS ONCE AS NEEDED
Status: DISCONTINUED | OUTPATIENT
Start: 2024-07-03 | End: 2024-07-03 | Stop reason: HOSPADM

## 2024-07-03 RX ADMIN — FLUOROURACIL 780 MG: 50 INJECTION, SOLUTION INTRAVENOUS at 02:07

## 2024-07-03 RX ADMIN — DEXTROSE MONOHYDRATE: 50 INJECTION, SOLUTION INTRAVENOUS at 12:07

## 2024-07-03 RX ADMIN — FLUOROURACIL 4680 MG: 50 INJECTION, SOLUTION INTRAVENOUS at 02:07

## 2024-07-03 RX ADMIN — BEVACIZUMAB-AWWB 400 MG: 400 INJECTION, SOLUTION INTRAVENOUS at 11:07

## 2024-07-03 RX ADMIN — PALONOSETRON HYDROCHLORIDE 0.25 MG: 0.25 INJECTION INTRAVENOUS at 11:07

## 2024-07-03 RX ADMIN — OXALIPLATIN 166 MG: 5 INJECTION, SOLUTION INTRAVENOUS at 12:07

## 2024-07-03 RX ADMIN — DEXTROSE MONOHYDRATE 780 MG: 50 INJECTION, SOLUTION INTRAVENOUS at 12:07

## 2024-07-03 RX ADMIN — SODIUM CHLORIDE: 9 INJECTION, SOLUTION INTRAVENOUS at 11:07

## 2024-07-03 NOTE — PLAN OF CARE
Problem: Fatigue  Goal: Improved Activity Tolerance  Intervention: Promote Improved Energy  Flowsheets (Taken 7/3/2024 1056)  Fatigue Management: fatigue-related activity identified  Activity Management: Ambulated -L4

## 2024-07-03 NOTE — PROGRESS NOTES
CHEMO SCHOOL- NUTRITION    Malaika Craven is a 63 y.o. female with colon cancer. Pt will be receiving folfox and mvasi. I met with Mrs. Craven for chemo school today. Mrs. Craven reports good appetite and intake. She denies any recent unintentional weight changes. She reports good hydration. She is not currently taking any herbal or antioxidant supplements. She denies having any nutrition related questions or concerns at the current time.     CW: 183#.     Food Insecurity:  Patient is worried whether their food would run out before they have more money to buy more: Never  The food they bought just didn't last and they didn't have money to buy more: Never      Plan:   Reviewed chemo school packet & provided copy to pt.   Discussed importance of maintaining wt & staying hydrated.   Reviewed food safety guidelines recommended during treatment.   Discussed cold sensitivity due to oxaliplatin  Provided RD contact info & encouraged pt to call with any questions/concerns.   Will f/u as needed.       Electronically signed by: Rajni Waddell MBA, RDN, LDN

## 2024-07-05 ENCOUNTER — INFUSION (OUTPATIENT)
Dept: INFUSION THERAPY | Facility: HOSPITAL | Age: 64
End: 2024-07-05
Attending: INTERNAL MEDICINE
Payer: COMMERCIAL

## 2024-07-05 VITALS
OXYGEN SATURATION: 98 % | WEIGHT: 181 LBS | TEMPERATURE: 97 F | DIASTOLIC BLOOD PRESSURE: 81 MMHG | HEIGHT: 63 IN | SYSTOLIC BLOOD PRESSURE: 127 MMHG | RESPIRATION RATE: 16 BRPM | BODY MASS INDEX: 32.07 KG/M2 | HEART RATE: 77 BPM

## 2024-07-05 DIAGNOSIS — C78.02 MALIGNANT NEOPLASM METASTATIC TO LEFT LUNG: Primary | ICD-10-CM

## 2024-07-05 DIAGNOSIS — C18.7 MALIGNANT NEOPLASM OF SIGMOID COLON: ICD-10-CM

## 2024-07-05 PROCEDURE — 63600175 PHARM REV CODE 636 W HCPCS: Performed by: INTERNAL MEDICINE

## 2024-07-05 PROCEDURE — 96523 IRRIG DRUG DELIVERY DEVICE: CPT

## 2024-07-05 PROCEDURE — A4216 STERILE WATER/SALINE, 10 ML: HCPCS | Performed by: INTERNAL MEDICINE

## 2024-07-05 PROCEDURE — 25000003 PHARM REV CODE 250: Performed by: INTERNAL MEDICINE

## 2024-07-05 RX ORDER — SODIUM CHLORIDE 0.9 % (FLUSH) 0.9 %
10 SYRINGE (ML) INJECTION
Status: DISCONTINUED | OUTPATIENT
Start: 2024-07-05 | End: 2024-07-05 | Stop reason: HOSPADM

## 2024-07-05 RX ORDER — HEPARIN 100 UNIT/ML
500 SYRINGE INTRAVENOUS
Status: DISCONTINUED | OUTPATIENT
Start: 2024-07-05 | End: 2024-07-05 | Stop reason: HOSPADM

## 2024-07-05 RX ADMIN — HEPARIN 500 UNITS: 100 SYRINGE at 01:07

## 2024-07-05 RX ADMIN — SODIUM CHLORIDE, PRESERVATIVE FREE 10 ML: 5 INJECTION INTRAVENOUS at 01:07

## 2024-07-10 ENCOUNTER — PATIENT MESSAGE (OUTPATIENT)
Dept: HEMATOLOGY/ONCOLOGY | Facility: CLINIC | Age: 64
End: 2024-07-10
Payer: COMMERCIAL

## 2024-07-16 ENCOUNTER — OFFICE VISIT (OUTPATIENT)
Dept: HEMATOLOGY/ONCOLOGY | Facility: CLINIC | Age: 64
End: 2024-07-16
Payer: COMMERCIAL

## 2024-07-16 ENCOUNTER — LAB VISIT (OUTPATIENT)
Dept: LAB | Facility: HOSPITAL | Age: 64
End: 2024-07-16
Attending: INTERNAL MEDICINE
Payer: COMMERCIAL

## 2024-07-16 VITALS
TEMPERATURE: 98 F | BODY MASS INDEX: 30.25 KG/M2 | DIASTOLIC BLOOD PRESSURE: 71 MMHG | SYSTOLIC BLOOD PRESSURE: 136 MMHG | HEIGHT: 66 IN | RESPIRATION RATE: 18 BRPM | OXYGEN SATURATION: 95 % | HEART RATE: 86 BPM | WEIGHT: 188.25 LBS

## 2024-07-16 DIAGNOSIS — T78.3XXA ANGIOEDEMA, INITIAL ENCOUNTER: ICD-10-CM

## 2024-07-16 DIAGNOSIS — C78.02 MALIGNANT NEOPLASM METASTATIC TO LEFT LUNG: ICD-10-CM

## 2024-07-16 DIAGNOSIS — C18.7 MALIGNANT NEOPLASM OF SIGMOID COLON: ICD-10-CM

## 2024-07-16 DIAGNOSIS — C18.7 MALIGNANT NEOPLASM OF SIGMOID COLON: Primary | ICD-10-CM

## 2024-07-16 LAB
ALBUMIN SERPL BCP-MCNC: 3.7 G/DL (ref 3.5–5.2)
ALP SERPL-CCNC: 99 U/L (ref 55–135)
ALT SERPL W/O P-5'-P-CCNC: 34 U/L (ref 10–44)
ANION GAP SERPL CALC-SCNC: 11 MMOL/L (ref 8–16)
AST SERPL-CCNC: 20 U/L (ref 10–40)
BASOPHILS # BLD AUTO: 0.1 K/UL (ref 0–0.2)
BASOPHILS NFR BLD: 1.6 % (ref 0–1.9)
BILIRUB SERPL-MCNC: 0.4 MG/DL (ref 0.1–1)
BUN SERPL-MCNC: 14 MG/DL (ref 8–23)
CALCIUM SERPL-MCNC: 9.7 MG/DL (ref 8.7–10.5)
CHLORIDE SERPL-SCNC: 102 MMOL/L (ref 95–110)
CO2 SERPL-SCNC: 27 MMOL/L (ref 23–29)
CREAT SERPL-MCNC: 0.9 MG/DL (ref 0.5–1.4)
DIFFERENTIAL METHOD BLD: ABNORMAL
EOSINOPHIL # BLD AUTO: 0.3 K/UL (ref 0–0.5)
EOSINOPHIL NFR BLD: 5.1 % (ref 0–8)
ERYTHROCYTE [DISTWIDTH] IN BLOOD BY AUTOMATED COUNT: 12.6 % (ref 11.5–14.5)
EST. GFR  (NO RACE VARIABLE): >60 ML/MIN/1.73 M^2
GLUCOSE SERPL-MCNC: 99 MG/DL (ref 70–110)
HCT VFR BLD AUTO: 40.7 % (ref 37–48.5)
HGB BLD-MCNC: 13.7 G/DL (ref 12–16)
IMM GRANULOCYTES # BLD AUTO: 0.01 K/UL (ref 0–0.04)
IMM GRANULOCYTES NFR BLD AUTO: 0.2 % (ref 0–0.5)
LYMPHOCYTES # BLD AUTO: 2.1 K/UL (ref 1–4.8)
LYMPHOCYTES NFR BLD: 34.1 % (ref 18–48)
MCH RBC QN AUTO: 31.6 PG (ref 27–31)
MCHC RBC AUTO-ENTMCNC: 33.7 G/DL (ref 32–36)
MCV RBC AUTO: 94 FL (ref 82–98)
MONOCYTES # BLD AUTO: 0.4 K/UL (ref 0.3–1)
MONOCYTES NFR BLD: 5.6 % (ref 4–15)
NEUTROPHILS # BLD AUTO: 3.3 K/UL (ref 1.8–7.7)
NEUTROPHILS NFR BLD: 53.4 % (ref 38–73)
NRBC BLD-RTO: 0 /100 WBC
PLATELET # BLD AUTO: 261 K/UL (ref 150–450)
PMV BLD AUTO: 10.9 FL (ref 9.2–12.9)
POTASSIUM SERPL-SCNC: 3.9 MMOL/L (ref 3.5–5.1)
PROT SERPL-MCNC: 7.2 G/DL (ref 6–8.4)
RBC # BLD AUTO: 4.34 M/UL (ref 4–5.4)
SODIUM SERPL-SCNC: 140 MMOL/L (ref 136–145)
WBC # BLD AUTO: 6.22 K/UL (ref 3.9–12.7)

## 2024-07-16 PROCEDURE — 1159F MED LIST DOCD IN RCRD: CPT | Mod: CPTII,S$GLB,, | Performed by: INTERNAL MEDICINE

## 2024-07-16 PROCEDURE — 3075F SYST BP GE 130 - 139MM HG: CPT | Mod: CPTII,S$GLB,, | Performed by: INTERNAL MEDICINE

## 2024-07-16 PROCEDURE — 99999 PR PBB SHADOW E&M-EST. PATIENT-LVL IV: CPT | Mod: PBBFAC,,, | Performed by: INTERNAL MEDICINE

## 2024-07-16 PROCEDURE — 3078F DIAST BP <80 MM HG: CPT | Mod: CPTII,S$GLB,, | Performed by: INTERNAL MEDICINE

## 2024-07-16 PROCEDURE — 85025 COMPLETE CBC W/AUTO DIFF WBC: CPT | Performed by: INTERNAL MEDICINE

## 2024-07-16 PROCEDURE — 80053 COMPREHEN METABOLIC PANEL: CPT | Performed by: INTERNAL MEDICINE

## 2024-07-16 PROCEDURE — 3044F HG A1C LEVEL LT 7.0%: CPT | Mod: CPTII,S$GLB,, | Performed by: INTERNAL MEDICINE

## 2024-07-16 PROCEDURE — 36415 COLL VENOUS BLD VENIPUNCTURE: CPT | Performed by: INTERNAL MEDICINE

## 2024-07-16 PROCEDURE — 99215 OFFICE O/P EST HI 40 MIN: CPT | Mod: S$GLB,,, | Performed by: INTERNAL MEDICINE

## 2024-07-16 PROCEDURE — 3008F BODY MASS INDEX DOCD: CPT | Mod: CPTII,S$GLB,, | Performed by: INTERNAL MEDICINE

## 2024-07-16 RX ORDER — EPINEPHRINE 0.3 MG/.3ML
1 INJECTION SUBCUTANEOUS ONCE
Qty: 0.3 ML | Refills: 0 | Status: SHIPPED | OUTPATIENT
Start: 2024-07-16 | End: 2024-07-16

## 2024-07-16 RX ORDER — PROCHLORPERAZINE EDISYLATE 5 MG/ML
10 INJECTION INTRAMUSCULAR; INTRAVENOUS ONCE AS NEEDED
Status: CANCELLED | OUTPATIENT
Start: 2024-07-18

## 2024-07-16 RX ORDER — EPINEPHRINE 0.3 MG/.3ML
0.3 INJECTION SUBCUTANEOUS ONCE AS NEEDED
Status: CANCELLED | OUTPATIENT
Start: 2024-07-16

## 2024-07-16 RX ORDER — HEPARIN 100 UNIT/ML
500 SYRINGE INTRAVENOUS
Status: CANCELLED | OUTPATIENT
Start: 2024-07-16

## 2024-07-16 RX ORDER — FLUOROURACIL 50 MG/ML
400 INJECTION, SOLUTION INTRAVENOUS
Status: CANCELLED | OUTPATIENT
Start: 2024-07-16

## 2024-07-16 RX ORDER — SODIUM CHLORIDE 0.9 % (FLUSH) 0.9 %
10 SYRINGE (ML) INJECTION
Status: CANCELLED | OUTPATIENT
Start: 2024-07-16

## 2024-07-16 RX ORDER — PROCHLORPERAZINE EDISYLATE 5 MG/ML
10 INJECTION INTRAMUSCULAR; INTRAVENOUS ONCE AS NEEDED
Status: CANCELLED | OUTPATIENT
Start: 2024-07-16

## 2024-07-16 RX ORDER — DIPHENHYDRAMINE HYDROCHLORIDE 50 MG/ML
50 INJECTION INTRAMUSCULAR; INTRAVENOUS ONCE AS NEEDED
Status: CANCELLED | OUTPATIENT
Start: 2024-07-16

## 2024-07-16 RX ORDER — HEPARIN 100 UNIT/ML
500 SYRINGE INTRAVENOUS
Status: CANCELLED | OUTPATIENT
Start: 2024-07-18

## 2024-07-16 RX ORDER — SODIUM CHLORIDE 0.9 % (FLUSH) 0.9 %
10 SYRINGE (ML) INJECTION
Status: CANCELLED | OUTPATIENT
Start: 2024-07-18

## 2024-07-16 NOTE — PROGRESS NOTES
Service Date:  7/16/24    Chief Complaint: Colonca/Labs and Ua    Malaika Craven is a 63 y.o. 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  She also had a CT scan done which showed a 2.2 centimeter lesion in the left lower lobe of the lung.  No evidence of liver Mets on MRI.    Here for neoadjuvant FOLFOX with Avastin.  Tolerated cycle 1 well. Doing ok. Had some hives about 3 days after removing 5FU pump. Has hx of hives with meds. Also had sore throat. No other complaints.    Review of Systems   Constitutional: Negative.  Negative for appetite change and unexpected weight change.   HENT: Negative.  Negative for mouth sores.    Eyes: Negative.  Negative for visual disturbance.   Respiratory: Negative.  Negative for cough and shortness of breath.    Cardiovascular: Negative.  Negative for chest pain.   Gastrointestinal: Negative.  Negative for abdominal pain and diarrhea.   Endocrine: Negative.    Genitourinary: Negative.  Negative for frequency.   Musculoskeletal: Negative.  Negative for back pain.   Integumentary:  Negative for rash. Negative.   Neurological: Negative.  Negative for headaches.   Hematological: Negative.  Negative for adenopathy.   Psychiatric/Behavioral: Negative.          Current Outpatient Medications   Medication Instructions    ALPRAZolam (XANAX) 0.5 mg, Oral, 3 times daily    diphenhydrAMINE-aluminum-magnesium hydroxide-simethicone-LIDOcaine viscous HCl 2% 15 mLs, Swish & Spit, Every 4 hours PRN    ELDERBERRY FRUIT ORAL Oral    EPINEPHrine (EPIPEN) 0.3 mg, Intramuscular, Once    fexofenadine (ALLEGRA ALLERGY) 60 MG tablet     hydroCHLOROthiazide (HYDRODIURIL) 25 mg, Oral, Daily, Patient takes two pills daily    LIDOcaine-prilocaine (EMLA) cream Topical (Top), As needed (PRN)    multivit-minerals/folic acid (ADULT ONE DAILY MULTIVITAMIN ORAL) 1 tablet, Oral, Daily PRN    multivitamin capsule 1 capsule, Oral, Daily    NIFEdipine (ADALAT CC) 60 MG TbSR TAKE 1  TABLET BY MOUTH EVERY DAY ON AN EMPTY STOMACH    OLANZapine (ZYPREXA) 5 MG tablet Take 1 tablet by mouth nightly on days 1-3 of each chemotherapy cycle.    OMEPRAZOLE ORAL 0.5 mg, Oral, Daily PRN    ondansetron (ZOFRAN) 8 mg, Oral, Every 8 hours PRN    oxyCODONE-acetaminophen (PERCOCET) 5-325 mg per tablet 1 tablet, Oral, Every 4 hours PRN    polyethylene glycol (GLYCOLAX) 17 g, Oral, Daily    potassium chloride (MICRO-K) 10 MEQ CpSR 20 mEq, Oral, Daily    prochlorperazine (COMPAZINE) 10 mg, Oral, Every 6 hours PRN    RYALTRIS 665-25 mcg/spray Spry     traZODone (DESYREL) 150 mg, Oral, Nightly        Past Medical History:   Diagnosis Date    Abnormal Pap smear of cervix     Allergy     Corneal erosion of right eye     Generalized anxiety disorder     Hypertension     Rectal mass     Sinus problem         Past Surgical History:   Procedure Laterality Date    BREAST CYST ASPIRATION       SECTION      X 2    CHOLECYSTECTOMY      COLONOSCOPY N/A 2024    Procedure: COLONOSCOPY;  Surgeon: Radhika Leary MD;  Location: [facility];  Service: Endoscopy;  Laterality: N/A;    DILATION AND CURETTAGE OF UTERUS      [facility]      EYE SURGERY Right     FOR CORNEAL EROSION    FLEXIBLE SIGMOIDOSCOPY N/A 2024    Procedure: SIGMOIDOSCOPY, FLEXIBLE(Fleets);  Surgeon: Oumar Montoya MD;  Location: [facility];  Service: General;  Laterality: N/A;    INSERTION OF TUNNELED CENTRAL VENOUS CATHETER (CVC) WITH SUBCUTANEOUS PORT N/A 2024    Procedure: PASGWDRLY-FNSQ-E-CATH;  Surgeon: Oumar Montoya MD;  Location: [facility];  Service: General;  Laterality: N/A;    LIPOSUCTION      x 2    Mole Excision Right     Shin    TUBAL LIGATION          Family History   Problem Relation Name Age of Onset    Heart disease Mother      Hypertension Mother      Arthritis Father      Thyroid disease Father      Kidney disease Paternal Grandmother      Liver cancer Maternal Grandmother      Cancer Maternal Grandfather       "Allergies Sister      Allergic rhinitis Sister      Angioedema Neg Hx      Asthma Neg Hx      Atopy Neg Hx      Eczema Neg Hx      Immunodeficiency Neg Hx      Rhinitis Neg Hx      Urticaria Neg Hx      Amblyopia Neg Hx      Blindness Neg Hx      Cataracts Neg Hx      Diabetes Neg Hx      Glaucoma Neg Hx      Macular degeneration Neg Hx      Retinal detachment Neg Hx      Strabismus Neg Hx      Stroke Neg Hx         Social History     Tobacco Use    Smoking status: Never    Smokeless tobacco: Never   Substance Use Topics    Alcohol use: Yes     Comment: 2 drinks per week    Drug use: No     Comment: MEDICAL MARIJUANA         Vitals:    07/16/24 0922   BP: 136/71   Pulse: 86   Resp: 18   Temp: 98.2 °F (36.8 °C)        Physical Exam:  /71 (BP Location: Left arm, Patient Position: Sitting, BP Method: X-Large (Manual))   Pulse 86   Temp 98.2 °F (36.8 °C) (Temporal)   Resp 18   Ht 5' 6" (1.676 m)   Wt 85.4 kg (188 lb 4.4 oz)   SpO2 95%   BMI 30.39 kg/m²     Physical Exam  Constitutional:       Appearance: Normal appearance.   HENT:      Head: Normocephalic and atraumatic.      Nose: Nose normal.      Mouth/Throat:      Mouth: Mucous membranes are moist.      Pharynx: Oropharynx is clear.   Eyes:      Conjunctiva/sclera: Conjunctivae normal.   Cardiovascular:      Rate and Rhythm: Normal rate and regular rhythm.      Heart sounds: Normal heart sounds.   Pulmonary:      Effort: Pulmonary effort is normal.      Breath sounds: Normal breath sounds.   Abdominal:      General: Abdomen is flat. Bowel sounds are normal.      Palpations: Abdomen is soft.   Musculoskeletal:         General: Normal range of motion.      Cervical back: Normal range of motion and neck supple.   Skin:     General: Skin is warm and dry.   Neurological:      General: No focal deficit present.      Mental Status: She is alert and oriented to person, place, and time. Mental status is at baseline.   Psychiatric:         Mood and Affect: Mood " normal.          Labs:  Lab Results   Component Value Date    WBC 6.22 07/16/2024    RBC 4.34 07/16/2024    HGB 13.7 07/16/2024    HCT 40.7 07/16/2024    MCV 94 07/16/2024    MCH 31.6 (H) 07/16/2024    MCHC 33.7 07/16/2024    RDW 12.6 07/16/2024     07/16/2024    MPV 10.9 07/16/2024    GRAN 3.3 07/16/2024    GRAN 53.4 07/16/2024    LYMPH 2.1 07/16/2024    LYMPH 34.1 07/16/2024    MONO 0.4 07/16/2024    MONO 5.6 07/16/2024    EOS 0.3 07/16/2024    BASO 0.10 07/16/2024    EOSINOPHIL 5.1 07/16/2024    BASOPHIL 1.6 07/16/2024     Sodium   Date Value Ref Range Status   07/16/2024 140 136 - 145 mmol/L Final     Potassium   Date Value Ref Range Status   07/16/2024 3.9 3.5 - 5.1 mmol/L Final     Chloride   Date Value Ref Range Status   07/16/2024 102 95 - 110 mmol/L Final     CO2   Date Value Ref Range Status   07/16/2024 27 23 - 29 mmol/L Final     Glucose   Date Value Ref Range Status   07/16/2024 99 70 - 110 mg/dL Final     BUN   Date Value Ref Range Status   07/16/2024 14 8 - 23 mg/dL Final     Creatinine   Date Value Ref Range Status   07/16/2024 0.9 0.5 - 1.4 mg/dL Final     Calcium   Date Value Ref Range Status   07/16/2024 9.7 8.7 - 10.5 mg/dL Final     Total Protein   Date Value Ref Range Status   07/16/2024 7.2 6.0 - 8.4 g/dL Final     Albumin   Date Value Ref Range Status   07/16/2024 3.7 3.5 - 5.2 g/dL Final     Total Bilirubin   Date Value Ref Range Status   07/16/2024 0.4 0.1 - 1.0 mg/dL Final     Comment:     For infants and newborns, interpretation of results should be based  on gestational age, weight and in agreement with clinical  observations.    Premature Infant recommended reference ranges:  Up to 24 hours.............<8.0 mg/dL  Up to 48 hours............<12.0 mg/dL  3-5 days..................<15.0 mg/dL  6-29 days.................<15.0 mg/dL       Alkaline Phosphatase   Date Value Ref Range Status   07/16/2024 99 55 - 135 U/L Final     AST   Date Value Ref Range Status   07/16/2024 20 10 - 40  U/L Final     ALT   Date Value Ref Range Status   07/16/2024 34 10 - 44 U/L Final     Anion Gap   Date Value Ref Range Status   07/16/2024 11 8 - 16 mmol/L Final     eGFR if    Date Value Ref Range Status   10/25/2021 >60.0 >60 mL/min/1.73 m^2 Final     eGFR if non    Date Value Ref Range Status   10/25/2021 >60.0 >60 mL/min/1.73 m^2 Final     Comment:     Calculation used to obtain the estimated glomerular filtration  rate (eGFR) is the CKD-EPI equation.          A/P:    Rectal adenocarcinoma   -patient with confirmed metastatic solitary lesion in the lung.  -MRI liver did not show any liver Mets  -I discussed the case in tumor board.  I also discussed it with Dr. Montoya and Dr. Melvin.  The plan is to try neoadjuvant treatment for at least 3 cycles with FOLFOX and Avastin.  Afterwards we will re-scanned her and discuss moving forward with radiation therapy versus resection of the metastatic disease which would require a lower lobe lobectomy.    -still waiting on [facility] results. Will call them if results aren't in now.  -okay for cycle 2   -return to clinic in 2 weeks for cycle 3     Transaminitis   -I suspect that this may be due to Tylenol and Percocet that she was taking after her recent port placement.  She states that she was taking a Percocet every 4-5 hours and would occasionally take Tylenol as well extra strength.  -I asked her to stop taking these pain medication to take Advil if she is having any pain.  I will monitor LFTs closely while she is on 5 FU  -CMP from today still pending.    Aurash Khoobehi, MD  Hematology and Oncology

## 2024-07-16 NOTE — Clinical Note
Ok for treatment tomorrow. RTC in 2 weeks with labs. Please look up [facility] testing results. If results are not in, call them today and find out what is going on.

## 2024-07-17 ENCOUNTER — INFUSION (OUTPATIENT)
Dept: INFUSION THERAPY | Facility: HOSPITAL | Age: 64
End: 2024-07-17
Attending: INTERNAL MEDICINE
Payer: COMMERCIAL

## 2024-07-17 VITALS
SYSTOLIC BLOOD PRESSURE: 128 MMHG | RESPIRATION RATE: 18 BRPM | WEIGHT: 186.5 LBS | OXYGEN SATURATION: 96 % | TEMPERATURE: 98 F | HEIGHT: 66 IN | BODY MASS INDEX: 29.97 KG/M2 | DIASTOLIC BLOOD PRESSURE: 85 MMHG | HEART RATE: 80 BPM

## 2024-07-17 DIAGNOSIS — C78.02 MALIGNANT NEOPLASM METASTATIC TO LEFT LUNG: Primary | ICD-10-CM

## 2024-07-17 DIAGNOSIS — C18.7 MALIGNANT NEOPLASM OF SIGMOID COLON: ICD-10-CM

## 2024-07-17 PROCEDURE — 96413 CHEMO IV INFUSION 1 HR: CPT

## 2024-07-17 PROCEDURE — 96368 THER/DIAG CONCURRENT INF: CPT

## 2024-07-17 PROCEDURE — 96367 TX/PROPH/DG ADDL SEQ IV INF: CPT

## 2024-07-17 PROCEDURE — 63600175 PHARM REV CODE 636 W HCPCS: Performed by: INTERNAL MEDICINE

## 2024-07-17 PROCEDURE — 96416 CHEMO PROLONG INFUSE W/PUMP: CPT

## 2024-07-17 PROCEDURE — 25000003 PHARM REV CODE 250: Performed by: INTERNAL MEDICINE

## 2024-07-17 PROCEDURE — 96411 CHEMO IV PUSH ADDL DRUG: CPT

## 2024-07-17 PROCEDURE — 96415 CHEMO IV INFUSION ADDL HR: CPT

## 2024-07-17 PROCEDURE — 96417 CHEMO IV INFUS EACH ADDL SEQ: CPT

## 2024-07-17 RX ORDER — FLUOROURACIL 50 MG/ML
400 INJECTION, SOLUTION INTRAVENOUS
Status: COMPLETED | OUTPATIENT
Start: 2024-07-17 | End: 2024-07-17

## 2024-07-17 RX ORDER — PROCHLORPERAZINE EDISYLATE 5 MG/ML
10 INJECTION INTRAMUSCULAR; INTRAVENOUS ONCE AS NEEDED
Status: DISCONTINUED | OUTPATIENT
Start: 2024-07-17 | End: 2024-07-17 | Stop reason: HOSPADM

## 2024-07-17 RX ORDER — SODIUM CHLORIDE 0.9 % (FLUSH) 0.9 %
10 SYRINGE (ML) INJECTION
Status: DISCONTINUED | OUTPATIENT
Start: 2024-07-17 | End: 2024-07-17 | Stop reason: HOSPADM

## 2024-07-17 RX ORDER — DIPHENHYDRAMINE HYDROCHLORIDE 50 MG/ML
50 INJECTION INTRAMUSCULAR; INTRAVENOUS ONCE AS NEEDED
Status: DISCONTINUED | OUTPATIENT
Start: 2024-07-17 | End: 2024-07-17 | Stop reason: HOSPADM

## 2024-07-17 RX ORDER — EPINEPHRINE 0.3 MG/.3ML
0.3 INJECTION SUBCUTANEOUS ONCE AS NEEDED
Status: DISCONTINUED | OUTPATIENT
Start: 2024-07-17 | End: 2024-07-17 | Stop reason: HOSPADM

## 2024-07-17 RX ADMIN — LEUCOVORIN CALCIUM 780 MG: 350 INJECTION, POWDER, LYOPHILIZED, FOR SUSPENSION INTRAMUSCULAR; INTRAVENOUS at 12:07

## 2024-07-17 RX ADMIN — FLUOROURACIL 780 MG: 50 INJECTION, SOLUTION INTRAVENOUS at 02:07

## 2024-07-17 RX ADMIN — BEVACIZUMAB-AWWB 400 MG: 400 INJECTION, SOLUTION INTRAVENOUS at 11:07

## 2024-07-17 RX ADMIN — FLUOROURACIL 4680 MG: 50 INJECTION, SOLUTION INTRAVENOUS at 02:07

## 2024-07-17 RX ADMIN — PALONOSETRON HYDROCHLORIDE 0.25 MG: 0.25 INJECTION INTRAVENOUS at 12:07

## 2024-07-17 RX ADMIN — OXALIPLATIN 166 MG: 100 INJECTION, SOLUTION, CONCENTRATE INTRAVENOUS at 12:07

## 2024-07-17 NOTE — PLAN OF CARE
Problem: Fatigue  Goal: Improved Activity Tolerance  Outcome: Progressing  Intervention: Promote Improved Energy  Flowsheets (Taken 7/17/2024 1048)  Fatigue Management:   fatigue-related activity identified   paced activity encouraged   frequent rest breaks encouraged  Sleep/Rest Enhancement:   noise level reduced   relaxation techniques promoted   regular sleep/rest pattern promoted  Activity Management:   Ambulated -L4   Up in chair - L3  Environmental Support:   calm environment promoted   distractions minimized   rest periods encouraged

## 2024-07-18 VITALS
WEIGHT: 184.88 LBS | HEART RATE: 82 BPM | TEMPERATURE: 98 F | DIASTOLIC BLOOD PRESSURE: 82 MMHG | SYSTOLIC BLOOD PRESSURE: 120 MMHG | RESPIRATION RATE: 16 BRPM | OXYGEN SATURATION: 95 % | BODY MASS INDEX: 29.84 KG/M2

## 2024-07-18 PROCEDURE — 99283 EMERGENCY DEPT VISIT LOW MDM: CPT

## 2024-07-19 ENCOUNTER — HOSPITAL ENCOUNTER (EMERGENCY)
Facility: HOSPITAL | Age: 64
Discharge: HOME | End: 2024-07-19
Attending: EMERGENCY MEDICINE
Payer: COMMERCIAL

## 2024-07-19 ENCOUNTER — INFUSION (OUTPATIENT)
Dept: INFUSION THERAPY | Facility: HOSPITAL | Age: 64
End: 2024-07-19
Attending: INTERNAL MEDICINE
Payer: COMMERCIAL

## 2024-07-19 VITALS
SYSTOLIC BLOOD PRESSURE: 113 MMHG | RESPIRATION RATE: 16 BRPM | OXYGEN SATURATION: 97 % | WEIGHT: 183 LBS | HEART RATE: 84 BPM | TEMPERATURE: 98 F | BODY MASS INDEX: 29.41 KG/M2 | HEIGHT: 66 IN | DIASTOLIC BLOOD PRESSURE: 71 MMHG

## 2024-07-19 DIAGNOSIS — T82.534A: Primary | ICD-10-CM

## 2024-07-19 DIAGNOSIS — C18.7 MALIGNANT NEOPLASM OF SIGMOID COLON: ICD-10-CM

## 2024-07-19 DIAGNOSIS — Z95.828 PORT-A-CATH IN PLACE: ICD-10-CM

## 2024-07-19 DIAGNOSIS — C78.02 MALIGNANT NEOPLASM METASTATIC TO LEFT LUNG: Primary | ICD-10-CM

## 2024-07-19 PROCEDURE — A4216 STERILE WATER/SALINE, 10 ML: HCPCS | Performed by: INTERNAL MEDICINE

## 2024-07-19 PROCEDURE — 25000003 PHARM REV CODE 250: Performed by: INTERNAL MEDICINE

## 2024-07-19 PROCEDURE — 96523 IRRIG DRUG DELIVERY DEVICE: CPT

## 2024-07-19 PROCEDURE — 63600175 PHARM REV CODE 636 W HCPCS: Performed by: INTERNAL MEDICINE

## 2024-07-19 RX ORDER — HEPARIN 100 UNIT/ML
500 SYRINGE INTRAVENOUS
Status: DISCONTINUED | OUTPATIENT
Start: 2024-07-19 | End: 2024-07-19 | Stop reason: HOSPADM

## 2024-07-19 RX ORDER — SODIUM CHLORIDE 0.9 % (FLUSH) 0.9 %
10 SYRINGE (ML) INJECTION
Status: DISCONTINUED | OUTPATIENT
Start: 2024-07-19 | End: 2024-07-19 | Stop reason: HOSPADM

## 2024-07-19 RX ADMIN — SODIUM CHLORIDE, PRESERVATIVE FREE 10 ML: 5 INJECTION INTRAVENOUS at 12:07

## 2024-07-19 RX ADMIN — HEPARIN 500 UNITS: 100 SYRINGE at 12:07

## 2024-07-19 NOTE — ED NOTES
Dr. Portillo removed pts ludmila needle r/t infusion was leaking. MATTIE Crump-Resource nurse accessed port. Pt was able to get infusion to start back. NAD noted.

## 2024-07-19 NOTE — ED PROVIDER NOTES
Encounter Date: 2024       History     Chief Complaint   Patient presents with    Vascular Access Problem     Port access leaking. Chemo infusing and stopped by pt     Emergent evaluation of a 63-year-old female with sigmoid colon cancer with Mets to left lung who he was on chemotherapy through a port to the right chest wall reports she was to continue chemotherapy overnight tonight and then poor will be on access tomorrow.  Reports that beginning this evening she was leakage of the chemotherapy onto her chest and clothing.  She reports she believes that the port was accessed in correctly.  She denies pain that has no rash to the chest.      Review of patient's allergies indicates:   Allergen Reactions    Atenolol Hives, Itching and Swelling    Losartan Swelling and Hives     Other reaction(s): Angioedema    Tetracyclines Rash    Amlodipine      Hives and swelling on the lower extremities    Lisinopril Hives    Sulfa (sulfonamide antibiotics) Rash     Past Medical History:   Diagnosis Date    Abnormal Pap smear of cervix     Allergy     Corneal erosion of right eye     Generalized anxiety disorder     Hypertension     Rectal mass     Sinus problem      Past Surgical History:   Procedure Laterality Date    BREAST CYST ASPIRATION       SECTION      X 2    CHOLECYSTECTOMY      COLONOSCOPY N/A 2024    Procedure: COLONOSCOPY;  Surgeon: Radhika Leary MD;  Location: [facility];  Service: Endoscopy;  Laterality: N/A;    DILATION AND CURETTAGE OF UTERUS      [facility]      EYE SURGERY Right     FOR CORNEAL EROSION    FLEXIBLE SIGMOIDOSCOPY N/A 2024    Procedure: SIGMOIDOSCOPY, FLEXIBLE(Fleets);  Surgeon: Oumar Montoya MD;  Location: [facility];  Service: General;  Laterality: N/A;    INSERTION OF TUNNELED CENTRAL VENOUS CATHETER (CVC) WITH SUBCUTANEOUS PORT N/A 2024    Procedure: [id number]-X-CATH;  Surgeon: Oumar Montoya MD;  Location: [facility];  Service: General;  Laterality:  N/A;    LIPOSUCTION      x 2    Mole Excision Right     Shin    TUBAL LIGATION       Family History   Problem Relation Name Age of Onset    Heart disease Mother      Hypertension Mother      Arthritis Father      Thyroid disease Father      Kidney disease Paternal Grandmother      Liver cancer Maternal Grandmother      Cancer Maternal Grandfather      Allergies Sister      Allergic rhinitis Sister      Angioedema Neg Hx      Asthma Neg Hx      Atopy Neg Hx      Eczema Neg Hx      Immunodeficiency Neg Hx      Rhinitis Neg Hx      Urticaria Neg Hx      Amblyopia Neg Hx      Blindness Neg Hx      Cataracts Neg Hx      Diabetes Neg Hx      Glaucoma Neg Hx      Macular degeneration Neg Hx      Retinal detachment Neg Hx      Strabismus Neg Hx      Stroke Neg Hx       Social History     Tobacco Use    Smoking status: Never    Smokeless tobacco: Never   Substance Use Topics    Alcohol use: Yes     Comment: 2 drinks per week    Drug use: No     Comment: MEDICAL MARIJUANA     Review of Systems   Constitutional:  Negative for activity change and fatigue.   Respiratory:  Negative for shortness of breath.    Cardiovascular:  Negative for chest pain.   Musculoskeletal:  Negative for myalgias.   Skin:  Negative for color change, pallor, rash and wound.   Allergic/Immunologic: Positive for immunocompromised state.   All other systems reviewed and are negative.      Physical Exam     Initial Vitals [07/18/24 2320]   BP Pulse Resp Temp SpO2   120/82 82 16 98.1 °F (36.7 °C) 95 %      MAP       --         Physical Exam    Nursing note and vitals reviewed.  Constitutional: She appears well-developed and well-nourished. She is not diaphoretic. No distress.   HENT:   Head: Normocephalic and atraumatic.   Cardiovascular:  Normal rate, regular rhythm and normal heart sounds.           Pulmonary/Chest: Breath sounds normal. No respiratory distress. She has no wheezes. She has no rhonchi. She has no rales. Chest wall is not dull to  percussion. She exhibits no mass, no tenderness, no bony tenderness, no laceration, no crepitus, no edema, no deformity, no swelling and no retraction.       Skin: Skin is warm and dry. Abrasion noted. No bruising, no burn, no ecchymosis, no laceration, no lesion, no rash and no abscess noted. No erythema.         ED Course   Procedures  Labs Reviewed - No data to display       Imaging Results    None          Medications - No data to display  Medical Decision Making  Emergent evaluation of a 63-year-old female with sigmoid colon cancer with Mets to left lung who he was on chemotherapy through a port to the right chest wall reports she was to continue chemotherapy overnight tonight and then poor will be on access tomorrow.  Reports that beginning this evening she was leakage of the chemotherapy onto her chest and clothing.  She reports she believes that the port was accessed in correctly.  She denies pain that has no rash to the chest.  On my physical exam vital signs are normal blood pressure 120/82 pulse 82 temp 98.1° sats 95% respirations 16 normal cardiac exam clear breath sounds.  Patient was port to the right chest wall has signs of the chemotherapy agent staining the tape with a white powdery residue and that has white powdery residue dried on her shirt.  When I removed the tape the needle access enter the port is out 1 cm.  This was removed and the needle was bent at 15°.  Prior to removal of the port access chemo was stopped.  Nursing staff will cleaned her chest of any lead chemotherapy.  We will re access the port and restart her chemotherapy for the rest of the night.  She was to follow up in the morning.  She was no signs of burns or dermatitis from the chemo  MDM    Patient presents for emergent evaluation of acute patient began to leak her chemotherapy from her port this evening that poses a threat to life and/or bodily function.   Differential diagnosis includes but was not limited to dislodgement of  port access, fracture of tubing, contact dermatitis, chemical burns, cellulitis, necrotizing fasciitis, soft tissue injury.   In the ED patient found to have acute leaking of chemotherapy from port      Discharge MDM     Patient was managed in the ED with none nonfunctioning port access was removed and new 1 was established  The response to treatment was good.    Patient was discharged in stable condition.  Detailed return precautions discussed.  Patient was told to follow up with primary care physician or specialist based on their diagnosis  Alaina Portillo MD                                        Clinical Impression:  Final diagnoses:  [T82.534A] Leaking central line catheter- leaking from PORT , initial encounter (Primary)          ED Disposition Condition    Discharge Stable          ED Prescriptions    None       Follow-up Information       Follow up With Specialties Details Why Contact Info Additional Information    Dario Celestin MD Family Medicine Schedule an appointment as soon as possible for a visit  As needed [address] [facility]  [place] [address]  [phone]       [facility] Emergency Medicine Go to  If symptoms worsen [address] [place] [address] [address] [place] [id number] 1st floor             Alaina Portillo MD  07/19/24 0116

## 2024-07-19 NOTE — PLAN OF CARE
Problem: Fall Injury Risk  Goal: Absence of Fall and Fall-Related Injury  Outcome: Progressing  Intervention: Identify and Manage Contributors  Flowsheets (Taken 7/19/2024 1242)  Medication Review/Management: medications reviewed  Intervention: Promote Injury-Free Environment  Flowsheets (Taken 7/19/2024 1242)  Safety Promotion/Fall Prevention: assistive device/personal item within reach

## 2024-07-19 NOTE — ED NOTES
Attempted to access port per MD order and proper technique. Pt port did not have blood return. Deaccessed r/t no blood return and called resource nurse to attempt.

## 2024-07-30 ENCOUNTER — LAB VISIT (OUTPATIENT)
Dept: LAB | Facility: HOSPITAL | Age: 64
End: 2024-07-30
Attending: INTERNAL MEDICINE
Payer: COMMERCIAL

## 2024-07-30 DIAGNOSIS — C18.7 MALIGNANT NEOPLASM OF SIGMOID COLON: ICD-10-CM

## 2024-07-30 LAB
ALBUMIN SERPL BCP-MCNC: 3.6 G/DL (ref 3.5–5.2)
ALP SERPL-CCNC: 99 U/L (ref 55–135)
ALT SERPL W/O P-5'-P-CCNC: 33 U/L (ref 10–44)
ANION GAP SERPL CALC-SCNC: 11 MMOL/L (ref 8–16)
AST SERPL-CCNC: 22 U/L (ref 10–40)
BASOPHILS # BLD AUTO: 0.05 K/UL (ref 0–0.2)
BASOPHILS NFR BLD: 1 % (ref 0–1.9)
BILIRUB SERPL-MCNC: 0.5 MG/DL (ref 0.1–1)
BUN SERPL-MCNC: 14 MG/DL (ref 8–23)
CALCIUM SERPL-MCNC: 9.6 MG/DL (ref 8.7–10.5)
CHLORIDE SERPL-SCNC: 104 MMOL/L (ref 95–110)
CO2 SERPL-SCNC: 26 MMOL/L (ref 23–29)
CREAT SERPL-MCNC: 0.9 MG/DL (ref 0.5–1.4)
DIFFERENTIAL METHOD BLD: ABNORMAL
EOSINOPHIL # BLD AUTO: 0.2 K/UL (ref 0–0.5)
EOSINOPHIL NFR BLD: 2.9 % (ref 0–8)
ERYTHROCYTE [DISTWIDTH] IN BLOOD BY AUTOMATED COUNT: 13.3 % (ref 11.5–14.5)
EST. GFR  (NO RACE VARIABLE): >60 ML/MIN/1.73 M^2
GLUCOSE SERPL-MCNC: 110 MG/DL (ref 70–110)
HCT VFR BLD AUTO: 38.9 % (ref 37–48.5)
HGB BLD-MCNC: 13.4 G/DL (ref 12–16)
IMM GRANULOCYTES # BLD AUTO: 0.01 K/UL (ref 0–0.04)
IMM GRANULOCYTES NFR BLD AUTO: 0.2 % (ref 0–0.5)
LYMPHOCYTES # BLD AUTO: 2 K/UL (ref 1–4.8)
LYMPHOCYTES NFR BLD: 38.3 % (ref 18–48)
MCH RBC QN AUTO: 31.6 PG (ref 27–31)
MCHC RBC AUTO-ENTMCNC: 34.4 G/DL (ref 32–36)
MCV RBC AUTO: 92 FL (ref 82–98)
MONOCYTES # BLD AUTO: 0.4 K/UL (ref 0.3–1)
MONOCYTES NFR BLD: 7.6 % (ref 4–15)
NEUTROPHILS # BLD AUTO: 2.6 K/UL (ref 1.8–7.7)
NEUTROPHILS NFR BLD: 50 % (ref 38–73)
NRBC BLD-RTO: 0 /100 WBC
PLATELET # BLD AUTO: 210 K/UL (ref 150–450)
PMV BLD AUTO: 10 FL (ref 9.2–12.9)
POTASSIUM SERPL-SCNC: 4.2 MMOL/L (ref 3.5–5.1)
PROT SERPL-MCNC: 7.1 G/DL (ref 6–8.4)
RBC # BLD AUTO: 4.24 M/UL (ref 4–5.4)
SODIUM SERPL-SCNC: 141 MMOL/L (ref 136–145)
WBC # BLD AUTO: 5.15 K/UL (ref 3.9–12.7)

## 2024-07-30 PROCEDURE — 36415 COLL VENOUS BLD VENIPUNCTURE: CPT | Performed by: INTERNAL MEDICINE

## 2024-07-30 PROCEDURE — 80053 COMPREHEN METABOLIC PANEL: CPT | Performed by: INTERNAL MEDICINE

## 2024-07-30 PROCEDURE — 85025 COMPLETE CBC W/AUTO DIFF WBC: CPT | Performed by: INTERNAL MEDICINE

## 2024-07-31 ENCOUNTER — INFUSION (OUTPATIENT)
Dept: INFUSION THERAPY | Facility: HOSPITAL | Age: 64
End: 2024-07-31
Attending: INTERNAL MEDICINE
Payer: COMMERCIAL

## 2024-07-31 ENCOUNTER — OFFICE VISIT (OUTPATIENT)
Dept: HEMATOLOGY/ONCOLOGY | Facility: CLINIC | Age: 64
End: 2024-07-31
Payer: COMMERCIAL

## 2024-07-31 ENCOUNTER — TELEPHONE (OUTPATIENT)
Dept: SURGERY | Facility: CLINIC | Age: 64
End: 2024-07-31
Payer: COMMERCIAL

## 2024-07-31 VITALS
RESPIRATION RATE: 18 BRPM | OXYGEN SATURATION: 97 % | WEIGHT: 186.31 LBS | TEMPERATURE: 98 F | DIASTOLIC BLOOD PRESSURE: 69 MMHG | SYSTOLIC BLOOD PRESSURE: 129 MMHG | HEIGHT: 66 IN | BODY MASS INDEX: 29.94 KG/M2 | HEART RATE: 86 BPM

## 2024-07-31 VITALS
OXYGEN SATURATION: 97 % | BODY MASS INDEX: 29.94 KG/M2 | DIASTOLIC BLOOD PRESSURE: 69 MMHG | SYSTOLIC BLOOD PRESSURE: 129 MMHG | RESPIRATION RATE: 16 BRPM | WEIGHT: 186.31 LBS | HEART RATE: 86 BPM | TEMPERATURE: 98 F | HEIGHT: 66 IN

## 2024-07-31 DIAGNOSIS — Z95.828 PORT-A-CATH IN PLACE: Primary | ICD-10-CM

## 2024-07-31 DIAGNOSIS — C78.02 MALIGNANT NEOPLASM METASTATIC TO LEFT LUNG: ICD-10-CM

## 2024-07-31 DIAGNOSIS — C18.7 MALIGNANT NEOPLASM OF SIGMOID COLON: Primary | ICD-10-CM

## 2024-07-31 PROCEDURE — 3044F HG A1C LEVEL LT 7.0%: CPT | Mod: CPTII,S$GLB,, | Performed by: INTERNAL MEDICINE

## 2024-07-31 PROCEDURE — 1159F MED LIST DOCD IN RCRD: CPT | Mod: CPTII,S$GLB,, | Performed by: INTERNAL MEDICINE

## 2024-07-31 PROCEDURE — G2211 COMPLEX E/M VISIT ADD ON: HCPCS | Mod: S$GLB,,, | Performed by: INTERNAL MEDICINE

## 2024-07-31 PROCEDURE — 3078F DIAST BP <80 MM HG: CPT | Mod: CPTII,S$GLB,, | Performed by: INTERNAL MEDICINE

## 2024-07-31 PROCEDURE — 99215 OFFICE O/P EST HI 40 MIN: CPT | Mod: S$GLB,,, | Performed by: INTERNAL MEDICINE

## 2024-07-31 PROCEDURE — 3008F BODY MASS INDEX DOCD: CPT | Mod: CPTII,S$GLB,, | Performed by: INTERNAL MEDICINE

## 2024-07-31 PROCEDURE — G0463 HOSPITAL OUTPT CLINIC VISIT: HCPCS

## 2024-07-31 PROCEDURE — 99999 PR PBB SHADOW E&M-EST. PATIENT-LVL IV: CPT | Mod: PBBFAC,,, | Performed by: INTERNAL MEDICINE

## 2024-07-31 PROCEDURE — 3074F SYST BP LT 130 MM HG: CPT | Mod: CPTII,S$GLB,, | Performed by: INTERNAL MEDICINE

## 2024-07-31 RX ORDER — DIPHENHYDRAMINE HYDROCHLORIDE 50 MG/ML
50 INJECTION INTRAMUSCULAR; INTRAVENOUS ONCE AS NEEDED
OUTPATIENT
Start: 2024-07-31

## 2024-07-31 RX ORDER — EPINEPHRINE 0.3 MG/.3ML
0.3 INJECTION SUBCUTANEOUS ONCE AS NEEDED
OUTPATIENT
Start: 2024-07-31

## 2024-07-31 RX ORDER — SODIUM CHLORIDE 0.9 % (FLUSH) 0.9 %
10 SYRINGE (ML) INJECTION
OUTPATIENT
Start: 2024-07-31

## 2024-07-31 RX ORDER — PROCHLORPERAZINE EDISYLATE 5 MG/ML
10 INJECTION INTRAMUSCULAR; INTRAVENOUS ONCE AS NEEDED
OUTPATIENT
Start: 2024-07-31

## 2024-07-31 RX ORDER — HEPARIN 100 UNIT/ML
500 SYRINGE INTRAVENOUS
OUTPATIENT
Start: 2024-08-01

## 2024-07-31 RX ORDER — FLUOROURACIL 50 MG/ML
400 INJECTION, SOLUTION INTRAVENOUS
OUTPATIENT
Start: 2024-07-31

## 2024-07-31 RX ORDER — PROCHLORPERAZINE EDISYLATE 5 MG/ML
10 INJECTION INTRAMUSCULAR; INTRAVENOUS ONCE AS NEEDED
OUTPATIENT
Start: 2024-08-01

## 2024-07-31 RX ORDER — HEPARIN 100 UNIT/ML
500 SYRINGE INTRAVENOUS
OUTPATIENT
Start: 2024-07-31

## 2024-07-31 RX ORDER — SODIUM CHLORIDE 0.9 % (FLUSH) 0.9 %
10 SYRINGE (ML) INJECTION
OUTPATIENT
Start: 2024-08-01

## 2024-07-31 NOTE — TELEPHONE ENCOUNTER
Spoke to pt, states Hem/onc nurses could not access port. Office visit vs Port Study will check with Dr Montoya.

## 2024-07-31 NOTE — TELEPHONE ENCOUNTER
----- Message from Amy Mena sent at 7/31/2024 11:39 AM CDT -----  Type: Needs Medical Advice  Who Called:  Patient   Symptoms (please be specific):    How long has patient had these symptoms:    Pharmacy name and phone #:    Best Call Back Number: [phone]  Additional Information: Patient is requesting a call back from the nurse regarding scheduling an appt. ASAP. Patient stated her port has flipped and has missed receiving her treatments at this time.

## 2024-07-31 NOTE — NURSING
Pt arrived for chemotherapy treatment. Attempted to access port twice by two different RN's, unable to access pt's port. Upon piercing pt's skin with brewer needle, needle hit hard surface stopping the needle from inserting into the port properly. Dr Khoobehi and scheduling notified. Pt stated she will also call surgeon's office that placed the port. MD office also noted to have put message into surgeon's office for pt needing an appt.

## 2024-07-31 NOTE — PROGRESS NOTES
Service Date:  7/31/24    Chief Complaint: Colon Cancer (Labs  UA   chemo clear      leaking from port after last infusion   [place] SHIRLEY pushed it back.  Some fluid was lost   )    Malaika Craven is a 64 y.o. 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  She also had a CT scan done which showed a 2.2 centimeter lesion in the left lower lobe of the lung.  No evidence of liver Mets on MRI.    Here for neoadjuvant FOLFOX with Avastin.  No issues with last treatment.  Had some chemotherapy-induced fatigue.  Otherwise doing well.    Review of Systems   Constitutional: Negative.  Negative for appetite change and unexpected weight change.   HENT: Negative.  Negative for mouth sores.    Eyes: Negative.  Negative for visual disturbance.   Respiratory: Negative.  Negative for cough and shortness of breath.    Cardiovascular: Negative.  Negative for chest pain.   Gastrointestinal: Negative.  Negative for abdominal pain and diarrhea.   Endocrine: Negative.    Genitourinary: Negative.  Negative for frequency.   Musculoskeletal: Negative.  Negative for back pain.   Integumentary:  Negative for rash. Negative.   Neurological: Negative.  Negative for headaches.   Hematological: Negative.  Negative for adenopathy.   Psychiatric/Behavioral: Negative.          Current Outpatient Medications   Medication Instructions    ALPRAZolam (XANAX) 0.5 mg, Oral, 3 times daily    diphenhydrAMINE-aluminum-magnesium hydroxide-simethicone-LIDOcaine viscous HCl 2% 15 mLs, Swish & Spit, Every 4 hours PRN    ELDERBERRY FRUIT ORAL Oral    EPINEPHrine (EPIPEN) 0.3 mg, Intramuscular, Once    fexofenadine (ALLEGRA ALLERGY) 60 MG tablet     hydroCHLOROthiazide (HYDRODIURIL) 25 mg, Oral, Daily, Patient takes two pills daily    LIDOcaine-prilocaine (EMLA) cream Topical (Top), As needed (PRN)    multivit-minerals/folic acid (ADULT ONE DAILY MULTIVITAMIN ORAL) 1 tablet, Oral, Daily PRN    multivitamin capsule 1 capsule, Oral,  Daily    NIFEdipine (ADALAT CC) 60 MG TbSR TAKE 1 TABLET BY MOUTH EVERY DAY ON AN EMPTY STOMACH    OLANZapine (ZYPREXA) 5 MG tablet Take 1 tablet by mouth nightly on days 1-3 of each chemotherapy cycle.    OMEPRAZOLE ORAL 0.5 mg, Oral, Daily PRN    ondansetron (ZOFRAN) 8 mg, Oral, Every 8 hours PRN    oxyCODONE-acetaminophen (PERCOCET) 5-325 mg per tablet 1 tablet, Oral, Every 4 hours PRN    polyethylene glycol (GLYCOLAX) 17 g, Oral, Daily    potassium chloride (MICRO-K) 10 MEQ CpSR 20 mEq, Oral, Daily    prochlorperazine (COMPAZINE) 10 mg, Oral, Every 6 hours PRN    RYALTRIS 665-25 mcg/spray Spry     traZODone (DESYREL) 150 mg, Oral, Nightly        Past Medical History:   Diagnosis Date    Abnormal Pap smear of cervix     Allergy     Corneal erosion of right eye     Generalized anxiety disorder     Hypertension     Rectal mass     Sinus problem         Past Surgical History:   Procedure Laterality Date    BREAST CYST ASPIRATION       SECTION      X 2    CHOLECYSTECTOMY      COLONOSCOPY N/A 2024    Procedure: COLONOSCOPY;  Surgeon: Radhika Leary MD;  Location: [facility];  Service: Endoscopy;  Laterality: N/A;    DILATION AND CURETTAGE OF UTERUS      [facility]      EYE SURGERY Right     FOR CORNEAL EROSION    FLEXIBLE SIGMOIDOSCOPY N/A 2024    Procedure: SIGMOIDOSCOPY, FLEXIBLE(Fleets);  Surgeon: Oumar Montoya MD;  Location: [facility];  Service: General;  Laterality: N/A;    INSERTION OF TUNNELED CENTRAL VENOUS CATHETER (CVC) WITH SUBCUTANEOUS PORT N/A 2024    Procedure: [id number];  Surgeon: Oumar Montoya MD;  Location: [facility];  Service: General;  Laterality: N/A;    LIPOSUCTION      x 2    Mole Excision Right     Shin    TUBAL LIGATION          Family History   Problem Relation Name Age of Onset    Heart disease Mother      Hypertension Mother      Arthritis Father      Thyroid disease Father      Kidney disease Paternal Grandmother      Liver cancer Maternal  "Grandmother      Cancer Maternal Grandfather      Allergies Sister      Allergic rhinitis Sister      Angioedema Neg Hx      Asthma Neg Hx      Atopy Neg Hx      Eczema Neg Hx      Immunodeficiency Neg Hx      Rhinitis Neg Hx      Urticaria Neg Hx      Amblyopia Neg Hx      Blindness Neg Hx      Cataracts Neg Hx      Diabetes Neg Hx      Glaucoma Neg Hx      Macular degeneration Neg Hx      Retinal detachment Neg Hx      Strabismus Neg Hx      Stroke Neg Hx         Social History     Tobacco Use    Smoking status: Never    Smokeless tobacco: Never   Substance Use Topics    Alcohol use: Yes     Comment: 2 drinks per week    Drug use: No     Comment: MEDICAL MARIJUANA         Vitals:    07/31/24 1037   BP: 129/69   Pulse: 86   Resp: 16   Temp: 97.7 °F (36.5 °C)        Physical Exam:  /69 (BP Location: Left arm, Patient Position: Sitting, BP Method: Small (Automatic))   Pulse 86   Temp 97.7 °F (36.5 °C) (Temporal)   Resp 16   Ht 5' 6" (1.676 m)   Wt 84.5 kg (186 lb 4.6 oz)   SpO2 97%   BMI 30.07 kg/m²     Physical Exam  Constitutional:       Appearance: Normal appearance.   HENT:      Head: Normocephalic and atraumatic.      Nose: Nose normal.      Mouth/Throat:      Mouth: Mucous membranes are moist.      Pharynx: Oropharynx is clear.   Eyes:      Conjunctiva/sclera: Conjunctivae normal.   Cardiovascular:      Rate and Rhythm: Normal rate and regular rhythm.      Heart sounds: Normal heart sounds.   Pulmonary:      Effort: Pulmonary effort is normal.      Breath sounds: Normal breath sounds.   Abdominal:      General: Abdomen is flat. Bowel sounds are normal.      Palpations: Abdomen is soft.   Musculoskeletal:         General: Normal range of motion.      Cervical back: Normal range of motion and neck supple.   Skin:     General: Skin is warm and dry.   Neurological:      General: No focal deficit present.      Mental Status: She is alert and oriented to person, place, and time. Mental status is at " baseline.   Psychiatric:         Mood and Affect: Mood normal.          Labs:  Lab Results   Component Value Date    WBC 5.15 07/30/2024    RBC 4.24 07/30/2024    HGB 13.4 07/30/2024    HCT 38.9 07/30/2024    MCV 92 07/30/2024    MCH 31.6 (H) 07/30/2024    MCHC 34.4 07/30/2024    RDW 13.3 07/30/2024     07/30/2024    MPV 10.0 07/30/2024    GRAN 2.6 07/30/2024    GRAN 50.0 07/30/2024    LYMPH 2.0 07/30/2024    LYMPH 38.3 07/30/2024    MONO 0.4 07/30/2024    MONO 7.6 07/30/2024    EOS 0.2 07/30/2024    BASO 0.05 07/30/2024    EOSINOPHIL 2.9 07/30/2024    BASOPHIL 1.0 07/30/2024     Sodium   Date Value Ref Range Status   07/30/2024 141 136 - 145 mmol/L Final     Potassium   Date Value Ref Range Status   07/30/2024 4.2 3.5 - 5.1 mmol/L Final     Chloride   Date Value Ref Range Status   07/30/2024 104 95 - 110 mmol/L Final     CO2   Date Value Ref Range Status   07/30/2024 26 23 - 29 mmol/L Final     Glucose   Date Value Ref Range Status   07/30/2024 110 70 - 110 mg/dL Final     BUN   Date Value Ref Range Status   07/30/2024 14 8 - 23 mg/dL Final     Creatinine   Date Value Ref Range Status   07/30/2024 0.9 0.5 - 1.4 mg/dL Final     Calcium   Date Value Ref Range Status   07/30/2024 9.6 8.7 - 10.5 mg/dL Final     Total Protein   Date Value Ref Range Status   07/30/2024 7.1 6.0 - 8.4 g/dL Final     Albumin   Date Value Ref Range Status   07/30/2024 3.6 3.5 - 5.2 g/dL Final     Total Bilirubin   Date Value Ref Range Status   07/30/2024 0.5 0.1 - 1.0 mg/dL Final     Comment:     For infants and newborns, interpretation of results should be based  on gestational age, weight and in agreement with clinical  observations.    Premature Infant recommended reference ranges:  Up to 24 hours.............<8.0 mg/dL  Up to 48 hours............<12.0 mg/dL  3-5 days..................<15.0 mg/dL  6-29 days.................<15.0 mg/dL       Alkaline Phosphatase   Date Value Ref Range Status   07/30/2024 99 55 - 135 U/L Final      AST   Date Value Ref Range Status   07/30/2024 22 10 - 40 U/L Final     ALT   Date Value Ref Range Status   07/30/2024 33 10 - 44 U/L Final     Anion Gap   Date Value Ref Range Status   07/30/2024 11 8 - 16 mmol/L Final     eGFR if    Date Value Ref Range Status   10/25/2021 >60.0 >60 mL/min/1.73 m^2 Final     eGFR if non    Date Value Ref Range Status   10/25/2021 >60.0 >60 mL/min/1.73 m^2 Final     Comment:     Calculation used to obtain the estimated glomerular filtration  rate (eGFR) is the CKD-EPI equation.          A/P:    Rectal adenocarcinoma   -patient with confirmed metastatic solitary lesion in the lung.  -MRI liver did not show any liver Mets  -I discussed the case in tumor board.  I also discussed it with Dr. Montoya and Dr. Melvin.  The plan is to try neoadjuvant treatment for at least 3 cycles with FOLFOX and Avastin.  Afterwards we will re-scanned her and discuss moving forward with radiation therapy versus resection of the metastatic disease which would require a lower lobe lobectomy.    -Tempus did not show any actionable mutations  -okay for cycle 3  -return to clinic in 2 weeks for cycle 4    Transaminitis   -resolved after stopping Tylenol    Chemotherapy-induced fatigue   -grade 1  -no dose adjustment needed at this time    Aurash Khoobehi, MD  Hematology and Oncology    Visit today included increased complexity associated with the care of the episodic problem addressed and managing the longitudinal care of the patient due to the serious and/or complex managed problem(s) colon cancer.

## 2024-08-01 ENCOUNTER — TELEPHONE (OUTPATIENT)
Dept: SURGERY | Facility: CLINIC | Age: 64
End: 2024-08-01
Payer: COMMERCIAL

## 2024-08-01 ENCOUNTER — HOSPITAL ENCOUNTER (OUTPATIENT)
Dept: RADIOLOGY | Facility: HOSPITAL | Age: 64
Discharge: HOME | End: 2024-08-01
Attending: INTERNAL MEDICINE
Payer: COMMERCIAL

## 2024-08-01 DIAGNOSIS — Z95.828 PORT-A-CATH IN PLACE: ICD-10-CM

## 2024-08-01 PROCEDURE — 36598 INJ W/FLUOR EVAL CV DEVICE: CPT | Mod: ,,, | Performed by: RADIOLOGY

## 2024-08-01 PROCEDURE — 36598 INJ W/FLUOR EVAL CV DEVICE: CPT

## 2024-08-01 NOTE — TELEPHONE ENCOUNTER
----- Message from Constance Bearden sent at 8/1/2024 11:36 AM CDT -----  Regarding: Needs Medical Advice  Contact: patient at [phone]  Type: Needs Medical Advice  Who Called:  patient at [phone]    Symptoms (please be specific):  port has flipped after study done today    Additional Information: Would like an appointment and to discuss next steps. Chem needs to be stopped until taken care of. Please call and advise. Thank you

## 2024-08-05 ENCOUNTER — TELEPHONE (OUTPATIENT)
Dept: HEMATOLOGY/ONCOLOGY | Facility: CLINIC | Age: 64
End: 2024-08-05
Payer: COMMERCIAL

## 2024-08-05 ENCOUNTER — OFFICE VISIT (OUTPATIENT)
Dept: SURGERY | Facility: CLINIC | Age: 64
End: 2024-08-05
Payer: COMMERCIAL

## 2024-08-05 VITALS
WEIGHT: 186.75 LBS | TEMPERATURE: 97 F | BODY MASS INDEX: 30.01 KG/M2 | HEART RATE: 79 BPM | SYSTOLIC BLOOD PRESSURE: 134 MMHG | DIASTOLIC BLOOD PRESSURE: 82 MMHG | HEIGHT: 66 IN

## 2024-08-05 DIAGNOSIS — Z95.828 PORT-A-CATH IN PLACE: Primary | ICD-10-CM

## 2024-08-05 PROCEDURE — 99999 PR PBB SHADOW E&M-EST. PATIENT-LVL IV: CPT | Mod: PBBFAC,,, | Performed by: STUDENT IN AN ORGANIZED HEALTH CARE EDUCATION/TRAINING PROGRAM

## 2024-08-05 PROCEDURE — 3044F HG A1C LEVEL LT 7.0%: CPT | Mod: CPTII,S$GLB,, | Performed by: STUDENT IN AN ORGANIZED HEALTH CARE EDUCATION/TRAINING PROGRAM

## 2024-08-05 PROCEDURE — 1159F MED LIST DOCD IN RCRD: CPT | Mod: CPTII,S$GLB,, | Performed by: STUDENT IN AN ORGANIZED HEALTH CARE EDUCATION/TRAINING PROGRAM

## 2024-08-05 PROCEDURE — 99211 OFF/OP EST MAY X REQ PHY/QHP: CPT | Mod: S$GLB,,, | Performed by: STUDENT IN AN ORGANIZED HEALTH CARE EDUCATION/TRAINING PROGRAM

## 2024-08-05 PROCEDURE — 3075F SYST BP GE 130 - 139MM HG: CPT | Mod: CPTII,S$GLB,, | Performed by: STUDENT IN AN ORGANIZED HEALTH CARE EDUCATION/TRAINING PROGRAM

## 2024-08-05 PROCEDURE — 3079F DIAST BP 80-89 MM HG: CPT | Mod: CPTII,S$GLB,, | Performed by: STUDENT IN AN ORGANIZED HEALTH CARE EDUCATION/TRAINING PROGRAM

## 2024-08-05 PROCEDURE — 3008F BODY MASS INDEX DOCD: CPT | Mod: CPTII,S$GLB,, | Performed by: STUDENT IN AN ORGANIZED HEALTH CARE EDUCATION/TRAINING PROGRAM

## 2024-08-06 ENCOUNTER — INFUSION (OUTPATIENT)
Dept: INFUSION THERAPY | Facility: HOSPITAL | Age: 64
End: 2024-08-06
Attending: INTERNAL MEDICINE
Payer: COMMERCIAL

## 2024-08-06 VITALS
HEIGHT: 66 IN | RESPIRATION RATE: 18 BRPM | DIASTOLIC BLOOD PRESSURE: 89 MMHG | OXYGEN SATURATION: 96 % | WEIGHT: 186.31 LBS | HEART RATE: 74 BPM | SYSTOLIC BLOOD PRESSURE: 137 MMHG | BODY MASS INDEX: 29.94 KG/M2 | TEMPERATURE: 98 F

## 2024-08-06 PROCEDURE — 96523 IRRIG DRUG DELIVERY DEVICE: CPT

## 2024-08-07 ENCOUNTER — INFUSION (OUTPATIENT)
Dept: INFUSION THERAPY | Facility: HOSPITAL | Age: 64
End: 2024-08-07
Attending: INTERNAL MEDICINE
Payer: COMMERCIAL

## 2024-08-07 VITALS
TEMPERATURE: 98 F | WEIGHT: 188.88 LBS | HEART RATE: 76 BPM | SYSTOLIC BLOOD PRESSURE: 127 MMHG | OXYGEN SATURATION: 98 % | RESPIRATION RATE: 18 BRPM | HEIGHT: 66 IN | DIASTOLIC BLOOD PRESSURE: 62 MMHG | BODY MASS INDEX: 30.35 KG/M2

## 2024-08-07 DIAGNOSIS — C18.7 MALIGNANT NEOPLASM OF SIGMOID COLON: ICD-10-CM

## 2024-08-07 DIAGNOSIS — C78.02 MALIGNANT NEOPLASM METASTATIC TO LEFT LUNG: Primary | ICD-10-CM

## 2024-08-07 PROCEDURE — 96415 CHEMO IV INFUSION ADDL HR: CPT

## 2024-08-07 PROCEDURE — 96368 THER/DIAG CONCURRENT INF: CPT

## 2024-08-07 PROCEDURE — 63600175 PHARM REV CODE 636 W HCPCS: Mod: JZ,JG | Performed by: INTERNAL MEDICINE

## 2024-08-07 PROCEDURE — 96411 CHEMO IV PUSH ADDL DRUG: CPT

## 2024-08-07 PROCEDURE — 96413 CHEMO IV INFUSION 1 HR: CPT

## 2024-08-07 PROCEDURE — 96367 TX/PROPH/DG ADDL SEQ IV INF: CPT

## 2024-08-07 PROCEDURE — 96416 CHEMO PROLONG INFUSE W/PUMP: CPT

## 2024-08-07 PROCEDURE — 96417 CHEMO IV INFUS EACH ADDL SEQ: CPT

## 2024-08-07 PROCEDURE — 25000003 PHARM REV CODE 250: Performed by: INTERNAL MEDICINE

## 2024-08-07 RX ORDER — EPINEPHRINE 0.3 MG/.3ML
0.3 INJECTION SUBCUTANEOUS ONCE AS NEEDED
Status: DISCONTINUED | OUTPATIENT
Start: 2024-08-07 | End: 2024-08-07 | Stop reason: HOSPADM

## 2024-08-07 RX ORDER — SODIUM CHLORIDE 0.9 % (FLUSH) 0.9 %
10 SYRINGE (ML) INJECTION
Status: DISCONTINUED | OUTPATIENT
Start: 2024-08-07 | End: 2024-08-07 | Stop reason: HOSPADM

## 2024-08-07 RX ORDER — PROCHLORPERAZINE EDISYLATE 5 MG/ML
10 INJECTION INTRAMUSCULAR; INTRAVENOUS ONCE AS NEEDED
Status: DISCONTINUED | OUTPATIENT
Start: 2024-08-07 | End: 2024-08-07 | Stop reason: HOSPADM

## 2024-08-07 RX ORDER — FLUOROURACIL 50 MG/ML
400 INJECTION, SOLUTION INTRAVENOUS
Status: COMPLETED | OUTPATIENT
Start: 2024-08-07 | End: 2024-08-07

## 2024-08-07 RX ORDER — DIPHENHYDRAMINE HYDROCHLORIDE 50 MG/ML
50 INJECTION INTRAMUSCULAR; INTRAVENOUS ONCE AS NEEDED
Status: DISCONTINUED | OUTPATIENT
Start: 2024-08-07 | End: 2024-08-07 | Stop reason: HOSPADM

## 2024-08-07 RX ADMIN — BEVACIZUMAB-AWWB 400 MG: 400 INJECTION, SOLUTION INTRAVENOUS at 08:08

## 2024-08-07 RX ADMIN — OXALIPLATIN 166 MG: 100 INJECTION, SOLUTION, CONCENTRATE INTRAVENOUS at 09:08

## 2024-08-07 RX ADMIN — PALONOSETRON HYDROCHLORIDE 0.25 MG: 0.25 INJECTION INTRAVENOUS at 09:08

## 2024-08-07 RX ADMIN — LEUCOVORIN CALCIUM 780 MG: 350 INJECTION, POWDER, LYOPHILIZED, FOR SUSPENSION INTRAMUSCULAR; INTRAVENOUS at 09:08

## 2024-08-07 RX ADMIN — FLUOROURACIL 780 MG: 50 INJECTION, SOLUTION INTRAVENOUS at 11:08

## 2024-08-07 RX ADMIN — FLUOROURACIL 4680 MG: 50 INJECTION, SOLUTION INTRAVENOUS at 11:08

## 2024-08-09 ENCOUNTER — INFUSION (OUTPATIENT)
Dept: INFUSION THERAPY | Facility: HOSPITAL | Age: 64
End: 2024-08-09
Attending: INTERNAL MEDICINE
Payer: COMMERCIAL

## 2024-08-09 VITALS
HEART RATE: 84 BPM | OXYGEN SATURATION: 98 % | TEMPERATURE: 98 F | RESPIRATION RATE: 16 BRPM | WEIGHT: 185.63 LBS | BODY MASS INDEX: 29.83 KG/M2 | DIASTOLIC BLOOD PRESSURE: 80 MMHG | HEIGHT: 66 IN | SYSTOLIC BLOOD PRESSURE: 134 MMHG

## 2024-08-09 DIAGNOSIS — C78.02 MALIGNANT NEOPLASM METASTATIC TO LEFT LUNG: Primary | ICD-10-CM

## 2024-08-09 DIAGNOSIS — C18.7 MALIGNANT NEOPLASM OF SIGMOID COLON: ICD-10-CM

## 2024-08-09 PROCEDURE — 63600175 PHARM REV CODE 636 W HCPCS: Performed by: INTERNAL MEDICINE

## 2024-08-09 PROCEDURE — 25000003 PHARM REV CODE 250: Performed by: INTERNAL MEDICINE

## 2024-08-09 PROCEDURE — 96523 IRRIG DRUG DELIVERY DEVICE: CPT

## 2024-08-09 PROCEDURE — A4216 STERILE WATER/SALINE, 10 ML: HCPCS | Performed by: INTERNAL MEDICINE

## 2024-08-09 RX ORDER — PROCHLORPERAZINE EDISYLATE 5 MG/ML
10 INJECTION INTRAMUSCULAR; INTRAVENOUS ONCE AS NEEDED
Status: DISCONTINUED | OUTPATIENT
Start: 2024-08-09 | End: 2024-08-09 | Stop reason: HOSPADM

## 2024-08-09 RX ORDER — HEPARIN 100 UNIT/ML
500 SYRINGE INTRAVENOUS
Status: DISCONTINUED | OUTPATIENT
Start: 2024-08-09 | End: 2024-08-09 | Stop reason: HOSPADM

## 2024-08-09 RX ORDER — SODIUM CHLORIDE 0.9 % (FLUSH) 0.9 %
10 SYRINGE (ML) INJECTION
Status: DISCONTINUED | OUTPATIENT
Start: 2024-08-09 | End: 2024-08-09 | Stop reason: HOSPADM

## 2024-08-09 RX ADMIN — HEPARIN 500 UNITS: 100 SYRINGE at 09:08

## 2024-08-09 RX ADMIN — SODIUM CHLORIDE, PRESERVATIVE FREE 10 ML: 5 INJECTION INTRAVENOUS at 09:08

## 2024-08-12 ENCOUNTER — OFFICE VISIT (OUTPATIENT)
Dept: SURGERY | Facility: CLINIC | Age: 64
End: 2024-08-12
Payer: COMMERCIAL

## 2024-08-12 ENCOUNTER — TELEPHONE (OUTPATIENT)
Dept: HEMATOLOGY/ONCOLOGY | Facility: CLINIC | Age: 64
End: 2024-08-12
Payer: COMMERCIAL

## 2024-08-12 DIAGNOSIS — Z95.828 PORT-A-CATH IN PLACE: Primary | ICD-10-CM

## 2024-08-12 PROCEDURE — 99999 PR PBB SHADOW E&M-EST. PATIENT-LVL I: CPT | Mod: PBBFAC,,, | Performed by: STUDENT IN AN ORGANIZED HEALTH CARE EDUCATION/TRAINING PROGRAM

## 2024-08-12 NOTE — PROGRESS NOTES
Procedure note     This is a 64-year-old female has a right chest port.  It has flipped on multiple occasions.  I recommended surgical evaluation with recent curing of the port to the chest wall for which she did consent.  She was taken to the procedure room, placed supine, the area around the palpable port was prepped and draped in the usual fashion, and a time-out was completed.  Skin was anesthetized with 1% lidocaine with epinephrine.  A 3 cm incision was made over the port.  The port was freed from the pocket using sharp dissection.  Old Vicryl sutures had completely dissolved.  3-0 Vicryl sutures were again used to secure the lateral aspect of the port to the chest wall.  Skin was then closed in layers with 3-0 Vicryl and a running 4-0 Monocryl.  Dermabond was applied as a dressing.  Patient tolerated the entire procedure without apparent complication.  Local wound care instructions were provided.  Patient will follow up with me as needed.

## 2024-08-15 ENCOUNTER — PATIENT MESSAGE (OUTPATIENT)
Dept: SURGERY | Facility: CLINIC | Age: 64
End: 2024-08-15
Payer: COMMERCIAL

## 2024-08-16 ENCOUNTER — HOSPITAL ENCOUNTER (OUTPATIENT)
Dept: RADIOLOGY | Facility: CLINIC | Age: 64
Discharge: HOME | End: 2024-08-16
Attending: STUDENT IN AN ORGANIZED HEALTH CARE EDUCATION/TRAINING PROGRAM
Payer: COMMERCIAL

## 2024-08-16 DIAGNOSIS — Z00.00 ROUTINE GENERAL MEDICAL EXAMINATION AT A HEALTH CARE FACILITY: ICD-10-CM

## 2024-08-16 PROCEDURE — 77067 SCR MAMMO BI INCL CAD: CPT | Mod: 26,,, | Performed by: RADIOLOGY

## 2024-08-16 PROCEDURE — 77063 BREAST TOMOSYNTHESIS BI: CPT | Mod: TC,PO

## 2024-08-16 PROCEDURE — 77063 BREAST TOMOSYNTHESIS BI: CPT | Mod: 26,,, | Performed by: RADIOLOGY

## 2024-08-16 PROCEDURE — 77067 SCR MAMMO BI INCL CAD: CPT | Mod: TC,PO

## 2024-08-19 ENCOUNTER — OFFICE VISIT (OUTPATIENT)
Dept: HEMATOLOGY/ONCOLOGY | Facility: CLINIC | Age: 64
End: 2024-08-19
Payer: COMMERCIAL

## 2024-08-19 ENCOUNTER — HOSPITAL ENCOUNTER (OUTPATIENT)
Dept: RADIOLOGY | Facility: HOSPITAL | Age: 64
Discharge: HOME | End: 2024-08-19
Attending: INTERNAL MEDICINE
Payer: COMMERCIAL

## 2024-08-19 VITALS
BODY MASS INDEX: 29.97 KG/M2 | WEIGHT: 186.5 LBS | SYSTOLIC BLOOD PRESSURE: 118 MMHG | HEART RATE: 99 BPM | TEMPERATURE: 98 F | DIASTOLIC BLOOD PRESSURE: 74 MMHG | RESPIRATION RATE: 16 BRPM | HEIGHT: 66 IN | OXYGEN SATURATION: 99 %

## 2024-08-19 DIAGNOSIS — R07.9 CHEST PAIN, UNSPECIFIED TYPE: ICD-10-CM

## 2024-08-19 DIAGNOSIS — C18.7 MALIGNANT NEOPLASM OF SIGMOID COLON: ICD-10-CM

## 2024-08-19 DIAGNOSIS — C78.02 MALIGNANT NEOPLASM METASTATIC TO LEFT LUNG: Primary | ICD-10-CM

## 2024-08-19 PROCEDURE — 99999 PR PBB SHADOW E&M-EST. PATIENT-LVL V: CPT | Mod: PBBFAC,,, | Performed by: INTERNAL MEDICINE

## 2024-08-19 PROCEDURE — G2211 COMPLEX E/M VISIT ADD ON: HCPCS | Mod: S$GLB,,, | Performed by: INTERNAL MEDICINE

## 2024-08-19 PROCEDURE — 71275 CT ANGIOGRAPHY CHEST: CPT | Mod: TC

## 2024-08-19 PROCEDURE — 1159F MED LIST DOCD IN RCRD: CPT | Mod: CPTII,S$GLB,, | Performed by: INTERNAL MEDICINE

## 2024-08-19 PROCEDURE — 71275 CT ANGIOGRAPHY CHEST: CPT | Mod: 26,,, | Performed by: RADIOLOGY

## 2024-08-19 PROCEDURE — 3044F HG A1C LEVEL LT 7.0%: CPT | Mod: CPTII,S$GLB,, | Performed by: INTERNAL MEDICINE

## 2024-08-19 PROCEDURE — 3078F DIAST BP <80 MM HG: CPT | Mod: CPTII,S$GLB,, | Performed by: INTERNAL MEDICINE

## 2024-08-19 PROCEDURE — 3008F BODY MASS INDEX DOCD: CPT | Mod: CPTII,S$GLB,, | Performed by: INTERNAL MEDICINE

## 2024-08-19 PROCEDURE — 99215 OFFICE O/P EST HI 40 MIN: CPT | Mod: S$GLB,,, | Performed by: INTERNAL MEDICINE

## 2024-08-19 PROCEDURE — 3074F SYST BP LT 130 MM HG: CPT | Mod: CPTII,S$GLB,, | Performed by: INTERNAL MEDICINE

## 2024-08-19 PROCEDURE — 25500020 PHARM REV CODE 255

## 2024-08-19 RX ORDER — SODIUM CHLORIDE 0.9 % (FLUSH) 0.9 %
10 SYRINGE (ML) INJECTION
Status: CANCELLED | OUTPATIENT
Start: 2024-08-22

## 2024-08-19 RX ORDER — EPINEPHRINE 0.3 MG/.3ML
0.3 INJECTION SUBCUTANEOUS ONCE AS NEEDED
Status: CANCELLED | OUTPATIENT
Start: 2024-08-20

## 2024-08-19 RX ORDER — PROCHLORPERAZINE EDISYLATE 5 MG/ML
10 INJECTION INTRAMUSCULAR; INTRAVENOUS ONCE AS NEEDED
Status: CANCELLED | OUTPATIENT
Start: 2024-08-20

## 2024-08-19 RX ORDER — DIPHENHYDRAMINE HYDROCHLORIDE 50 MG/ML
50 INJECTION INTRAMUSCULAR; INTRAVENOUS ONCE AS NEEDED
Status: CANCELLED | OUTPATIENT
Start: 2024-08-20

## 2024-08-19 RX ORDER — HEPARIN 100 UNIT/ML
500 SYRINGE INTRAVENOUS
Status: CANCELLED | OUTPATIENT
Start: 2024-08-22

## 2024-08-19 RX ORDER — FLUOROURACIL 50 MG/ML
400 INJECTION, SOLUTION INTRAVENOUS
Status: CANCELLED | OUTPATIENT
Start: 2024-08-20

## 2024-08-19 RX ORDER — SODIUM CHLORIDE 0.9 % (FLUSH) 0.9 %
10 SYRINGE (ML) INJECTION
Status: CANCELLED | OUTPATIENT
Start: 2024-08-20

## 2024-08-19 RX ORDER — PROCHLORPERAZINE EDISYLATE 5 MG/ML
10 INJECTION INTRAMUSCULAR; INTRAVENOUS ONCE AS NEEDED
Status: CANCELLED | OUTPATIENT
Start: 2024-08-22

## 2024-08-19 RX ORDER — HEPARIN 100 UNIT/ML
500 SYRINGE INTRAVENOUS
Status: CANCELLED | OUTPATIENT
Start: 2024-08-20

## 2024-08-19 RX ADMIN — IOHEXOL 75 ML: 350 INJECTION, SOLUTION INTRAVENOUS at 03:08

## 2024-08-19 NOTE — PROGRESS NOTES
Service Date:  8/19/24    Chief Complaint: Colon Cancer (Labs  UA  chemo clear   pt complain of trembling, feeling out of it  and SOB)    Malaika Craven is a 64 y.o. 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  She also had a CT scan done which showed a 2.2 centimeter lesion in the left lower lobe of the lung.  No evidence of liver Mets on MRI.    Here for neoadjuvant FOLFOX with Avastin.  Complaining of dizziness, weakness, and shortness of breath since last night.  Also tachycardic.  Denies any chest pain.  Denies any numbness tingling in her arms or neck.    Review of Systems   Constitutional: Negative.  Negative for appetite change and unexpected weight change.   HENT: Negative.  Negative for mouth sores.    Eyes: Negative.  Negative for visual disturbance.   Respiratory: Negative.  Negative for cough and shortness of breath.    Cardiovascular: Negative.  Negative for chest pain.   Gastrointestinal: Negative.  Negative for abdominal pain and diarrhea.   Endocrine: Negative.    Genitourinary: Negative.  Negative for frequency.   Musculoskeletal: Negative.  Negative for back pain.   Integumentary:  Negative for rash. Negative.   Neurological: Negative.  Negative for headaches.   Hematological: Negative.  Negative for adenopathy.   Psychiatric/Behavioral: Negative.          Current Outpatient Medications   Medication Instructions    ALPRAZolam (XANAX) 0.5 mg, Oral, 3 times daily    diphenhydrAMINE-aluminum-magnesium hydroxide-simethicone-LIDOcaine viscous HCl 2% 15 mLs, Swish & Spit, Every 4 hours PRN    ELDERBERRY FRUIT ORAL Take by mouth.    EPINEPHrine (EPIPEN) 0.3 mg, Intramuscular, Once    fexofenadine (ALLEGRA ALLERGY) 60 MG tablet     hydroCHLOROthiazide (HYDRODIURIL) 25 mg, Oral, Daily, Patient takes two pills daily    LIDOcaine-prilocaine (EMLA) cream Topical (Top), As needed (PRN)    multivit-minerals/folic acid (ADULT ONE DAILY MULTIVITAMIN ORAL) 1 tablet, Daily PRN     NIFEdipine (ADALAT CC) 60 MG TbSR TAKE 1 TABLET BY MOUTH EVERY DAY ON AN EMPTY STOMACH    OLANZapine (ZYPREXA) 5 MG tablet Take 1 tablet by mouth nightly on days 1-3 of each chemotherapy cycle.    OMEPRAZOLE ORAL 0.5 mg, Oral, Daily PRN    ondansetron (ZOFRAN) 8 mg, Oral, Every 8 hours PRN    oxyCODONE-acetaminophen (PERCOCET) 5-325 mg per tablet 1 tablet, Oral, Every 4 hours PRN    polyethylene glycol (GLYCOLAX) 17 g, Daily    potassium chloride (MICRO-K) 10 MEQ CpSR 20 mEq, Oral, Daily    prochlorperazine (COMPAZINE) 10 mg, Oral, Every 6 hours PRN    RYALTRIS 665-25 mcg/spray Spry     traZODone (DESYREL) 150 mg, Nightly        Past Medical History:   Diagnosis Date    Abnormal Pap smear of cervix     Allergy     Corneal erosion of right eye     Generalized anxiety disorder     Hypertension     Rectal mass     Sinus problem         Past Surgical History:   Procedure Laterality Date    BREAST CYST ASPIRATION       SECTION      X 2    CHOLECYSTECTOMY      COLONOSCOPY N/A 2024    Procedure: COLONOSCOPY;  Surgeon: Radhika Leary MD;  Location: [facility];  Service: Endoscopy;  Laterality: N/A;    DILATION AND CURETTAGE OF UTERUS      [facility]      EYE SURGERY Right     FOR CORNEAL EROSION    FLEXIBLE SIGMOIDOSCOPY N/A 2024    Procedure: SIGMOIDOSCOPY, FLEXIBLE(Flesilvia);  Surgeon: Oumar Montoya MD;  Location: [facility];  Service: General;  Laterality: N/A;    INSERTION OF TUNNELED CENTRAL VENOUS CATHETER (CVC) WITH SUBCUTANEOUS PORT N/A 2024    Procedure: [id number];  Surgeon: Oumar Montoya MD;  Location: [facility];  Service: General;  Laterality: N/A;    LIPOSUCTION      x 2    Mole Excision Right     Shin    TUBAL LIGATION          Family History   Problem Relation Name Age of Onset    Heart disease Mother      Hypertension Mother      Arthritis Father      Thyroid disease Father      Kidney disease Paternal Grandmother      Liver cancer Maternal Grandmother      Cancer  "Maternal Grandfather      Allergies Sister      Allergic rhinitis Sister      Angioedema Neg Hx      Asthma Neg Hx      Atopy Neg Hx      Eczema Neg Hx      Immunodeficiency Neg Hx      Rhinitis Neg Hx      Urticaria Neg Hx      Amblyopia Neg Hx      Blindness Neg Hx      Cataracts Neg Hx      Diabetes Neg Hx      Glaucoma Neg Hx      Macular degeneration Neg Hx      Retinal detachment Neg Hx      Strabismus Neg Hx      Stroke Neg Hx         Social History     Tobacco Use    Smoking status: Never    Smokeless tobacco: Never   Substance Use Topics    Alcohol use: Yes     Comment: 2 drinks per week    Drug use: No     Comment: MEDICAL MARIJUANA         Vitals:    08/19/24 1134   BP: 118/74   Pulse: 99   Resp: 16   Temp: 98.1 °F (36.7 °C)        Physical Exam:  /74 (BP Location: Right arm, Patient Position: Sitting, BP Method: Large (Automatic))   Pulse 99   Temp 98.1 °F (36.7 °C) (Temporal)   Resp 16   Ht 5' 6" (1.676 m)   Wt 84.6 kg (186 lb 8.2 oz)   SpO2 99%   BMI 30.10 kg/m²     Physical Exam  Constitutional:       Appearance: Normal appearance.   HENT:      Head: Normocephalic and atraumatic.      Nose: Nose normal.      Mouth/Throat:      Mouth: Mucous membranes are moist.      Pharynx: Oropharynx is clear.   Eyes:      Conjunctiva/sclera: Conjunctivae normal.   Cardiovascular:      Rate and Rhythm: Normal rate and regular rhythm.      Heart sounds: Normal heart sounds.   Pulmonary:      Effort: Pulmonary effort is normal.      Breath sounds: Normal breath sounds.   Abdominal:      General: Abdomen is flat. Bowel sounds are normal.      Palpations: Abdomen is soft.   Musculoskeletal:         General: Normal range of motion.      Cervical back: Normal range of motion and neck supple.   Skin:     General: Skin is warm and dry.   Neurological:      General: No focal deficit present.      Mental Status: She is alert and oriented to person, place, and time. Mental status is at baseline.   Psychiatric:    "      Mood and Affect: Mood normal.          Labs:  Lab Results   Component Value Date    WBC 5.99 08/19/2024    RBC 4.22 08/19/2024    HGB 13.6 08/19/2024    HCT 38.7 08/19/2024    MCV 92 08/19/2024    MCH 32.2 (H) 08/19/2024    MCHC 35.1 08/19/2024    RDW 13.9 08/19/2024     08/19/2024    MPV 10.7 08/19/2024    GRAN 3.4 08/19/2024    GRAN 57.4 08/19/2024    LYMPH 2.0 08/19/2024    LYMPH 33.9 08/19/2024    MONO 0.4 08/19/2024    MONO 5.8 08/19/2024    EOS 0.1 08/19/2024    BASO 0.03 08/19/2024    EOSINOPHIL 2.2 08/19/2024    BASOPHIL 0.5 08/19/2024     Sodium   Date Value Ref Range Status   08/19/2024 141 136 - 145 mmol/L Final     Potassium   Date Value Ref Range Status   08/19/2024 3.6 3.5 - 5.1 mmol/L Final     Chloride   Date Value Ref Range Status   08/19/2024 105 95 - 110 mmol/L Final     CO2   Date Value Ref Range Status   08/19/2024 22 (L) 23 - 29 mmol/L Final     Glucose   Date Value Ref Range Status   08/19/2024 127 (H) 70 - 110 mg/dL Final     BUN   Date Value Ref Range Status   08/19/2024 18 8 - 23 mg/dL Final     Creatinine   Date Value Ref Range Status   08/19/2024 1.0 0.5 - 1.4 mg/dL Final     Calcium   Date Value Ref Range Status   08/19/2024 9.8 8.7 - 10.5 mg/dL Final     Total Protein   Date Value Ref Range Status   08/19/2024 7.5 6.0 - 8.4 g/dL Final     Albumin   Date Value Ref Range Status   08/19/2024 3.8 3.5 - 5.2 g/dL Final     Total Bilirubin   Date Value Ref Range Status   08/19/2024 0.6 0.1 - 1.0 mg/dL Final     Comment:     For infants and newborns, interpretation of results should be based  on gestational age, weight and in agreement with clinical  observations.    Premature Infant recommended reference ranges:  Up to 24 hours.............<8.0 mg/dL  Up to 48 hours............<12.0 mg/dL  3-5 days..................<15.0 mg/dL  6-29 days.................<15.0 mg/dL       Alkaline Phosphatase   Date Value Ref Range Status   08/19/2024 94 55 - 135 U/L Final     AST   Date Value Ref  Range Status   08/19/2024 17 10 - 40 U/L Final     ALT   Date Value Ref Range Status   08/19/2024 19 10 - 44 U/L Final     Anion Gap   Date Value Ref Range Status   08/19/2024 14 8 - 16 mmol/L Final     eGFR if    Date Value Ref Range Status   10/25/2021 >60.0 >60 mL/min/1.73 m^2 Final     eGFR if non    Date Value Ref Range Status   10/25/2021 >60.0 >60 mL/min/1.73 m^2 Final     Comment:     Calculation used to obtain the estimated glomerular filtration  rate (eGFR) is the CKD-EPI equation.          A/P:    Weakness, dizziness, and shortness of breath  -check CTA as patient is also tachycardic and high risk for clot    Rectal adenocarcinoma   -patient with confirmed metastatic solitary lesion in the lung.  -MRI liver did not show any liver Mets  -I discussed the case in tumor board.  I also discussed it with Dr. Montoya and Dr. Melvin.  The plan is to try neoadjuvant treatment for at least 3 cycles with FOLFOX and Avastin.  Afterwards we will re-scanned her and discuss moving forward with radiation therapy versus resection of the metastatic disease which would require a lower lobe lobectomy.    -Tempus did not show any actionable mutations  -okay for cycle 4 if CTA is ok  -return to clinic in 2 weeks for cycle 5    Transaminitis   -resolved after stopping Tylenol    Chemotherapy-induced fatigue   -grade 1  -no dose adjustment needed at this time    Aurash Khoobehi, MD  Hematology and Oncology    Visit today included increased complexity associated with the care of the episodic problem addressed and managing the longitudinal care of the patient due to the serious and/or complex managed problem(s) colon cancer.

## 2024-08-20 ENCOUNTER — INFUSION (OUTPATIENT)
Dept: INFUSION THERAPY | Facility: HOSPITAL | Age: 64
End: 2024-08-20
Attending: INTERNAL MEDICINE
Payer: COMMERCIAL

## 2024-08-20 VITALS
TEMPERATURE: 98 F | RESPIRATION RATE: 15 BRPM | OXYGEN SATURATION: 98 % | HEIGHT: 66 IN | DIASTOLIC BLOOD PRESSURE: 84 MMHG | BODY MASS INDEX: 29.6 KG/M2 | HEART RATE: 82 BPM | WEIGHT: 184.19 LBS | SYSTOLIC BLOOD PRESSURE: 123 MMHG

## 2024-08-20 DIAGNOSIS — C18.7 MALIGNANT NEOPLASM OF SIGMOID COLON: ICD-10-CM

## 2024-08-20 DIAGNOSIS — C78.02 MALIGNANT NEOPLASM METASTATIC TO LEFT LUNG: Primary | ICD-10-CM

## 2024-08-20 PROCEDURE — 96416 CHEMO PROLONG INFUSE W/PUMP: CPT

## 2024-08-20 PROCEDURE — 96368 THER/DIAG CONCURRENT INF: CPT

## 2024-08-20 PROCEDURE — 96367 TX/PROPH/DG ADDL SEQ IV INF: CPT

## 2024-08-20 PROCEDURE — 96413 CHEMO IV INFUSION 1 HR: CPT

## 2024-08-20 PROCEDURE — 96411 CHEMO IV PUSH ADDL DRUG: CPT

## 2024-08-20 PROCEDURE — 25000003 PHARM REV CODE 250: Performed by: INTERNAL MEDICINE

## 2024-08-20 PROCEDURE — 96415 CHEMO IV INFUSION ADDL HR: CPT

## 2024-08-20 PROCEDURE — 96417 CHEMO IV INFUS EACH ADDL SEQ: CPT

## 2024-08-20 PROCEDURE — 63600175 PHARM REV CODE 636 W HCPCS: Performed by: INTERNAL MEDICINE

## 2024-08-20 RX ORDER — DIPHENHYDRAMINE HYDROCHLORIDE 50 MG/ML
50 INJECTION INTRAMUSCULAR; INTRAVENOUS ONCE AS NEEDED
Status: DISCONTINUED | OUTPATIENT
Start: 2024-08-20 | End: 2024-08-20 | Stop reason: HOSPADM

## 2024-08-20 RX ORDER — FLUOROURACIL 50 MG/ML
400 INJECTION, SOLUTION INTRAVENOUS
Status: COMPLETED | OUTPATIENT
Start: 2024-08-20 | End: 2024-08-20

## 2024-08-20 RX ORDER — EPINEPHRINE 0.3 MG/.3ML
0.3 INJECTION SUBCUTANEOUS ONCE AS NEEDED
Status: DISCONTINUED | OUTPATIENT
Start: 2024-08-20 | End: 2024-08-20 | Stop reason: HOSPADM

## 2024-08-20 RX ORDER — SODIUM CHLORIDE 0.9 % (FLUSH) 0.9 %
10 SYRINGE (ML) INJECTION
Status: DISCONTINUED | OUTPATIENT
Start: 2024-08-20 | End: 2024-08-20 | Stop reason: HOSPADM

## 2024-08-20 RX ORDER — PROCHLORPERAZINE EDISYLATE 5 MG/ML
10 INJECTION INTRAMUSCULAR; INTRAVENOUS ONCE AS NEEDED
Status: DISCONTINUED | OUTPATIENT
Start: 2024-08-20 | End: 2024-08-20 | Stop reason: HOSPADM

## 2024-08-20 RX ADMIN — PALONOSETRON HYDROCHLORIDE 0.25 MG: 0.25 INJECTION INTRAVENOUS at 08:08

## 2024-08-20 RX ADMIN — DEXTROSE MONOHYDRATE: 50 INJECTION, SOLUTION INTRAVENOUS at 08:08

## 2024-08-20 RX ADMIN — LEUCOVORIN CALCIUM 780 MG: 350 INJECTION, POWDER, LYOPHILIZED, FOR SUSPENSION INTRAMUSCULAR; INTRAVENOUS at 08:08

## 2024-08-20 RX ADMIN — OXALIPLATIN 166 MG: 100 INJECTION, SOLUTION, CONCENTRATE INTRAVENOUS at 08:08

## 2024-08-20 RX ADMIN — FLUOROURACIL 4680 MG: 50 INJECTION, SOLUTION INTRAVENOUS at 11:08

## 2024-08-20 RX ADMIN — BEVACIZUMAB-AWWB 400 MG: 400 INJECTION, SOLUTION INTRAVENOUS at 07:08

## 2024-08-20 RX ADMIN — FLUOROURACIL 780 MG: 50 INJECTION, SOLUTION INTRAVENOUS at 11:08

## 2024-08-20 NOTE — PLAN OF CARE
Problem: Fall Injury Risk  Goal: Absence of Fall and Fall-Related Injury  Outcome: Progressing  Intervention: Identify and Manage Contributors  Flowsheets (Taken 8/20/2024 0727)  Self-Care Promotion:   independence encouraged   BADL personal objects within reach   adaptive equipment use encouraged  Medication Review/Management: medications reviewed  Intervention: Promote Injury-Free Environment  Flowsheets (Taken 8/20/2024 0727)  Safety Promotion/Fall Prevention: medications reviewed

## 2024-08-22 ENCOUNTER — INFUSION (OUTPATIENT)
Dept: INFUSION THERAPY | Facility: HOSPITAL | Age: 64
End: 2024-08-22
Attending: INTERNAL MEDICINE
Payer: COMMERCIAL

## 2024-08-22 VITALS
DIASTOLIC BLOOD PRESSURE: 84 MMHG | BODY MASS INDEX: 30 KG/M2 | TEMPERATURE: 98 F | RESPIRATION RATE: 15 BRPM | WEIGHT: 186.69 LBS | HEART RATE: 72 BPM | OXYGEN SATURATION: 98 % | SYSTOLIC BLOOD PRESSURE: 128 MMHG | HEIGHT: 66 IN

## 2024-08-22 DIAGNOSIS — C78.02 MALIGNANT NEOPLASM METASTATIC TO LEFT LUNG: Primary | ICD-10-CM

## 2024-08-22 DIAGNOSIS — C18.7 MALIGNANT NEOPLASM OF SIGMOID COLON: ICD-10-CM

## 2024-08-22 PROCEDURE — 25000003 PHARM REV CODE 250: Performed by: INTERNAL MEDICINE

## 2024-08-22 PROCEDURE — 63600175 PHARM REV CODE 636 W HCPCS: Performed by: INTERNAL MEDICINE

## 2024-08-22 PROCEDURE — 96523 IRRIG DRUG DELIVERY DEVICE: CPT

## 2024-08-22 PROCEDURE — A4216 STERILE WATER/SALINE, 10 ML: HCPCS | Performed by: INTERNAL MEDICINE

## 2024-08-22 RX ORDER — SODIUM CHLORIDE 0.9 % (FLUSH) 0.9 %
10 SYRINGE (ML) INJECTION
Status: DISCONTINUED | OUTPATIENT
Start: 2024-08-22 | End: 2024-08-22 | Stop reason: HOSPADM

## 2024-08-22 RX ORDER — HEPARIN 100 UNIT/ML
500 SYRINGE INTRAVENOUS
Status: DISCONTINUED | OUTPATIENT
Start: 2024-08-22 | End: 2024-08-22 | Stop reason: HOSPADM

## 2024-08-22 RX ADMIN — SODIUM CHLORIDE, PRESERVATIVE FREE 10 ML: 5 INJECTION INTRAVENOUS at 10:08

## 2024-08-22 RX ADMIN — HEPARIN 500 UNITS: 100 SYRINGE at 10:08

## 2024-08-28 ENCOUNTER — PATIENT MESSAGE (OUTPATIENT)
Dept: HEMATOLOGY/ONCOLOGY | Facility: CLINIC | Age: 64
End: 2024-08-28
Payer: COMMERCIAL

## 2024-09-03 ENCOUNTER — LAB VISIT (OUTPATIENT)
Dept: LAB | Facility: HOSPITAL | Age: 64
End: 2024-09-03
Attending: INTERNAL MEDICINE
Payer: COMMERCIAL

## 2024-09-03 ENCOUNTER — OFFICE VISIT (OUTPATIENT)
Dept: HEMATOLOGY/ONCOLOGY | Facility: CLINIC | Age: 64
End: 2024-09-03
Payer: COMMERCIAL

## 2024-09-03 VITALS
HEIGHT: 66 IN | HEART RATE: 90 BPM | RESPIRATION RATE: 16 BRPM | WEIGHT: 186.31 LBS | TEMPERATURE: 97 F | BODY MASS INDEX: 29.94 KG/M2 | SYSTOLIC BLOOD PRESSURE: 124 MMHG | DIASTOLIC BLOOD PRESSURE: 67 MMHG | OXYGEN SATURATION: 98 %

## 2024-09-03 DIAGNOSIS — E53.8 B12 DEFICIENCY: ICD-10-CM

## 2024-09-03 DIAGNOSIS — C18.7 MALIGNANT NEOPLASM OF SIGMOID COLON: Primary | ICD-10-CM

## 2024-09-03 DIAGNOSIS — R53.83 OTHER FATIGUE: ICD-10-CM

## 2024-09-03 DIAGNOSIS — C18.7 MALIGNANT NEOPLASM OF SIGMOID COLON: ICD-10-CM

## 2024-09-03 DIAGNOSIS — C78.02 MALIGNANT NEOPLASM METASTATIC TO LEFT LUNG: ICD-10-CM

## 2024-09-03 LAB
ALBUMIN SERPL BCP-MCNC: 3.6 G/DL (ref 3.5–5.2)
ALP SERPL-CCNC: 99 U/L (ref 55–135)
ALT SERPL W/O P-5'-P-CCNC: 39 U/L (ref 10–44)
ANION GAP SERPL CALC-SCNC: 9 MMOL/L (ref 8–16)
AST SERPL-CCNC: 22 U/L (ref 10–40)
BASOPHILS # BLD AUTO: 0.07 K/UL (ref 0–0.2)
BASOPHILS NFR BLD: 1.9 % (ref 0–1.9)
BILIRUB SERPL-MCNC: 0.6 MG/DL (ref 0.1–1)
BILIRUB UR QL STRIP: NEGATIVE
BUN SERPL-MCNC: 15 MG/DL (ref 8–23)
CALCIUM SERPL-MCNC: 9.3 MG/DL (ref 8.7–10.5)
CHLORIDE SERPL-SCNC: 107 MMOL/L (ref 95–110)
CLARITY UR: CLEAR
CO2 SERPL-SCNC: 24 MMOL/L (ref 23–29)
COLOR UR: YELLOW
CREAT SERPL-MCNC: 0.9 MG/DL (ref 0.5–1.4)
DIFFERENTIAL METHOD BLD: ABNORMAL
EOSINOPHIL # BLD AUTO: 0.1 K/UL (ref 0–0.5)
EOSINOPHIL NFR BLD: 3.2 % (ref 0–8)
ERYTHROCYTE [DISTWIDTH] IN BLOOD BY AUTOMATED COUNT: 15.6 % (ref 11.5–14.5)
EST. GFR  (NO RACE VARIABLE): >60 ML/MIN/1.73 M^2
GLUCOSE SERPL-MCNC: 154 MG/DL (ref 70–110)
GLUCOSE UR QL STRIP: ABNORMAL
HCT VFR BLD AUTO: 37 % (ref 37–48.5)
HGB BLD-MCNC: 12.9 G/DL (ref 12–16)
HGB UR QL STRIP: NEGATIVE
HYALINE CASTS #/AREA URNS LPF: 1 /LPF
IMM GRANULOCYTES # BLD AUTO: 0 K/UL (ref 0–0.04)
IMM GRANULOCYTES NFR BLD AUTO: 0 % (ref 0–0.5)
KETONES UR QL STRIP: NEGATIVE
LEUKOCYTE ESTERASE UR QL STRIP: ABNORMAL
LYMPHOCYTES # BLD AUTO: 2.2 K/UL (ref 1–4.8)
LYMPHOCYTES NFR BLD: 57.5 % (ref 18–48)
MCH RBC QN AUTO: 32.7 PG (ref 27–31)
MCHC RBC AUTO-ENTMCNC: 34.9 G/DL (ref 32–36)
MCV RBC AUTO: 94 FL (ref 82–98)
MICROSCOPIC COMMENT: NORMAL
MONOCYTES # BLD AUTO: 0.3 K/UL (ref 0.3–1)
MONOCYTES NFR BLD: 9.1 % (ref 4–15)
NEUTROPHILS # BLD AUTO: 1.1 K/UL (ref 1.8–7.7)
NEUTROPHILS NFR BLD: 28.3 % (ref 38–73)
NITRITE UR QL STRIP: NEGATIVE
NRBC BLD-RTO: 0 /100 WBC
PH UR STRIP: 6 [PH] (ref 5–8)
PLATELET # BLD AUTO: 188 K/UL (ref 150–450)
PMV BLD AUTO: 10.3 FL (ref 9.2–12.9)
POTASSIUM SERPL-SCNC: 3.4 MMOL/L (ref 3.5–5.1)
PROT SERPL-MCNC: 7 G/DL (ref 6–8.4)
PROT UR QL STRIP: NEGATIVE
RBC # BLD AUTO: 3.95 M/UL (ref 4–5.4)
RBC #/AREA URNS HPF: 0 /HPF (ref 0–4)
SODIUM SERPL-SCNC: 140 MMOL/L (ref 136–145)
SP GR UR STRIP: 1.02 (ref 1–1.03)
SQUAMOUS #/AREA URNS HPF: 1 /HPF
URN SPEC COLLECT METH UR: ABNORMAL
UROBILINOGEN UR STRIP-ACNC: NEGATIVE EU/DL
WBC # BLD AUTO: 3.74 K/UL (ref 3.9–12.7)
WBC #/AREA URNS HPF: 3 /HPF (ref 0–5)

## 2024-09-03 PROCEDURE — G2211 COMPLEX E/M VISIT ADD ON: HCPCS | Mod: S$GLB,,, | Performed by: NURSE PRACTITIONER

## 2024-09-03 PROCEDURE — 85025 COMPLETE CBC W/AUTO DIFF WBC: CPT | Performed by: INTERNAL MEDICINE

## 2024-09-03 PROCEDURE — 80053 COMPREHEN METABOLIC PANEL: CPT | Performed by: INTERNAL MEDICINE

## 2024-09-03 PROCEDURE — 3044F HG A1C LEVEL LT 7.0%: CPT | Mod: CPTII,S$GLB,, | Performed by: NURSE PRACTITIONER

## 2024-09-03 PROCEDURE — 1160F RVW MEDS BY RX/DR IN RCRD: CPT | Mod: CPTII,S$GLB,, | Performed by: NURSE PRACTITIONER

## 2024-09-03 PROCEDURE — 3078F DIAST BP <80 MM HG: CPT | Mod: CPTII,S$GLB,, | Performed by: NURSE PRACTITIONER

## 2024-09-03 PROCEDURE — 81000 URINALYSIS NONAUTO W/SCOPE: CPT | Performed by: NURSE PRACTITIONER

## 2024-09-03 PROCEDURE — 99999 PR PBB SHADOW E&M-EST. PATIENT-LVL V: CPT | Mod: PBBFAC,,, | Performed by: NURSE PRACTITIONER

## 2024-09-03 PROCEDURE — 3074F SYST BP LT 130 MM HG: CPT | Mod: CPTII,S$GLB,, | Performed by: NURSE PRACTITIONER

## 2024-09-03 PROCEDURE — 36415 COLL VENOUS BLD VENIPUNCTURE: CPT | Performed by: INTERNAL MEDICINE

## 2024-09-03 PROCEDURE — 3008F BODY MASS INDEX DOCD: CPT | Mod: CPTII,S$GLB,, | Performed by: NURSE PRACTITIONER

## 2024-09-03 PROCEDURE — 1159F MED LIST DOCD IN RCRD: CPT | Mod: CPTII,S$GLB,, | Performed by: NURSE PRACTITIONER

## 2024-09-03 PROCEDURE — 99215 OFFICE O/P EST HI 40 MIN: CPT | Mod: S$GLB,,, | Performed by: NURSE PRACTITIONER

## 2024-09-03 RX ORDER — SODIUM CHLORIDE 0.9 % (FLUSH) 0.9 %
10 SYRINGE (ML) INJECTION
Status: CANCELLED | OUTPATIENT
Start: 2024-09-03

## 2024-09-03 RX ORDER — HEPARIN 100 UNIT/ML
500 SYRINGE INTRAVENOUS
Status: CANCELLED | OUTPATIENT
Start: 2024-09-05

## 2024-09-03 RX ORDER — HEPARIN 100 UNIT/ML
500 SYRINGE INTRAVENOUS
Status: CANCELLED | OUTPATIENT
Start: 2024-09-03

## 2024-09-03 RX ORDER — SODIUM CHLORIDE 0.9 % (FLUSH) 0.9 %
10 SYRINGE (ML) INJECTION
Status: CANCELLED | OUTPATIENT
Start: 2024-09-05

## 2024-09-03 RX ORDER — EPINEPHRINE 0.3 MG/.3ML
0.3 INJECTION SUBCUTANEOUS ONCE AS NEEDED
Status: CANCELLED | OUTPATIENT
Start: 2024-09-03

## 2024-09-03 RX ORDER — PROCHLORPERAZINE EDISYLATE 5 MG/ML
10 INJECTION INTRAMUSCULAR; INTRAVENOUS ONCE AS NEEDED
Status: CANCELLED | OUTPATIENT
Start: 2024-09-05

## 2024-09-03 RX ORDER — PROCHLORPERAZINE EDISYLATE 5 MG/ML
10 INJECTION INTRAMUSCULAR; INTRAVENOUS ONCE AS NEEDED
Status: CANCELLED | OUTPATIENT
Start: 2024-09-03

## 2024-09-03 RX ORDER — FLUOROURACIL 50 MG/ML
400 INJECTION, SOLUTION INTRAVENOUS
Status: CANCELLED | OUTPATIENT
Start: 2024-09-03

## 2024-09-03 RX ORDER — DIPHENHYDRAMINE HYDROCHLORIDE 50 MG/ML
50 INJECTION INTRAMUSCULAR; INTRAVENOUS ONCE AS NEEDED
Status: CANCELLED | OUTPATIENT
Start: 2024-09-03

## 2024-09-03 RX ORDER — PREDNISONE 5 MG/1
5 TABLET ORAL DAILY
Qty: 30 TABLET | Refills: 1 | Status: SHIPPED | OUTPATIENT
Start: 2024-09-03

## 2024-09-03 NOTE — PROGRESS NOTES
Service Date:  9/3/24    Chief Complaint: Lung Cancer (Labs  UA  chemo clear)    Malaika Craven is a 64 y.o. 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  She also had a CT scan done which showed a 2.2 centimeter lesion in the left lower lobe of the lung.  No evidence of liver Mets on MRI.    Here for neoadjuvant FOLFOX with Avastin.  Complaining of dizziness, weakness, and shortness of breath since last night.  Also tachycardic.  Denies any chest pain.  Denies any numbness tingling in her arms or neck.    9/3/2024:  She returns today prior to cycle 5 of FOLFOX/Avastin.  Patient states after previous treatment she developed difficulty with words.  Patient stated she knows what she wanted to say but was unable to find the words.  She is also complaining of persistent fatigue    Review of Systems   Constitutional:  Positive for malaise/fatigue.   HENT: Negative.     Eyes: Negative.    Respiratory: Negative.     Cardiovascular: Negative.    Gastrointestinal: Negative.    Genitourinary: Negative.    Musculoskeletal: Negative.    Skin: Negative.    Neurological:         Delayed speech/difficulty with words   Endo/Heme/Allergies: Negative.    Psychiatric/Behavioral: Negative.      Current Outpatient Medications   Medication Instructions    ALPRAZolam (XANAX) 0.5 mg, Oral, 3 times daily    diphenhydrAMINE-aluminum-magnesium hydroxide-simethicone-LIDOcaine viscous HCl 2% 15 mLs, Swish & Spit, Every 4 hours PRN    ELDERBERRY FRUIT ORAL Take by mouth.    EPINEPHrine (EPIPEN) 0.3 mg, Intramuscular, Once    fexofenadine (ALLEGRA ALLERGY) 60 MG tablet     hydroCHLOROthiazide (HYDRODIURIL) 25 mg, Oral, Daily, Patient takes two pills daily    LIDOcaine-prilocaine (EMLA) cream Topical (Top), As needed (PRN)    multivit-minerals/folic acid (ADULT ONE DAILY MULTIVITAMIN ORAL) 1 tablet, Daily PRN    NIFEdipine (ADALAT CC) 60 MG TbSR TAKE 1 TABLET BY MOUTH EVERY DAY ON AN EMPTY STOMACH    OLANZapine  (ZYPREXA) 5 MG tablet Take 1 tablet by mouth nightly on days 1-3 of each chemotherapy cycle.    OMEPRAZOLE ORAL 0.5 mg, Oral, Daily PRN    ondansetron (ZOFRAN) 8 mg, Oral, Every 8 hours PRN    oxyCODONE-acetaminophen (PERCOCET) 5-325 mg per tablet 1 tablet, Oral, Every 4 hours PRN    polyethylene glycol (GLYCOLAX) 17 g, Daily    potassium chloride (MICRO-K) 10 MEQ CpSR 20 mEq, Oral, Daily    prochlorperazine (COMPAZINE) 10 mg, Oral, Every 6 hours PRN    RYALTRIS 665-25 mcg/spray Spry     traZODone (DESYREL) 150 mg, Nightly        Past Medical History:   Diagnosis Date    Abnormal Pap smear of cervix     Allergy     Corneal erosion of right eye     Generalized anxiety disorder     Hypertension     Rectal mass     Sinus problem         Past Surgical History:   Procedure Laterality Date    BREAST CYST ASPIRATION       SECTION      X 2    CHOLECYSTECTOMY      COLONOSCOPY N/A 2024    Procedure: COLONOSCOPY;  Surgeon: Radhika Leary MD;  Location: [facility];  Service: Endoscopy;  Laterality: N/A;    DILATION AND CURETTAGE OF UTERUS      [facility]      EYE SURGERY Right     FOR CORNEAL EROSION    FLEXIBLE SIGMOIDOSCOPY N/A 2024    Procedure: SIGMOIDOSCOPY, FLEXIBLE(Fleets);  Surgeon: Oumar Montoya MD;  Location: [facility];  Service: General;  Laterality: N/A;    INSERTION OF TUNNELED CENTRAL VENOUS CATHETER (CVC) WITH SUBCUTANEOUS PORT N/A 2024    Procedure: [id number]-H-CATH;  Surgeon: Oumar Montoya MD;  Location: [facility];  Service: General;  Laterality: N/A;    LIPOSUCTION      x 2    Mole Excision Right     Shin    TUBAL LIGATION          Family History   Problem Relation Name Age of Onset    Heart disease Mother      Hypertension Mother      Arthritis Father      Thyroid disease Father      Kidney disease Paternal Grandmother      Liver cancer Maternal Grandmother      Cancer Maternal Grandfather      Allergies Sister      Allergic rhinitis Sister      Angioedema Neg Hx       "Asthma Neg Hx      Atopy Neg Hx      Eczema Neg Hx      Immunodeficiency Neg Hx      Rhinitis Neg Hx      Urticaria Neg Hx      Amblyopia Neg Hx      Blindness Neg Hx      Cataracts Neg Hx      Diabetes Neg Hx      Glaucoma Neg Hx      Macular degeneration Neg Hx      Retinal detachment Neg Hx      Strabismus Neg Hx      Stroke Neg Hx         Social History     Tobacco Use    Smoking status: Never    Smokeless tobacco: Never   Substance Use Topics    Alcohol use: Yes     Comment: 2 drinks per week    Drug use: No     Comment: MEDICAL MARIJUANA         Vitals:    09/03/24 1525   BP: 124/67   Pulse: 90   Resp: 16   Temp: 97.1 °F (36.2 °C)        Physical Exam:  /67 (BP Location: Left arm, Patient Position: Sitting, BP Method: Large (Automatic))   Pulse 90   Temp 97.1 °F (36.2 °C) (Temporal)   Resp 16   Ht 5' 6" (1.676 m)   Wt 84.5 kg (186 lb 4.6 oz)   SpO2 98%   BMI 30.07 kg/m²     Physical Exam  Constitutional:       Appearance: Normal appearance.   HENT:      Head: Normocephalic and atraumatic.      Nose: Nose normal.      Mouth/Throat:      Mouth: Mucous membranes are moist.      Pharynx: Oropharynx is clear.   Eyes:      Conjunctiva/sclera: Conjunctivae normal.   Cardiovascular:      Rate and Rhythm: Normal rate and regular rhythm.      Heart sounds: Normal heart sounds.   Pulmonary:      Effort: Pulmonary effort is normal.      Breath sounds: Normal breath sounds.   Abdominal:      General: Abdomen is flat. Bowel sounds are normal.      Palpations: Abdomen is soft.   Musculoskeletal:         General: Normal range of motion.      Cervical back: Normal range of motion and neck supple.   Skin:     General: Skin is warm and dry.   Neurological:      General: No focal deficit present.      Mental Status: She is alert and oriented to person, place, and time. Mental status is at baseline.   Psychiatric:         Mood and Affect: Mood normal.        Labs:  Lab Results   Component Value Date    WBC 3.74 (L) " 09/03/2024    RBC 3.95 (L) 09/03/2024    HGB 12.9 09/03/2024    HCT 37.0 09/03/2024    MCV 94 09/03/2024    MCH 32.7 (H) 09/03/2024    MCHC 34.9 09/03/2024    RDW 15.6 (H) 09/03/2024     09/03/2024    MPV 10.3 09/03/2024    GRAN 1.1 (L) 09/03/2024    GRAN 28.3 (L) 09/03/2024    LYMPH 2.2 09/03/2024    LYMPH 57.5 (H) 09/03/2024    MONO 0.3 09/03/2024    MONO 9.1 09/03/2024    EOS 0.1 09/03/2024    BASO 0.07 09/03/2024    EOSINOPHIL 3.2 09/03/2024    BASOPHIL 1.9 09/03/2024     Sodium   Date Value Ref Range Status   09/03/2024 140 136 - 145 mmol/L Final     Potassium   Date Value Ref Range Status   09/03/2024 3.4 (L) 3.5 - 5.1 mmol/L Final     Chloride   Date Value Ref Range Status   09/03/2024 107 95 - 110 mmol/L Final     CO2   Date Value Ref Range Status   09/03/2024 24 23 - 29 mmol/L Final     Glucose   Date Value Ref Range Status   09/03/2024 154 (H) 70 - 110 mg/dL Final     BUN   Date Value Ref Range Status   09/03/2024 15 8 - 23 mg/dL Final     Creatinine   Date Value Ref Range Status   09/03/2024 0.9 0.5 - 1.4 mg/dL Final     Calcium   Date Value Ref Range Status   09/03/2024 9.3 8.7 - 10.5 mg/dL Final     Total Protein   Date Value Ref Range Status   09/03/2024 7.0 6.0 - 8.4 g/dL Final     Albumin   Date Value Ref Range Status   09/03/2024 3.6 3.5 - 5.2 g/dL Final     Total Bilirubin   Date Value Ref Range Status   09/03/2024 0.6 0.1 - 1.0 mg/dL Final     Comment:     For infants and newborns, interpretation of results should be based  on gestational age, weight and in agreement with clinical  observations.    Premature Infant recommended reference ranges:  Up to 24 hours.............<8.0 mg/dL  Up to 48 hours............<12.0 mg/dL  3-5 days..................<15.0 mg/dL  6-29 days.................<15.0 mg/dL       Alkaline Phosphatase   Date Value Ref Range Status   09/03/2024 99 55 - 135 U/L Final     AST   Date Value Ref Range Status   09/03/2024 22 10 - 40 U/L Final     ALT   Date Value Ref Range  Status   09/03/2024 39 10 - 44 U/L Final     Anion Gap   Date Value Ref Range Status   09/03/2024 9 8 - 16 mmol/L Final     eGFR if    Date Value Ref Range Status   10/25/2021 >60.0 >60 mL/min/1.73 m^2 Final     eGFR if non    Date Value Ref Range Status   10/25/2021 >60.0 >60 mL/min/1.73 m^2 Final     Comment:     Calculation used to obtain the estimated glomerular filtration  rate (eGFR) is the CKD-EPI equation.          A/P:    Weakness, dizziness, and shortness of breath  -check CTA as patient is also tachycardic and high risk for clot    Rectal adenocarcinoma   -patient with confirmed metastatic solitary lesion in the lung.  -MRI liver did not show any liver Mets  -I discussed the case in tumor board.  I also discussed it with Dr. Montoya and Dr. Melvin.  The plan is to try neoadjuvant treatment for at least 3 cycles with FOLFOX and Avastin.  Afterwards we will re-scanned her and discuss moving forward with radiation therapy versus resection of the metastatic disease which would require a lower lobe lobectomy.    -Tempus did not show any actionable mutations  -proceed with cycle 5  -see MD in 2 weeks for cycle 6    Transaminitis   -resolved after stopping Tylenol    Chemotherapy-induced fatigue   -grade 1  -no dose adjustment needed at this time  -start prednisone 5 mg p.o. daily  -check B12 and iron on follow-up    Intermittent Delayed speech:   -MRI of the brain    Visit today included increased complexity associated with the care of the episodic problem addressed and managing the longitudinal care of the patient due to the serious and/or complex managed problem(s) metastatic colon cancer.

## 2024-09-04 ENCOUNTER — INFUSION (OUTPATIENT)
Dept: INFUSION THERAPY | Facility: HOSPITAL | Age: 64
End: 2024-09-04
Attending: INTERNAL MEDICINE
Payer: COMMERCIAL

## 2024-09-04 VITALS
HEART RATE: 83 BPM | DIASTOLIC BLOOD PRESSURE: 84 MMHG | RESPIRATION RATE: 18 BRPM | TEMPERATURE: 97 F | WEIGHT: 185.69 LBS | SYSTOLIC BLOOD PRESSURE: 127 MMHG | OXYGEN SATURATION: 98 % | HEIGHT: 66 IN | BODY MASS INDEX: 29.84 KG/M2

## 2024-09-04 DIAGNOSIS — C18.7 MALIGNANT NEOPLASM OF SIGMOID COLON: ICD-10-CM

## 2024-09-04 DIAGNOSIS — C78.02 MALIGNANT NEOPLASM METASTATIC TO LEFT LUNG: Primary | ICD-10-CM

## 2024-09-04 PROCEDURE — 96416 CHEMO PROLONG INFUSE W/PUMP: CPT

## 2024-09-04 PROCEDURE — 96368 THER/DIAG CONCURRENT INF: CPT

## 2024-09-04 PROCEDURE — 96415 CHEMO IV INFUSION ADDL HR: CPT

## 2024-09-04 PROCEDURE — 96413 CHEMO IV INFUSION 1 HR: CPT

## 2024-09-04 PROCEDURE — 96367 TX/PROPH/DG ADDL SEQ IV INF: CPT

## 2024-09-04 PROCEDURE — 96411 CHEMO IV PUSH ADDL DRUG: CPT

## 2024-09-04 PROCEDURE — 25000003 PHARM REV CODE 250: Performed by: NURSE PRACTITIONER

## 2024-09-04 PROCEDURE — 96417 CHEMO IV INFUS EACH ADDL SEQ: CPT

## 2024-09-04 PROCEDURE — 63600175 PHARM REV CODE 636 W HCPCS: Performed by: NURSE PRACTITIONER

## 2024-09-04 RX ORDER — DIPHENHYDRAMINE HYDROCHLORIDE 50 MG/ML
50 INJECTION INTRAMUSCULAR; INTRAVENOUS ONCE AS NEEDED
Status: DISCONTINUED | OUTPATIENT
Start: 2024-09-04 | End: 2024-09-04 | Stop reason: HOSPADM

## 2024-09-04 RX ORDER — SODIUM CHLORIDE 0.9 % (FLUSH) 0.9 %
10 SYRINGE (ML) INJECTION
Status: DISCONTINUED | OUTPATIENT
Start: 2024-09-04 | End: 2024-09-04 | Stop reason: HOSPADM

## 2024-09-04 RX ORDER — PROCHLORPERAZINE EDISYLATE 5 MG/ML
10 INJECTION INTRAMUSCULAR; INTRAVENOUS ONCE AS NEEDED
Status: DISCONTINUED | OUTPATIENT
Start: 2024-09-04 | End: 2024-09-04 | Stop reason: HOSPADM

## 2024-09-04 RX ORDER — FLUOROURACIL 50 MG/ML
400 INJECTION, SOLUTION INTRAVENOUS
Status: COMPLETED | OUTPATIENT
Start: 2024-09-04 | End: 2024-09-04

## 2024-09-04 RX ORDER — EPINEPHRINE 0.3 MG/.3ML
0.3 INJECTION SUBCUTANEOUS ONCE AS NEEDED
Status: DISCONTINUED | OUTPATIENT
Start: 2024-09-04 | End: 2024-09-04 | Stop reason: HOSPADM

## 2024-09-04 RX ADMIN — FLUOROURACIL 4680 MG: 50 INJECTION, SOLUTION INTRAVENOUS at 02:09

## 2024-09-04 RX ADMIN — PALONOSETRON HYDROCHLORIDE 0.25 MG: 0.25 INJECTION INTRAVENOUS at 12:09

## 2024-09-04 RX ADMIN — FLUOROURACIL 780 MG: 50 INJECTION, SOLUTION INTRAVENOUS at 02:09

## 2024-09-04 RX ADMIN — BEVACIZUMAB-AWWB 400 MG: 400 INJECTION, SOLUTION INTRAVENOUS at 11:09

## 2024-09-04 RX ADMIN — OXALIPLATIN 166 MG: 100 INJECTION, SOLUTION, CONCENTRATE INTRAVENOUS at 12:09

## 2024-09-04 RX ADMIN — LEUCOVORIN CALCIUM 780 MG: 350 INJECTION, POWDER, LYOPHILIZED, FOR SOLUTION INTRAMUSCULAR; INTRAVENOUS at 12:09

## 2024-09-04 NOTE — PLAN OF CARE
Problem: Fatigue  Goal: Improved Activity Tolerance  Intervention: Promote Improved Energy  Flowsheets (Taken 9/4/2024 1124)  Fatigue Management: fatigue-related activity identified  Activity Management: Ambulated -L4

## 2024-09-06 ENCOUNTER — INFUSION (OUTPATIENT)
Dept: INFUSION THERAPY | Facility: HOSPITAL | Age: 64
End: 2024-09-06
Attending: INTERNAL MEDICINE
Payer: COMMERCIAL

## 2024-09-06 ENCOUNTER — PATIENT MESSAGE (OUTPATIENT)
Dept: HEMATOLOGY/ONCOLOGY | Facility: CLINIC | Age: 64
End: 2024-09-06
Payer: COMMERCIAL

## 2024-09-06 VITALS
WEIGHT: 185.69 LBS | HEART RATE: 78 BPM | TEMPERATURE: 98 F | OXYGEN SATURATION: 99 % | BODY MASS INDEX: 29.84 KG/M2 | DIASTOLIC BLOOD PRESSURE: 78 MMHG | HEIGHT: 66 IN | RESPIRATION RATE: 18 BRPM | SYSTOLIC BLOOD PRESSURE: 120 MMHG

## 2024-09-06 DIAGNOSIS — C18.7 MALIGNANT NEOPLASM OF SIGMOID COLON: ICD-10-CM

## 2024-09-06 DIAGNOSIS — C78.02 MALIGNANT NEOPLASM METASTATIC TO LEFT LUNG: Primary | ICD-10-CM

## 2024-09-06 PROCEDURE — 63600175 PHARM REV CODE 636 W HCPCS: Performed by: NURSE PRACTITIONER

## 2024-09-06 PROCEDURE — 96365 THER/PROPH/DIAG IV INF INIT: CPT

## 2024-09-06 PROCEDURE — 25000003 PHARM REV CODE 250: Performed by: NURSE PRACTITIONER

## 2024-09-06 PROCEDURE — A4216 STERILE WATER/SALINE, 10 ML: HCPCS | Performed by: NURSE PRACTITIONER

## 2024-09-06 RX ORDER — SODIUM CHLORIDE 0.9 % (FLUSH) 0.9 %
10 SYRINGE (ML) INJECTION
Status: DISCONTINUED | OUTPATIENT
Start: 2024-09-06 | End: 2024-09-06 | Stop reason: HOSPADM

## 2024-09-06 RX ORDER — HEPARIN 100 UNIT/ML
500 SYRINGE INTRAVENOUS
Status: DISCONTINUED | OUTPATIENT
Start: 2024-09-06 | End: 2024-09-06 | Stop reason: HOSPADM

## 2024-09-06 RX ADMIN — HEPARIN 500 UNITS: 100 SYRINGE at 04:09

## 2024-09-06 RX ADMIN — SODIUM CHLORIDE, PRESERVATIVE FREE 10 ML: 5 INJECTION INTRAVENOUS at 04:09

## 2024-09-06 RX ADMIN — PROCHLORPERAZINE EDISYLATE: 5 INJECTION INTRAMUSCULAR; INTRAVENOUS at 04:09

## 2024-09-06 NOTE — PLAN OF CARE
Problem: Nausea and Vomiting  Goal: Nausea and Vomiting Relief  9/6/2024 1603 by Diana Gonzalez, RN  Outcome: Met  9/6/2024 1603 by Diana Gonzalez, RN  Outcome: Progressing

## 2024-09-10 ENCOUNTER — PATIENT MESSAGE (OUTPATIENT)
Dept: HEMATOLOGY/ONCOLOGY | Facility: CLINIC | Age: 64
End: 2024-09-10
Payer: COMMERCIAL

## 2024-09-12 ENCOUNTER — HOSPITAL ENCOUNTER (OUTPATIENT)
Dept: RADIOLOGY | Facility: HOSPITAL | Age: 64
Discharge: HOME | End: 2024-09-12
Attending: NURSE PRACTITIONER
Payer: COMMERCIAL

## 2024-09-12 DIAGNOSIS — E53.8 B12 DEFICIENCY: ICD-10-CM

## 2024-09-12 DIAGNOSIS — C18.7 MALIGNANT NEOPLASM OF SIGMOID COLON: ICD-10-CM

## 2024-09-12 PROCEDURE — 70553 MRI BRAIN STEM W/O & W/DYE: CPT | Mod: 26,,, | Performed by: RADIOLOGY

## 2024-09-12 PROCEDURE — 70553 MRI BRAIN STEM W/O & W/DYE: CPT | Mod: TC,PO

## 2024-09-12 PROCEDURE — A9585 GADOBUTROL INJECTION: HCPCS | Mod: PO | Performed by: NURSE PRACTITIONER

## 2024-09-12 PROCEDURE — 25500020 PHARM REV CODE 255: Mod: PO | Performed by: NURSE PRACTITIONER

## 2024-09-12 RX ORDER — GADOBUTROL 604.72 MG/ML
8.5 INJECTION INTRAVENOUS
Status: COMPLETED | OUTPATIENT
Start: 2024-09-12 | End: 2024-09-12

## 2024-09-12 RX ADMIN — GADOBUTROL 8.5 ML: 604.72 INJECTION INTRAVENOUS at 10:09

## 2024-09-23 ENCOUNTER — LAB VISIT (OUTPATIENT)
Dept: LAB | Facility: HOSPITAL | Age: 64
End: 2024-09-23
Attending: NURSE PRACTITIONER
Payer: COMMERCIAL

## 2024-09-23 DIAGNOSIS — C18.7 MALIGNANT NEOPLASM OF SIGMOID COLON: ICD-10-CM

## 2024-09-23 DIAGNOSIS — E53.8 B12 DEFICIENCY: ICD-10-CM

## 2024-09-23 LAB
ALBUMIN SERPL BCP-MCNC: 3.9 G/DL (ref 3.5–5.2)
ALP SERPL-CCNC: 102 U/L (ref 55–135)
ALT SERPL W/O P-5'-P-CCNC: 28 U/L (ref 10–44)
ANION GAP SERPL CALC-SCNC: 13 MMOL/L (ref 8–16)
AST SERPL-CCNC: 20 U/L (ref 10–40)
BASOPHILS # BLD AUTO: 0.04 K/UL (ref 0–0.2)
BASOPHILS NFR BLD: 1.5 % (ref 0–1.9)
BILIRUB SERPL-MCNC: 0.9 MG/DL (ref 0.1–1)
BUN SERPL-MCNC: 9 MG/DL (ref 8–23)
CALCIUM SERPL-MCNC: 10.1 MG/DL (ref 8.7–10.5)
CHLORIDE SERPL-SCNC: 106 MMOL/L (ref 95–110)
CO2 SERPL-SCNC: 24 MMOL/L (ref 23–29)
CREAT SERPL-MCNC: 1 MG/DL (ref 0.5–1.4)
DIFFERENTIAL METHOD BLD: ABNORMAL
EOSINOPHIL # BLD AUTO: 0.1 K/UL (ref 0–0.5)
EOSINOPHIL NFR BLD: 5.2 % (ref 0–8)
ERYTHROCYTE [DISTWIDTH] IN BLOOD BY AUTOMATED COUNT: 16.5 % (ref 11.5–14.5)
EST. GFR  (NO RACE VARIABLE): >60 ML/MIN/1.73 M^2
FERRITIN SERPL-MCNC: 314 NG/ML (ref 20–300)
GLUCOSE SERPL-MCNC: 102 MG/DL (ref 70–110)
HCT VFR BLD AUTO: 37.2 % (ref 37–48.5)
HGB BLD-MCNC: 13.1 G/DL (ref 12–16)
IMM GRANULOCYTES # BLD AUTO: 0.02 K/UL (ref 0–0.04)
IMM GRANULOCYTES NFR BLD AUTO: 0.7 % (ref 0–0.5)
IRON SERPL-MCNC: 121 UG/DL (ref 30–160)
LYMPHOCYTES # BLD AUTO: 1.6 K/UL (ref 1–4.8)
LYMPHOCYTES NFR BLD: 59.3 % (ref 18–48)
MAGNESIUM SERPL-MCNC: 2.1 MG/DL (ref 1.6–2.6)
MCH RBC QN AUTO: 33.9 PG (ref 27–31)
MCHC RBC AUTO-ENTMCNC: 35.2 G/DL (ref 32–36)
MCV RBC AUTO: 96 FL (ref 82–98)
MONOCYTES # BLD AUTO: 0.5 K/UL (ref 0.3–1)
MONOCYTES NFR BLD: 16.8 % (ref 4–15)
NEUTROPHILS # BLD AUTO: 0.4 K/UL (ref 1.8–7.7)
NEUTROPHILS NFR BLD: 16.5 % (ref 38–73)
NRBC BLD-RTO: 0 /100 WBC
PLATELET # BLD AUTO: 229 K/UL (ref 150–450)
PMV BLD AUTO: 10.4 FL (ref 9.2–12.9)
POTASSIUM SERPL-SCNC: 3.6 MMOL/L (ref 3.5–5.1)
PROT SERPL-MCNC: 7.4 G/DL (ref 6–8.4)
RBC # BLD AUTO: 3.87 M/UL (ref 4–5.4)
SATURATED IRON: 30 % (ref 20–50)
SODIUM SERPL-SCNC: 143 MMOL/L (ref 136–145)
TOTAL IRON BINDING CAPACITY: 399 UG/DL (ref 250–450)
TRANSFERRIN SERPL-MCNC: 285 MG/DL (ref 200–375)
VIT B12 SERPL-MCNC: 466 PG/ML (ref 210–950)
WBC # BLD AUTO: 2.68 K/UL (ref 3.9–12.7)

## 2024-09-23 PROCEDURE — 82607 VITAMIN B-12: CPT | Performed by: NURSE PRACTITIONER

## 2024-09-23 PROCEDURE — 83540 ASSAY OF IRON: CPT | Performed by: NURSE PRACTITIONER

## 2024-09-23 PROCEDURE — 82728 ASSAY OF FERRITIN: CPT | Performed by: NURSE PRACTITIONER

## 2024-09-23 PROCEDURE — 36415 COLL VENOUS BLD VENIPUNCTURE: CPT | Performed by: NURSE PRACTITIONER

## 2024-09-23 PROCEDURE — 83735 ASSAY OF MAGNESIUM: CPT | Performed by: NURSE PRACTITIONER

## 2024-09-23 PROCEDURE — 85025 COMPLETE CBC W/AUTO DIFF WBC: CPT | Performed by: NURSE PRACTITIONER

## 2024-09-23 PROCEDURE — 80053 COMPREHEN METABOLIC PANEL: CPT | Performed by: NURSE PRACTITIONER

## 2024-09-24 ENCOUNTER — OFFICE VISIT (OUTPATIENT)
Dept: HEMATOLOGY/ONCOLOGY | Facility: CLINIC | Age: 64
End: 2024-09-24
Payer: COMMERCIAL

## 2024-09-24 VITALS
SYSTOLIC BLOOD PRESSURE: 118 MMHG | DIASTOLIC BLOOD PRESSURE: 70 MMHG | WEIGHT: 184.5 LBS | HEART RATE: 88 BPM | OXYGEN SATURATION: 99 % | TEMPERATURE: 98 F | HEIGHT: 66 IN | BODY MASS INDEX: 29.65 KG/M2 | RESPIRATION RATE: 18 BRPM

## 2024-09-24 DIAGNOSIS — C18.7 MALIGNANT NEOPLASM OF SIGMOID COLON: Primary | ICD-10-CM

## 2024-09-24 DIAGNOSIS — R53.83 OTHER FATIGUE: ICD-10-CM

## 2024-09-24 DIAGNOSIS — C78.02 MALIGNANT NEOPLASM METASTATIC TO LEFT LUNG: ICD-10-CM

## 2024-09-24 PROCEDURE — 3074F SYST BP LT 130 MM HG: CPT | Mod: CPTII,S$GLB,, | Performed by: INTERNAL MEDICINE

## 2024-09-24 PROCEDURE — 3078F DIAST BP <80 MM HG: CPT | Mod: CPTII,S$GLB,, | Performed by: INTERNAL MEDICINE

## 2024-09-24 PROCEDURE — 1159F MED LIST DOCD IN RCRD: CPT | Mod: CPTII,S$GLB,, | Performed by: INTERNAL MEDICINE

## 2024-09-24 PROCEDURE — 3044F HG A1C LEVEL LT 7.0%: CPT | Mod: CPTII,S$GLB,, | Performed by: INTERNAL MEDICINE

## 2024-09-24 PROCEDURE — 99999 PR PBB SHADOW E&M-EST. PATIENT-LVL III: CPT | Mod: PBBFAC,,, | Performed by: INTERNAL MEDICINE

## 2024-09-24 PROCEDURE — 3008F BODY MASS INDEX DOCD: CPT | Mod: CPTII,S$GLB,, | Performed by: INTERNAL MEDICINE

## 2024-09-24 PROCEDURE — 1160F RVW MEDS BY RX/DR IN RCRD: CPT | Mod: CPTII,S$GLB,, | Performed by: INTERNAL MEDICINE

## 2024-09-24 PROCEDURE — 99214 OFFICE O/P EST MOD 30 MIN: CPT | Mod: S$GLB,,, | Performed by: INTERNAL MEDICINE

## 2024-09-24 RX ORDER — APIXABAN 5 MG (74)
2 KIT ORAL 2 TIMES DAILY
COMMUNITY

## 2024-09-24 NOTE — PROGRESS NOTES
Service Date:  9/24/24    Chief Complaint: Colon Cancer (Labs  MRI 2 wfu chemo clear )    Malaika Craven is a 64 y.o. 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  She also had a CT scan done which showed a 2.2 centimeter lesion in the left lower lobe of the lung.  No evidence of liver Mets on MRI.    Here for neoadjuvant FOLFOX with Avastin.  Fatigue has gotten worse.  Patient states that she feels too tired for chemo this week.  She is neutropenic anyway.  She has seen MD Leroy with recommendation to have SBRT to lung lesion followed by resection    Review of Systems   Constitutional: Negative.  Negative for appetite change and unexpected weight change.   HENT: Negative.  Negative for mouth sores.    Eyes: Negative.  Negative for visual disturbance.   Respiratory: Negative.  Negative for cough and shortness of breath.    Cardiovascular: Negative.  Negative for chest pain.   Gastrointestinal: Negative.  Negative for abdominal pain and diarrhea.   Endocrine: Negative.    Genitourinary: Negative.  Negative for frequency.   Musculoskeletal: Negative.  Negative for back pain.   Integumentary:  Negative for rash. Negative.   Neurological: Negative.  Negative for headaches.   Hematological: Negative.  Negative for adenopathy.   Psychiatric/Behavioral: Negative.          Current Outpatient Medications   Medication Instructions    ALPRAZolam (XANAX) 0.5 mg, Oral, 3 times daily    apixaban (ELIQUIS DVT-PE TREAT 30D START) 5 mg (74 tabs) DsPk 2 tablets, Oral, 2 times daily, For the first 7 days take two 5 mg tablets twice daily.  After 7 days take one 5 mg tablet twice daily.    diphenhydrAMINE-aluminum-magnesium hydroxide-simethicone-LIDOcaine viscous HCl 2% 15 mLs, Swish & Spit, Every 4 hours PRN    ELDERBERRY FRUIT ORAL Take by mouth.    EPINEPHrine (EPIPEN) 0.3 mg, Intramuscular, Once    fexofenadine (ALLEGRA ALLERGY) 60 MG tablet     hydroCHLOROthiazide (HYDRODIURIL) 25 mg, Oral,  Daily, Patient takes two pills daily    LIDOcaine-prilocaine (EMLA) cream Topical (Top), As needed (PRN)    multivit-minerals/folic acid (ADULT ONE DAILY MULTIVITAMIN ORAL) 1 tablet, Daily PRN    NIFEdipine (ADALAT CC) 60 MG TbSR TAKE 1 TABLET BY MOUTH EVERY DAY ON AN EMPTY STOMACH    OLANZapine (ZYPREXA) 5 MG tablet Take 1 tablet by mouth nightly on days 1-3 of each chemotherapy cycle.    OMEPRAZOLE ORAL 0.5 mg, Oral, Daily PRN    ondansetron (ZOFRAN) 8 mg, Oral, Every 8 hours PRN    oxyCODONE-acetaminophen (PERCOCET) 5-325 mg per tablet 1 tablet, Oral, Every 4 hours PRN    polyethylene glycol (GLYCOLAX) 17 g, Daily    potassium chloride (MICRO-K) 10 MEQ CpSR 20 mEq, Oral, Daily    predniSONE (DELTASONE) 5 mg, Oral, Daily    prochlorperazine (COMPAZINE) 10 mg, Oral, Every 6 hours PRN    RYALTRIS 665-25 mcg/spray Spry     traZODone (DESYREL) 150 mg, Nightly        Past Medical History:   Diagnosis Date    Abnormal Pap smear of cervix     Allergy     Corneal erosion of right eye     Generalized anxiety disorder     Hypertension     Rectal mass     Sinus problem         Past Surgical History:   Procedure Laterality Date    BREAST CYST ASPIRATION       SECTION      X 2    CHOLECYSTECTOMY      COLONOSCOPY N/A 2024    Procedure: COLONOSCOPY;  Surgeon: Radhika Leary MD;  Location: [facility];  Service: Endoscopy;  Laterality: N/A;    DILATION AND CURETTAGE OF UTERUS      [facility]      EYE SURGERY Right     FOR CORNEAL EROSION    FLEXIBLE SIGMOIDOSCOPY N/A 2024    Procedure: SIGMOIDOSCOPY, FLEXIBLE(Fleets);  Surgeon: Oumar Montoya MD;  Location: [facility];  Service: General;  Laterality: N/A;    INSERTION OF TUNNELED CENTRAL VENOUS CATHETER (CVC) WITH SUBCUTANEOUS PORT N/A 2024    Procedure: [id number];  Surgeon: Oumar Montoya MD;  Location: [facility];  Service: General;  Laterality: N/A;    LIPOSUCTION      x 2    Mole Excision Right     Shin    TUBAL LIGATION          Family  "History   Problem Relation Name Age of Onset    Heart disease Mother      Hypertension Mother      Arthritis Father      Thyroid disease Father      Kidney disease Paternal Grandmother      Liver cancer Maternal Grandmother      Cancer Maternal Grandfather      Allergies Sister      Allergic rhinitis Sister      Angioedema Neg Hx      Asthma Neg Hx      Atopy Neg Hx      Eczema Neg Hx      Immunodeficiency Neg Hx      Rhinitis Neg Hx      Urticaria Neg Hx      Amblyopia Neg Hx      Blindness Neg Hx      Cataracts Neg Hx      Diabetes Neg Hx      Glaucoma Neg Hx      Macular degeneration Neg Hx      Retinal detachment Neg Hx      Strabismus Neg Hx      Stroke Neg Hx         Social History     Tobacco Use    Smoking status: Never    Smokeless tobacco: Never   Substance Use Topics    Alcohol use: Yes     Comment: 2 drinks per week    Drug use: No     Comment: MEDICAL MARIJUANA         Vitals:    09/24/24 1131   BP: 118/70   Pulse: 88   Resp: 18   Temp: 97.5 °F (36.4 °C)        Physical Exam:  /70 (BP Location: Left arm, Patient Position: Sitting, BP Method: Large (Automatic))   Pulse 88   Temp 97.5 °F (36.4 °C) (Temporal)   Resp 18   Ht 5' 6" (1.676 m)   Wt 83.7 kg (184 lb 8.4 oz)   SpO2 99%   BMI 29.78 kg/m²     Physical Exam  Constitutional:       Appearance: Normal appearance.   HENT:      Head: Normocephalic and atraumatic.      Nose: Nose normal.      Mouth/Throat:      Mouth: Mucous membranes are moist.      Pharynx: Oropharynx is clear.   Eyes:      Conjunctiva/sclera: Conjunctivae normal.   Cardiovascular:      Rate and Rhythm: Normal rate and regular rhythm.      Heart sounds: Normal heart sounds.   Pulmonary:      Effort: Pulmonary effort is normal.      Breath sounds: Normal breath sounds.   Abdominal:      General: Abdomen is flat. Bowel sounds are normal.      Palpations: Abdomen is soft.   Musculoskeletal:         General: Normal range of motion.      Cervical back: Normal range of motion and " neck supple.   Skin:     General: Skin is warm and dry.   Neurological:      General: No focal deficit present.      Mental Status: She is alert and oriented to person, place, and time. Mental status is at baseline.   Psychiatric:         Mood and Affect: Mood normal.          Labs:  Lab Results   Component Value Date    WBC 2.68 (L) 09/23/2024    RBC 3.87 (L) 09/23/2024    HGB 13.1 09/23/2024    HCT 37.2 09/23/2024    MCV 96 09/23/2024    MCH 33.9 (H) 09/23/2024    MCHC 35.2 09/23/2024    RDW 16.5 (H) 09/23/2024     09/23/2024    MPV 10.4 09/23/2024    GRAN 0.4 (L) 09/23/2024    GRAN 16.5 (L) 09/23/2024    LYMPH 1.6 09/23/2024    LYMPH 59.3 (H) 09/23/2024    MONO 0.5 09/23/2024    MONO 16.8 (H) 09/23/2024    EOS 0.1 09/23/2024    BASO 0.04 09/23/2024    EOSINOPHIL 5.2 09/23/2024    BASOPHIL 1.5 09/23/2024     Sodium   Date Value Ref Range Status   09/23/2024 143 136 - 145 mmol/L Final     Potassium   Date Value Ref Range Status   09/23/2024 3.6 3.5 - 5.1 mmol/L Final     Chloride   Date Value Ref Range Status   09/23/2024 106 95 - 110 mmol/L Final     CO2   Date Value Ref Range Status   09/23/2024 24 23 - 29 mmol/L Final     Glucose   Date Value Ref Range Status   09/23/2024 102 70 - 110 mg/dL Final     BUN   Date Value Ref Range Status   09/23/2024 9 8 - 23 mg/dL Final     Creatinine   Date Value Ref Range Status   09/23/2024 1.0 0.5 - 1.4 mg/dL Final     Calcium   Date Value Ref Range Status   09/23/2024 10.1 8.7 - 10.5 mg/dL Final     Total Protein   Date Value Ref Range Status   09/23/2024 7.4 6.0 - 8.4 g/dL Final     Albumin   Date Value Ref Range Status   09/23/2024 3.9 3.5 - 5.2 g/dL Final     Total Bilirubin   Date Value Ref Range Status   09/23/2024 0.9 0.1 - 1.0 mg/dL Final     Comment:     For infants and newborns, interpretation of results should be based  on gestational age, weight and in agreement with clinical  observations.    Premature Infant recommended reference ranges:  Up to 24  hours.............<8.0 mg/dL  Up to 48 hours............<12.0 mg/dL  3-5 days..................<15.0 mg/dL  6-29 days.................<15.0 mg/dL       Alkaline Phosphatase   Date Value Ref Range Status   09/23/2024 102 55 - 135 U/L Final     AST   Date Value Ref Range Status   09/23/2024 20 10 - 40 U/L Final     ALT   Date Value Ref Range Status   09/23/2024 28 10 - 44 U/L Final     Anion Gap   Date Value Ref Range Status   09/23/2024 13 8 - 16 mmol/L Final     eGFR if    Date Value Ref Range Status   10/25/2021 >60.0 >60 mL/min/1.73 m^2 Final     eGFR if non    Date Value Ref Range Status   10/25/2021 >60.0 >60 mL/min/1.73 m^2 Final     Comment:     Calculation used to obtain the estimated glomerular filtration  rate (eGFR) is the CKD-EPI equation.          A/P:    Neutropenia  -due to chemo  -hold treatment 1 week  -will reduce chemo next treatment.    Rectal adenocarcinoma   -patient with confirmed metastatic solitary lesion in the lung.  -MRI liver did not show any liver Mets  -I discussed the case in tumor board.  I also discussed it with Dr. Montoya and Dr. Melvin.  The plan is to try neoadjuvant treatment for at least 3 cycles with FOLFOX and Avastin.  Afterwards we will re-scanned her and discuss moving forward with radiation therapy versus resection of the metastatic disease which would require a lower lobe lobectomy.    -Tempus did not show any actionable mutations  -delay cycle 5 1 week  -patient saw MD Leroy.  Plans have SBRT to the left lower lobe lesion in mid October after 1 more cycle of chemotherapy followed by resection.    Transaminitis   -resolved after stopping Tylenol    Chemotherapy-induced fatigue   -grade 2  -no dose adjustment needed at this time    Aurash Khoobehi, MD  Hematology and Oncology    Visit today included increased complexity associated with the care of the episodic problem addressed and managing the longitudinal care of the patient due to  the serious and/or complex managed problem(s) colon cancer.

## 2024-09-25 ENCOUNTER — PATIENT MESSAGE (OUTPATIENT)
Dept: HEMATOLOGY/ONCOLOGY | Facility: CLINIC | Age: 64
End: 2024-09-25
Payer: COMMERCIAL

## 2024-10-01 ENCOUNTER — LAB VISIT (OUTPATIENT)
Dept: LAB | Facility: HOSPITAL | Age: 64
End: 2024-10-01
Attending: INTERNAL MEDICINE
Payer: COMMERCIAL

## 2024-10-01 ENCOUNTER — OFFICE VISIT (OUTPATIENT)
Dept: HEMATOLOGY/ONCOLOGY | Facility: CLINIC | Age: 64
End: 2024-10-01
Payer: COMMERCIAL

## 2024-10-01 VITALS
TEMPERATURE: 98 F | RESPIRATION RATE: 18 BRPM | HEIGHT: 66 IN | DIASTOLIC BLOOD PRESSURE: 82 MMHG | HEART RATE: 97 BPM | WEIGHT: 183.63 LBS | OXYGEN SATURATION: 98 % | BODY MASS INDEX: 29.51 KG/M2 | SYSTOLIC BLOOD PRESSURE: 142 MMHG

## 2024-10-01 DIAGNOSIS — T45.1X5A CHEMOTHERAPY INDUCED NEUTROPENIA: ICD-10-CM

## 2024-10-01 DIAGNOSIS — C78.02 MALIGNANT NEOPLASM METASTATIC TO LEFT LUNG: ICD-10-CM

## 2024-10-01 DIAGNOSIS — R53.83 CHEMOTHERAPY-INDUCED FATIGUE: ICD-10-CM

## 2024-10-01 DIAGNOSIS — T45.1X5A CHEMOTHERAPY-INDUCED FATIGUE: ICD-10-CM

## 2024-10-01 DIAGNOSIS — C18.7 MALIGNANT NEOPLASM OF SIGMOID COLON: ICD-10-CM

## 2024-10-01 DIAGNOSIS — D70.1 CHEMOTHERAPY INDUCED NEUTROPENIA: ICD-10-CM

## 2024-10-01 DIAGNOSIS — C18.7 MALIGNANT NEOPLASM OF SIGMOID COLON: Primary | ICD-10-CM

## 2024-10-01 LAB
ALBUMIN SERPL BCP-MCNC: 3.9 G/DL (ref 3.5–5.2)
ALP SERPL-CCNC: 101 U/L (ref 55–135)
ALT SERPL W/O P-5'-P-CCNC: 40 U/L (ref 10–44)
ANION GAP SERPL CALC-SCNC: 13 MMOL/L (ref 8–16)
AST SERPL-CCNC: 29 U/L (ref 10–40)
BASOPHILS # BLD AUTO: 0.08 K/UL (ref 0–0.2)
BASOPHILS NFR BLD: 1 % (ref 0–1.9)
BILIRUB SERPL-MCNC: 0.6 MG/DL (ref 0.1–1)
BUN SERPL-MCNC: 14 MG/DL (ref 8–23)
CALCIUM SERPL-MCNC: 9.8 MG/DL (ref 8.7–10.5)
CHLORIDE SERPL-SCNC: 105 MMOL/L (ref 95–110)
CO2 SERPL-SCNC: 23 MMOL/L (ref 23–29)
CREAT SERPL-MCNC: 0.9 MG/DL (ref 0.5–1.4)
DIFFERENTIAL METHOD BLD: ABNORMAL
EOSINOPHIL # BLD AUTO: 0.2 K/UL (ref 0–0.5)
EOSINOPHIL NFR BLD: 2.1 % (ref 0–8)
ERYTHROCYTE [DISTWIDTH] IN BLOOD BY AUTOMATED COUNT: 15.1 % (ref 11.5–14.5)
EST. GFR  (NO RACE VARIABLE): >60 ML/MIN/1.73 M^2
GLUCOSE SERPL-MCNC: 102 MG/DL (ref 70–110)
HCT VFR BLD AUTO: 41 % (ref 37–48.5)
HGB BLD-MCNC: 14.1 G/DL (ref 12–16)
IMM GRANULOCYTES # BLD AUTO: 0.09 K/UL (ref 0–0.04)
IMM GRANULOCYTES NFR BLD AUTO: 1.1 % (ref 0–0.5)
LYMPHOCYTES # BLD AUTO: 2.7 K/UL (ref 1–4.8)
LYMPHOCYTES NFR BLD: 33.8 % (ref 18–48)
MCH RBC QN AUTO: 33.4 PG (ref 27–31)
MCHC RBC AUTO-ENTMCNC: 34.4 G/DL (ref 32–36)
MCV RBC AUTO: 97 FL (ref 82–98)
MONOCYTES # BLD AUTO: 0.6 K/UL (ref 0.3–1)
MONOCYTES NFR BLD: 7.1 % (ref 4–15)
NEUTROPHILS # BLD AUTO: 4.4 K/UL (ref 1.8–7.7)
NEUTROPHILS NFR BLD: 54.9 % (ref 38–73)
NRBC BLD-RTO: 0 /100 WBC
PLATELET # BLD AUTO: 168 K/UL (ref 150–450)
PMV BLD AUTO: 11 FL (ref 9.2–12.9)
POTASSIUM SERPL-SCNC: 3.7 MMOL/L (ref 3.5–5.1)
PROT SERPL-MCNC: 7.6 G/DL (ref 6–8.4)
RBC # BLD AUTO: 4.22 M/UL (ref 4–5.4)
SODIUM SERPL-SCNC: 141 MMOL/L (ref 136–145)
WBC # BLD AUTO: 7.99 K/UL (ref 3.9–12.7)

## 2024-10-01 PROCEDURE — 85025 COMPLETE CBC W/AUTO DIFF WBC: CPT | Performed by: INTERNAL MEDICINE

## 2024-10-01 PROCEDURE — 3079F DIAST BP 80-89 MM HG: CPT | Mod: CPTII,S$GLB,, | Performed by: INTERNAL MEDICINE

## 2024-10-01 PROCEDURE — 3077F SYST BP >= 140 MM HG: CPT | Mod: CPTII,S$GLB,, | Performed by: INTERNAL MEDICINE

## 2024-10-01 PROCEDURE — 36415 COLL VENOUS BLD VENIPUNCTURE: CPT | Performed by: INTERNAL MEDICINE

## 2024-10-01 PROCEDURE — 3008F BODY MASS INDEX DOCD: CPT | Mod: CPTII,S$GLB,, | Performed by: INTERNAL MEDICINE

## 2024-10-01 PROCEDURE — 99215 OFFICE O/P EST HI 40 MIN: CPT | Mod: S$GLB,,, | Performed by: INTERNAL MEDICINE

## 2024-10-01 PROCEDURE — G2211 COMPLEX E/M VISIT ADD ON: HCPCS | Mod: S$GLB,,, | Performed by: INTERNAL MEDICINE

## 2024-10-01 PROCEDURE — 3044F HG A1C LEVEL LT 7.0%: CPT | Mod: CPTII,S$GLB,, | Performed by: INTERNAL MEDICINE

## 2024-10-01 PROCEDURE — 99999 PR PBB SHADOW E&M-EST. PATIENT-LVL III: CPT | Mod: PBBFAC,,, | Performed by: INTERNAL MEDICINE

## 2024-10-01 PROCEDURE — 80053 COMPREHEN METABOLIC PANEL: CPT | Performed by: INTERNAL MEDICINE

## 2024-10-01 RX ORDER — HEPARIN 100 UNIT/ML
500 SYRINGE INTRAVENOUS
Status: CANCELLED | OUTPATIENT
Start: 2024-10-04

## 2024-10-01 RX ORDER — PROCHLORPERAZINE EDISYLATE 5 MG/ML
10 INJECTION INTRAMUSCULAR; INTRAVENOUS ONCE AS NEEDED
Status: CANCELLED | OUTPATIENT
Start: 2024-10-04

## 2024-10-01 RX ORDER — SODIUM CHLORIDE 0.9 % (FLUSH) 0.9 %
10 SYRINGE (ML) INJECTION
Status: CANCELLED | OUTPATIENT
Start: 2024-10-04

## 2024-10-01 RX ORDER — FLUOROURACIL 50 MG/ML
400 INJECTION, SOLUTION INTRAVENOUS
Status: CANCELLED | OUTPATIENT
Start: 2024-10-02

## 2024-10-01 RX ORDER — HEPARIN 100 UNIT/ML
500 SYRINGE INTRAVENOUS
Status: CANCELLED | OUTPATIENT
Start: 2024-10-02

## 2024-10-01 RX ORDER — EPINEPHRINE 0.3 MG/.3ML
0.3 INJECTION SUBCUTANEOUS ONCE AS NEEDED
Status: CANCELLED | OUTPATIENT
Start: 2024-10-02

## 2024-10-01 RX ORDER — DIPHENHYDRAMINE HYDROCHLORIDE 50 MG/ML
50 INJECTION INTRAMUSCULAR; INTRAVENOUS ONCE AS NEEDED
Status: CANCELLED | OUTPATIENT
Start: 2024-10-02

## 2024-10-01 RX ORDER — PROCHLORPERAZINE EDISYLATE 5 MG/ML
10 INJECTION INTRAMUSCULAR; INTRAVENOUS ONCE AS NEEDED
Status: CANCELLED | OUTPATIENT
Start: 2024-10-02

## 2024-10-01 RX ORDER — SODIUM CHLORIDE 0.9 % (FLUSH) 0.9 %
10 SYRINGE (ML) INJECTION
Status: CANCELLED | OUTPATIENT
Start: 2024-10-02

## 2024-10-01 NOTE — PROGRESS NOTES
Service Date:  10/1/24    Chief Complaint: Colon Cancer (Labs chemo clear 1 wk delay)    Malaika Craven is a 64 y.o. 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  She also had a CT scan done which showed a 2.2 centimeter lesion in the left lower lobe of the lung.  No evidence of liver Mets on MRI.    Here for neoadjuvant FOLFOX with Avastin.  Treatment delayed last week due to neutropenia.  That has resolved.  Denies any fever.  Continues to have fatigue however.  Very minimal improvement from last week.  She has seen [facility] with recommendation to have SBRT to lung lesion followed by resection    Review of Systems   Constitutional: Negative.  Negative for appetite change and unexpected weight change.   HENT: Negative.  Negative for mouth sores.    Eyes: Negative.  Negative for visual disturbance.   Respiratory: Negative.  Negative for cough and shortness of breath.    Cardiovascular: Negative.  Negative for chest pain.   Gastrointestinal: Negative.  Negative for abdominal pain and diarrhea.   Endocrine: Negative.    Genitourinary: Negative.  Negative for frequency.   Musculoskeletal: Negative.  Negative for back pain.   Integumentary:  Negative for rash. Negative.   Neurological: Negative.  Negative for headaches.   Hematological: Negative.  Negative for adenopathy.   Psychiatric/Behavioral: Negative.          Current Outpatient Medications   Medication Instructions    ALPRAZolam (XANAX) 0.5 mg, Oral, 3 times daily    apixaban (ELIQUIS DVT-PE TREAT 30D START) 5 mg (74 tabs) DsPk 2 tablets, 2 times daily    diphenhydrAMINE-aluminum-magnesium hydroxide-simethicone-LIDOcaine viscous HCl 2% 15 mLs, Swish & Spit, Every 4 hours PRN    ELDERBERRY FRUIT ORAL Take by mouth.    EPINEPHrine (EPIPEN) 0.3 mg, Intramuscular, Once    fexofenadine (ALLEGRA ALLERGY) 60 MG tablet     hydroCHLOROthiazide (HYDRODIURIL) 25 mg, Oral, Daily, Patient takes two pills daily    LIDOcaine-prilocaine (EMLA)  cream Topical (Top), As needed (PRN)    multivit-minerals/folic acid (ADULT ONE DAILY MULTIVITAMIN ORAL) 1 tablet, Daily PRN    NIFEdipine (ADALAT CC) 60 MG TbSR TAKE 1 TABLET BY MOUTH EVERY DAY ON AN EMPTY STOMACH    OLANZapine (ZYPREXA) 5 MG tablet Take 1 tablet by mouth nightly on days 1-3 of each chemotherapy cycle.    OMEPRAZOLE ORAL 0.5 mg, Daily PRN    ondansetron (ZOFRAN) 8 mg, Oral, Every 8 hours PRN    oxyCODONE-acetaminophen (PERCOCET) 5-325 mg per tablet 1 tablet, Oral, Every 4 hours PRN    polyethylene glycol (GLYCOLAX) 17 g, Daily    potassium chloride (MICRO-K) 10 MEQ CpSR 20 mEq, Oral, Daily    predniSONE (DELTASONE) 5 mg, Oral, Daily    prochlorperazine (COMPAZINE) 10 mg, Oral, Every 6 hours PRN    RYALTRIS 665-25 mcg/spray Spry     traZODone (DESYREL) 150 mg, Nightly        Past Medical History:   Diagnosis Date    Abnormal Pap smear of cervix     Allergy     Corneal erosion of right eye     Generalized anxiety disorder     Hypertension     Rectal mass     Sinus problem         Past Surgical History:   Procedure Laterality Date    BREAST CYST ASPIRATION       SECTION      X 2    CHOLECYSTECTOMY      COLONOSCOPY N/A 2024    Procedure: COLONOSCOPY;  Surgeon: Radhika Leary MD;  Location: [facility];  Service: Endoscopy;  Laterality: N/A;    DILATION AND CURETTAGE OF UTERUS      [facility]      EYE SURGERY Right     FOR CORNEAL EROSION    FLEXIBLE SIGMOIDOSCOPY N/A 2024    Procedure: SIGMOIDOSCOPY, FLEXIBLE(Fleets);  Surgeon: Oumar Montoya MD;  Location: [facility];  Service: General;  Laterality: N/A;    INSERTION OF TUNNELED CENTRAL VENOUS CATHETER (CVC) WITH SUBCUTANEOUS PORT N/A 2024    Procedure: [id number];  Surgeon: Oumar Montoya MD;  Location: [facility];  Service: General;  Laterality: N/A;    LIPOSUCTION      x 2    Mole Excision Right     Shin    TUBAL LIGATION          Family History   Problem Relation Name Age of Onset    Heart disease Mother       "Hypertension Mother      Arthritis Father      Thyroid disease Father      Kidney disease Paternal Grandmother      Liver cancer Maternal Grandmother      Cancer Maternal Grandfather      Allergies Sister      Allergic rhinitis Sister      Angioedema Neg Hx      Asthma Neg Hx      Atopy Neg Hx      Eczema Neg Hx      Immunodeficiency Neg Hx      Rhinitis Neg Hx      Urticaria Neg Hx      Amblyopia Neg Hx      Blindness Neg Hx      Cataracts Neg Hx      Diabetes Neg Hx      Glaucoma Neg Hx      Macular degeneration Neg Hx      Retinal detachment Neg Hx      Strabismus Neg Hx      Stroke Neg Hx         Social History     Tobacco Use    Smoking status: Never    Smokeless tobacco: Never   Substance Use Topics    Alcohol use: Yes     Comment: 2 drinks per week    Drug use: No     Comment: MEDICAL MARIJUANA         Vitals:    10/01/24 1050   BP: (!) 142/82   Pulse: 97   Resp: 18   Temp: 97.7 °F (36.5 °C)        Physical Exam:  BP (!) 142/82 (BP Location: Right arm, Patient Position: Sitting)   Pulse 97   Temp 97.7 °F (36.5 °C) (Temporal)   Resp 18   Ht 5' 6" (1.676 m)   Wt 83.3 kg (183 lb 10.3 oz)   SpO2 98%   BMI 29.64 kg/m²     Physical Exam  Constitutional:       Appearance: Normal appearance.   HENT:      Head: Normocephalic and atraumatic.      Nose: Nose normal.      Mouth/Throat:      Mouth: Mucous membranes are moist.      Pharynx: Oropharynx is clear.   Eyes:      Conjunctiva/sclera: Conjunctivae normal.   Cardiovascular:      Rate and Rhythm: Normal rate and regular rhythm.      Heart sounds: Normal heart sounds.   Pulmonary:      Effort: Pulmonary effort is normal.      Breath sounds: Normal breath sounds.   Abdominal:      General: Abdomen is flat. Bowel sounds are normal.      Palpations: Abdomen is soft.   Musculoskeletal:         General: Normal range of motion.      Cervical back: Normal range of motion and neck supple.   Skin:     General: Skin is warm and dry.   Neurological:      General: No " focal deficit present.      Mental Status: She is alert and oriented to person, place, and time. Mental status is at baseline.   Psychiatric:         Mood and Affect: Mood normal.          Labs:  Lab Results   Component Value Date    WBC 7.99 10/01/2024    RBC 4.22 10/01/2024    HGB 14.1 10/01/2024    HCT 41.0 10/01/2024    MCV 97 10/01/2024    MCH 33.4 (H) 10/01/2024    MCHC 34.4 10/01/2024    RDW 15.1 (H) 10/01/2024     10/01/2024    MPV 11.0 10/01/2024    GRAN 4.4 10/01/2024    GRAN 54.9 10/01/2024    LYMPH 2.7 10/01/2024    LYMPH 33.8 10/01/2024    MONO 0.6 10/01/2024    MONO 7.1 10/01/2024    EOS 0.2 10/01/2024    BASO 0.08 10/01/2024    EOSINOPHIL 2.1 10/01/2024    BASOPHIL 1.0 10/01/2024     Sodium   Date Value Ref Range Status   10/01/2024 141 136 - 145 mmol/L Final     Potassium   Date Value Ref Range Status   10/01/2024 3.7 3.5 - 5.1 mmol/L Final     Chloride   Date Value Ref Range Status   10/01/2024 105 95 - 110 mmol/L Final     CO2   Date Value Ref Range Status   10/01/2024 23 23 - 29 mmol/L Final     Glucose   Date Value Ref Range Status   10/01/2024 102 70 - 110 mg/dL Final     BUN   Date Value Ref Range Status   10/01/2024 14 8 - 23 mg/dL Final     Creatinine   Date Value Ref Range Status   10/01/2024 0.9 0.5 - 1.4 mg/dL Final     Calcium   Date Value Ref Range Status   10/01/2024 9.8 8.7 - 10.5 mg/dL Final     Total Protein   Date Value Ref Range Status   10/01/2024 7.6 6.0 - 8.4 g/dL Final     Albumin   Date Value Ref Range Status   10/01/2024 3.9 3.5 - 5.2 g/dL Final     Total Bilirubin   Date Value Ref Range Status   10/01/2024 0.6 0.1 - 1.0 mg/dL Final     Comment:     For infants and newborns, interpretation of results should be based  on gestational age, weight and in agreement with clinical  observations.    Premature Infant recommended reference ranges:  Up to 24 hours.............<8.0 mg/dL  Up to 48 hours............<12.0 mg/dL  3-5 days..................<15.0 mg/dL  6-29  days.................<15.0 mg/dL       Alkaline Phosphatase   Date Value Ref Range Status   10/01/2024 101 55 - 135 U/L Final     AST   Date Value Ref Range Status   10/01/2024 29 10 - 40 U/L Final     ALT   Date Value Ref Range Status   10/01/2024 40 10 - 44 U/L Final     Anion Gap   Date Value Ref Range Status   10/01/2024 13 8 - 16 mmol/L Final     eGFR if    Date Value Ref Range Status   10/25/2021 >60.0 >60 mL/min/1.73 m^2 Final     eGFR if non    Date Value Ref Range Status   10/25/2021 >60.0 >60 mL/min/1.73 m^2 Final     Comment:     Calculation used to obtain the estimated glomerular filtration  rate (eGFR) is the CKD-EPI equation.          A/P:        Rectal adenocarcinoma   -patient with confirmed metastatic solitary lesion in the lung.  -MRI liver did not show any liver Mets  -I discussed the case in tumor board.  I also discussed it with Dr. Montoya and Dr. Mlevin.  The plan is to try neoadjuvant treatment for at least 3 cycles with FOLFOX and Avastin.  Afterwards we will re-scanned her and discuss moving forward with radiation therapy versus resection of the metastatic disease which would require a lower lobe lobectomy.    -Tempus did not show any actionable mutations  -proceed with cycle 6 of treatment after 1 week delay. Patient will take the month of October off in prep for radiation and surgery. RTC in 2nd week of November to continue with chemotherapy after. Surgery will likely be in December at the earliest.    Transaminitis   -resolved after stopping Tylenol    Chemotherapy-induced fatigue   -grade 2  -no dose adjustment needed at this time    Neutropenia  -due to chemo  -reduce dose of 5FU bolus by about 20%    Aurash Khoobehi, MD  Hematology and Oncology    Visit today included increased complexity associated with the care of the episodic problem addressed and managing the longitudinal care of the patient due to the serious and/or complex managed problem(s)  colon cancer.

## 2024-10-01 NOTE — Clinical Note
Ok for treatment tomorrow. Then delay next treatment until second week of November with labs. She can return to clinic at that time.

## 2024-10-02 ENCOUNTER — INFUSION (OUTPATIENT)
Dept: INFUSION THERAPY | Facility: HOSPITAL | Age: 64
End: 2024-10-02
Attending: INTERNAL MEDICINE
Payer: COMMERCIAL

## 2024-10-02 VITALS
HEART RATE: 79 BPM | BODY MASS INDEX: 29.62 KG/M2 | WEIGHT: 184.31 LBS | DIASTOLIC BLOOD PRESSURE: 84 MMHG | TEMPERATURE: 97 F | SYSTOLIC BLOOD PRESSURE: 131 MMHG | HEIGHT: 66 IN | RESPIRATION RATE: 18 BRPM

## 2024-10-02 DIAGNOSIS — C18.7 MALIGNANT NEOPLASM OF SIGMOID COLON: ICD-10-CM

## 2024-10-02 DIAGNOSIS — C78.02 MALIGNANT NEOPLASM METASTATIC TO LEFT LUNG: Primary | ICD-10-CM

## 2024-10-02 PROCEDURE — 63600175 PHARM REV CODE 636 W HCPCS: Performed by: INTERNAL MEDICINE

## 2024-10-02 PROCEDURE — 96411 CHEMO IV PUSH ADDL DRUG: CPT

## 2024-10-02 PROCEDURE — 96417 CHEMO IV INFUS EACH ADDL SEQ: CPT

## 2024-10-02 PROCEDURE — 96416 CHEMO PROLONG INFUSE W/PUMP: CPT

## 2024-10-02 PROCEDURE — 25000003 PHARM REV CODE 250: Performed by: INTERNAL MEDICINE

## 2024-10-02 PROCEDURE — 96415 CHEMO IV INFUSION ADDL HR: CPT

## 2024-10-02 PROCEDURE — 96367 TX/PROPH/DG ADDL SEQ IV INF: CPT

## 2024-10-02 PROCEDURE — 96413 CHEMO IV INFUSION 1 HR: CPT

## 2024-10-02 PROCEDURE — 96368 THER/DIAG CONCURRENT INF: CPT

## 2024-10-02 RX ORDER — SODIUM CHLORIDE 0.9 % (FLUSH) 0.9 %
10 SYRINGE (ML) INJECTION
Status: DISCONTINUED | OUTPATIENT
Start: 2024-10-02 | End: 2024-10-02 | Stop reason: HOSPADM

## 2024-10-02 RX ORDER — DIPHENHYDRAMINE HYDROCHLORIDE 50 MG/ML
50 INJECTION INTRAMUSCULAR; INTRAVENOUS ONCE AS NEEDED
Status: DISCONTINUED | OUTPATIENT
Start: 2024-10-02 | End: 2024-10-02 | Stop reason: HOSPADM

## 2024-10-02 RX ORDER — FLUOROURACIL 50 MG/ML
400 INJECTION, SOLUTION INTRAVENOUS
Status: COMPLETED | OUTPATIENT
Start: 2024-10-02 | End: 2024-10-02

## 2024-10-02 RX ORDER — PROCHLORPERAZINE EDISYLATE 5 MG/ML
10 INJECTION INTRAMUSCULAR; INTRAVENOUS ONCE AS NEEDED
Status: DISCONTINUED | OUTPATIENT
Start: 2024-10-02 | End: 2024-10-02 | Stop reason: HOSPADM

## 2024-10-02 RX ORDER — EPINEPHRINE 0.3 MG/.3ML
0.3 INJECTION SUBCUTANEOUS ONCE AS NEEDED
Status: DISCONTINUED | OUTPATIENT
Start: 2024-10-02 | End: 2024-10-02 | Stop reason: HOSPADM

## 2024-10-02 RX ADMIN — FLUOROURACIL 3900 MG: 50 INJECTION, SOLUTION INTRAVENOUS at 12:10

## 2024-10-02 RX ADMIN — FLUOROURACIL 780 MG: 50 INJECTION, SOLUTION INTRAVENOUS at 12:10

## 2024-10-02 RX ADMIN — OXALIPLATIN 166 MG: 5 INJECTION, SOLUTION INTRAVENOUS at 10:10

## 2024-10-02 RX ADMIN — PALONOSETRON HYDROCHLORIDE 0.25 MG: 0.25 INJECTION INTRAVENOUS at 09:10

## 2024-10-02 RX ADMIN — BEVACIZUMAB-AWWB 400 MG: 400 INJECTION, SOLUTION INTRAVENOUS at 09:10

## 2024-10-02 RX ADMIN — LEUCOVORIN CALCIUM 780 MG: 350 INJECTION, POWDER, LYOPHILIZED, FOR SOLUTION INTRAMUSCULAR; INTRAVENOUS at 10:10

## 2024-10-02 NOTE — PLAN OF CARE
Problem: Fatigue  Goal: Improved Activity Tolerance  10/2/2024 0914 by Diana Gonzalez, RN  Outcome: Met  10/2/2024 0914 by Diana Gonzalez, RN  Outcome: Progressing

## 2024-10-04 ENCOUNTER — INFUSION (OUTPATIENT)
Dept: INFUSION THERAPY | Facility: HOSPITAL | Age: 64
End: 2024-10-04
Attending: INTERNAL MEDICINE
Payer: COMMERCIAL

## 2024-10-04 VITALS
OXYGEN SATURATION: 98 % | HEART RATE: 71 BPM | TEMPERATURE: 97 F | DIASTOLIC BLOOD PRESSURE: 81 MMHG | WEIGHT: 182.81 LBS | HEIGHT: 66 IN | BODY MASS INDEX: 29.38 KG/M2 | SYSTOLIC BLOOD PRESSURE: 126 MMHG | RESPIRATION RATE: 17 BRPM

## 2024-10-04 DIAGNOSIS — C18.7 MALIGNANT NEOPLASM OF SIGMOID COLON: ICD-10-CM

## 2024-10-04 DIAGNOSIS — C78.02 MALIGNANT NEOPLASM METASTATIC TO LEFT LUNG: Primary | ICD-10-CM

## 2024-10-04 PROCEDURE — 96374 THER/PROPH/DIAG INJ IV PUSH: CPT

## 2024-10-04 PROCEDURE — 63600175 PHARM REV CODE 636 W HCPCS: Performed by: INTERNAL MEDICINE

## 2024-10-04 PROCEDURE — 25000003 PHARM REV CODE 250: Performed by: INTERNAL MEDICINE

## 2024-10-04 PROCEDURE — A4216 STERILE WATER/SALINE, 10 ML: HCPCS | Performed by: INTERNAL MEDICINE

## 2024-10-04 RX ORDER — HEPARIN 100 UNIT/ML
500 SYRINGE INTRAVENOUS
Status: DISCONTINUED | OUTPATIENT
Start: 2024-10-04 | End: 2024-10-04 | Stop reason: HOSPADM

## 2024-10-04 RX ORDER — PROCHLORPERAZINE EDISYLATE 5 MG/ML
10 INJECTION INTRAMUSCULAR; INTRAVENOUS ONCE AS NEEDED
Status: DISCONTINUED | OUTPATIENT
Start: 2024-10-04 | End: 2024-10-04

## 2024-10-04 RX ORDER — SODIUM CHLORIDE 0.9 % (FLUSH) 0.9 %
10 SYRINGE (ML) INJECTION
Status: DISCONTINUED | OUTPATIENT
Start: 2024-10-04 | End: 2024-10-04 | Stop reason: HOSPADM

## 2024-10-04 RX ADMIN — PROCHLORPERAZINE EDISYLATE: 5 INJECTION INTRAMUSCULAR; INTRAVENOUS at 10:10

## 2024-10-04 RX ADMIN — HEPARIN 500 UNITS: 100 SYRINGE at 11:10

## 2024-10-04 RX ADMIN — SODIUM CHLORIDE, PRESERVATIVE FREE 10 ML: 5 INJECTION INTRAVENOUS at 11:10

## 2024-10-04 NOTE — PLAN OF CARE
Problem: Fatigue  Goal: Improved Activity Tolerance  Outcome: Progressing  Intervention: Promote Improved Energy  Flowsheets (Taken 10/4/2024 1025)  Fatigue Management:   fatigue-related activity identified   frequent rest breaks encouraged   paced activity encouraged  Sleep/Rest Enhancement:   relaxation techniques promoted   regular sleep/rest pattern promoted  Activity Management: Ambulated -L4  Environmental Support: rest periods encouraged

## 2024-10-16 ENCOUNTER — PATIENT MESSAGE (OUTPATIENT)
Dept: HEMATOLOGY/ONCOLOGY | Facility: CLINIC | Age: 64
End: 2024-10-16
Payer: COMMERCIAL

## 2024-11-07 ENCOUNTER — PATIENT MESSAGE (OUTPATIENT)
Dept: FAMILY MEDICINE | Facility: CLINIC | Age: 64
End: 2024-11-07
Payer: COMMERCIAL

## 2024-11-07 DIAGNOSIS — Z01.818 PREOP TESTING: Primary | ICD-10-CM

## 2024-11-12 ENCOUNTER — PATIENT MESSAGE (OUTPATIENT)
Dept: ADMINISTRATIVE | Facility: HOSPITAL | Age: 64
End: 2024-11-12
Payer: COMMERCIAL

## 2024-11-24 ENCOUNTER — PROCEDURE VISIT (OUTPATIENT)
Dept: SLEEP MEDICINE | Facility: HOSPITAL | Age: 64
End: 2024-11-24
Payer: COMMERCIAL

## 2024-11-24 DIAGNOSIS — Z01.818 PREOP TESTING: ICD-10-CM

## 2024-11-24 PROCEDURE — 95806 SLEEP STUDY UNATT&RESP EFFT: CPT

## 2024-12-02 DIAGNOSIS — G47.33 OSA (OBSTRUCTIVE SLEEP APNEA): Primary | ICD-10-CM

## 2024-12-19 ENCOUNTER — PATIENT OUTREACH (OUTPATIENT)
Dept: ADMINISTRATIVE | Facility: CLINIC | Age: 64
End: 2024-12-19
Payer: COMMERCIAL

## 2024-12-19 ENCOUNTER — TELEPHONE (OUTPATIENT)
Dept: HEMATOLOGY/ONCOLOGY | Facility: CLINIC | Age: 64
End: 2024-12-19
Payer: COMMERCIAL

## 2025-01-11 ENCOUNTER — PATIENT MESSAGE (OUTPATIENT)
Dept: SURGERY | Facility: CLINIC | Age: 65
End: 2025-01-11
Payer: COMMERCIAL

## 2025-02-18 ENCOUNTER — TELEPHONE (OUTPATIENT)
Dept: HEMATOLOGY/ONCOLOGY | Facility: CLINIC | Age: 65
End: 2025-02-18
Payer: COMMERCIAL

## 2025-02-18 NOTE — TELEPHONE ENCOUNTER
Incoming call from Dr Barahona with [facility], requesting to talk with Dr Khoobehi regarding POC for mutual pt. Provided him with contact number for Dr Khoobehi.     Dr Barahona [phone]

## 2025-02-19 ENCOUNTER — TELEPHONE (OUTPATIENT)
Dept: HEMATOLOGY/ONCOLOGY | Facility: CLINIC | Age: 65
End: 2025-02-19
Payer: COMMERCIAL

## 2025-02-19 DIAGNOSIS — C18.7 MALIGNANT NEOPLASM OF SIGMOID COLON: Primary | ICD-10-CM

## 2025-02-19 NOTE — TELEPHONE ENCOUNTER
"Spoke with pt who states that stool is collecting at her stoma and not going into the concave colostomy bag like it should. She states that she has used lubricant and this did not help. She states that the stool backing up is causing the "stoma to burn." Dr Khoobehi notified. He has advised that pt call her surgeon.    Returned call to pt. She states that surgeon at [facility] advised her to contact her oncologist. Message sent to nurse FADUMO Abdalla in gen surg to inquire if pt can be put on schedule urgently. Awaiting response.    ----- Message from Mimub sent at 2/19/2025 10:51 AM CST -----  Regarding: Scheduling Request  Contact: Malaika Craven   Treating Provider:Khoobehi Caller Name:Malaika Craven  Contact Preference:[phone]  Comments/notes:Patient is calling to speak to office about her bag being changed. Requesting a call back ASAP  "

## 2025-02-19 NOTE — TELEPHONE ENCOUNTER
Spoke with pt to advise that ref to wound care/ostomy nurse has been placed and msg has been sent to that nurse, Marily Fowler. Pt has also scheduled next available appt with gen surg. This nurse sent msg to Dr Montoya' and Dr Merritt's nurses for assistance with an appt for pt. Pt verbalized understanding.

## 2025-02-20 ENCOUNTER — TELEPHONE (OUTPATIENT)
Dept: HEMATOLOGY/ONCOLOGY | Facility: CLINIC | Age: 65
End: 2025-02-20
Payer: COMMERCIAL

## 2025-02-20 NOTE — TELEPHONE ENCOUNTER
Called to talk with pt. Discussed the conversation I had with wound care, Marily Fowler. Sent Marily another message to contact the pt. She also said that she has an appointment with general surgery to establish care here.     ----- Message from Josette sent at 2/20/2025  1:22 PM CST -----  Pt is requesting the nurse to give her a call in regards to the colostomy bag. Pt hs a question. # [phone]

## 2025-02-20 NOTE — TELEPHONE ENCOUNTER
"Had a conversation with Marily Fowler, wound care, on epic chat. Pt is having an issue with her concave ostomy. Stool collecting at stoma site instead of draining into bag and burning the skin. Pt reported that Dr Montoya, general surgeon that placed port "blew me off," her surgeon for the ostomy is at [facility]. Referral placed for wound care and Marily stated that she would schedule pt to be seen.    "

## 2025-02-24 ENCOUNTER — LAB VISIT (OUTPATIENT)
Dept: LAB | Facility: HOSPITAL | Age: 65
End: 2025-02-24
Attending: INTERNAL MEDICINE
Payer: COMMERCIAL

## 2025-02-24 DIAGNOSIS — C78.02 MALIGNANT NEOPLASM METASTATIC TO LEFT LUNG: ICD-10-CM

## 2025-02-24 DIAGNOSIS — C18.7 MALIGNANT NEOPLASM OF SIGMOID COLON: ICD-10-CM

## 2025-02-24 LAB
ALBUMIN SERPL BCP-MCNC: 3.9 G/DL (ref 3.5–5.2)
ALP SERPL-CCNC: 246 U/L (ref 40–150)
ALT SERPL W/O P-5'-P-CCNC: 188 U/L (ref 10–44)
ANION GAP SERPL CALC-SCNC: 10 MMOL/L (ref 8–16)
AST SERPL-CCNC: 99 U/L (ref 10–40)
BACTERIA #/AREA URNS HPF: ABNORMAL /HPF
BASOPHILS # BLD AUTO: 0.05 K/UL (ref 0–0.2)
BASOPHILS NFR BLD: 0.6 % (ref 0–1.9)
BILIRUB SERPL-MCNC: 0.8 MG/DL (ref 0.1–1)
BILIRUB UR QL STRIP: NEGATIVE
BUN SERPL-MCNC: 15 MG/DL (ref 8–23)
CALCIUM SERPL-MCNC: 9.6 MG/DL (ref 8.7–10.5)
CHLORIDE SERPL-SCNC: 105 MMOL/L (ref 95–110)
CLARITY UR: ABNORMAL
CO2 SERPL-SCNC: 21 MMOL/L (ref 23–29)
COLOR UR: YELLOW
CREAT SERPL-MCNC: 1 MG/DL (ref 0.5–1.4)
DIFFERENTIAL METHOD BLD: ABNORMAL
EOSINOPHIL # BLD AUTO: 0.4 K/UL (ref 0–0.5)
EOSINOPHIL NFR BLD: 4 % (ref 0–8)
ERYTHROCYTE [DISTWIDTH] IN BLOOD BY AUTOMATED COUNT: 14 % (ref 11.5–14.5)
EST. GFR  (NO RACE VARIABLE): >60 ML/MIN/1.73 M^2
GLUCOSE SERPL-MCNC: 101 MG/DL (ref 70–110)
GLUCOSE UR QL STRIP: NEGATIVE
HCT VFR BLD AUTO: 36.3 % (ref 37–48.5)
HGB BLD-MCNC: 12 G/DL (ref 12–16)
HGB UR QL STRIP: NEGATIVE
HYALINE CASTS #/AREA URNS LPF: 1 /LPF
IMM GRANULOCYTES # BLD AUTO: 0.04 K/UL (ref 0–0.04)
IMM GRANULOCYTES NFR BLD AUTO: 0.5 % (ref 0–0.5)
KETONES UR QL STRIP: NEGATIVE
LEUKOCYTE ESTERASE UR QL STRIP: ABNORMAL
LYMPHOCYTES # BLD AUTO: 2.2 K/UL (ref 1–4.8)
LYMPHOCYTES NFR BLD: 25.2 % (ref 18–48)
MCH RBC QN AUTO: 32.1 PG (ref 27–31)
MCHC RBC AUTO-ENTMCNC: 33.1 G/DL (ref 32–36)
MCV RBC AUTO: 97 FL (ref 82–98)
MICROSCOPIC COMMENT: ABNORMAL
MONOCYTES # BLD AUTO: 0.4 K/UL (ref 0.3–1)
MONOCYTES NFR BLD: 4.7 % (ref 4–15)
NEUTROPHILS # BLD AUTO: 5.7 K/UL (ref 1.8–7.7)
NEUTROPHILS NFR BLD: 65 % (ref 38–73)
NITRITE UR QL STRIP: NEGATIVE
NON-SQ EPI CELLS #/AREA URNS HPF: 10 /HPF
NRBC BLD-RTO: 0 /100 WBC
PH UR STRIP: 6 [PH] (ref 5–8)
PLATELET # BLD AUTO: 260 K/UL (ref 150–450)
PMV BLD AUTO: 9.8 FL (ref 9.2–12.9)
POTASSIUM SERPL-SCNC: 4.1 MMOL/L (ref 3.5–5.1)
PROT SERPL-MCNC: 8 G/DL (ref 6–8.4)
PROT UR QL STRIP: ABNORMAL
RBC # BLD AUTO: 3.74 M/UL (ref 4–5.4)
RBC #/AREA URNS HPF: 19 /HPF (ref 0–4)
SODIUM SERPL-SCNC: 136 MMOL/L (ref 136–145)
SP GR UR STRIP: 1.02 (ref 1–1.03)
SQUAMOUS #/AREA URNS HPF: 7 /HPF
URN SPEC COLLECT METH UR: ABNORMAL
UROBILINOGEN UR STRIP-ACNC: NEGATIVE EU/DL
WBC # BLD AUTO: 8.73 K/UL (ref 3.9–12.7)
WBC #/AREA URNS HPF: 70 /HPF (ref 0–5)

## 2025-02-24 PROCEDURE — 36415 COLL VENOUS BLD VENIPUNCTURE: CPT | Performed by: INTERNAL MEDICINE

## 2025-02-24 PROCEDURE — 80053 COMPREHEN METABOLIC PANEL: CPT | Performed by: INTERNAL MEDICINE

## 2025-02-24 PROCEDURE — 85025 COMPLETE CBC W/AUTO DIFF WBC: CPT | Performed by: INTERNAL MEDICINE

## 2025-02-24 PROCEDURE — 81000 URINALYSIS NONAUTO W/SCOPE: CPT | Performed by: NURSE PRACTITIONER

## 2025-02-25 ENCOUNTER — OFFICE VISIT (OUTPATIENT)
Dept: HEMATOLOGY/ONCOLOGY | Facility: CLINIC | Age: 65
End: 2025-02-25
Payer: COMMERCIAL

## 2025-02-25 VITALS
DIASTOLIC BLOOD PRESSURE: 75 MMHG | BODY MASS INDEX: 26.08 KG/M2 | HEIGHT: 66 IN | TEMPERATURE: 98 F | HEART RATE: 102 BPM | SYSTOLIC BLOOD PRESSURE: 133 MMHG | OXYGEN SATURATION: 99 % | WEIGHT: 162.25 LBS

## 2025-02-25 DIAGNOSIS — G62.0 CHEMOTHERAPY-INDUCED NEUROPATHY: ICD-10-CM

## 2025-02-25 DIAGNOSIS — C78.02 MALIGNANT NEOPLASM METASTATIC TO LEFT LUNG: Primary | ICD-10-CM

## 2025-02-25 DIAGNOSIS — C18.7 MALIGNANT NEOPLASM OF SIGMOID COLON: ICD-10-CM

## 2025-02-25 DIAGNOSIS — K94.19 ALTERED BOWEL ELIMINATION DUE TO INTESTINAL OSTOMY: ICD-10-CM

## 2025-02-25 DIAGNOSIS — T45.1X5A CHEMOTHERAPY-INDUCED NEUROPATHY: ICD-10-CM

## 2025-02-25 PROCEDURE — 3075F SYST BP GE 130 - 139MM HG: CPT | Mod: CPTII,S$GLB,, | Performed by: INTERNAL MEDICINE

## 2025-02-25 PROCEDURE — 99214 OFFICE O/P EST MOD 30 MIN: CPT | Mod: S$GLB,,, | Performed by: INTERNAL MEDICINE

## 2025-02-25 PROCEDURE — 3078F DIAST BP <80 MM HG: CPT | Mod: CPTII,S$GLB,, | Performed by: INTERNAL MEDICINE

## 2025-02-25 PROCEDURE — G2211 COMPLEX E/M VISIT ADD ON: HCPCS | Mod: S$GLB,,, | Performed by: INTERNAL MEDICINE

## 2025-02-25 PROCEDURE — 99999 PR PBB SHADOW E&M-EST. PATIENT-LVL III: CPT | Mod: PBBFAC,,, | Performed by: INTERNAL MEDICINE

## 2025-02-25 PROCEDURE — 3008F BODY MASS INDEX DOCD: CPT | Mod: CPTII,S$GLB,, | Performed by: INTERNAL MEDICINE

## 2025-02-25 PROCEDURE — 1159F MED LIST DOCD IN RCRD: CPT | Mod: CPTII,S$GLB,, | Performed by: INTERNAL MEDICINE

## 2025-02-25 PROCEDURE — 1160F RVW MEDS BY RX/DR IN RCRD: CPT | Mod: CPTII,S$GLB,, | Performed by: INTERNAL MEDICINE

## 2025-02-25 NOTE — PROGRESS NOTES
Service Date:  2/25/25    Chief Complaint: Colon Cancer    Malaika Craven is a 64 y.o. female here with oligometastatic rectosigmoid colon cancer    Had 6 cycles of FOLFOX with Avastin.  Then went to [facility] and had radiation of the lung met.  Then had resection by [facility].  Now has ostomy in place.    Here to discuss adjuvant treatment.  Ostomy is putting out high.  She recently discuss this with [facility] and they recommended increasing her Lomotil.  She is already taking Imodium for this.  She is concerned about the increased output.  Otherwise she is recovering okay.  Her recovery was prolonged from the surgery but she is now on the mend.      Review of Systems   Constitutional: Negative.  Negative for appetite change and unexpected weight change.   HENT: Negative.  Negative for mouth sores.    Eyes: Negative.  Negative for visual disturbance.   Respiratory: Negative.  Negative for cough and shortness of breath.    Cardiovascular: Negative.  Negative for chest pain.   Gastrointestinal: Negative.  Negative for abdominal pain and diarrhea.   Endocrine: Negative.    Genitourinary: Negative.  Negative for frequency.   Musculoskeletal: Negative.  Negative for back pain.   Integumentary:  Negative for rash. Negative.   Neurological: Negative.  Negative for headaches.   Hematological: Negative.  Negative for adenopathy.   Psychiatric/Behavioral: Negative.          Current Outpatient Medications   Medication Instructions    ALPRAZolam (XANAX) 0.5 mg, Oral, 3 times daily    apixaban (ELIQUIS DVT-PE TREAT 30D START) 5 mg (74 tabs) DsPk 2 tablets, 2 times daily    diphenhydrAMINE-aluminum-magnesium hydroxide-simethicone-LIDOcaine viscous HCl 2% 15 mLs, Swish & Spit, Every 4 hours PRN    ELDERBERRY FRUIT ORAL Take by mouth.    EPINEPHrine (EPIPEN) 0.3 mg, Intramuscular, Once    fexofenadine (ALLEGRA ALLERGY) 60 MG tablet     hydroCHLOROthiazide (HYDRODIURIL) 25 mg, Oral, Daily, Patient takes two pills daily     LIDOcaine-prilocaine (EMLA) cream Topical (Top), As needed (PRN)    multivit-minerals/folic acid (ADULT ONE DAILY MULTIVITAMIN ORAL) 1 tablet, Daily PRN    NIFEdipine (ADALAT CC) 60 MG TbSR TAKE 1 TABLET BY MOUTH EVERY DAY ON AN EMPTY STOMACH    OMEPRAZOLE ORAL 0.5 mg, Daily PRN    ondansetron (ZOFRAN) 8 mg, Oral, Every 8 hours PRN    oxyCODONE-acetaminophen (PERCOCET) 5-325 mg per tablet 1 tablet, Oral, Every 4 hours PRN    polyethylene glycol (GLYCOLAX) 17 g, Daily    potassium chloride (MICRO-K) 10 MEQ CpSR 20 mEq, Oral, Daily    predniSONE (DELTASONE) 5 mg, Oral, Daily    RYALTRIS 665-25 mcg/spray Spry     traZODone (DESYREL) 150 mg, Nightly        Past Medical History:   Diagnosis Date    Abnormal Pap smear of cervix     Allergy     Corneal erosion of right eye     Generalized anxiety disorder     Hypertension     Rectal mass     Sinus problem         Past Surgical History:   Procedure Laterality Date    BREAST CYST ASPIRATION       SECTION      X 2    CHOLECYSTECTOMY      COLONOSCOPY N/A 2024    Procedure: COLONOSCOPY;  Surgeon: Radhika Leary MD;  Location: [facility];  Service: Endoscopy;  Laterality: N/A;    DILATION AND CURETTAGE OF UTERUS      [facility]      EYE SURGERY Right     FOR CORNEAL EROSION    FLEXIBLE SIGMOIDOSCOPY N/A 2024    Procedure: SIGMOIDOSCOPY, FLEXIBLE(Fleets);  Surgeon: Oumar Montoya MD;  Location: [facility];  Service: General;  Laterality: N/A;    INSERTION OF TUNNELED CENTRAL VENOUS CATHETER (CVC) WITH SUBCUTANEOUS PORT N/A 2024    Procedure: [id number];  Surgeon: Oumar Montoya MD;  Location: [facility];  Service: General;  Laterality: N/A;    LIPOSUCTION      x 2    Mole Excision Right     Shin    TUBAL LIGATION          Family History   Problem Relation Name Age of Onset    Heart disease Mother      Hypertension Mother      Arthritis Father      Thyroid disease Father      Kidney disease Paternal Grandmother      Liver cancer Maternal  "Grandmother      Cancer Maternal Grandfather      Allergies Sister      Allergic rhinitis Sister      Angioedema Neg Hx      Asthma Neg Hx      Atopy Neg Hx      Eczema Neg Hx      Immunodeficiency Neg Hx      Rhinitis Neg Hx      Urticaria Neg Hx      Amblyopia Neg Hx      Blindness Neg Hx      Cataracts Neg Hx      Diabetes Neg Hx      Glaucoma Neg Hx      Macular degeneration Neg Hx      Retinal detachment Neg Hx      Strabismus Neg Hx      Stroke Neg Hx         Social History     Tobacco Use    Smoking status: Never    Smokeless tobacco: Never   Substance Use Topics    Alcohol use: Yes     Comment: 2 drinks per week    Drug use: No     Comment: MEDICAL MARIJUANA         Vitals:    02/25/25 0948   BP: 133/75   Pulse: 102   Temp: 98.2 °F (36.8 °C)        Physical Exam:  /75 (BP Location: Right arm, Patient Position: Sitting)   Pulse 102   Temp 98.2 °F (36.8 °C) (Temporal)   Ht 5' 6" (1.676 m)   Wt 73.6 kg (162 lb 4.1 oz)   SpO2 99%   BMI 26.19 kg/m²     Physical Exam  Constitutional:       Appearance: Normal appearance.   HENT:      Head: Normocephalic and atraumatic.      Nose: Nose normal.      Mouth/Throat:      Mouth: Mucous membranes are moist.      Pharynx: Oropharynx is clear.   Eyes:      Conjunctiva/sclera: Conjunctivae normal.   Cardiovascular:      Rate and Rhythm: Normal rate and regular rhythm.      Heart sounds: Normal heart sounds.   Pulmonary:      Effort: Pulmonary effort is normal.      Breath sounds: Normal breath sounds.   Abdominal:      General: Abdomen is flat. Bowel sounds are normal.      Palpations: Abdomen is soft.   Musculoskeletal:         General: Normal range of motion.      Cervical back: Normal range of motion and neck supple.   Skin:     General: Skin is warm and dry.   Neurological:      General: No focal deficit present.      Mental Status: She is alert and oriented to person, place, and time. Mental status is at baseline.   Psychiatric:         Mood and Affect: Mood " normal.          Labs:  Lab Results   Component Value Date    WBC 8.73 02/24/2025    RBC 3.74 (L) 02/24/2025    HGB 12.0 02/24/2025    HCT 36.3 (L) 02/24/2025    MCV 97 02/24/2025    MCH 32.1 (H) 02/24/2025    MCHC 33.1 02/24/2025    RDW 14.0 02/24/2025     02/24/2025    MPV 9.8 02/24/2025    GRAN 5.7 02/24/2025    GRAN 65.0 02/24/2025    LYMPH 2.2 02/24/2025    LYMPH 25.2 02/24/2025    MONO 0.4 02/24/2025    MONO 4.7 02/24/2025    EOS 0.4 02/24/2025    BASO 0.05 02/24/2025    EOSINOPHIL 4.0 02/24/2025    BASOPHIL 0.6 02/24/2025     Sodium   Date Value Ref Range Status   02/24/2025 136 136 - 145 mmol/L Final     Potassium   Date Value Ref Range Status   02/24/2025 4.1 3.5 - 5.1 mmol/L Final     Chloride   Date Value Ref Range Status   02/24/2025 105 95 - 110 mmol/L Final     CO2   Date Value Ref Range Status   02/24/2025 21 (L) 23 - 29 mmol/L Final     Glucose   Date Value Ref Range Status   02/24/2025 101 70 - 110 mg/dL Final     BUN   Date Value Ref Range Status   02/24/2025 15 8 - 23 mg/dL Final     Creatinine   Date Value Ref Range Status   02/24/2025 1.0 0.5 - 1.4 mg/dL Final     Calcium   Date Value Ref Range Status   02/24/2025 9.6 8.7 - 10.5 mg/dL Final     Total Protein   Date Value Ref Range Status   02/24/2025 8.0 6.0 - 8.4 g/dL Final     Albumin   Date Value Ref Range Status   02/24/2025 3.9 3.5 - 5.2 g/dL Final     Total Bilirubin   Date Value Ref Range Status   02/24/2025 0.8 0.1 - 1.0 mg/dL Final     Comment:     For infants and newborns, interpretation of results should be based  on gestational age, weight and in agreement with clinical  observations.    Premature Infant recommended reference ranges:  Up to 24 hours.............<8.0 mg/dL  Up to 48 hours............<12.0 mg/dL  3-5 days..................<15.0 mg/dL  6-29 days.................<15.0 mg/dL       Alkaline Phosphatase   Date Value Ref Range Status   02/24/2025 246 (H) 40 - 150 U/L Final     AST   Date Value Ref Range Status    02/24/2025 99 (H) 10 - 40 U/L Final     ALT   Date Value Ref Range Status   02/24/2025 188 (H) 10 - 44 U/L Final     Anion Gap   Date Value Ref Range Status   02/24/2025 10 8 - 16 mmol/L Final     eGFR if    Date Value Ref Range Status   10/25/2021 >60.0 >60 mL/min/1.73 m^2 Final     eGFR if non    Date Value Ref Range Status   10/25/2021 >60.0 >60 mL/min/1.73 m^2 Final     Comment:     Calculation used to obtain the estimated glomerular filtration  rate (eGFR) is the CKD-EPI equation.          A/P:    Rectal adenocarcinoma   -patient had oligometastatic disease   -had 6 cycles of FOLFOX and Avastin with me.  Then went to [facility] had radiation therapy to the lung metastatic disease.  She also had a resection by [facility].  She now has an ostomy in place.    -we discussed starting her back on adjuvant 5 FU.  She is concerned about it increasing her ostomy output as it is very high right now.  This was part of her prolonged recovery after surgery.  Ideally we should be able to hopefully start soon.  I will get authorization and see her back.  If her ostomy output is not improving, then we may not be able to do the treatment however.  I will get authorization for now and on her return visit I can see if she got better.  We may just try 1 treatment as well and if it worsened then we could stop adjuvant treatment.  She is okay with this plan.    -return to clinic when ready to start chemo    Ostomy  -has high output.  -taking Imodium 8 tablets and told to increase Lomotil to 4 tablets by [facility].  She is going to try this increase to see if it helps.  But there was some concern that 5 FU would make the ostomy output worsened so this could be a limiting factor.    Aurash Khoobehi, MD  Hematology and Oncology    Visit today included increased complexity associated with the care of the episodic problem addressed and managing the longitudinal care of the patient due to the serious and/or  complex managed problem(s) colon cancer.

## 2025-02-26 PROBLEM — Z71.89 OSTOMY NURSE CONSULTATION: Status: ACTIVE | Noted: 2025-02-26

## 2025-02-26 PROBLEM — L24.B3 IRRITANT CONTACT DERMATITIS ASSOCIATED WITH FECAL STOMA: Status: ACTIVE | Noted: 2025-02-26

## 2025-03-06 ENCOUNTER — PATIENT MESSAGE (OUTPATIENT)
Dept: HEMATOLOGY/ONCOLOGY | Facility: CLINIC | Age: 65
End: 2025-03-06
Payer: COMMERCIAL

## 2025-03-10 ENCOUNTER — PATIENT MESSAGE (OUTPATIENT)
Dept: HEMATOLOGY/ONCOLOGY | Facility: CLINIC | Age: 65
End: 2025-03-10
Payer: COMMERCIAL

## 2025-03-11 ENCOUNTER — LAB VISIT (OUTPATIENT)
Dept: LAB | Facility: HOSPITAL | Age: 65
End: 2025-03-11
Attending: INTERNAL MEDICINE
Payer: COMMERCIAL

## 2025-03-11 DIAGNOSIS — C18.7 MALIGNANT NEOPLASM OF SIGMOID COLON: ICD-10-CM

## 2025-03-11 DIAGNOSIS — C18.7 MALIGNANT NEOPLASM OF SIGMOID COLON: Primary | ICD-10-CM

## 2025-03-11 DIAGNOSIS — C78.02 MALIGNANT NEOPLASM METASTATIC TO LEFT LUNG: ICD-10-CM

## 2025-03-11 PROBLEM — Z93.2 ILEOSTOMY IN PLACE: Status: ACTIVE | Noted: 2025-03-11

## 2025-03-11 PROBLEM — Z78.9 UNDER CARE OF OSTOMY NURSE: Status: ACTIVE | Noted: 2025-03-11

## 2025-03-11 LAB
ALBUMIN SERPL BCP-MCNC: 4 G/DL (ref 3.5–5.2)
ALP SERPL-CCNC: 173 U/L (ref 40–150)
ALT SERPL W/O P-5'-P-CCNC: 88 U/L (ref 10–44)
ANION GAP SERPL CALC-SCNC: 11 MMOL/L (ref 8–16)
AST SERPL-CCNC: 61 U/L (ref 10–40)
BASOPHILS # BLD AUTO: 0.06 K/UL (ref 0–0.2)
BASOPHILS NFR BLD: 0.8 % (ref 0–1.9)
BILIRUB SERPL-MCNC: 0.5 MG/DL (ref 0.1–1)
BUN SERPL-MCNC: 15 MG/DL (ref 8–23)
CALCIUM SERPL-MCNC: 9.6 MG/DL (ref 8.7–10.5)
CHLORIDE SERPL-SCNC: 104 MMOL/L (ref 95–110)
CO2 SERPL-SCNC: 23 MMOL/L (ref 23–29)
CREAT SERPL-MCNC: 1 MG/DL (ref 0.5–1.4)
DIFFERENTIAL METHOD BLD: ABNORMAL
EOSINOPHIL # BLD AUTO: 0.3 K/UL (ref 0–0.5)
EOSINOPHIL NFR BLD: 3.2 % (ref 0–8)
ERYTHROCYTE [DISTWIDTH] IN BLOOD BY AUTOMATED COUNT: 13.1 % (ref 11.5–14.5)
EST. GFR  (NO RACE VARIABLE): >60 ML/MIN/1.73 M^2
GLUCOSE SERPL-MCNC: 150 MG/DL (ref 70–110)
HCT VFR BLD AUTO: 35.7 % (ref 37–48.5)
HGB BLD-MCNC: 12.1 G/DL (ref 12–16)
IMM GRANULOCYTES # BLD AUTO: 0.03 K/UL (ref 0–0.04)
IMM GRANULOCYTES NFR BLD AUTO: 0.4 % (ref 0–0.5)
LYMPHOCYTES # BLD AUTO: 2 K/UL (ref 1–4.8)
LYMPHOCYTES NFR BLD: 26.2 % (ref 18–48)
MCH RBC QN AUTO: 32.4 PG (ref 27–31)
MCHC RBC AUTO-ENTMCNC: 33.9 G/DL (ref 32–36)
MCV RBC AUTO: 96 FL (ref 82–98)
MONOCYTES # BLD AUTO: 0.4 K/UL (ref 0.3–1)
MONOCYTES NFR BLD: 4.7 % (ref 4–15)
NEUTROPHILS # BLD AUTO: 5 K/UL (ref 1.8–7.7)
NEUTROPHILS NFR BLD: 64.7 % (ref 38–73)
NRBC BLD-RTO: 0 /100 WBC
PLATELET # BLD AUTO: 252 K/UL (ref 150–450)
PMV BLD AUTO: 10.1 FL (ref 9.2–12.9)
POTASSIUM SERPL-SCNC: 4.3 MMOL/L (ref 3.5–5.1)
PROT SERPL-MCNC: 7.8 G/DL (ref 6–8.4)
RBC # BLD AUTO: 3.74 M/UL (ref 4–5.4)
SODIUM SERPL-SCNC: 138 MMOL/L (ref 136–145)
WBC # BLD AUTO: 7.72 K/UL (ref 3.9–12.7)

## 2025-03-11 PROCEDURE — 85025 COMPLETE CBC W/AUTO DIFF WBC: CPT | Performed by: INTERNAL MEDICINE

## 2025-03-11 PROCEDURE — 36415 COLL VENOUS BLD VENIPUNCTURE: CPT | Performed by: INTERNAL MEDICINE

## 2025-03-11 PROCEDURE — 80053 COMPREHEN METABOLIC PANEL: CPT | Performed by: INTERNAL MEDICINE

## 2025-03-12 ENCOUNTER — INFUSION (OUTPATIENT)
Dept: INFUSION THERAPY | Facility: HOSPITAL | Age: 65
End: 2025-03-12
Attending: INTERNAL MEDICINE
Payer: COMMERCIAL

## 2025-03-12 ENCOUNTER — SOCIAL WORK (OUTPATIENT)
Dept: HEMATOLOGY/ONCOLOGY | Facility: CLINIC | Age: 65
End: 2025-03-12
Payer: COMMERCIAL

## 2025-03-12 VITALS
OXYGEN SATURATION: 98 % | WEIGHT: 163 LBS | RESPIRATION RATE: 18 BRPM | DIASTOLIC BLOOD PRESSURE: 80 MMHG | HEART RATE: 74 BPM | BODY MASS INDEX: 26.2 KG/M2 | HEIGHT: 66 IN | SYSTOLIC BLOOD PRESSURE: 137 MMHG | TEMPERATURE: 97 F

## 2025-03-12 DIAGNOSIS — C18.7 MALIGNANT NEOPLASM OF SIGMOID COLON: ICD-10-CM

## 2025-03-12 DIAGNOSIS — C78.02 MALIGNANT NEOPLASM METASTATIC TO LEFT LUNG: Primary | ICD-10-CM

## 2025-03-12 PROCEDURE — 96409 CHEMO IV PUSH SNGL DRUG: CPT

## 2025-03-12 PROCEDURE — 96366 THER/PROPH/DIAG IV INF ADDON: CPT

## 2025-03-12 PROCEDURE — 25000003 PHARM REV CODE 250: Performed by: INTERNAL MEDICINE

## 2025-03-12 PROCEDURE — 96367 TX/PROPH/DG ADDL SEQ IV INF: CPT

## 2025-03-12 PROCEDURE — 96416 CHEMO PROLONG INFUSE W/PUMP: CPT

## 2025-03-12 PROCEDURE — 63600175 PHARM REV CODE 636 W HCPCS: Performed by: INTERNAL MEDICINE

## 2025-03-12 RX ORDER — HEPARIN 100 UNIT/ML
500 SYRINGE INTRAVENOUS
Status: CANCELLED | OUTPATIENT
Start: 2025-03-13

## 2025-03-12 RX ORDER — FLUOROURACIL 50 MG/ML
400 INJECTION, SOLUTION INTRAVENOUS
Status: COMPLETED | OUTPATIENT
Start: 2025-03-12 | End: 2025-03-12

## 2025-03-12 RX ORDER — ONDANSETRON HCL IN 0.9 % NACL 8 MG/50 ML
8 INTRAVENOUS SOLUTION, PIGGYBACK (ML) INTRAVENOUS
Status: COMPLETED | OUTPATIENT
Start: 2025-03-12 | End: 2025-03-12

## 2025-03-12 RX ORDER — FLUOROURACIL 50 MG/ML
400 INJECTION, SOLUTION INTRAVENOUS
Status: CANCELLED | OUTPATIENT
Start: 2025-03-12

## 2025-03-12 RX ORDER — DIPHENHYDRAMINE HYDROCHLORIDE 50 MG/ML
50 INJECTION, SOLUTION INTRAMUSCULAR; INTRAVENOUS ONCE AS NEEDED
Status: CANCELLED | OUTPATIENT
Start: 2025-03-12

## 2025-03-12 RX ORDER — HEPARIN 100 UNIT/ML
500 SYRINGE INTRAVENOUS
Status: CANCELLED | OUTPATIENT
Start: 2025-03-12

## 2025-03-12 RX ORDER — EPINEPHRINE 0.3 MG/.3ML
0.3 INJECTION SUBCUTANEOUS ONCE AS NEEDED
Status: CANCELLED | OUTPATIENT
Start: 2025-03-12

## 2025-03-12 RX ORDER — SODIUM CHLORIDE 0.9 % (FLUSH) 0.9 %
10 SYRINGE (ML) INJECTION
Status: CANCELLED | OUTPATIENT
Start: 2025-03-12

## 2025-03-12 RX ORDER — SODIUM CHLORIDE 0.9 % (FLUSH) 0.9 %
10 SYRINGE (ML) INJECTION
Status: DISCONTINUED | OUTPATIENT
Start: 2025-03-12 | End: 2025-03-12 | Stop reason: HOSPADM

## 2025-03-12 RX ORDER — SODIUM CHLORIDE 0.9 % (FLUSH) 0.9 %
10 SYRINGE (ML) INJECTION
Status: CANCELLED | OUTPATIENT
Start: 2025-03-13

## 2025-03-12 RX ORDER — ONDANSETRON HYDROCHLORIDE 2 MG/ML
8 INJECTION, SOLUTION INTRAVENOUS
Status: CANCELLED | OUTPATIENT
Start: 2025-03-12

## 2025-03-12 RX ADMIN — FLUOROURACIL 4440 MG: 50 INJECTION, SOLUTION INTRAVENOUS at 12:03

## 2025-03-12 RX ADMIN — SODIUM CHLORIDE 8 MG: 9 INJECTION, SOLUTION INTRAVENOUS at 10:03

## 2025-03-12 RX ADMIN — DEXTROSE MONOHYDRATE 740 MG: 50 INJECTION, SOLUTION INTRAVENOUS at 10:03

## 2025-03-12 RX ADMIN — FLUOROURACIL 740 MG: 50 INJECTION, SOLUTION INTRAVENOUS at 12:03

## 2025-03-12 RX ADMIN — SODIUM CHLORIDE: 9 INJECTION, SOLUTION INTRAVENOUS at 10:03

## 2025-03-14 ENCOUNTER — INFUSION (OUTPATIENT)
Dept: INFUSION THERAPY | Facility: HOSPITAL | Age: 65
End: 2025-03-14
Attending: INTERNAL MEDICINE
Payer: COMMERCIAL

## 2025-03-14 VITALS
TEMPERATURE: 97 F | HEART RATE: 109 BPM | BODY MASS INDEX: 26.17 KG/M2 | DIASTOLIC BLOOD PRESSURE: 82 MMHG | OXYGEN SATURATION: 98 % | RESPIRATION RATE: 18 BRPM | SYSTOLIC BLOOD PRESSURE: 134 MMHG | WEIGHT: 162.81 LBS | HEIGHT: 66 IN

## 2025-03-14 DIAGNOSIS — C18.7 MALIGNANT NEOPLASM OF SIGMOID COLON: ICD-10-CM

## 2025-03-14 DIAGNOSIS — C78.02 MALIGNANT NEOPLASM METASTATIC TO LEFT LUNG: Primary | ICD-10-CM

## 2025-03-14 PROCEDURE — 63600175 PHARM REV CODE 636 W HCPCS: Performed by: INTERNAL MEDICINE

## 2025-03-14 PROCEDURE — 25000003 PHARM REV CODE 250: Performed by: INTERNAL MEDICINE

## 2025-03-14 PROCEDURE — 96523 IRRIG DRUG DELIVERY DEVICE: CPT

## 2025-03-14 PROCEDURE — A4216 STERILE WATER/SALINE, 10 ML: HCPCS | Performed by: INTERNAL MEDICINE

## 2025-03-14 RX ORDER — HEPARIN 100 UNIT/ML
500 SYRINGE INTRAVENOUS
Status: DISCONTINUED | OUTPATIENT
Start: 2025-03-14 | End: 2025-03-14 | Stop reason: HOSPADM

## 2025-03-14 RX ORDER — SODIUM CHLORIDE 0.9 % (FLUSH) 0.9 %
10 SYRINGE (ML) INJECTION
Status: DISCONTINUED | OUTPATIENT
Start: 2025-03-14 | End: 2025-03-14 | Stop reason: HOSPADM

## 2025-03-14 RX ADMIN — SODIUM CHLORIDE, PRESERVATIVE FREE 10 ML: 5 INJECTION INTRAVENOUS at 11:03

## 2025-03-14 RX ADMIN — HEPARIN 500 UNITS: 100 SYRINGE at 11:03

## 2025-03-14 NOTE — PLAN OF CARE
Problem: Fatigue  Goal: Improved Activity Tolerance  Outcome: Progressing  Intervention: Promote Improved Energy  Flowsheets (Taken 3/14/2025 1101)  Fatigue Management: frequent rest breaks encouraged  Activity Management: Ambulated -L4  Environmental Support: rest periods encouraged

## 2025-03-18 ENCOUNTER — LAB VISIT (OUTPATIENT)
Dept: LAB | Facility: HOSPITAL | Age: 65
End: 2025-03-18
Attending: INTERNAL MEDICINE
Payer: COMMERCIAL

## 2025-03-18 ENCOUNTER — OFFICE VISIT (OUTPATIENT)
Dept: DERMATOLOGY | Facility: CLINIC | Age: 65
End: 2025-03-18
Payer: COMMERCIAL

## 2025-03-18 DIAGNOSIS — D48.5 NEOPLASM OF UNCERTAIN BEHAVIOR OF SKIN: Primary | ICD-10-CM

## 2025-03-18 DIAGNOSIS — D22.9 MULTIPLE BENIGN NEVI: ICD-10-CM

## 2025-03-18 DIAGNOSIS — L82.1 SEBORRHEIC KERATOSES: ICD-10-CM

## 2025-03-18 DIAGNOSIS — L81.4 LENTIGO: ICD-10-CM

## 2025-03-18 DIAGNOSIS — Z12.83 SCREENING EXAM FOR SKIN CANCER: ICD-10-CM

## 2025-03-18 DIAGNOSIS — D18.01 CHERRY ANGIOMA: ICD-10-CM

## 2025-03-18 DIAGNOSIS — C18.7 MALIGNANT NEOPLASM OF SIGMOID COLON: ICD-10-CM

## 2025-03-18 LAB
ALBUMIN SERPL BCP-MCNC: 4.1 G/DL (ref 3.5–5.2)
ALP SERPL-CCNC: 146 U/L (ref 40–150)
ALT SERPL W/O P-5'-P-CCNC: 84 U/L (ref 10–44)
ANION GAP SERPL CALC-SCNC: 12 MMOL/L (ref 8–16)
AST SERPL-CCNC: 33 U/L (ref 10–40)
BASOPHILS # BLD AUTO: 0.05 K/UL (ref 0–0.2)
BASOPHILS NFR BLD: 0.9 % (ref 0–1.9)
BILIRUB SERPL-MCNC: 0.5 MG/DL (ref 0.1–1)
BUN SERPL-MCNC: 24 MG/DL (ref 8–23)
CALCIUM SERPL-MCNC: 9.6 MG/DL (ref 8.7–10.5)
CHLORIDE SERPL-SCNC: 106 MMOL/L (ref 95–110)
CO2 SERPL-SCNC: 21 MMOL/L (ref 23–29)
CREAT SERPL-MCNC: 1.1 MG/DL (ref 0.5–1.4)
DIFFERENTIAL METHOD BLD: ABNORMAL
EOSINOPHIL # BLD AUTO: 0.2 K/UL (ref 0–0.5)
EOSINOPHIL NFR BLD: 3.1 % (ref 0–8)
ERYTHROCYTE [DISTWIDTH] IN BLOOD BY AUTOMATED COUNT: 12.5 % (ref 11.5–14.5)
EST. GFR  (NO RACE VARIABLE): 57 ML/MIN/1.73 M^2
GLUCOSE SERPL-MCNC: 146 MG/DL (ref 70–110)
HCT VFR BLD AUTO: 36.6 % (ref 37–48.5)
HGB BLD-MCNC: 12.3 G/DL (ref 12–16)
IMM GRANULOCYTES # BLD AUTO: 0.02 K/UL (ref 0–0.04)
IMM GRANULOCYTES NFR BLD AUTO: 0.3 % (ref 0–0.5)
LYMPHOCYTES # BLD AUTO: 1.6 K/UL (ref 1–4.8)
LYMPHOCYTES NFR BLD: 27.8 % (ref 18–48)
MCH RBC QN AUTO: 31.9 PG (ref 27–31)
MCHC RBC AUTO-ENTMCNC: 33.6 G/DL (ref 32–36)
MCV RBC AUTO: 95 FL (ref 82–98)
MONOCYTES # BLD AUTO: 0.2 K/UL (ref 0.3–1)
MONOCYTES NFR BLD: 3.4 % (ref 4–15)
NEUTROPHILS # BLD AUTO: 3.8 K/UL (ref 1.8–7.7)
NEUTROPHILS NFR BLD: 64.5 % (ref 38–73)
NRBC BLD-RTO: 0 /100 WBC
PLATELET # BLD AUTO: 229 K/UL (ref 150–450)
PMV BLD AUTO: 10.7 FL (ref 9.2–12.9)
POTASSIUM SERPL-SCNC: 4 MMOL/L (ref 3.5–5.1)
PROT SERPL-MCNC: 8.2 G/DL (ref 6–8.4)
RBC # BLD AUTO: 3.85 M/UL (ref 4–5.4)
SODIUM SERPL-SCNC: 139 MMOL/L (ref 136–145)
WBC # BLD AUTO: 5.86 K/UL (ref 3.9–12.7)

## 2025-03-18 PROCEDURE — 1159F MED LIST DOCD IN RCRD: CPT | Mod: CPTII,S$GLB,, | Performed by: STUDENT IN AN ORGANIZED HEALTH CARE EDUCATION/TRAINING PROGRAM

## 2025-03-18 PROCEDURE — 80053 COMPREHEN METABOLIC PANEL: CPT | Performed by: INTERNAL MEDICINE

## 2025-03-18 PROCEDURE — 11102 TANGNTL BX SKIN SINGLE LES: CPT | Mod: S$GLB,,, | Performed by: STUDENT IN AN ORGANIZED HEALTH CARE EDUCATION/TRAINING PROGRAM

## 2025-03-18 PROCEDURE — 1160F RVW MEDS BY RX/DR IN RCRD: CPT | Mod: CPTII,S$GLB,, | Performed by: STUDENT IN AN ORGANIZED HEALTH CARE EDUCATION/TRAINING PROGRAM

## 2025-03-18 PROCEDURE — 85025 COMPLETE CBC W/AUTO DIFF WBC: CPT | Performed by: INTERNAL MEDICINE

## 2025-03-18 PROCEDURE — 99203 OFFICE O/P NEW LOW 30 MIN: CPT | Mod: 25,S$GLB,, | Performed by: STUDENT IN AN ORGANIZED HEALTH CARE EDUCATION/TRAINING PROGRAM

## 2025-03-18 PROCEDURE — 36415 COLL VENOUS BLD VENIPUNCTURE: CPT | Performed by: INTERNAL MEDICINE

## 2025-03-18 PROCEDURE — 88305 TISSUE EXAM BY PATHOLOGIST: CPT | Performed by: PATHOLOGY

## 2025-03-18 PROCEDURE — 99999 PR PBB SHADOW E&M-EST. PATIENT-LVL IV: CPT | Mod: PBBFAC,,, | Performed by: STUDENT IN AN ORGANIZED HEALTH CARE EDUCATION/TRAINING PROGRAM

## 2025-03-18 NOTE — PROGRESS NOTES
Subjective:      Patient ID:  Malaika Craven is a 64 y.o. female who presents for   Chief Complaint   Patient presents with    Skin Check     UBSE      Pt is here for UBSE   Pt has mole under L breast she is concerned about  Pt states mole is sensitive and has changed in size       Review of Systems   Skin:  Negative for itching, rash and dry skin.       Objective:   Physical Exam   Constitutional: She appears well-developed and well-nourished. No distress.   Neurological: She is alert and oriented to person, place, and time. She is not disoriented.   Psychiatric: She has a normal mood and affect.   Skin:   Areas Examined (abnormalities noted in diagram):   Scalp / Hair Palpated and Inspected  Head / Face Inspection Performed  Neck Inspection Performed  Chest / Axilla Inspection Performed  Abdomen Inspection Performed  Back Inspection Performed  RUE Inspected  LUE Inspection Performed                 Diagram Legend     Erythematous scaling macule/papule c/w actinic keratosis       Vascular papule c/w angioma      Pigmented verrucoid papule/plaque c/w seborrheic keratosis      Yellow umbilicated papule c/w sebaceous hyperplasia      Irregularly shaped tan macule c/w lentigo     1-2 mm smooth white papules consistent with Milia      Movable subcutaneous cyst with punctum c/w epidermal inclusion cyst      Subcutaneous movable cyst c/w pilar cyst      Firm pink to brown papule c/w dermatofibroma      Pedunculated fleshy papule(s) c/w skin tag(s)      Evenly pigmented macule c/w junctional nevus     Mildly variegated pigmented, slightly irregular-bordered macule c/w mildly atypical nevus      Flesh colored to evenly pigmented papule c/w intradermal nevus       Pink pearly papule/plaque c/w basal cell carcinoma      Erythematous hyperkeratotic cursted plaque c/w SCC      Surgical scar with no sign of skin cancer recurrence      Open and closed comedones      Inflammatory papules and pustules      Verrucoid papule  consistent consistent with wart     Erythematous eczematous patches and plaques     Dystrophic onycholytic nail with subungual debris c/w onychomycosis     Umbilicated papule    Erythematous-base heme-crusted tan verrucoid plaque consistent with inflamed seborrheic keratosis     Erythematous Silvery Scaling Plaque c/w Psoriasis     See annotation            Assessment / Plan:      Pathology Orders:       Normal Orders This Visit    Specimen to Pathology, Dermatology     Questions:    Procedure Type: Dermatology and skin neoplasms    Number of Specimens: 1    ------------------------: -------------------------    Spec 1 Procedure: Biopsy    Spec 1 Clinical Impression: iSK. r/o SCCis    Spec 1 Source: left inframammary    Release to patient: Immediate    Release to patient:           Neoplasm of uncertain behavior of skin  -     Specimen to Pathology, Dermatology    Shave biopsy procedure note:    Shave biopsy performed after verbal consent including risk of infection, scar, recurrence, need for additional treatment of site. Area prepped with alcohol, anesthetized with approximately 1.0cc of 1% lidocaine with epinephrine. Lesional tissue shaved with razor blade. Hemostasis achieved with application of aluminum chloride followed by hyfrecation. No complications. Dressing applied. Wound care explained.      Lentigo  Tenorio angioma  Multiple benign nevi  Seborrheic keratoses  Reassurance given to patient. No treatment is necessary.   Treatment of benign, asymptomatic lesions may be considered cosmetic.    Screening exam for skin cancer  Upper body skin examination performed today including at least 6 points as noted in physical examination. No lesions suspicious for malignancy noted.    Recommend daily sun protection/avoidance and use of at least SPF 30, broad spectrum sunscreen (OTC drug).              Follow up in about 1 year (around 3/18/2026) for TBSE.

## 2025-03-18 NOTE — PATIENT INSTRUCTIONS
Shave Biopsy Wound Care    Your doctor has performed a shave biopsy today.  A band aid and vaseline ointment has been placed over the site.  This should remain in place for NO LONGER THAN 48 hours.  It is fine to remove the bandaid after 24 hours, if the area is no longer bleeding. It is recommended that you keep the area dry (do not wet)) for the first 24 hours.  After 24 hours, wash the area with warm soap and water and apply Vaseline jelly.  Many patients prefer to use Neosporin or Bacitracin ointment.  This is acceptable; however, know that you can develop an allergy to this medication even if you have used it safely for years.  It is important to keep the area moist.  Letting it dry out and get air slows healing time, and will worsen the scar.        If you notice increasing redness, tenderness, pain, or yellow drainage at the biopsy site, please notify your doctor.  These are signs of an infection.    If your biopsy site is bleeding, apply firm pressure for 15 minutes straight.  Repeat for another 15 minutes, if it is still bleeding.   If the surgical site continues to bleed, then please contact your doctor.      For [facility] users:   You will receive your biopsy results in [facility] as soon as they are available. Please be assured that your physician/provider will review your results and will then determine what further treatment, evaluation, or planning is required. You should be contacted by your physician's/provider's office within 5 business days of receiving your results; If not, please reach out to directly. This is one more way [facility] you [facility].     [address] [place] [address]/ [phone] FAX/ [email or web address]         Sunscreen Guidelines  Sunscreen protects your skin by absorbing and reflecting ultraviolet rays. All sunscreens have a sun protection factor (SPF) rating that indicates how long a sunscreen will remain effective on the skin.    Why protect  your skin?  The sun's rays are composed of many different wavelengths, including UVA, UVB, and visible light that each affect the skin differently.    UVB: sunburn, photoaging, skin cancer (melanoma, basal cell, and squamous cell carcinomas) and modulation of the skin's immune system.    UVA: similar to above but thought to contribute more to aging; at the same dose of UVB it is less powerful however the sun has 10-20 times the levels of UVA as compared with UVB.  Visible light: implicated in causing unwanted darkening of skin, such as melasma and post-inflammatory hyperpigmentation in darker skin types     If I have dark skin, do I need to worry about the sun?    More darkly pigmented skin is more protected against UV-induced skin cancer, sunburn, and photoaging, though may still suffer from sun-related conditions, including melasma, hyperpigmentation, and other dark spots.    Sun avoidance  As a general rule, stay out of the sun as much as possible between 10 a.m. - 4 p.m.    Download the EPA UV index denisse to track the UV index by hour in your zip code.      Which sunscreen should I choose?  The best sunscreen to use varies by individual. The one that feels best on your skin and fits your lifestyle will be the one you will likely use most regularly.   Active ingredients of sunscreen vary by , and may be a chemical (such as avobenzone or oxybenzone) or physical agent (such as zinc oxide or titanium dioxide). I recommend a physical agent.  A water-resistant sunscreen is one that maintains the SPF level after 40 minutes of water exposure. A very water-resistant sunscreen maintains the SPF level after 80 minutes of water exposure.    Sunscreen: this is the last layer in sun protection   Be generous: 1 shot glass of sunscreen for your body, ½ teaspoon for your face/neck  Reapply every 2 hours  Broad spectrum (provides UVA/UVB protection), SPF 50 or above  Avoid spray sunscreens: less effective and have been  "found to contain benzene (carcinogen)    Sun protective clothing  Although sunscreen helps minimize sun damage, no sunscreen completely blocks all wavelengths of UV light. Wearing sun protective clothing such as hats, rashguards or swim shirts, and long sleeves and/or pants, as well as avoiding sun exposure from 10 a.m. to 4 p.m. will help protect your skin from overexposure and minimize sun damage. Seek shade.  Long sleeved clothing, hats, and sunglasses: makes sun protection easier, more effective, and can even be more affordable, since sunscreen needs to be reapplied frequently.    Solumbra (www.sunprectautions.com)  Crosswise (www.ClickShift.Ahandyhand)  Coolibar (www.Probiodrug)  Land's end (wwwki work)  Hats from Joan Catherine ([email or web address])    My Favorite Sunscreens:  Physical blockers: Can have a "white case" but in general are more effective  - Face: CeraVe tinted mineral sunscreen, Bare Minerals complexion rescue (20 shades), Elta MD (UV elements, UV physical, UV restore, etc), Tizo ultra zinc tinted, Cetaphil Sheer Mineral Face Liquid Sunscreen  - Body: Blue Lizard, Neutrogena Sheer Zinc, Eucerin Daily Protection, Aveeno Baby   "

## 2025-03-19 ENCOUNTER — PATIENT MESSAGE (OUTPATIENT)
Dept: HEMATOLOGY/ONCOLOGY | Facility: CLINIC | Age: 65
End: 2025-03-19

## 2025-03-19 ENCOUNTER — OFFICE VISIT (OUTPATIENT)
Dept: HEMATOLOGY/ONCOLOGY | Facility: CLINIC | Age: 65
End: 2025-03-19
Payer: COMMERCIAL

## 2025-03-19 VITALS
TEMPERATURE: 98 F | DIASTOLIC BLOOD PRESSURE: 84 MMHG | RESPIRATION RATE: 18 BRPM | SYSTOLIC BLOOD PRESSURE: 137 MMHG | BODY MASS INDEX: 25.75 KG/M2 | HEIGHT: 66 IN | HEART RATE: 90 BPM | WEIGHT: 160.25 LBS | OXYGEN SATURATION: 99 %

## 2025-03-19 DIAGNOSIS — R19.7 DIARRHEA, UNSPECIFIED TYPE: ICD-10-CM

## 2025-03-19 DIAGNOSIS — C18.7 MALIGNANT NEOPLASM OF SIGMOID COLON: Primary | ICD-10-CM

## 2025-03-19 DIAGNOSIS — K12.30 MUCOSITIS: ICD-10-CM

## 2025-03-19 DIAGNOSIS — C78.02 MALIGNANT NEOPLASM METASTATIC TO LEFT LUNG: ICD-10-CM

## 2025-03-19 DIAGNOSIS — E86.0 DEHYDRATION: ICD-10-CM

## 2025-03-19 PROCEDURE — 99214 OFFICE O/P EST MOD 30 MIN: CPT | Mod: S$GLB,,, | Performed by: INTERNAL MEDICINE

## 2025-03-19 PROCEDURE — 99999 PR PBB SHADOW E&M-EST. PATIENT-LVL IV: CPT | Mod: PBBFAC,,, | Performed by: INTERNAL MEDICINE

## 2025-03-19 PROCEDURE — 3079F DIAST BP 80-89 MM HG: CPT | Mod: CPTII,S$GLB,, | Performed by: INTERNAL MEDICINE

## 2025-03-19 PROCEDURE — 1159F MED LIST DOCD IN RCRD: CPT | Mod: CPTII,S$GLB,, | Performed by: INTERNAL MEDICINE

## 2025-03-19 PROCEDURE — 3008F BODY MASS INDEX DOCD: CPT | Mod: CPTII,S$GLB,, | Performed by: INTERNAL MEDICINE

## 2025-03-19 PROCEDURE — 3075F SYST BP GE 130 - 139MM HG: CPT | Mod: CPTII,S$GLB,, | Performed by: INTERNAL MEDICINE

## 2025-03-19 RX ORDER — HEPARIN 100 UNIT/ML
500 SYRINGE INTRAVENOUS
Status: CANCELLED | OUTPATIENT
Start: 2025-03-19

## 2025-03-19 RX ORDER — MORPHINE 10 MG/ML
6 TINCTURE ORAL 4 TIMES DAILY PRN
Qty: 118 ML | Refills: 0 | Status: SHIPPED | OUTPATIENT
Start: 2025-03-19

## 2025-03-19 RX ORDER — SODIUM CHLORIDE 0.9 % (FLUSH) 0.9 %
10 SYRINGE (ML) INJECTION
Status: CANCELLED | OUTPATIENT
Start: 2025-03-19

## 2025-03-19 RX ORDER — DEXAMETHASONE 0.5 MG/5ML
1 ELIXIR ORAL EVERY 12 HOURS
Qty: 200 ML | Refills: 0 | Status: SHIPPED | OUTPATIENT
Start: 2025-03-19 | End: 2025-03-29

## 2025-03-19 NOTE — PROGRESS NOTES
Service Date:  3/19/25    Chief Complaint: Colon Cancer (Labs  tox check)    Malaika Craven is a 64 y.o. female here with oligometastatic rectosigmoid colon cancer    Had 6 cycles of FOLFOX with Avastin.  Then went to [facility] and had radiation of the lung met.  Then had resection by [facility].  Now has ostomy in place.    Started adjuvant treatment.  Doing okay with the.  Having mucositis.  Using magic mouthwash with little relief.  I will put from the ostomy has increased although it never really improved prior to starting the chemo.    Review of Systems   Constitutional: Negative.  Negative for appetite change and unexpected weight change.   HENT:  Positive for mouth sores.    Eyes: Negative.  Negative for visual disturbance.   Respiratory: Negative.  Negative for cough and shortness of breath.    Cardiovascular: Negative.  Negative for chest pain.   Gastrointestinal:  Positive for diarrhea. Negative for abdominal pain.   Endocrine: Negative.    Genitourinary: Negative.  Negative for frequency.   Musculoskeletal: Negative.  Negative for back pain.   Integumentary:  Negative for rash. Negative.   Neurological: Negative.  Negative for headaches.   Hematological: Negative.  Negative for adenopathy.   Psychiatric/Behavioral: Negative.          Current Outpatient Medications   Medication Instructions    ALPRAZolam (XANAX) 0.5 mg, Oral, 3 times daily    apixaban (ELIQUIS DVT-PE TREAT 30D START) 5 mg (74 tabs) DsPk 2 tablets, 2 times daily    dexAMETHasone (DECADRON) 1 mg, Oral, Every 12 hours    diphenhydrAMINE-aluminum-magnesium hydroxide-simethicone-LIDOcaine viscous HCl 2% 15 mLs, Swish & Spit, Every 4 hours PRN    ELDERBERRY FRUIT ORAL Take by mouth.    EPINEPHrine (EPIPEN) 0.3 mg, Intramuscular, Once    fexofenadine (ALLEGRA ALLERGY) 60 MG tablet     hydroCHLOROthiazide (HYDRODIURIL) 25 mg, Oral, Daily, Patient takes two pills daily    LIDOcaine-prilocaine (EMLA) cream Topical (Top), As needed (PRN)     multivit-minerals/folic acid (ADULT ONE DAILY MULTIVITAMIN ORAL) 1 tablet, Daily PRN    NIFEdipine (ADALAT CC) 60 MG TbSR TAKE 1 TABLET BY MOUTH EVERY DAY ON AN EMPTY STOMACH    OMEPRAZOLE ORAL 0.5 mg, Daily PRN    ondansetron (ZOFRAN) 8 mg, Oral, Every 8 hours PRN    oxyCODONE-acetaminophen (PERCOCET) 5-325 mg per tablet 1 tablet, Oral, Every 4 hours PRN    polyethylene glycol (GLYCOLAX) 17 g, Daily    potassium chloride (MICRO-K) 10 MEQ CpSR 20 mEq, Oral, Daily    predniSONE (DELTASONE) 5 mg, Oral, Daily    RYALTRIS 665-25 mcg/spray Spry     traZODone (DESYREL) 150 mg, Nightly        Past Medical History:   Diagnosis Date    Abnormal Pap smear of cervix     Allergy     Corneal erosion of right eye     Generalized anxiety disorder     Hypertension     Rectal mass     Sinus problem         Past Surgical History:   Procedure Laterality Date    BREAST CYST ASPIRATION       SECTION      X 2    CHOLECYSTECTOMY      COLONOSCOPY N/A 2024    Procedure: COLONOSCOPY;  Surgeon: Radhika Leary MD;  Location: [facility];  Service: Endoscopy;  Laterality: N/A;    DILATION AND CURETTAGE OF UTERUS      [facility]      EYE SURGERY Right     FOR CORNEAL EROSION    FLEXIBLE SIGMOIDOSCOPY N/A 2024    Procedure: SIGMOIDOSCOPY, FLEXIBLE(Flesilvia);  Surgeon: Oumar Montoya MD;  Location: [facility];  Service: General;  Laterality: N/A;    INSERTION OF TUNNELED CENTRAL VENOUS CATHETER (CVC) WITH SUBCUTANEOUS PORT N/A 2024    Procedure: GNLYLDYAV-CBTU-E-CATH;  Surgeon: Oumar Montoya MD;  Location: [facility];  Service: General;  Laterality: N/A;    LIPOSUCTION      x 2    Mole Excision Right     Shin    TUBAL LIGATION          Family History   Problem Relation Name Age of Onset    Heart disease Mother      Hypertension Mother      Arthritis Father      Thyroid disease Father      Kidney disease Paternal Grandmother      Liver cancer Maternal Grandmother      Cancer Maternal Grandfather      Allergies Sister       "Allergic rhinitis Sister      Angioedema Neg Hx      Asthma Neg Hx      Atopy Neg Hx      Eczema Neg Hx      Immunodeficiency Neg Hx      Rhinitis Neg Hx      Urticaria Neg Hx      Amblyopia Neg Hx      Blindness Neg Hx      Cataracts Neg Hx      Diabetes Neg Hx      Glaucoma Neg Hx      Macular degeneration Neg Hx      Retinal detachment Neg Hx      Strabismus Neg Hx      Stroke Neg Hx         Social History     Tobacco Use    Smoking status: Never    Smokeless tobacco: Never   Substance Use Topics    Alcohol use: Yes     Comment: 2 drinks per week    Drug use: No     Comment: MEDICAL MARIJUANA         Vitals:    03/19/25 1115   BP: 137/84   Pulse: 90   Resp: 18   Temp: 97.9 °F (36.6 °C)        Physical Exam:  /84 (BP Location: Right arm, Patient Position: Sitting)   Pulse 90   Temp 97.9 °F (36.6 °C) (Temporal)   Resp 18   Ht 5' 6" (1.676 m)   Wt 72.7 kg (160 lb 4.4 oz)   SpO2 99%   BMI 25.87 kg/m²     Physical Exam  Constitutional:       Appearance: Normal appearance.   HENT:      Head: Normocephalic and atraumatic.      Nose: Nose normal.      Mouth/Throat:      Mouth: Mucous membranes are moist.      Pharynx: Oropharynx is clear.   Eyes:      Conjunctiva/sclera: Conjunctivae normal.   Cardiovascular:      Rate and Rhythm: Normal rate and regular rhythm.      Heart sounds: Normal heart sounds.   Pulmonary:      Effort: Pulmonary effort is normal.      Breath sounds: Normal breath sounds.   Abdominal:      General: Abdomen is flat. Bowel sounds are normal.      Palpations: Abdomen is soft.   Musculoskeletal:         General: Normal range of motion.      Cervical back: Normal range of motion and neck supple.   Skin:     General: Skin is warm and dry.   Neurological:      General: No focal deficit present.      Mental Status: She is alert and oriented to person, place, and time. Mental status is at baseline.   Psychiatric:         Mood and Affect: Mood normal.          Labs:  Lab Results   Component " Value Date    WBC 5.86 03/18/2025    RBC 3.85 (L) 03/18/2025    HGB 12.3 03/18/2025    HCT 36.6 (L) 03/18/2025    MCV 95 03/18/2025    MCH 31.9 (H) 03/18/2025    MCHC 33.6 03/18/2025    RDW 12.5 03/18/2025     03/18/2025    MPV 10.7 03/18/2025    GRAN 3.8 03/18/2025    GRAN 64.5 03/18/2025    LYMPH 1.6 03/18/2025    LYMPH 27.8 03/18/2025    MONO 0.2 (L) 03/18/2025    MONO 3.4 (L) 03/18/2025    EOS 0.2 03/18/2025    BASO 0.05 03/18/2025    EOSINOPHIL 3.1 03/18/2025    BASOPHIL 0.9 03/18/2025     Sodium   Date Value Ref Range Status   03/18/2025 139 136 - 145 mmol/L Final     Potassium   Date Value Ref Range Status   03/18/2025 4.0 3.5 - 5.1 mmol/L Final     Chloride   Date Value Ref Range Status   03/18/2025 106 95 - 110 mmol/L Final     CO2   Date Value Ref Range Status   03/18/2025 21 (L) 23 - 29 mmol/L Final     Glucose   Date Value Ref Range Status   03/18/2025 146 (H) 70 - 110 mg/dL Final     BUN   Date Value Ref Range Status   03/18/2025 24 (H) 8 - 23 mg/dL Final     Creatinine   Date Value Ref Range Status   03/18/2025 1.1 0.5 - 1.4 mg/dL Final     Calcium   Date Value Ref Range Status   03/18/2025 9.6 8.7 - 10.5 mg/dL Final     Total Protein   Date Value Ref Range Status   03/18/2025 8.2 6.0 - 8.4 g/dL Final     Albumin   Date Value Ref Range Status   03/18/2025 4.1 3.5 - 5.2 g/dL Final     Total Bilirubin   Date Value Ref Range Status   03/18/2025 0.5 0.1 - 1.0 mg/dL Final     Comment:     For infants and newborns, interpretation of results should be based  on gestational age, weight and in agreement with clinical  observations.    Premature Infant recommended reference ranges:  Up to 24 hours.............<8.0 mg/dL  Up to 48 hours............<12.0 mg/dL  3-5 days..................<15.0 mg/dL  6-29 days.................<15.0 mg/dL       Alkaline Phosphatase   Date Value Ref Range Status   03/18/2025 146 40 - 150 U/L Final     AST   Date Value Ref Range Status   03/18/2025 33 10 - 40 U/L Final     ALT    Date Value Ref Range Status   03/18/2025 84 (H) 10 - 44 U/L Final     Anion Gap   Date Value Ref Range Status   03/18/2025 12 8 - 16 mmol/L Final     eGFR if    Date Value Ref Range Status   10/25/2021 >60.0 >60 mL/min/1.73 m^2 Final     eGFR if non    Date Value Ref Range Status   10/25/2021 >60.0 >60 mL/min/1.73 m^2 Final     Comment:     Calculation used to obtain the estimated glomerular filtration  rate (eGFR) is the CKD-EPI equation.          A/P:    Rectal adenocarcinoma   -patient had oligometastatic disease   -had 6 cycles of FOLFOX and Avastin with me.  Then went to [facility] had radiation therapy to the lung metastatic disease.  She also had a resection by [facility].  She now has an ostomy in place.    -we discussed starting her back on adjuvant 5 FU.    -she had cycle 1 last week.  This did increase her high ostomy output and caused mucositis.  I will see her again next week prior to cycle 2 to see if these are better.    Mucositis   -continue magic mouthwash but I will also give her a prescription for Decadron elixir    Ostomy  -has high output.  -as combination of Imodium, Lomotil, and Metamucil are not working, I will give her a prescription for tincture of opium to try.    Aurash Khoobehi, MD  Hematology and Oncology    Visit today included increased complexity associated with the care of the episodic problem addressed and managing the longitudinal care of the patient due to the serious and/or complex managed problem(s) colon cancer.

## 2025-03-20 ENCOUNTER — INFUSION (OUTPATIENT)
Dept: INFUSION THERAPY | Facility: HOSPITAL | Age: 65
End: 2025-03-20
Attending: INTERNAL MEDICINE
Payer: COMMERCIAL

## 2025-03-20 VITALS
BODY MASS INDEX: 25.66 KG/M2 | HEIGHT: 66 IN | SYSTOLIC BLOOD PRESSURE: 138 MMHG | WEIGHT: 159.63 LBS | HEART RATE: 82 BPM | DIASTOLIC BLOOD PRESSURE: 88 MMHG | OXYGEN SATURATION: 100 % | RESPIRATION RATE: 15 BRPM | TEMPERATURE: 98 F

## 2025-03-20 DIAGNOSIS — E86.0 DEHYDRATION: Primary | ICD-10-CM

## 2025-03-20 LAB
FINAL PATHOLOGIC DIAGNOSIS: NORMAL
GROSS: NORMAL
Lab: NORMAL
MICROSCOPIC EXAM: NORMAL

## 2025-03-20 PROCEDURE — 96360 HYDRATION IV INFUSION INIT: CPT

## 2025-03-20 PROCEDURE — 63600175 PHARM REV CODE 636 W HCPCS: Performed by: INTERNAL MEDICINE

## 2025-03-20 PROCEDURE — 25000003 PHARM REV CODE 250: Performed by: INTERNAL MEDICINE

## 2025-03-20 PROCEDURE — A4216 STERILE WATER/SALINE, 10 ML: HCPCS | Performed by: INTERNAL MEDICINE

## 2025-03-20 PROCEDURE — 96361 HYDRATE IV INFUSION ADD-ON: CPT

## 2025-03-20 RX ORDER — HEPARIN 100 UNIT/ML
500 SYRINGE INTRAVENOUS
Status: DISCONTINUED | OUTPATIENT
Start: 2025-03-20 | End: 2025-03-20 | Stop reason: HOSPADM

## 2025-03-20 RX ORDER — SODIUM CHLORIDE 0.9 % (FLUSH) 0.9 %
10 SYRINGE (ML) INJECTION
Status: DISCONTINUED | OUTPATIENT
Start: 2025-03-20 | End: 2025-03-20 | Stop reason: HOSPADM

## 2025-03-20 RX ADMIN — SODIUM CHLORIDE, PRESERVATIVE FREE 10 ML: 5 INJECTION INTRAVENOUS at 11:03

## 2025-03-20 RX ADMIN — SODIUM CHLORIDE 1000 ML: 9 INJECTION, SOLUTION INTRAVENOUS at 08:03

## 2025-03-20 RX ADMIN — HEPARIN 500 UNITS: 100 SYRINGE at 11:03

## 2025-03-20 NOTE — PLAN OF CARE
Problem: Fatigue  Goal: Improved Activity Tolerance  Outcome: Progressing  Intervention: Promote Improved Energy  Flowsheets (Taken 3/20/2025 7635)  Fatigue Management:   fatigue-related activity identified   frequent rest breaks encouraged   paced activity encouraged  Sleep/Rest Enhancement:   regular sleep/rest pattern promoted   relaxation techniques promoted  Activity Management: Ambulated -L4  Environmental Support: rest periods encouraged

## 2025-03-21 ENCOUNTER — RESULTS FOLLOW-UP (OUTPATIENT)
Dept: DERMATOLOGY | Facility: CLINIC | Age: 65
End: 2025-03-21

## 2025-03-23 ENCOUNTER — LAB VISIT (OUTPATIENT)
Dept: LAB | Facility: HOSPITAL | Age: 65
End: 2025-03-23
Attending: INTERNAL MEDICINE
Payer: COMMERCIAL

## 2025-03-23 DIAGNOSIS — C18.7 MALIGNANT NEOPLASM OF SIGMOID COLON: ICD-10-CM

## 2025-03-23 DIAGNOSIS — C78.02 MALIGNANT NEOPLASM METASTATIC TO LEFT LUNG: ICD-10-CM

## 2025-03-23 LAB
ABSOLUTE EOSINOPHIL (SMH): 0.23 K/UL
ABSOLUTE MONOCYTE (SMH): 0.44 K/UL (ref 0.3–1)
ABSOLUTE NEUTROPHIL COUNT (SMH): 4.5 K/UL (ref 1.8–7.7)
ALBUMIN SERPL-MCNC: 4 G/DL (ref 3.5–5.2)
ALP SERPL-CCNC: 156 UNIT/L (ref 40–150)
ALT SERPL-CCNC: 100 UNIT/L (ref 10–44)
ANION GAP (SMH): 11 MMOL/L (ref 8–16)
AST SERPL-CCNC: 58 UNIT/L (ref 11–45)
BASOPHILS # BLD AUTO: 0.04 K/UL
BASOPHILS NFR BLD AUTO: 0.5 %
BILIRUB SERPL-MCNC: 0.5 MG/DL (ref 0.1–1)
BUN SERPL-MCNC: 20 MG/DL (ref 8–23)
CALCIUM SERPL-MCNC: 9.4 MG/DL (ref 8.7–10.5)
CHLORIDE SERPL-SCNC: 109 MMOL/L (ref 95–110)
CO2 SERPL-SCNC: 19 MMOL/L (ref 23–29)
CREAT SERPL-MCNC: 1.1 MG/DL (ref 0.5–1.4)
ERYTHROCYTE [DISTWIDTH] IN BLOOD BY AUTOMATED COUNT: 12.8 % (ref 11.5–14.5)
GFR SERPLBLD CREATININE-BSD FMLA CKD-EPI: 57 ML/MIN/1.73/M2
GLUCOSE SERPL-MCNC: 105 MG/DL (ref 70–110)
HCT VFR BLD AUTO: 35.4 % (ref 37–48.5)
HGB BLD-MCNC: 12.3 GM/DL (ref 12–16)
IMM GRANULOCYTES # BLD AUTO: 0.02 K/UL (ref 0–0.04)
IMM GRANULOCYTES NFR BLD AUTO: 0.3 % (ref 0–0.5)
LYMPHOCYTES # BLD AUTO: 2.5 K/UL (ref 1–4.8)
MAGNESIUM SERPL-MCNC: 1.6 MG/DL (ref 1.6–2.6)
MCH RBC QN AUTO: 32.4 PG (ref 27–31)
MCHC RBC AUTO-ENTMCNC: 34.7 G/DL (ref 32–36)
MCV RBC AUTO: 93 FL (ref 82–98)
NUCLEATED RBC (/100WBC) (SMH): 0 /100 WBC
PLATELET # BLD AUTO: 262 K/UL (ref 150–450)
PMV BLD AUTO: 8.9 FL (ref 9.2–12.9)
POTASSIUM SERPL-SCNC: 4.1 MMOL/L (ref 3.5–5.1)
PROT SERPL-MCNC: 7.5 GM/DL (ref 6–8.4)
RBC # BLD AUTO: 3.8 M/UL (ref 4–5.4)
RELATIVE EOSINOPHIL (SMH): 3 % (ref 0–8)
RELATIVE LYMPHOCYTE (SMH): 32.2 % (ref 18–48)
RELATIVE MONOCYTE (SMH): 5.7 % (ref 4–15)
RELATIVE NEUTROPHIL (SMH): 58.3 % (ref 38–73)
SODIUM SERPL-SCNC: 139 MMOL/L (ref 136–145)
WBC # BLD AUTO: 7.77 K/UL (ref 3.9–12.7)

## 2025-03-23 PROCEDURE — 82040 ASSAY OF SERUM ALBUMIN: CPT

## 2025-03-23 PROCEDURE — 85025 COMPLETE CBC W/AUTO DIFF WBC: CPT

## 2025-03-23 PROCEDURE — 83735 ASSAY OF MAGNESIUM: CPT

## 2025-03-23 PROCEDURE — 36415 COLL VENOUS BLD VENIPUNCTURE: CPT

## 2025-03-25 ENCOUNTER — OFFICE VISIT (OUTPATIENT)
Dept: HEMATOLOGY/ONCOLOGY | Facility: CLINIC | Age: 65
End: 2025-03-25
Payer: COMMERCIAL

## 2025-03-25 VITALS
HEIGHT: 66 IN | BODY MASS INDEX: 25.75 KG/M2 | DIASTOLIC BLOOD PRESSURE: 76 MMHG | TEMPERATURE: 97 F | RESPIRATION RATE: 17 BRPM | OXYGEN SATURATION: 98 % | SYSTOLIC BLOOD PRESSURE: 140 MMHG | HEART RATE: 93 BPM | WEIGHT: 160.25 LBS

## 2025-03-25 DIAGNOSIS — C78.02 MALIGNANT NEOPLASM METASTATIC TO LEFT LUNG: ICD-10-CM

## 2025-03-25 DIAGNOSIS — R19.7 DIARRHEA, UNSPECIFIED TYPE: ICD-10-CM

## 2025-03-25 DIAGNOSIS — C18.7 MALIGNANT NEOPLASM OF SIGMOID COLON: Primary | ICD-10-CM

## 2025-03-25 PROCEDURE — 1160F RVW MEDS BY RX/DR IN RCRD: CPT | Mod: CPTII,S$GLB,, | Performed by: INTERNAL MEDICINE

## 2025-03-25 PROCEDURE — 3008F BODY MASS INDEX DOCD: CPT | Mod: CPTII,S$GLB,, | Performed by: INTERNAL MEDICINE

## 2025-03-25 PROCEDURE — 99999 PR PBB SHADOW E&M-EST. PATIENT-LVL V: CPT | Mod: PBBFAC,,, | Performed by: INTERNAL MEDICINE

## 2025-03-25 PROCEDURE — 3077F SYST BP >= 140 MM HG: CPT | Mod: CPTII,S$GLB,, | Performed by: INTERNAL MEDICINE

## 2025-03-25 PROCEDURE — 3078F DIAST BP <80 MM HG: CPT | Mod: CPTII,S$GLB,, | Performed by: INTERNAL MEDICINE

## 2025-03-25 PROCEDURE — 99215 OFFICE O/P EST HI 40 MIN: CPT | Mod: S$GLB,,, | Performed by: INTERNAL MEDICINE

## 2025-03-25 PROCEDURE — 1159F MED LIST DOCD IN RCRD: CPT | Mod: CPTII,S$GLB,, | Performed by: INTERNAL MEDICINE

## 2025-03-25 PROCEDURE — G2211 COMPLEX E/M VISIT ADD ON: HCPCS | Mod: S$GLB,,, | Performed by: INTERNAL MEDICINE

## 2025-03-25 RX ORDER — SODIUM CHLORIDE 0.9 % (FLUSH) 0.9 %
10 SYRINGE (ML) INJECTION
Status: CANCELLED | OUTPATIENT
Start: 2025-03-26

## 2025-03-25 RX ORDER — DIPHENHYDRAMINE HYDROCHLORIDE 50 MG/ML
50 INJECTION, SOLUTION INTRAMUSCULAR; INTRAVENOUS ONCE AS NEEDED
Status: CANCELLED | OUTPATIENT
Start: 2025-03-26

## 2025-03-25 RX ORDER — SODIUM CHLORIDE 0.9 % (FLUSH) 0.9 %
10 SYRINGE (ML) INJECTION
Status: CANCELLED | OUTPATIENT
Start: 2025-03-28

## 2025-03-25 RX ORDER — HEPARIN 100 UNIT/ML
500 SYRINGE INTRAVENOUS
Status: CANCELLED | OUTPATIENT
Start: 2025-03-26

## 2025-03-25 RX ORDER — FLUOROURACIL 50 MG/ML
400 INJECTION, SOLUTION INTRAVENOUS
Status: CANCELLED | OUTPATIENT
Start: 2025-03-26

## 2025-03-25 RX ORDER — HEPARIN 100 UNIT/ML
500 SYRINGE INTRAVENOUS
Status: CANCELLED | OUTPATIENT
Start: 2025-03-28

## 2025-03-25 RX ORDER — ONDANSETRON HYDROCHLORIDE 2 MG/ML
8 INJECTION, SOLUTION INTRAVENOUS
Status: CANCELLED | OUTPATIENT
Start: 2025-03-26

## 2025-03-25 RX ORDER — EPINEPHRINE 0.3 MG/.3ML
0.3 INJECTION SUBCUTANEOUS ONCE AS NEEDED
Status: CANCELLED | OUTPATIENT
Start: 2025-03-26

## 2025-03-25 NOTE — PROGRESS NOTES
Service Date:  3/25/25    Chief Complaint: Malignant neoplasm of sigmoid colon (Labs  chemo clear)    Malaika Craven is a 64 y.o. female here with oligometastatic rectosigmoid colon cancer    Had 6 cycles of FOLFOX with Avastin.  Then went to [facility] and had radiation of the lung met.  Then had resection by [facility].  Now has ostomy in place.    Here for cycle 2 of adjuvant treatment.  Doing well.  Still having high ostomy output despite the tincture of opium.  She did stop Lomotil and Imodium as instructed by me.  She is still taking Metamucil.    Review of Systems   Constitutional: Negative.  Negative for appetite change and unexpected weight change.   HENT:  Positive for mouth sores.    Eyes: Negative.  Negative for visual disturbance.   Respiratory: Negative.  Negative for cough and shortness of breath.    Cardiovascular: Negative.  Negative for chest pain.   Gastrointestinal:  Positive for diarrhea. Negative for abdominal pain.   Endocrine: Negative.    Genitourinary: Negative.  Negative for frequency.   Musculoskeletal: Negative.  Negative for back pain.   Integumentary:  Negative for rash. Negative.   Neurological: Negative.  Negative for headaches.   Hematological: Negative.  Negative for adenopathy.   Psychiatric/Behavioral: Negative.          Current Outpatient Medications   Medication Instructions    ALPRAZolam (XANAX) 0.5 mg, Oral, 3 times daily    apixaban (ELIQUIS DVT-PE TREAT 30D START) 5 mg (74 tabs) DsPk 2 tablets, 2 times daily    dexAMETHasone (DECADRON) 1 mg, Oral, Every 12 hours    diphenhydrAMINE-aluminum-magnesium hydroxide-simethicone-LIDOcaine viscous HCl 2% 15 mLs, Swish & Spit, Every 4 hours PRN    ELDERBERRY FRUIT ORAL Take by mouth.    EPINEPHrine (EPIPEN) 0.3 mg, Intramuscular, Once    fexofenadine (ALLEGRA ALLERGY) 60 MG tablet     hydroCHLOROthiazide (HYDRODIURIL) 25 mg, Oral, Daily, Patient takes two pills daily    LIDOcaine-prilocaine (EMLA) cream Topical (Top), As needed (PRN)     multivit-minerals/folic acid (ADULT ONE DAILY MULTIVITAMIN ORAL) 1 tablet, Daily PRN    NIFEdipine (ADALAT CC) 60 MG TbSR TAKE 1 TABLET BY MOUTH EVERY DAY ON AN EMPTY STOMACH    OMEPRAZOLE ORAL 0.5 mg, Daily PRN    ondansetron (ZOFRAN) 8 mg, Oral, Every 8 hours PRN    opium tincture 6 mg, Oral, 4 times daily PRN    oxyCODONE-acetaminophen (PERCOCET) 5-325 mg per tablet 1 tablet, Oral, Every 4 hours PRN    polyethylene glycol (GLYCOLAX) 17 g, Daily    potassium chloride (MICRO-K) 10 MEQ CpSR 20 mEq, Oral, Daily    predniSONE (DELTASONE) 5 mg, Oral, Daily    RYALTRIS 665-25 mcg/spray Spry     traZODone (DESYREL) 150 mg, Nightly        Past Medical History:   Diagnosis Date    Abnormal Pap smear of cervix     Allergy     Corneal erosion of right eye     Generalized anxiety disorder     Hypertension     Rectal mass     Sinus problem         Past Surgical History:   Procedure Laterality Date    BREAST CYST ASPIRATION       SECTION      X 2    CHOLECYSTECTOMY      COLONOSCOPY N/A 2024    Procedure: COLONOSCOPY;  Surgeon: Radhika Leary MD;  Location: [facility];  Service: Endoscopy;  Laterality: N/A;    DILATION AND CURETTAGE OF UTERUS      [facility]      EYE SURGERY Right     FOR CORNEAL EROSION    FLEXIBLE SIGMOIDOSCOPY N/A 2024    Procedure: SIGMOIDOSCOPY, FLEXIBLE(Flesilvia);  Surgeon: Oumar Montoya MD;  Location: [facility];  Service: General;  Laterality: N/A;    INSERTION OF TUNNELED CENTRAL VENOUS CATHETER (CVC) WITH SUBCUTANEOUS PORT N/A 2024    Procedure: OCQOJKESS-TPEP-P-CATH;  Surgeon: Oumar Montoya MD;  Location: [facility];  Service: General;  Laterality: N/A;    LIPOSUCTION      x 2    Mole Excision Right     Shin    TUBAL LIGATION          Family History   Problem Relation Name Age of Onset    Heart disease Mother      Hypertension Mother      Arthritis Father      Thyroid disease Father      Kidney disease Paternal Grandmother      Liver cancer Maternal Grandmother       "Cancer Maternal Grandfather      Allergies Sister      Allergic rhinitis Sister      Angioedema Neg Hx      Asthma Neg Hx      Atopy Neg Hx      Eczema Neg Hx      Immunodeficiency Neg Hx      Rhinitis Neg Hx      Urticaria Neg Hx      Amblyopia Neg Hx      Blindness Neg Hx      Cataracts Neg Hx      Diabetes Neg Hx      Glaucoma Neg Hx      Macular degeneration Neg Hx      Retinal detachment Neg Hx      Strabismus Neg Hx      Stroke Neg Hx         Social History     Tobacco Use    Smoking status: Never    Smokeless tobacco: Never   Substance Use Topics    Alcohol use: Yes     Comment: 2 drinks per week    Drug use: No     Comment: MEDICAL MARIJUANA         Vitals:    03/25/25 0953   BP: (!) 140/76   Pulse: 93   Resp: 17   Temp: 97.4 °F (36.3 °C)        Physical Exam:  BP (!) 140/76 (BP Location: Right arm, Patient Position: Sitting)   Pulse 93   Temp 97.4 °F (36.3 °C) (Temporal)   Resp 17   Ht 5' 6" (1.676 m)   Wt 72.7 kg (160 lb 4.4 oz)   SpO2 98%   BMI 25.87 kg/m²     Physical Exam  Constitutional:       Appearance: Normal appearance.   HENT:      Head: Normocephalic and atraumatic.      Nose: Nose normal.      Mouth/Throat:      Mouth: Mucous membranes are moist.      Pharynx: Oropharynx is clear.   Eyes:      Conjunctiva/sclera: Conjunctivae normal.   Cardiovascular:      Rate and Rhythm: Normal rate and regular rhythm.      Heart sounds: Normal heart sounds.   Pulmonary:      Effort: Pulmonary effort is normal.      Breath sounds: Normal breath sounds.   Abdominal:      General: Abdomen is flat. Bowel sounds are normal.      Palpations: Abdomen is soft.   Musculoskeletal:         General: Normal range of motion.      Cervical back: Normal range of motion and neck supple.   Skin:     General: Skin is warm and dry.   Neurological:      General: No focal deficit present.      Mental Status: She is alert and oriented to person, place, and time. Mental status is at baseline.   Psychiatric:         Mood and " Affect: Mood normal.          Labs:  Lab Results   Component Value Date    WBC 7.77 03/23/2025    RBC 3.80 (L) 03/23/2025    HGB 12.3 03/23/2025    HCT 35.4 (L) 03/23/2025    MCV 93 03/23/2025    MCH 32.4 (H) 03/23/2025    MCHC 34.7 03/23/2025    RDW 12.8 03/23/2025     03/23/2025    MPV 8.9 (L) 03/23/2025    GRAN 3.8 03/18/2025    GRAN 64.5 03/18/2025    LYMPH 1.6 03/18/2025    LYMPH 27.8 03/18/2025    MONO 0.2 (L) 03/18/2025    MONO 3.4 (L) 03/18/2025    EOS 0.2 03/18/2025    BASO 0.05 03/18/2025    EOSINOPHIL 3.1 03/18/2025    BASOPHIL 0.9 03/18/2025     Sodium   Date Value Ref Range Status   03/23/2025 139 136 - 145 mmol/L Final     Potassium   Date Value Ref Range Status   03/23/2025 4.1 3.5 - 5.1 mmol/L Final     Chloride   Date Value Ref Range Status   03/18/2025 106 95 - 110 mmol/L Final     CO2   Date Value Ref Range Status   03/23/2025 19 (L) 23 - 29 mmol/L Final     Glucose   Date Value Ref Range Status   03/18/2025 146 (H) 70 - 110 mg/dL Final     BUN   Date Value Ref Range Status   03/23/2025 20 8 - 23 mg/dL Final     Creatinine   Date Value Ref Range Status   03/23/2025 1.1 0.5 - 1.4 mg/dL Final     Calcium   Date Value Ref Range Status   03/23/2025 9.4 8.7 - 10.5 mg/dL Final     Total Protein   Date Value Ref Range Status   03/18/2025 8.2 6.0 - 8.4 g/dL Final     Albumin   Date Value Ref Range Status   03/23/2025 4.0 3.5 - 5.2 g/dL Final     Bilirubin Total   Date Value Ref Range Status   03/23/2025 0.5 0.1 - 1.0 mg/dL Final     Comment:     For infants and newborns, interpretation of results should be based   on gestational age, weight and in agreement with clinical   observations.    Premature Infant recommended reference ranges:   0-24 hours:  <8.0 mg/dL   24-48 hours: <12.0 mg/dL   3-5 days:    <15.0 mg/dL   6-29 days:   <15.0 mg/dL     ALP   Date Value Ref Range Status   03/23/2025 156 (H) 40 - 150 unit/L Final     AST   Date Value Ref Range Status   03/23/2025 58 (H) 11 - 45 unit/L Final      ALT   Date Value Ref Range Status   03/23/2025 100 (H) 10 - 44 unit/L Final     Anion Gap   Date Value Ref Range Status   03/18/2025 12 8 - 16 mmol/L Final     eGFR if    Date Value Ref Range Status   10/25/2021 >60.0 >60 mL/min/1.73 m^2 Final     eGFR if non    Date Value Ref Range Status   10/25/2021 >60.0 >60 mL/min/1.73 m^2 Final     Comment:     Calculation used to obtain the estimated glomerular filtration  rate (eGFR) is the CKD-EPI equation.          A/P:    Rectal adenocarcinoma   -patient had oligometastatic disease   -had 6 cycles of FOLFOX and Avastin with me.  Then went to [facility] had radiation therapy to the lung metastatic disease.  She also had a resection by [facility].  She now has an ostomy in place.    -now on adjuvant 5 FU for a total of 3 months plan    -okay for cycle 2 of 6  -return to clinic in 2 weeks for next treatment    Mucositis   -resolve    Ostomy  -has high output.  -combination of Imodium, Lomotil, and Metamucil with the tincture of opium this time, and will increase to 10 mg  -asked her to notify me if it has not improve by the end of the week    Aurash Khoobehi, MD  Hematology and Oncology    Visit today included increased complexity associated with the care of the episodic problem addressed and managing the longitudinal care of the patient due to the serious and/or complex managed problem(s) colon cancer.

## 2025-03-26 ENCOUNTER — INFUSION (OUTPATIENT)
Dept: INFUSION THERAPY | Facility: HOSPITAL | Age: 65
End: 2025-03-26
Attending: INTERNAL MEDICINE
Payer: COMMERCIAL

## 2025-03-26 VITALS
RESPIRATION RATE: 17 BRPM | OXYGEN SATURATION: 100 % | HEIGHT: 66 IN | SYSTOLIC BLOOD PRESSURE: 141 MMHG | BODY MASS INDEX: 25.85 KG/M2 | HEART RATE: 76 BPM | TEMPERATURE: 98 F | DIASTOLIC BLOOD PRESSURE: 67 MMHG | WEIGHT: 160.88 LBS

## 2025-03-26 DIAGNOSIS — C78.02 MALIGNANT NEOPLASM METASTATIC TO LEFT LUNG: Primary | ICD-10-CM

## 2025-03-26 DIAGNOSIS — C18.7 MALIGNANT NEOPLASM OF SIGMOID COLON: ICD-10-CM

## 2025-03-26 PROCEDURE — 96366 THER/PROPH/DIAG IV INF ADDON: CPT

## 2025-03-26 PROCEDURE — 96416 CHEMO PROLONG INFUSE W/PUMP: CPT

## 2025-03-26 PROCEDURE — 63600175 PHARM REV CODE 636 W HCPCS: Performed by: INTERNAL MEDICINE

## 2025-03-26 PROCEDURE — 96409 CHEMO IV PUSH SNGL DRUG: CPT

## 2025-03-26 PROCEDURE — 25000003 PHARM REV CODE 250: Performed by: INTERNAL MEDICINE

## 2025-03-26 PROCEDURE — 96367 TX/PROPH/DG ADDL SEQ IV INF: CPT

## 2025-03-26 RX ORDER — FLUOROURACIL 50 MG/ML
400 INJECTION, SOLUTION INTRAVENOUS
Status: COMPLETED | OUTPATIENT
Start: 2025-03-26 | End: 2025-03-26

## 2025-03-26 RX ORDER — SODIUM CHLORIDE 0.9 % (FLUSH) 0.9 %
10 SYRINGE (ML) INJECTION
Status: DISCONTINUED | OUTPATIENT
Start: 2025-03-26 | End: 2025-03-26 | Stop reason: HOSPADM

## 2025-03-26 RX ORDER — ONDANSETRON HCL IN 0.9 % NACL 8 MG/50 ML
8 INTRAVENOUS SOLUTION, PIGGYBACK (ML) INTRAVENOUS
Status: COMPLETED | OUTPATIENT
Start: 2025-03-26 | End: 2025-03-26

## 2025-03-26 RX ORDER — EPINEPHRINE 0.3 MG/.3ML
0.3 INJECTION SUBCUTANEOUS ONCE AS NEEDED
Status: DISCONTINUED | OUTPATIENT
Start: 2025-03-26 | End: 2025-03-26 | Stop reason: HOSPADM

## 2025-03-26 RX ORDER — DIPHENHYDRAMINE HYDROCHLORIDE 50 MG/ML
50 INJECTION, SOLUTION INTRAMUSCULAR; INTRAVENOUS ONCE AS NEEDED
Status: DISCONTINUED | OUTPATIENT
Start: 2025-03-26 | End: 2025-03-26 | Stop reason: HOSPADM

## 2025-03-26 RX ADMIN — FLUOROURACIL 4440 MG: 50 INJECTION, SOLUTION INTRAVENOUS at 12:03

## 2025-03-26 RX ADMIN — LEUCOVORIN CALCIUM 740 MG: 350 INJECTION, POWDER, LYOPHILIZED, FOR SOLUTION INTRAMUSCULAR; INTRAVENOUS at 10:03

## 2025-03-26 RX ADMIN — FLUOROURACIL 740 MG: 50 INJECTION, SOLUTION INTRAVENOUS at 12:03

## 2025-03-26 RX ADMIN — SODIUM CHLORIDE 8 MG: 9 INJECTION, SOLUTION INTRAVENOUS at 10:03

## 2025-03-26 RX ADMIN — SODIUM CHLORIDE: 9 INJECTION, SOLUTION INTRAVENOUS at 09:03

## 2025-03-26 NOTE — PLAN OF CARE
Problem: Fatigue  Goal: Improved Activity Tolerance  Outcome: Progressing  Intervention: Promote Improved Energy  Flowsheets (Taken 3/26/2025 1241)  Fatigue Management: frequent rest breaks encouraged  Sleep/Rest Enhancement: regular sleep/rest pattern promoted  Activity Management: Ambulated -L4  Environmental Support: calm environment promoted

## 2025-03-28 ENCOUNTER — INFUSION (OUTPATIENT)
Dept: INFUSION THERAPY | Facility: HOSPITAL | Age: 65
End: 2025-03-28
Attending: INTERNAL MEDICINE
Payer: COMMERCIAL

## 2025-03-28 ENCOUNTER — HOSPITAL ENCOUNTER (EMERGENCY)
Facility: HOSPITAL | Age: 65
Discharge: HOME | End: 2025-03-28
Attending: EMERGENCY MEDICINE
Payer: COMMERCIAL

## 2025-03-28 VITALS
WEIGHT: 162.5 LBS | OXYGEN SATURATION: 100 % | TEMPERATURE: 97 F | SYSTOLIC BLOOD PRESSURE: 103 MMHG | HEIGHT: 66 IN | BODY MASS INDEX: 26.12 KG/M2 | RESPIRATION RATE: 25 BRPM | HEART RATE: 98 BPM | DIASTOLIC BLOOD PRESSURE: 70 MMHG

## 2025-03-28 VITALS
DIASTOLIC BLOOD PRESSURE: 73 MMHG | BODY MASS INDEX: 26.03 KG/M2 | SYSTOLIC BLOOD PRESSURE: 152 MMHG | HEART RATE: 78 BPM | RESPIRATION RATE: 24 BRPM | TEMPERATURE: 98 F | WEIGHT: 162 LBS | OXYGEN SATURATION: 99 % | HEIGHT: 66 IN

## 2025-03-28 DIAGNOSIS — C18.7 MALIGNANT NEOPLASM OF SIGMOID COLON: ICD-10-CM

## 2025-03-28 DIAGNOSIS — J18.1 LEFT LOWER LOBE CONSOLIDATION: ICD-10-CM

## 2025-03-28 DIAGNOSIS — R74.8 ELEVATED LIVER ENZYMES: Primary | ICD-10-CM

## 2025-03-28 DIAGNOSIS — R06.02 SOB (SHORTNESS OF BREATH): ICD-10-CM

## 2025-03-28 DIAGNOSIS — D64.9 ANEMIA, UNSPECIFIED TYPE: ICD-10-CM

## 2025-03-28 DIAGNOSIS — C78.02 MALIGNANT NEOPLASM METASTATIC TO LEFT LUNG: Primary | ICD-10-CM

## 2025-03-28 LAB
ABSOLUTE EOSINOPHIL (SMH): 0.16 K/UL
ABSOLUTE MONOCYTE (SMH): 0.27 K/UL (ref 0.3–1)
ABSOLUTE NEUTROPHIL COUNT (SMH): 4.3 K/UL (ref 1.8–7.7)
ALBUMIN SERPL-MCNC: 3.8 G/DL (ref 3.5–5.2)
ALP SERPL-CCNC: 165 UNIT/L (ref 40–150)
ALT SERPL-CCNC: 171 UNIT/L (ref 10–44)
ANION GAP (SMH): 10 MMOL/L (ref 8–16)
AST SERPL-CCNC: 99 UNIT/L (ref 11–45)
BASOPHILS # BLD AUTO: 0.03 K/UL
BASOPHILS NFR BLD AUTO: 0.4 %
BILIRUB SERPL-MCNC: 0.6 MG/DL (ref 0.1–1)
BUN SERPL-MCNC: 17 MG/DL (ref 8–23)
CALCIUM SERPL-MCNC: 9.5 MG/DL (ref 8.7–10.5)
CHLORIDE SERPL-SCNC: 106 MMOL/L (ref 95–110)
CO2 SERPL-SCNC: 21 MMOL/L (ref 23–29)
CREAT SERPL-MCNC: 1.1 MG/DL (ref 0.5–1.4)
D DIMER PPP IA.FEU-MCNC: 0.37 MG/L FEU
ERYTHROCYTE [DISTWIDTH] IN BLOOD BY AUTOMATED COUNT: 13.1 % (ref 11.5–14.5)
GFR SERPLBLD CREATININE-BSD FMLA CKD-EPI: 57 ML/MIN/1.73/M2
GLUCOSE SERPL-MCNC: 110 MG/DL (ref 70–110)
HCT VFR BLD AUTO: 29.4 % (ref 37–48.5)
HCV AB SERPL QL IA: NORMAL
HGB BLD-MCNC: 10.6 GM/DL (ref 12–16)
HIV 1+2 AB+HIV1 P24 AG SERPL QL IA: NORMAL
HOLD SPECIMEN: NORMAL
IMM GRANULOCYTES # BLD AUTO: 0.02 K/UL (ref 0–0.04)
IMM GRANULOCYTES NFR BLD AUTO: 0.3 % (ref 0–0.5)
LYMPHOCYTES # BLD AUTO: 1.9 K/UL (ref 1–4.8)
MAGNESIUM SERPL-MCNC: 1.6 MG/DL (ref 1.6–2.6)
MCH RBC QN AUTO: 33.4 PG (ref 27–31)
MCHC RBC AUTO-ENTMCNC: 36.1 G/DL (ref 32–36)
MCV RBC AUTO: 93 FL (ref 82–98)
NT-PROBNP SERPL-MCNC: 169 PG/ML
NUCLEATED RBC (/100WBC) (SMH): 0 /100 WBC
PLATELET # BLD AUTO: 223 K/UL (ref 150–450)
PMV BLD AUTO: 9.8 FL (ref 9.2–12.9)
POTASSIUM SERPL-SCNC: 3.6 MMOL/L (ref 3.5–5.1)
PROCALCITONIN SERPL-MCNC: 0.09 NG/ML
PROT SERPL-MCNC: 7.1 GM/DL (ref 6–8.4)
RBC # BLD AUTO: 3.17 M/UL (ref 4–5.4)
RELATIVE EOSINOPHIL (SMH): 2.4 % (ref 0–8)
RELATIVE LYMPHOCYTE (SMH): 28.4 % (ref 18–48)
RELATIVE MONOCYTE (SMH): 4 % (ref 4–15)
RELATIVE NEUTROPHIL (SMH): 64.5 % (ref 38–73)
SODIUM SERPL-SCNC: 137 MMOL/L (ref 136–145)
TROPONIN I SERPL HS-MCNC: 3 NG/L
WBC # BLD AUTO: 6.69 K/UL (ref 3.9–12.7)

## 2025-03-28 PROCEDURE — 85379 FIBRIN DEGRADATION QUANT: CPT | Performed by: NURSE PRACTITIONER

## 2025-03-28 PROCEDURE — 86803 HEPATITIS C AB TEST: CPT | Performed by: EMERGENCY MEDICINE

## 2025-03-28 PROCEDURE — 84484 ASSAY OF TROPONIN QUANT: CPT | Performed by: NURSE PRACTITIONER

## 2025-03-28 PROCEDURE — 36415 COLL VENOUS BLD VENIPUNCTURE: CPT | Performed by: NURSE PRACTITIONER

## 2025-03-28 PROCEDURE — 85025 COMPLETE CBC W/AUTO DIFF WBC: CPT | Performed by: NURSE PRACTITIONER

## 2025-03-28 PROCEDURE — 63600175 PHARM REV CODE 636 W HCPCS: Performed by: NURSE PRACTITIONER

## 2025-03-28 PROCEDURE — 99285 EMERGENCY DEPT VISIT HI MDM: CPT | Mod: 25

## 2025-03-28 PROCEDURE — 96523 IRRIG DRUG DELIVERY DEVICE: CPT

## 2025-03-28 PROCEDURE — 25500020 PHARM REV CODE 255

## 2025-03-28 PROCEDURE — 93005 ELECTROCARDIOGRAM TRACING: CPT

## 2025-03-28 PROCEDURE — 84145 PROCALCITONIN (PCT): CPT | Performed by: NURSE PRACTITIONER

## 2025-03-28 PROCEDURE — 83880 ASSAY OF NATRIURETIC PEPTIDE: CPT | Performed by: NURSE PRACTITIONER

## 2025-03-28 PROCEDURE — 87389 HIV-1 AG W/HIV-1&-2 AB AG IA: CPT | Performed by: EMERGENCY MEDICINE

## 2025-03-28 PROCEDURE — 80053 COMPREHEN METABOLIC PANEL: CPT | Performed by: NURSE PRACTITIONER

## 2025-03-28 PROCEDURE — 83735 ASSAY OF MAGNESIUM: CPT | Performed by: NURSE PRACTITIONER

## 2025-03-28 PROCEDURE — 93010 ELECTROCARDIOGRAM REPORT: CPT | Mod: ,,, | Performed by: GENERAL PRACTICE

## 2025-03-28 RX ORDER — SODIUM CHLORIDE 0.9 % (FLUSH) 0.9 %
10 SYRINGE (ML) INJECTION
Status: DISCONTINUED | OUTPATIENT
Start: 2025-03-28 | End: 2025-03-28 | Stop reason: HOSPADM

## 2025-03-28 RX ORDER — HEPARIN 100 UNIT/ML
100 SYRINGE INTRAVENOUS EVERY 8 HOURS PRN
Status: DISCONTINUED | OUTPATIENT
Start: 2025-03-28 | End: 2025-03-28

## 2025-03-28 RX ORDER — HEPARIN 100 UNIT/ML
500 SYRINGE INTRAVENOUS
Status: DISCONTINUED | OUTPATIENT
Start: 2025-03-28 | End: 2025-03-28 | Stop reason: HOSPADM

## 2025-03-28 RX ORDER — HEPARIN 100 UNIT/ML
SYRINGE INTRAVENOUS
Status: DISCONTINUED
Start: 2025-03-28 | End: 2025-03-28 | Stop reason: HOSPADM

## 2025-03-28 RX ORDER — AZITHROMYCIN 250 MG/1
TABLET, FILM COATED ORAL
Qty: 6 TABLET | Refills: 0 | Status: SHIPPED | OUTPATIENT
Start: 2025-03-28 | End: 2025-04-02 | Stop reason: ALTCHOICE

## 2025-03-28 RX ORDER — AMOXICILLIN AND CLAVULANATE POTASSIUM 875; 125 MG/1; MG/1
1 TABLET, FILM COATED ORAL 2 TIMES DAILY
Qty: 14 TABLET | Refills: 0 | Status: SHIPPED | OUTPATIENT
Start: 2025-03-28 | End: 2025-04-04

## 2025-03-28 RX ORDER — HEPARIN 100 UNIT/ML
10 SYRINGE INTRAVENOUS EVERY 8 HOURS PRN
Status: COMPLETED | OUTPATIENT
Start: 2025-03-28 | End: 2025-03-28

## 2025-03-28 RX ORDER — HEPARIN SODIUM 1000 [USP'U]/ML
1000 INJECTION, SOLUTION INTRAVENOUS; SUBCUTANEOUS
Status: DISCONTINUED | OUTPATIENT
Start: 2025-03-28 | End: 2025-03-28

## 2025-03-28 RX ADMIN — HEPARIN 1000 UNITS: 100 SYRINGE at 05:03

## 2025-03-28 RX ADMIN — IOHEXOL 80 ML: 350 INJECTION, SOLUTION INTRAVENOUS at 03:03

## 2025-03-28 NOTE — PLAN OF CARE
Problem: Fatigue  Goal: Improved Activity Tolerance  Intervention: Promote Improved Energy  Flowsheets (Taken 3/28/2025 1112)  Fatigue Management: fatigue-related activity identified  Activity Management: Ambulated -L4

## 2025-03-28 NOTE — NURSING
Patient arrived for pump dc extremely short of breath.  02 was 100% but patient was breathing rapidly and using all accessory muscles.  At rest patient still struggling to breathe.  Dr Khoobehi notified and stated that patient needed to go to ED.  Patient verbalizes understanding and states that she is going to go to [facility].  [address] RN called report and notified charge nurse of situation and informed them that the patients port was accessed and only flushed with saline.  Nurse instructed that before discharge, it would need heparin and to be removed.  Patient instructed to notify ER of this as well.  Patient verbalized understanding.

## 2025-03-29 NOTE — ED PROVIDER NOTES
Encounter Date: 3/28/2025       History     Chief Complaint   Patient presents with    Shortness of Breath     SOB and dizziness starting yesterday. Sent from provider's office for concerns of PE.     Patient is a 64 y.o. female who presents to the ED 2025 with a chief complaint of Shortness of breath that started last night.  Patient currently on her 5FU treatments for metastatic colon cancer.  She completed 6 rounds of FOLFOX therapy a few weeks ago.  She denies any cough or fever.  She went home with her infusion and went to the infusion center to get unhooked and she told him she was feeling short of breath to they instructed her to go to the emergency department.  She does have a history of pulmonary embolism in his compliant with her Eliquis.  She denies any new lower extremity swelling.  She denies any chest pain.  She has not had any blood in her stool or other recent bleeding.  She has a history of hypertension, metastatic colon cancer, and anxiety. PSH noted below.              Review of patient's allergies indicates:   Allergen Reactions    Atenolol Hives, Itching and Swelling    Losartan Swelling and Hives     Other reaction(s): Angioedema    Tetracyclines Rash    Amlodipine      Hives and swelling on the lower extremities    Lisinopril Hives    Sulfa (sulfonamide antibiotics) Rash     Past Medical History:   Diagnosis Date    Abnormal Pap smear of cervix     Allergy     Corneal erosion of right eye     Generalized anxiety disorder     Hypertension     Rectal mass     Sinus problem      Past Surgical History:   Procedure Laterality Date    BREAST CYST ASPIRATION       SECTION      X 2    CHOLECYSTECTOMY      COLONOSCOPY N/A 2024    Procedure: COLONOSCOPY;  Surgeon: Radhika Leary MD;  Location: [facility];  Service: Endoscopy;  Laterality: N/A;    DILATION AND CURETTAGE OF UTERUS      [facility]      EYE SURGERY Right     FOR CORNEAL EROSION    FLEXIBLE SIGMOIDOSCOPY N/A 2024     Procedure: SIGMOIDOSCOPY, FLEXIBLE(Fleets);  Surgeon: Oumar Montoya MD;  Location: [facility] ENDO;  Service: General;  Laterality: N/A;    INSERTION OF TUNNELED CENTRAL VENOUS CATHETER (CVC) WITH SUBCUTANEOUS PORT N/A 6/24/2024    Procedure: [id number];  Surgeon: Oumar Montoya MD;  Location: [facility] OR;  Service: General;  Laterality: N/A;    LIPOSUCTION      x 2    Mole Excision Right     Shin    TUBAL LIGATION       Family History   Problem Relation Name Age of Onset    Heart disease Mother      Hypertension Mother      Arthritis Father      Thyroid disease Father      Kidney disease Paternal Grandmother      Liver cancer Maternal Grandmother      Cancer Maternal Grandfather      Allergies Sister      Allergic rhinitis Sister      Angioedema Neg Hx      Asthma Neg Hx      Atopy Neg Hx      Eczema Neg Hx      Immunodeficiency Neg Hx      Rhinitis Neg Hx      Urticaria Neg Hx      Amblyopia Neg Hx      Blindness Neg Hx      Cataracts Neg Hx      Diabetes Neg Hx      Glaucoma Neg Hx      Macular degeneration Neg Hx      Retinal detachment Neg Hx      Strabismus Neg Hx      Stroke Neg Hx       Social History[1]  Review of Systems   Constitutional:  Negative for chills and fever.   HENT:  Negative for sore throat.    Respiratory:  Positive for shortness of breath. Negative for cough, chest tightness, wheezing and stridor.    Cardiovascular:  Negative for chest pain and leg swelling.   Gastrointestinal:  Negative for abdominal pain.   Genitourinary:  Negative for dysuria.   Musculoskeletal:  Negative for arthralgias and myalgias.   Skin:  Negative for rash and wound.   Neurological:  Negative for syncope, weakness and numbness.   Hematological:  Does not bruise/bleed easily.       Physical Exam     Initial Vitals [03/28/25 1206]   BP Pulse Resp Temp SpO2   110/63 88 (!) 24 98.1 °F (36.7 °C) 100 %      MAP       --         Physical Exam    Nursing note and vitals reviewed.  Constitutional: Vital signs are  normal. She appears well-developed and well-nourished.   HENT:   Head: Normocephalic and atraumatic.   Eyes: Pupils are equal, round, and reactive to light.   Neck: Neck supple.   Cardiovascular:  Normal rate, regular rhythm, normal heart sounds and intact distal pulses.     Exam reveals no gallop and no friction rub.       No murmur heard.There is a central line which is a Roque-Cath in the right subclavian.   Pulmonary/Chest: She has decreased breath sounds in the right lower field and the left lower field. She has no wheezes. She has no rhonchi. She has no rales.   Abdominal: Abdomen is soft. Bowel sounds are normal. There is no abdominal tenderness.   RLQ colostomy.    Musculoskeletal:      Cervical back: Neck supple.     Neurological: She is alert and oriented to person, place, and time. She has normal strength.   Skin: Skin is warm, dry and intact. Capillary refill takes less than 2 seconds.   Psychiatric: She has a normal mood and affect. Her speech is normal and behavior is normal.         ED Course   Procedures  Labs Reviewed   COMPREHENSIVE METABOLIC PANEL - Abnormal       Result Value    Sodium 137      Potassium 3.6      Chloride 106      CO2 21 (*)     Glucose 110      BUN 17      Creatinine 1.1      Calcium 9.5      Protein Total 7.1      Albumin 3.8      Bilirubin Total 0.6       (*)     AST 99 (*)      (*)     Anion Gap 10      eGFR 57 (*)    NT-PRO NATRIURETIC PEPTIDE - Abnormal    NT-proBNP 169 (*)    CBC WITH DIFFERENTIAL - Abnormal    WBC 6.69      RBC 3.17 (*)     Hgb 10.6 (*)     Hct 29.4 (*)     MCV 93      MCH 33.4 (*)     MCHC 36.1 (*)     RDW 13.1      Platelet Count 223      MPV 9.8      Nucleated RBC 0      Neut % 64.5      Lymph % 28.4      Mono % 4.0      Eos % 2.4      Basophil % 0.4      Imm Grans % 0.3      Neut # 4.3      Lymph # 1.90      Mono # 0.27 (*)     Eos # 0.16      Baso # 0.03      Imm Grans # 0.02     HEPATITIS C ANTIBODY - Normal    Hep C Ab Interp  Non-Reactive     HIV 1 / 2 ANTIBODY - Normal    HIV 1/2 Ag/Ab Non-Reactive     D DIMER, QUANTITATIVE - Normal    D-Dimer 0.37     TROPONIN I HIGH SENSITIVITY - Normal    Troponin High Sensitive 3     MAGNESIUM - Normal    Magnesium 1.6     PROCALCITONIN - Normal    Procalcitonin 0.09     CBC W/ AUTO DIFFERENTIAL    Narrative:     The following orders were created for panel order Complete Blood Count (CBC).  Procedure                               Abnormality         Status                     ---------                               -----------         ------                     CBC with Differential[[id number]]       Abnormal            Final result                 Please view results for these tests on the individual orders.   EXTRA TUBES    Narrative:     The following orders were created for panel order EXTRA TUBES.  Procedure                               Abnormality         Status                     ---------                               -----------         ------                     Gold Top Hold[[id number]]                                   Final result                 Please view results for these tests on the individual orders.   GOLD TOP HOLD    Extra Tube Hold for add-ons.       EKG Readings: (Independently Interpreted)   Initial Reading: No STEMI. Rhythm: Normal Sinus Rhythm. Heart Rate: 81. Ectopy: No Ectopy. Conduction: Normal. ST Segments: Normal ST Segments. T Waves: Normal. Clinical Impression: Normal Sinus Rhythm Other Impression: poor R wave progression.       Imaging Results              CTA Chest Non-Coronary (PE Studies) (Final result)  Result time 03/28/25 15:36:11      Final result by Ihsan Guerrero MD (03/28/25 15:36:11)                   Impression:      1. No pulmonary embolus to the proximal segmental level.  2. Unable to evaluate target left pulmonary nodule which is now obscured in area of airspace disease.  XRT is noted in chart review.  3. Unchanged size of the target left  pulmonary nodule.  4. Increased size of the common bile duct which is only partially imaged.  This could be due to reservoir effect post cholecystectomy but is new since prior.  Correlate with bilirubin level as the need for further assessment with ERCP/MRCP.      Electronically signed by: Ihsan Guerrero  Date:    03/28/2025  Time:    15:36               Narrative:    EXAMINATION:  CTA CHEST NON CORONARY (PE STUDIES)    CLINICAL HISTORY:  Pulmonary embolism (PE) suspected, high prob;    TECHNIQUE:  Low dose axial images, sagittal and coronal reformations were obtained from the thoracic inlet to the lung bases following the IV administration of 80 mL of Omnipaque 350.  Contrast timing was optimized to evaluate the pulmonary arteries.  MIP images were performed.    COMPARISON:  08/19/2024 and 05/11/2024    FINDINGS:  There is airspace disease in the left lower lobe with some volume loss.  Mild scar versus atelectasis in the dependent lower lobes elsewhere.  No filling defects central airways.  No pleural effusion or pneumothorax.  Normal size heart.  No pulmonary artery filling defect to the segmental artery level in either lung.  There are a few prominent but nonenlarged lymph nodes along the left hilum largest 9 mm series 2, image 242.  Port-A-Cath right chest wall with tip in the SVC.  Partially imaged cystic lesion upper pole cortex left kidney.  Heterogeneous 3.5 cm left adrenal nodule.  Cholecystectomy.  Dilated intrahepatic biliary radicles.  Common bile duct is 16 mm just outside the liver and 9 mm along the pancreas head.                                       X-Ray Chest PA And Lateral (Final result)  Result time 03/28/25 13:04:29      Final result by Martinez Pierce MD (03/28/25 13:04:29)                   Impression:      Left lower lobe masslike opacity, increased in size.      Electronically signed by: Martinez Pierce MD  Date:    03/28/2025  Time:    13:04               Narrative:     "EXAMINATION:  XR CHEST PA AND LATERAL    CLINICAL HISTORY:  Cough, unspecified    TECHNIQUE:  PA and lateral views of the chest were performed.    COMPARISON:  07/19/2024    FINDINGS:  A right IJ Port-A-Cath has its tip in the superior vena cava.  The cardiomediastinal silhouette is with normal limits.  There is a masslike opacity in the right lower lobe measuring 5.3 cm, increased from 2.2 cm.  No pleural effusion.                                       Medications   heparin, porcine (PF) (heparin flush 100 units/mL) 100 unit/mL injection flush (has no administration in time range)   iohexoL (OMNIPAQUE 350) 350 mg iodine/mL injection (80 mLs Intravenous Given 3/28/25 1520)   heparin, porcine (PF) 100 unit/mL injection flush 1,000 Units (1,000 Units Intravenous Given 3/28/25 1700)     Medical Decision Making  Amount and/or Complexity of Data Reviewed  Labs: ordered.  Radiology: ordered.    Risk  Prescription drug management.         APC / Resident Notes:   Patient is a 64 y.o. female who presents to the ED 03/28/2025 who underwent emergent evaluation for SOB. Pt not hypoxic. Pt not wheezing. Pt in NAD.  CTA without evidence of PE. CT findings discussed with pt in detail including poorly differentiated "airspace disease".  This may be secondary to radiation and I discussed with patient's oncologist who is unsure.  Currently no pulmonologist on call.  Spoke with critical Care on-call who recommends outpatient follow up with pulmonology.  Discussed possibility WBC normal.   Vital signs normal.  She has not appear to be septic.  I do not think admission for IV antibiotics indicated.  She has mild anemia well above transfusion threshold with no active bleeding.  I did discuss this with patient.  Patient also has chronic but slightly worsening transaminitis likely due to recent cancer therapies.  Assist with the collagen who agrees.  Normal bilirubin and she has history of cholecystectomy.  She also denies any abdominal " pain and I do not think any acute cholodoliathiasis or cholonagitis or other emergent imaging indicated for this at this time but recommend close follow up and discussed findings of dilated common bile duct with pt. no evidence of CHF.  Troponin normal.  I do not think atypical ACS.  Other labs are unremarkable.  Recommend close follow up with Oncology, and given referral to pulmonology. Based on my clinical evaluation, I do not appreciate any immediate, emergent, or life threatening condition or etiology that warrants additional workup today and feel that the patient can be discharged with close follow up care. Case discussed with Dr. Araiza who is agreeable to plan of care. Follow up and return precautions discussed; patient verbalized understanding and is agreeable to plan of care. Patient discharged home in stable condition.                          Medical Decision Making:   Differential Diagnosis:   PE  Pneumonia  Metastatic cancer lesion              Clinical Impression:  Final diagnoses:  [R06.02] SOB (shortness of breath)  [R05.9] Cough  [R74.8] Elevated liver enzymes (Primary)  [D64.9] Anemia, unspecified type  [J18.1] Left lower lobe consolidation          ED Disposition Condition    Discharge Stable          ED Prescriptions       Medication Sig Dispense Start Date End Date Auth. Provider    amoxicillin-clavulanate 875-125mg (AUGMENTIN) 875-125 mg per tablet Take 1 tablet by mouth 2 (two) times daily. for 7 days 14 tablet 3/28/2025 4/4/2025 Namita Staton NP    azithromycin (Z-TED) 250 MG tablet Take 2 tablets by mouth on day 1; Take 1 tablet by mouth on days 2-5 6 tablet 3/28/2025 4/2/2025 Namita Staton NP          Follow-up Information       Follow up With Specialties Details Why Contact Info Additional Information    Gayatri Moore MD Pulmonary Disease, Critical Care Medicine In 3 days  [address]  [address]  [place] [address]  [phone]       Khoobehi, Aurash, MD Hematology and Oncology,  Hematology, Oncology In 3 days  [address] [facility]  [address]  [place] [address]  [phone]       [place] [facility] -  Emergency Medicine  As needed, If symptoms worsen [address] [place] [address] [address] [place] [id number] 1st floor               [1]   Social History  Tobacco Use    Smoking status: Never    Smokeless tobacco: Never   Substance Use Topics    Alcohol use: Yes     Comment: 2 drinks per week    Drug use: No     Comment: [employer]        Namita Staton NP  03/28/25 1953

## 2025-03-30 LAB
OHS QRS DURATION: 78 MS
OHS QTC CALCULATION: 418 MS

## 2025-04-02 ENCOUNTER — OFFICE VISIT (OUTPATIENT)
Dept: PULMONOLOGY | Facility: CLINIC | Age: 65
End: 2025-04-02
Payer: COMMERCIAL

## 2025-04-02 VITALS
WEIGHT: 162.69 LBS | BODY MASS INDEX: 26.14 KG/M2 | HEART RATE: 92 BPM | SYSTOLIC BLOOD PRESSURE: 115 MMHG | HEIGHT: 66 IN | DIASTOLIC BLOOD PRESSURE: 76 MMHG | OXYGEN SATURATION: 98 %

## 2025-04-02 DIAGNOSIS — J18.1 LEFT LOWER LOBE CONSOLIDATION: ICD-10-CM

## 2025-04-02 DIAGNOSIS — R06.02 SHORTNESS OF BREATH: ICD-10-CM

## 2025-04-02 DIAGNOSIS — J70.0 RADIATION PNEUMONITIS: Primary | ICD-10-CM

## 2025-04-02 DIAGNOSIS — F41.9 ANXIETY: ICD-10-CM

## 2025-04-02 DIAGNOSIS — C18.7 MALIGNANT NEOPLASM OF SIGMOID COLON: ICD-10-CM

## 2025-04-02 PROCEDURE — 3078F DIAST BP <80 MM HG: CPT | Mod: CPTII,S$GLB,, | Performed by: INTERNAL MEDICINE

## 2025-04-02 PROCEDURE — 3074F SYST BP LT 130 MM HG: CPT | Mod: CPTII,S$GLB,, | Performed by: INTERNAL MEDICINE

## 2025-04-02 PROCEDURE — 3008F BODY MASS INDEX DOCD: CPT | Mod: CPTII,S$GLB,, | Performed by: INTERNAL MEDICINE

## 2025-04-02 PROCEDURE — 99205 OFFICE O/P NEW HI 60 MIN: CPT | Mod: S$GLB,,, | Performed by: INTERNAL MEDICINE

## 2025-04-02 PROCEDURE — 99999 PR PBB SHADOW E&M-EST. PATIENT-LVL III: CPT | Mod: PBBFAC,,, | Performed by: INTERNAL MEDICINE

## 2025-04-02 RX ORDER — ALPRAZOLAM 0.5 MG/1
0.5 TABLET ORAL 3 TIMES DAILY
Qty: 90 TABLET | Refills: 0 | Status: SHIPPED | OUTPATIENT
Start: 2025-04-02

## 2025-04-02 RX ORDER — PREDNISONE 20 MG/1
TABLET ORAL
Qty: 12 TABLET | Refills: 0 | Status: SHIPPED | OUTPATIENT
Start: 2025-04-02

## 2025-04-02 RX ORDER — DIPHENOXYLATE HYDROCHLORIDE AND ATROPINE SULFATE 2.5; .025 MG/1; MG/1
1 TABLET ORAL
COMMUNITY
Start: 2024-12-12

## 2025-04-02 RX ORDER — ONDANSETRON 4 MG/1
4 TABLET, ORALLY DISINTEGRATING ORAL EVERY 6 HOURS PRN
COMMUNITY
Start: 2025-03-05 | End: 2026-03-05

## 2025-04-02 NOTE — PROGRESS NOTES
2025    Malaika Craven  New Patient Consult    Chief Complaint   Patient presents with    Abnormal Chest X-ray       HPI:    2025 pt had colon cancer dx May 2024, pt was found to have met to left lower lung.  Pt had pre op xrt to lung and colon surg 2024 with post op leak with repeat surg followed by drainage radiologically.   Pt had had pe 10/2024 with eliquis.     Pt had chemo late summer- pt had ct chest with bx around May,  follow up cta 2024 with smaller tumor with cavitaion seen. Pt started xrt September or -- 4 doses given daily x 4 days.      Pt had streaking seen on ct at [facility] -- considered clot dz-- on eliquis for vague finding....      Pt has been on eliquis since sept.    Pt has had sob couple yrs -- pt noted breathing worsened last weekend.    No cough nor mucous. No chest pains.  Pt had er presentation with cta done. Area of left lower lung pneumonia seen -- larger than last 2024 density where initial lung met seen-- lesion had decreased 2024 after chemo with some cavitation then.  Now patchy pneumonia appearance..    Pt had had diarrhea 1600 -1700 since mid December -- tincture opium started 2 weeks ago.      No cough nor mucous , no chest pain    Pt worse hinds acitivity     PFSH:  Past Medical History:   Diagnosis Date    Abnormal Pap smear of cervix     Allergy     Corneal erosion of right eye     Generalized anxiety disorder     Hypertension     Rectal mass     Sinus problem          Past Surgical History:   Procedure Laterality Date    BREAST CYST ASPIRATION       SECTION      X 2    CHOLECYSTECTOMY      COLONOSCOPY N/A 2024    Procedure: COLONOSCOPY;  Surgeon: Radhika Leary MD;  Location: [facility];  Service: Endoscopy;  Laterality: N/A;    DILATION AND CURETTAGE OF UTERUS      [facility]      EYE SURGERY Right     FOR CORNEAL EROSION    FLEXIBLE SIGMOIDOSCOPY N/A 2024    Procedure: SIGMOIDOSCOPY, FLEXIBLE(Junior);  Surgeon: Oumar Montoya,  "MD;  Location: [facility] ENDO;  Service: General;  Laterality: N/A;    INSERTION OF TUNNELED CENTRAL VENOUS CATHETER (CVC) WITH SUBCUTANEOUS PORT N/A 6/24/2024    Procedure: [id number];  Surgeon: Oumar Montoya MD;  Location: [facility] OR;  Service: General;  Laterality: N/A;    LIPOSUCTION      x 2    Mole Excision Right     Shin    TUBAL LIGATION       Social History[1]  Family History   Problem Relation Name Age of Onset    Heart disease Mother      Hypertension Mother      Arthritis Father      Thyroid disease Father      Kidney disease Paternal Grandmother      Liver cancer Maternal Grandmother      Cancer Maternal Grandfather      Allergies Sister      Allergic rhinitis Sister      Angioedema Neg Hx      Asthma Neg Hx      Atopy Neg Hx      Eczema Neg Hx      Immunodeficiency Neg Hx      Rhinitis Neg Hx      Urticaria Neg Hx      Amblyopia Neg Hx      Blindness Neg Hx      Cataracts Neg Hx      Diabetes Neg Hx      Glaucoma Neg Hx      Macular degeneration Neg Hx      Retinal detachment Neg Hx      Strabismus Neg Hx      Stroke Neg Hx       Review of patient's allergies indicates:   Allergen Reactions    Atenolol Hives, Itching and Swelling    Losartan Swelling and Hives     Other reaction(s): Angioedema    Tetracyclines Rash    Amlodipine      Hives and swelling on the lower extremities    Lisinopril Hives    Sulfa (sulfonamide antibiotics) Rash          Review of Systems:  a review of eleven systems covering constitutional, Eye, HEENT, Psych, Respiratory, Cardiac, GI, , Musculoskeletal, Endocrine, Dermatologic was negative except for pertinent findings as listed ABOVE and below:  pertinent positives as above, rest good        Exam:Comprehensive exam done. /76 (BP Location: Left arm, Patient Position: Sitting)   Pulse 92   Ht 5' 6" (1.676 m)   Wt 73.8 kg (162 lb 11.2 oz)   SpO2 98% Comment: on room air at rest  BMI 26.26 kg/m²   Exam included Vitals as listed, and patient's appearance and " "affect and alertness and mood, oral exam for yeast and hygiene and pharynx lesions and Mallapatti (M) score, neck with inspection for jvd and masses and thyroid abnormalities and lymph nodes (supraclavicular and infraclavicular nodes and axillary also examined and noted if abn), chest exam included symmetry and effort and fremitus and percussion and auscultation, cardiac exam included rhythm and gallops and murmur and rubs and jvd and edema, abdominal exam for mass and hepatosplenomegaly and tenderness and hernias and bowel sounds, Musculoskeletal exam with muscle tone and posture and mobility/gait and  strength, and skin for rashes and cyanosis and pallor and turgor, extremity for clubbing.  Findings were normal except for pertinent findings listed below:  M2, sl dull left lower lung.  No edema.  Rest good.          Radiographs (ct chest and cxr) reviewed: view by direct vision      Labs reviewed           PFT will be done and results to be reviewed       Plan:  Clinical impression is apparently straight forward and impression with management as below.    Malaika Barlow" was seen today for abnormal chest x-ray.    Diagnoses and all orders for this visit:    Radiation pneumonitis  -     Stress test, pulmonary; Future  -     Complete PFT with bronchodilator; Future  -     X-Ray Chest PA And Lateral; Standing  -     CT Chest Without Contrast; Future  -     predniSONE (DELTASONE) 20 MG tablet; Take one daily for 3 days and may repeat for shortness of breath.    Left lower lobe consolidation  -     Ambulatory referral/consult to Pulmonology    Malignant neoplasm of sigmoid colon    Shortness of breath  -     Echo; Future    Anxiety  -     ALPRAZolam (XANAX) 0.5 MG tablet; Take 1 tablet (0.5 mg total) by mouth 3 (three) times daily.        Follow up in about 6 weeks (around 5/14/2025).    Discussed with patient above for education the following:      Patient Instructions   Ct chest and xrays and [facility] records " reviewed/viewed.    Working diagnosis would be radiation pneumonitis-- walking office sat increased implying not too too bad.  Could dose steroids but not clearly needed,.    Would check echo    Heart sl fast but not too too bad-- xanax recommended -- use 1/2 to 1 dose as needed.      Would follow up cxr in a wk or so,  would follow up ct in a month.  Could have f/u MD Leroy if not progressing.      Symptoms started quickly--   If breathing seems worsening --dose prednisone 20 mg daily for 3 days and call....  Eval took 74 min         [1]   Social History  Tobacco Use    Smoking status: Never    Smokeless tobacco: Never   Substance Use Topics    Alcohol use: Yes     Comment: 2 drinks per week    Drug use: No     Comment: [employer]

## 2025-04-02 NOTE — PATIENT INSTRUCTIONS
Ct chest and xrays and [facility] records reviewed/viewed.    Working diagnosis would be radiation pneumonitis-- walking office sat increased implying not too too bad.  Could dose steroids but not clearly needed,.    Would check echo    Heart sl fast but not too too bad-- xanax recommended -- use 1/2 to 1 dose as needed.      Would follow up cxr in a wk or so,  would follow up ct in a month.  Could have f/u MD Rad if not progressing.      Symptoms started quickly--   If breathing seems worsening --dose prednisone 20 mg daily for 3 days and call....

## 2025-04-07 ENCOUNTER — LAB VISIT (OUTPATIENT)
Dept: LAB | Facility: HOSPITAL | Age: 65
End: 2025-04-07
Attending: INTERNAL MEDICINE
Payer: COMMERCIAL

## 2025-04-07 DIAGNOSIS — C18.7 MALIGNANT NEOPLASM OF SIGMOID COLON: ICD-10-CM

## 2025-04-07 DIAGNOSIS — C78.02 MALIGNANT NEOPLASM METASTATIC TO LEFT LUNG: ICD-10-CM

## 2025-04-07 LAB
ABSOLUTE EOSINOPHIL (SMH): 0.24 K/UL
ABSOLUTE MONOCYTE (SMH): 0.5 K/UL (ref 0.3–1)
ABSOLUTE NEUTROPHIL COUNT (SMH): 3.4 K/UL (ref 1.8–7.7)
ALBUMIN SERPL-MCNC: 4.1 G/DL (ref 3.5–5.2)
ALP SERPL-CCNC: 150 UNIT/L (ref 40–150)
ALT SERPL-CCNC: 139 UNIT/L (ref 10–44)
ANION GAP (SMH): 9 MMOL/L (ref 8–16)
AST SERPL-CCNC: 111 UNIT/L (ref 11–45)
BASOPHILS # BLD AUTO: 0.04 K/UL
BASOPHILS NFR BLD AUTO: 0.6 %
BILIRUB SERPL-MCNC: 0.4 MG/DL (ref 0.1–1)
BUN SERPL-MCNC: 15 MG/DL (ref 8–23)
CALCIUM SERPL-MCNC: 9.8 MG/DL (ref 8.7–10.5)
CHLORIDE SERPL-SCNC: 106 MMOL/L (ref 95–110)
CO2 SERPL-SCNC: 24 MMOL/L (ref 23–29)
CREAT SERPL-MCNC: 1 MG/DL (ref 0.5–1.4)
ERYTHROCYTE [DISTWIDTH] IN BLOOD BY AUTOMATED COUNT: 13.3 % (ref 11.5–14.5)
GFR SERPLBLD CREATININE-BSD FMLA CKD-EPI: >60 ML/MIN/1.73/M2
GLUCOSE SERPL-MCNC: 117 MG/DL (ref 70–110)
HCT VFR BLD AUTO: 36.6 % (ref 37–48.5)
HGB BLD-MCNC: 12.7 GM/DL (ref 12–16)
IMM GRANULOCYTES # BLD AUTO: 0.02 K/UL (ref 0–0.04)
IMM GRANULOCYTES NFR BLD AUTO: 0.3 % (ref 0–0.5)
LYMPHOCYTES # BLD AUTO: 2.49 K/UL (ref 1–4.8)
MAGNESIUM SERPL-MCNC: 1.6 MG/DL (ref 1.6–2.6)
MCH RBC QN AUTO: 32.6 PG (ref 27–31)
MCHC RBC AUTO-ENTMCNC: 34.7 G/DL (ref 32–36)
MCV RBC AUTO: 94 FL (ref 82–98)
NUCLEATED RBC (/100WBC) (SMH): 0 /100 WBC
PLATELET # BLD AUTO: 245 K/UL (ref 150–450)
PMV BLD AUTO: 9.5 FL (ref 9.2–12.9)
POTASSIUM SERPL-SCNC: 4.1 MMOL/L (ref 3.5–5.1)
PROT SERPL-MCNC: 7.9 GM/DL (ref 6–8.4)
RBC # BLD AUTO: 3.9 M/UL (ref 4–5.4)
RELATIVE EOSINOPHIL (SMH): 3.6 % (ref 0–8)
RELATIVE LYMPHOCYTE (SMH): 37 % (ref 18–48)
RELATIVE MONOCYTE (SMH): 7.4 % (ref 4–15)
RELATIVE NEUTROPHIL (SMH): 51.1 % (ref 38–73)
SODIUM SERPL-SCNC: 139 MMOL/L (ref 136–145)
WBC # BLD AUTO: 6.73 K/UL (ref 3.9–12.7)

## 2025-04-07 PROCEDURE — 36415 COLL VENOUS BLD VENIPUNCTURE: CPT

## 2025-04-07 PROCEDURE — 83735 ASSAY OF MAGNESIUM: CPT

## 2025-04-07 PROCEDURE — 85025 COMPLETE CBC W/AUTO DIFF WBC: CPT

## 2025-04-07 PROCEDURE — 82040 ASSAY OF SERUM ALBUMIN: CPT

## 2025-04-08 ENCOUNTER — OFFICE VISIT (OUTPATIENT)
Dept: HEMATOLOGY/ONCOLOGY | Facility: CLINIC | Age: 65
End: 2025-04-08
Payer: COMMERCIAL

## 2025-04-08 VITALS
HEART RATE: 80 BPM | HEIGHT: 66 IN | TEMPERATURE: 98 F | BODY MASS INDEX: 26.14 KG/M2 | OXYGEN SATURATION: 100 % | DIASTOLIC BLOOD PRESSURE: 70 MMHG | WEIGHT: 162.69 LBS | RESPIRATION RATE: 18 BRPM | SYSTOLIC BLOOD PRESSURE: 118 MMHG

## 2025-04-08 DIAGNOSIS — C18.7 MALIGNANT NEOPLASM OF SIGMOID COLON: ICD-10-CM

## 2025-04-08 DIAGNOSIS — C78.02 MALIGNANT NEOPLASM METASTATIC TO LEFT LUNG: Primary | ICD-10-CM

## 2025-04-08 DIAGNOSIS — K12.30 MUCOSITIS: ICD-10-CM

## 2025-04-08 PROCEDURE — G2211 COMPLEX E/M VISIT ADD ON: HCPCS | Mod: S$GLB,,, | Performed by: INTERNAL MEDICINE

## 2025-04-08 PROCEDURE — 3078F DIAST BP <80 MM HG: CPT | Mod: CPTII,S$GLB,, | Performed by: INTERNAL MEDICINE

## 2025-04-08 PROCEDURE — 99999 PR PBB SHADOW E&M-EST. PATIENT-LVL IV: CPT | Mod: PBBFAC,,, | Performed by: INTERNAL MEDICINE

## 2025-04-08 PROCEDURE — 3074F SYST BP LT 130 MM HG: CPT | Mod: CPTII,S$GLB,, | Performed by: INTERNAL MEDICINE

## 2025-04-08 PROCEDURE — 1159F MED LIST DOCD IN RCRD: CPT | Mod: CPTII,S$GLB,, | Performed by: INTERNAL MEDICINE

## 2025-04-08 PROCEDURE — 99215 OFFICE O/P EST HI 40 MIN: CPT | Mod: S$GLB,,, | Performed by: INTERNAL MEDICINE

## 2025-04-08 PROCEDURE — 3008F BODY MASS INDEX DOCD: CPT | Mod: CPTII,S$GLB,, | Performed by: INTERNAL MEDICINE

## 2025-04-08 RX ORDER — SODIUM CHLORIDE 0.9 % (FLUSH) 0.9 %
10 SYRINGE (ML) INJECTION
Status: CANCELLED | OUTPATIENT
Start: 2025-04-09

## 2025-04-08 RX ORDER — SODIUM CHLORIDE 0.9 % (FLUSH) 0.9 %
10 SYRINGE (ML) INJECTION
Status: CANCELLED | OUTPATIENT
Start: 2025-04-11

## 2025-04-08 RX ORDER — ONDANSETRON HYDROCHLORIDE 2 MG/ML
8 INJECTION, SOLUTION INTRAVENOUS
Status: CANCELLED | OUTPATIENT
Start: 2025-04-09

## 2025-04-08 RX ORDER — EPINEPHRINE 0.3 MG/.3ML
0.3 INJECTION SUBCUTANEOUS ONCE AS NEEDED
Status: CANCELLED | OUTPATIENT
Start: 2025-04-09

## 2025-04-08 RX ORDER — HEPARIN 100 UNIT/ML
500 SYRINGE INTRAVENOUS
Status: CANCELLED | OUTPATIENT
Start: 2025-04-09

## 2025-04-08 RX ORDER — DIPHENHYDRAMINE HYDROCHLORIDE 50 MG/ML
50 INJECTION, SOLUTION INTRAMUSCULAR; INTRAVENOUS ONCE AS NEEDED
Status: CANCELLED | OUTPATIENT
Start: 2025-04-09

## 2025-04-08 RX ORDER — FLUOROURACIL 50 MG/ML
400 INJECTION, SOLUTION INTRAVENOUS
Status: CANCELLED | OUTPATIENT
Start: 2025-04-09

## 2025-04-08 RX ORDER — HEPARIN 100 UNIT/ML
500 SYRINGE INTRAVENOUS
Status: CANCELLED | OUTPATIENT
Start: 2025-04-11

## 2025-04-08 NOTE — PROGRESS NOTES
Service Date:  4/8/25    Chief Complaint: Colon Cancer (Labs  chemo clear)    Malaika Craven is a 64 y.o. female here with oligometastatic rectosigmoid colon cancer    Had 6 cycles of FOLFOX with Avastin.  Then went to [facility] and had radiation of the lung met.  Then had resection by [facility].  Now has ostomy in place.    Here for cycle 3 of adjuvant treatment.  Doing well.  Ostomy output has finally been better with the increased dose of tincture of opium along with the Lomotil and Imodium.  Also taking Metamucil.  No other complaints to me.  Doing very well now.  In good spirits since her ostomy output has improved.    Review of Systems   Constitutional: Negative.  Negative for appetite change and unexpected weight change.   HENT:  Negative for mouth sores.    Eyes: Negative.  Negative for visual disturbance.   Respiratory: Negative.  Negative for cough and shortness of breath.    Cardiovascular: Negative.  Negative for chest pain.   Gastrointestinal:  Negative for abdominal pain and diarrhea.   Endocrine: Negative.    Genitourinary: Negative.  Negative for frequency.   Musculoskeletal: Negative.  Negative for back pain.   Integumentary:  Negative for rash. Negative.   Neurological: Negative.  Negative for headaches.   Hematological: Negative.  Negative for adenopathy.   Psychiatric/Behavioral: Negative.          Current Outpatient Medications   Medication Instructions    ALPRAZolam (XANAX) 0.5 mg, Oral, 3 times daily    apixaban (ELIQUIS DVT-PE TREAT 30D START) 5 mg (74 tabs) DsPk 2 tablets, 2 times daily    diphenhydrAMINE-aluminum-magnesium hydroxide-simethicone-LIDOcaine viscous HCl 2% 15 mLs, Swish & Spit, Every 4 hours PRN    diphenoxylate-atropine 2.5-0.025 mg (LOMOTIL) 2.5-0.025 mg per tablet 1 tablet    EPINEPHrine (EPIPEN) 0.3 mg, Intramuscular, Once    fexofenadine (ALLEGRA ALLERGY) 60 MG tablet     LIDOcaine-prilocaine (EMLA) cream Topical (Top), As needed (PRN)    loperamide HCl (IMODIUM A-D ORAL)      NIFEdipine (ADALAT CC) 60 MG TbSR TAKE 1 TABLET BY MOUTH EVERY DAY ON AN EMPTY STOMACH    OMEPRAZOLE ORAL 0.5 mg, Daily PRN    ondansetron (ZOFRAN) 8 mg, Oral, Every 8 hours PRN    ondansetron (ZOFRAN-ODT) 4 mg, Every 6 hours PRN    opium tincture 6 mg, Oral, 4 times daily PRN    polyethylene glycol (GLYCOLAX) 17 g, Daily    potassium chloride (MICRO-K) 10 MEQ CpSR 20 mEq, Oral, Daily    predniSONE (DELTASONE) 20 MG tablet Take one daily for 3 days and may repeat for shortness of breath.    predniSONE (DELTASONE) 5 mg, Oral, Daily    psyllium husk, aspartame, (METAMUCIL) 3.4 gram PwPk packet 1 packet    RYALTRIS 665-25 mcg/spray Spry     traZODone (DESYREL) 150 mg, Nightly        Past Medical History:   Diagnosis Date    Abnormal Pap smear of cervix     Allergy     Corneal erosion of right eye     Generalized anxiety disorder     Hypertension     Rectal mass     Sinus problem         Past Surgical History:   Procedure Laterality Date    BREAST CYST ASPIRATION       SECTION      X 2    CHOLECYSTECTOMY      COLONOSCOPY N/A 2024    Procedure: COLONOSCOPY;  Surgeon: Radhika Leary MD;  Location: [facility];  Service: Endoscopy;  Laterality: N/A;    DILATION AND CURETTAGE OF UTERUS      [facility]      EYE SURGERY Right     FOR CORNEAL EROSION    FLEXIBLE SIGMOIDOSCOPY N/A 2024    Procedure: SIGMOIDOSCOPY, FLEXIBLE(Fleets);  Surgeon: Oumar Montoya MD;  Location: [facility] ENDO;  Service: General;  Laterality: N/A;    INSERTION OF TUNNELED CENTRAL VENOUS CATHETER (CVC) WITH SUBCUTANEOUS PORT N/A 2024    Procedure: [id number];  Surgeon: Oumar Montoya MD;  Location: [facility] OR;  Service: General;  Laterality: N/A;    LIPOSUCTION      x 2    Mole Excision Right     Shin    TUBAL LIGATION          Family History   Problem Relation Name Age of Onset    Heart disease Mother      Hypertension Mother      Arthritis Father      Thyroid disease Father      Kidney disease Paternal  "Grandmother      Liver cancer Maternal Grandmother      Cancer Maternal Grandfather      Allergies Sister      Allergic rhinitis Sister      Angioedema Neg Hx      Asthma Neg Hx      Atopy Neg Hx      Eczema Neg Hx      Immunodeficiency Neg Hx      Rhinitis Neg Hx      Urticaria Neg Hx      Amblyopia Neg Hx      Blindness Neg Hx      Cataracts Neg Hx      Diabetes Neg Hx      Glaucoma Neg Hx      Macular degeneration Neg Hx      Retinal detachment Neg Hx      Strabismus Neg Hx      Stroke Neg Hx         Social History     Tobacco Use    Smoking status: Never    Smokeless tobacco: Never   Substance Use Topics    Alcohol use: Yes     Comment: 2 drinks per week    Drug use: No     Comment: MEDICAL MARIJUANA         Vitals:    04/08/25 1030   BP: 118/70   Pulse: 80   Resp: 18   Temp: 98 °F (36.7 °C)        Physical Exam:  /70 (BP Location: Left arm, Patient Position: Sitting)   Pulse 80   Temp 98 °F (36.7 °C) (Temporal)   Resp 18   Ht 5' 6" (1.676 m)   Wt 73.8 kg (162 lb 11.2 oz)   SpO2 100%   BMI 26.26 kg/m²     Physical Exam  Constitutional:       Appearance: Normal appearance.   HENT:      Head: Normocephalic and atraumatic.      Nose: Nose normal.      Mouth/Throat:      Mouth: Mucous membranes are moist.      Pharynx: Oropharynx is clear.   Eyes:      Conjunctiva/sclera: Conjunctivae normal.   Cardiovascular:      Rate and Rhythm: Normal rate and regular rhythm.      Heart sounds: Normal heart sounds.   Pulmonary:      Effort: Pulmonary effort is normal.      Breath sounds: Normal breath sounds.   Abdominal:      General: Abdomen is flat. Bowel sounds are normal.      Palpations: Abdomen is soft.   Musculoskeletal:         General: Normal range of motion.      Cervical back: Normal range of motion and neck supple.   Skin:     General: Skin is warm and dry.   Neurological:      General: No focal deficit present.      Mental Status: She is alert and oriented to person, place, and time. Mental status is " at baseline.   Psychiatric:         Mood and Affect: Mood normal.          Labs:  Lab Results   Component Value Date    WBC 6.73 04/07/2025    RBC 3.90 (L) 04/07/2025    HGB 12.7 04/07/2025    HCT 36.6 (L) 04/07/2025    MCV 94 04/07/2025    MCH 32.6 (H) 04/07/2025    MCHC 34.7 04/07/2025    RDW 13.3 04/07/2025     04/07/2025    MPV 9.5 04/07/2025    GRAN 3.8 03/18/2025    GRAN 64.5 03/18/2025    LYMPH 1.6 03/18/2025    LYMPH 27.8 03/18/2025    MONO 0.2 (L) 03/18/2025    MONO 3.4 (L) 03/18/2025    EOS 0.2 03/18/2025    BASO 0.05 03/18/2025    EOSINOPHIL 3.1 03/18/2025    BASOPHIL 0.9 03/18/2025     Sodium   Date Value Ref Range Status   04/07/2025 139 136 - 145 mmol/L Final     Potassium   Date Value Ref Range Status   04/07/2025 4.1 3.5 - 5.1 mmol/L Final     Chloride   Date Value Ref Range Status   03/18/2025 106 95 - 110 mmol/L Final     CO2   Date Value Ref Range Status   04/07/2025 24 23 - 29 mmol/L Final     Glucose   Date Value Ref Range Status   03/18/2025 146 (H) 70 - 110 mg/dL Final     BUN   Date Value Ref Range Status   04/07/2025 15 8 - 23 mg/dL Final     Creatinine   Date Value Ref Range Status   04/07/2025 1.0 0.5 - 1.4 mg/dL Final     Calcium   Date Value Ref Range Status   04/07/2025 9.8 8.7 - 10.5 mg/dL Final     Total Protein   Date Value Ref Range Status   03/18/2025 8.2 6.0 - 8.4 g/dL Final     Albumin   Date Value Ref Range Status   04/07/2025 4.1 3.5 - 5.2 g/dL Final     Bilirubin Total   Date Value Ref Range Status   04/07/2025 0.4 0.1 - 1.0 mg/dL Final     Comment:     For infants and newborns, interpretation of results should be based   on gestational age, weight and in agreement with clinical   observations.    Premature Infant recommended reference ranges:   0-24 hours:  <8.0 mg/dL   24-48 hours: <12.0 mg/dL   3-5 days:    <15.0 mg/dL   6-29 days:   <15.0 mg/dL     ALP   Date Value Ref Range Status   04/07/2025 150 40 - 150 unit/L Final     AST   Date Value Ref Range Status    04/07/2025 111 (H) 11 - 45 unit/L Final     ALT   Date Value Ref Range Status   04/07/2025 139 (H) 10 - 44 unit/L Final     Anion Gap   Date Value Ref Range Status   03/18/2025 12 8 - 16 mmol/L Final     eGFR if    Date Value Ref Range Status   10/25/2021 >60.0 >60 mL/min/1.73 m^2 Final     eGFR if non    Date Value Ref Range Status   10/25/2021 >60.0 >60 mL/min/1.73 m^2 Final     Comment:     Calculation used to obtain the estimated glomerular filtration  rate (eGFR) is the CKD-EPI equation.          A/P:    Rectal adenocarcinoma   -patient had oligometastatic disease   -had 6 cycles of FOLFOX and Avastin with me.  Then went to [facility] had radiation therapy to the lung metastatic disease.  She also had a resection by [facility].  She now has an ostomy in place.    -now on adjuvant 5 FU for a total of 3 months plan    -okay for cycle 3 of 6  -return to clinic in 2 weeks for next treatment    Ostomy  -has high output.  -combination of Imodium, Lomotil, and Metamucil with the tincture of opium seems to finally be helping    Aurash Khoobehi, MD  Hematology and Oncology    Visit today included increased complexity associated with the care of the episodic problem addressed and managing the longitudinal care of the patient due to the serious and/or complex managed problem(s) colon cancer.

## 2025-04-09 ENCOUNTER — INFUSION (OUTPATIENT)
Dept: INFUSION THERAPY | Facility: HOSPITAL | Age: 65
End: 2025-04-09
Attending: INTERNAL MEDICINE
Payer: COMMERCIAL

## 2025-04-09 VITALS
HEART RATE: 67 BPM | OXYGEN SATURATION: 99 % | SYSTOLIC BLOOD PRESSURE: 116 MMHG | BODY MASS INDEX: 26.17 KG/M2 | RESPIRATION RATE: 17 BRPM | WEIGHT: 162.81 LBS | TEMPERATURE: 97 F | HEIGHT: 66 IN | DIASTOLIC BLOOD PRESSURE: 83 MMHG

## 2025-04-09 DIAGNOSIS — C78.02 MALIGNANT NEOPLASM METASTATIC TO LEFT LUNG: Primary | ICD-10-CM

## 2025-04-09 DIAGNOSIS — C18.7 MALIGNANT NEOPLASM OF SIGMOID COLON: ICD-10-CM

## 2025-04-09 PROCEDURE — 63600175 PHARM REV CODE 636 W HCPCS: Performed by: INTERNAL MEDICINE

## 2025-04-09 PROCEDURE — 96416 CHEMO PROLONG INFUSE W/PUMP: CPT

## 2025-04-09 PROCEDURE — 25000003 PHARM REV CODE 250: Performed by: INTERNAL MEDICINE

## 2025-04-09 PROCEDURE — 96409 CHEMO IV PUSH SNGL DRUG: CPT

## 2025-04-09 PROCEDURE — 96367 TX/PROPH/DG ADDL SEQ IV INF: CPT

## 2025-04-09 PROCEDURE — 96366 THER/PROPH/DIAG IV INF ADDON: CPT

## 2025-04-09 RX ORDER — ONDANSETRON HCL IN 0.9 % NACL 8 MG/50 ML
8 INTRAVENOUS SOLUTION, PIGGYBACK (ML) INTRAVENOUS
Status: COMPLETED | OUTPATIENT
Start: 2025-04-09 | End: 2025-04-09

## 2025-04-09 RX ORDER — DIPHENHYDRAMINE HYDROCHLORIDE 50 MG/ML
50 INJECTION, SOLUTION INTRAMUSCULAR; INTRAVENOUS ONCE AS NEEDED
Status: DISCONTINUED | OUTPATIENT
Start: 2025-04-09 | End: 2025-04-09 | Stop reason: HOSPADM

## 2025-04-09 RX ORDER — SODIUM CHLORIDE 0.9 % (FLUSH) 0.9 %
10 SYRINGE (ML) INJECTION
Status: DISCONTINUED | OUTPATIENT
Start: 2025-04-09 | End: 2025-04-09 | Stop reason: HOSPADM

## 2025-04-09 RX ORDER — FLUOROURACIL 50 MG/ML
400 INJECTION, SOLUTION INTRAVENOUS
Status: COMPLETED | OUTPATIENT
Start: 2025-04-09 | End: 2025-04-09

## 2025-04-09 RX ORDER — EPINEPHRINE 0.3 MG/.3ML
0.3 INJECTION SUBCUTANEOUS ONCE AS NEEDED
Status: DISCONTINUED | OUTPATIENT
Start: 2025-04-09 | End: 2025-04-09 | Stop reason: HOSPADM

## 2025-04-09 RX ADMIN — FLUOROURACIL 740 MG: 50 INJECTION, SOLUTION INTRAVENOUS at 12:04

## 2025-04-09 RX ADMIN — FLUOROURACIL 4440 MG: 50 INJECTION, SOLUTION INTRAVENOUS at 12:04

## 2025-04-09 RX ADMIN — SODIUM CHLORIDE 8 MG: 9 INJECTION, SOLUTION INTRAVENOUS at 10:04

## 2025-04-09 RX ADMIN — LEUCOVORIN CALCIUM 740 MG: 350 INJECTION, POWDER, LYOPHILIZED, FOR SOLUTION INTRAMUSCULAR; INTRAVENOUS at 10:04

## 2025-04-09 NOTE — PLAN OF CARE
Problem: Infection  Goal: Absence of Infection Signs and Symptoms  Outcome: Progressing  Intervention: Prevent or Manage Infection  Flowsheets (Taken 4/9/2025 1032)  Infection Management: aseptic technique maintained  Isolation Precautions:   precautions initiated   precautions maintained

## 2025-04-11 ENCOUNTER — INFUSION (OUTPATIENT)
Dept: INFUSION THERAPY | Facility: HOSPITAL | Age: 65
End: 2025-04-11
Attending: INTERNAL MEDICINE
Payer: COMMERCIAL

## 2025-04-11 VITALS
OXYGEN SATURATION: 99 % | SYSTOLIC BLOOD PRESSURE: 114 MMHG | HEART RATE: 100 BPM | HEIGHT: 66 IN | RESPIRATION RATE: 16 BRPM | WEIGHT: 165.81 LBS | DIASTOLIC BLOOD PRESSURE: 66 MMHG | BODY MASS INDEX: 26.65 KG/M2 | TEMPERATURE: 98 F

## 2025-04-11 DIAGNOSIS — C18.7 MALIGNANT NEOPLASM OF SIGMOID COLON: ICD-10-CM

## 2025-04-11 DIAGNOSIS — C78.02 MALIGNANT NEOPLASM METASTATIC TO LEFT LUNG: Primary | ICD-10-CM

## 2025-04-11 PROCEDURE — A4216 STERILE WATER/SALINE, 10 ML: HCPCS | Performed by: INTERNAL MEDICINE

## 2025-04-11 PROCEDURE — 25000003 PHARM REV CODE 250: Performed by: INTERNAL MEDICINE

## 2025-04-11 PROCEDURE — 96523 IRRIG DRUG DELIVERY DEVICE: CPT

## 2025-04-11 PROCEDURE — 63600175 PHARM REV CODE 636 W HCPCS: Performed by: INTERNAL MEDICINE

## 2025-04-11 RX ORDER — HEPARIN 100 UNIT/ML
500 SYRINGE INTRAVENOUS
Status: DISCONTINUED | OUTPATIENT
Start: 2025-04-11 | End: 2025-04-11 | Stop reason: HOSPADM

## 2025-04-11 RX ORDER — SODIUM CHLORIDE 0.9 % (FLUSH) 0.9 %
10 SYRINGE (ML) INJECTION
Status: DISCONTINUED | OUTPATIENT
Start: 2025-04-11 | End: 2025-04-11 | Stop reason: HOSPADM

## 2025-04-11 RX ADMIN — HEPARIN 500 UNITS: 100 SYRINGE at 10:04

## 2025-04-11 RX ADMIN — SODIUM CHLORIDE, PRESERVATIVE FREE 10 ML: 5 INJECTION INTRAVENOUS at 10:04

## 2025-04-11 NOTE — PLAN OF CARE
Problem: Infection  Goal: Absence of Infection Signs and Symptoms  Outcome: Progressing  Intervention: Prevent or Manage Infection  Flowsheets (Taken 4/11/2025 1012)  Infection Management: aseptic technique maintained  Isolation Precautions:   precautions initiated   precautions maintained

## 2025-04-15 ENCOUNTER — RESULTS FOLLOW-UP (OUTPATIENT)
Dept: PULMONOLOGY | Facility: CLINIC | Age: 65
End: 2025-04-15
Payer: COMMERCIAL

## 2025-04-15 ENCOUNTER — HOSPITAL ENCOUNTER (OUTPATIENT)
Dept: PULMONOLOGY | Facility: HOSPITAL | Age: 65
Discharge: HOME | End: 2025-04-15
Attending: INTERNAL MEDICINE
Payer: COMMERCIAL

## 2025-04-15 ENCOUNTER — HOSPITAL ENCOUNTER (OUTPATIENT)
Dept: RADIOLOGY | Facility: HOSPITAL | Age: 65
Discharge: HOME | End: 2025-04-15
Attending: INTERNAL MEDICINE
Payer: COMMERCIAL

## 2025-04-15 DIAGNOSIS — J70.0 RADIATION PNEUMONITIS: ICD-10-CM

## 2025-04-15 LAB
DLCO SINGLE BREATH LLN: 16.1
DLCO SINGLE BREATH PRE REF: 76.2 %
DLCO SINGLE BREATH REF: 21.83
DLCOC SBVA LLN: 3.04
DLCOC SBVA REF: 4.58
DLCOC SINGLE BREATH LLN: 16.1
DLCOC SINGLE BREATH REF: 21.83
DLCOVA LLN: 3.04
DLCOVA PRE REF: 91 %
DLCOVA PRE: 4.17 ML/(MIN*MMHG*L) (ref 3.04–6.11)
DLCOVA REF: 4.58
ERV LLN: -16449.24
ERV PRE REF: 143.7 %
ERV REF: 0.76
FEF 25 75 CHG: 19.4 %
FEF 25 75 LLN: 1.41
FEF 25 75 POST REF: 85.8 %
FEF 25 75 PRE REF: 71.9 %
FEF 25 75 REF: 2.81
FET100 CHG: -8.8 %
FEV1 CHG: -0.4 %
FEV1 FVC CHG: 3.3 %
FEV1 FVC LLN: 67
FEV1 FVC POST REF: 103.2 %
FEV1 FVC PRE REF: 99.9 %
FEV1 FVC REF: 79
FEV1 LLN: 1.75
FEV1 POST REF: 99 %
FEV1 PRE REF: 99.4 %
FEV1 REF: 2.34
FRCPLETH LLN: 1.82
FRCPLETH PREREF: 100.5 %
FRCPLETH REF: 2.65
FVC CHG: -3.6 %
FVC LLN: 2.23
FVC POST REF: 95.4 %
FVC PRE REF: 98.9 %
FVC REF: 2.97
IVC PRE: 2.89 L (ref 2.23–3.75)
IVC SINGLE BREATH LLN: 2.23
IVC SINGLE BREATH PRE REF: 97.3 %
IVC SINGLE BREATH REF: 2.97
MVV LLN: 74
MVV PRE REF: 87.8 %
MVV REF: 87
PEF CHG: -8 %
PEF LLN: 4.37
PEF POST REF: 82.4 %
PEF PRE REF: 89.5 %
PEF REF: 6.03
POST FEF 25 75: 2.41 L/S (ref 1.41–4.21)
POST FET 100: 5.7 SEC
POST FEV1 FVC: 81.66 % (ref 66.8–89.72)
POST FEV1: 2.32 L (ref 1.75–2.91)
POST FVC: 2.84 L (ref 2.23–3.75)
POST PEF: 4.96 L/S (ref 4.37–7.69)
PRE DLCO: 16.65 ML/(MIN*MMHG) (ref 16.1–27.57)
PRE ERV: 1.09 L (ref -16449.24–16450.76)
PRE FEF 25 75: 2.02 L/S (ref 1.41–4.21)
PRE FET 100: 6.25 SEC
PRE FEV1 FVC: 79.09 % (ref 66.8–89.72)
PRE FEV1: 2.33 L (ref 1.75–2.91)
PRE FRC PL: 2.66 L (ref 1.82–3.47)
PRE FVC: 2.94 L (ref 2.23–3.75)
PRE MVV: 76.27 L/MIN (ref 73.81–99.86)
PRE PEF: 5.4 L/S (ref 4.37–7.69)
PRE RV: 1.57 L (ref 1.31–2.46)
PRE TLC: 4.51 L (ref 3.78–5.76)
RAW LLN: 3.06
RAW PRE REF: 131.9 %
RAW PRE: 4.03 CMH2O*S/L (ref 3.06–3.06)
RAW REF: 3.06
RV LLN: 1.31
RV PRE REF: 83.2 %
RV REF: 1.89
RVTLC LLN: 30
RVTLC PRE REF: 86.9 %
RVTLC PRE: 34.81 % (ref 30.45–49.63)
RVTLC REF: 40
TLC LLN: 3.78
TLC PRE REF: 94.6 %
TLC REF: 4.77
VA PRE: 4 L (ref 4.62–4.62)
VA SINGLE BREATH LLN: 4.62
VA SINGLE BREATH PRE REF: 86.5 %
VA SINGLE BREATH REF: 4.62
VC LLN: 2.23
VC PRE REF: 98.9 %
VC PRE: 2.94 L (ref 2.23–3.75)
VC REF: 2.97

## 2025-04-15 PROCEDURE — 71046 X-RAY EXAM CHEST 2 VIEWS: CPT | Mod: 26,,, | Performed by: RADIOLOGY

## 2025-04-15 PROCEDURE — 71046 X-RAY EXAM CHEST 2 VIEWS: CPT | Mod: TC

## 2025-04-15 PROCEDURE — 94726 PLETHYSMOGRAPHY LUNG VOLUMES: CPT

## 2025-04-15 PROCEDURE — 94618 PULMONARY STRESS TESTING: CPT

## 2025-04-15 PROCEDURE — 94618 PULMONARY STRESS TESTING: CPT | Mod: 26,,, | Performed by: INTERNAL MEDICINE

## 2025-04-15 PROCEDURE — 94726 PLETHYSMOGRAPHY LUNG VOLUMES: CPT | Mod: 26,,, | Performed by: INTERNAL MEDICINE

## 2025-04-15 PROCEDURE — 94729 DIFFUSING CAPACITY: CPT

## 2025-04-15 PROCEDURE — 94729 DIFFUSING CAPACITY: CPT | Mod: 26,,, | Performed by: INTERNAL MEDICINE

## 2025-04-15 PROCEDURE — 94060 EVALUATION OF WHEEZING: CPT

## 2025-04-15 PROCEDURE — 94060 EVALUATION OF WHEEZING: CPT | Mod: 26,59,, | Performed by: INTERNAL MEDICINE

## 2025-04-15 RX ORDER — ALBUTEROL SULFATE 2.5 MG/.5ML
SOLUTION RESPIRATORY (INHALATION)
Status: DISPENSED
Start: 2025-04-15 | End: 2025-04-15

## 2025-04-16 ENCOUNTER — HOSPITAL ENCOUNTER (OUTPATIENT)
Dept: CARDIOLOGY | Facility: HOSPITAL | Age: 65
Discharge: HOME | End: 2025-04-16
Attending: INTERNAL MEDICINE
Payer: COMMERCIAL

## 2025-04-16 ENCOUNTER — OFFICE VISIT (OUTPATIENT)
Dept: FAMILY MEDICINE | Facility: CLINIC | Age: 65
End: 2025-04-16
Payer: COMMERCIAL

## 2025-04-16 VITALS — HEIGHT: 66 IN | BODY MASS INDEX: 26.08 KG/M2 | WEIGHT: 162.25 LBS

## 2025-04-16 VITALS
SYSTOLIC BLOOD PRESSURE: 120 MMHG | HEART RATE: 87 BPM | BODY MASS INDEX: 26.08 KG/M2 | WEIGHT: 162.25 LBS | RESPIRATION RATE: 18 BRPM | DIASTOLIC BLOOD PRESSURE: 78 MMHG | TEMPERATURE: 98 F | HEIGHT: 66 IN | OXYGEN SATURATION: 96 %

## 2025-04-16 DIAGNOSIS — R06.02 SHORTNESS OF BREATH: ICD-10-CM

## 2025-04-16 DIAGNOSIS — E78.2 MIXED HYPERLIPIDEMIA: ICD-10-CM

## 2025-04-16 DIAGNOSIS — Z93.2 ILEOSTOMY IN PLACE: ICD-10-CM

## 2025-04-16 DIAGNOSIS — C18.7 MALIGNANT NEOPLASM OF SIGMOID COLON: ICD-10-CM

## 2025-04-16 DIAGNOSIS — I10 ESSENTIAL HYPERTENSION: ICD-10-CM

## 2025-04-16 DIAGNOSIS — F32.5 MAJOR DEPRESSIVE DISORDER WITH SINGLE EPISODE, IN FULL REMISSION: ICD-10-CM

## 2025-04-16 DIAGNOSIS — Z00.00 ROUTINE GENERAL MEDICAL EXAMINATION AT A HEALTH CARE FACILITY: Primary | ICD-10-CM

## 2025-04-16 DIAGNOSIS — C78.02 MALIGNANT NEOPLASM METASTATIC TO LEFT LUNG: ICD-10-CM

## 2025-04-16 PROBLEM — Z79.01 LONG TERM (CURRENT) USE OF ANTICOAGULANTS: Status: ACTIVE | Noted: 2024-12-09

## 2025-04-16 PROBLEM — E66.811 CLASS 1 OBESITY WITHOUT SERIOUS COMORBIDITY WITH BODY MASS INDEX (BMI) OF 32.0 TO 32.9 IN ADULT, UNSPECIFIED OBESITY TYPE: Status: RESOLVED | Noted: 2024-04-11 | Resolved: 2025-04-16

## 2025-04-16 LAB
AORTIC ROOT ANNULUS: 2.8 CM
AORTIC VALVE CUSP SEPERATION: 1.61 CM
APICAL FOUR CHAMBER EJECTION FRACTION: 68 %
APICAL TWO CHAMBER EJECTION FRACTION: 75 %
AV INDEX (PROSTH): 0.84
AV MEAN GRADIENT: 7 MMHG
AV PEAK GRADIENT: 13 MMHG
AV VALVE AREA BY VELOCITY RATIO: 2.6 CM²
AV VALVE AREA: 2.6 CM²
AV VELOCITY RATIO: 0.83
BSA FOR ECHO PROCEDURE: 1.85 M2
CV ECHO LV RWT: 0.4 CM
DOP CALC AO PEAK VEL: 1.8 M/S
DOP CALC AO VTI: 29.8 CM
DOP CALC LVOT AREA: 3.1 CM2
DOP CALC LVOT DIAMETER: 2 CM
DOP CALC LVOT PEAK VEL: 1.5 M/S
DOP CALC LVOT STROKE VOLUME: 78.5 CM3
DOP CALC MV VTI: 21.8 CM
DOP CALCLVOT PEAK VEL VTI: 25 CM
E WAVE DECELERATION TIME: 153 MSEC
E/A RATIO: 0.98
E/E' RATIO: 11 M/S
ECHO LV POSTERIOR WALL: 0.9 CM (ref 0.6–1.1)
FRACTIONAL SHORTENING: 37.8 % (ref 28–44)
INTERVENTRICULAR SEPTUM: 0.9 CM (ref 0.6–1.1)
IVC DIAMETER: 1.29 CM
IVRT: 69 MSEC
LA MAJOR: 4.6 CM
LA MINOR: 4.7 CM
LEFT ATRIUM SIZE: 3.4 CM
LEFT INTERNAL DIMENSION IN SYSTOLE: 2.8 CM (ref 2.1–4)
LEFT VENTRICLE DIASTOLIC VOLUME INDEX: 50.82 ML/M2
LEFT VENTRICLE DIASTOLIC VOLUME: 93 ML
LEFT VENTRICLE END DIASTOLIC VOLUME APICAL 2 CHAMBER: 66.28 ML
LEFT VENTRICLE END DIASTOLIC VOLUME APICAL 4 CHAMBER: 70.39 ML
LEFT VENTRICLE MASS INDEX: 72.6 G/M2
LEFT VENTRICLE SYSTOLIC VOLUME INDEX: 16.9 ML/M2
LEFT VENTRICLE SYSTOLIC VOLUME: 31 ML
LEFT VENTRICULAR INTERNAL DIMENSION IN DIASTOLE: 4.5 CM (ref 3.5–6)
LEFT VENTRICULAR MASS: 132.8 G
LV LATERAL E/E' RATIO: 10.8 M/S
LV SEPTAL E/E' RATIO: 10.8 M/S
LVED V (TEICH): 92.85 ML
LVES V (TEICH): 30.65 ML
LVOT MG: 4.74 MMHG
LVOT MV: 1.02 CM/S
MV A" WAVE DURATION": 122.74 MSEC
MV MEAN GRADIENT: 2 MMHG
MV PEAK A VEL: 0.88 M/S
MV PEAK E VEL: 0.86 M/S
MV PEAK GRADIENT: 5 MMHG
MV STENOSIS PRESSURE HALF TIME: 55.38 MS
MV VALVE AREA BY CONTINUITY EQUATION: 3.6 CM2
MV VALVE AREA P 1/2 METHOD: 3.97 CM2
OHS CV RV/LV RATIO: 0.42 CM
OHS LV EJECTION FRACTION SIMPSONS BIPLANE MOD: 71 %
PISA TR MAX VEL: 1.8 M/S
PV MV: 0.73 M/S
PV PEAK GRADIENT: 4 MMHG
PV PEAK VELOCITY: 0.97 M/S
RA MAJOR: 3.85 CM
RIGHT VENTRICLE DIASTOLIC BASEL DIMENSION: 1.9 CM
RIGHT VENTRICULAR END-DIASTOLIC DIMENSION: 1.93 CM
RV TISSUE DOPPLER FREE WALL SYSTOLIC VELOCITY 1 (APICAL 4 CHAMBER VIEW): 12.57 CM/S
TDI LATERAL: 0.08 M/S
TDI SEPTAL: 0.08 M/S
TDI: 0.08 M/S
TR MAX PG: 13 MMHG
TRICUSPID ANNULAR PLANE SYSTOLIC EXCURSION: 1.92 CM
Z-SCORE OF LEFT VENTRICULAR DIMENSION IN END DIASTOLE: -1.19
Z-SCORE OF LEFT VENTRICULAR DIMENSION IN END SYSTOLE: -0.88

## 2025-04-16 PROCEDURE — 93306 TTE W/DOPPLER COMPLETE: CPT

## 2025-04-16 PROCEDURE — 99999 PR PBB SHADOW E&M-EST. PATIENT-LVL V: CPT | Mod: PBBFAC,,, | Performed by: PHYSICIAN ASSISTANT

## 2025-04-16 RX ORDER — NIFEDIPINE 60 MG/1
60 TABLET, EXTENDED RELEASE ORAL DAILY
Qty: 90 TABLET | Refills: 3 | Status: SHIPPED | OUTPATIENT
Start: 2025-04-16

## 2025-04-16 NOTE — PROGRESS NOTES
OFFICE VISIT   4/16/2025    Patient ID: Malaika Craven is a 64 y.o. female    SUBJECTIVE:  Annual Exam      HPI/ROS:  History of Present Illness    CHIEF COMPLAINT:  Patient presents today for follow-up and medication refills    COLON CANCER AND TREATMENT:  She was diagnosed with colon cancer following an abnormal FIT home test and subsequent colonoscopy. She underwent colon resection on December 9th at [facility] in [place]. Approximately 10 days post-surgery, she developed internal leakage requiring an ileostomy. She later developed an abscess requiring drainage. She is currently undergoing local chemotherapy with three rounds remaining, and reports the tumors are responding to treatment. She is under the care of Dr. Khoobehi.    CHEMOTHERAPY SIDE EFFECTS:  She reports mild neuropathy in toes, watery eyes, mouth sores, and labial swelling. She denies vaginal discharge. She reports labial swelling which is described as uncomfortable but not severe.    ILEOSTOMY MANAGEMENT:  She reports high output from ileostomy exceeding target of 1000mL per day, with recent output of 1125mL and previous episodes reaching 1600-1700mL. Current medications for output management include Imodium 8 times daily, Lomotil, and Metamucil 3 times daily.    PULMONARY:  She has history of lung nodule treated with radiation. She subsequently developed significant shortness of breath. She is followed by pulmonology Dr. Vann. Pulmonary function testing was completed yesterday.    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 HCTZ was recently discontinued due to hypotension and significant weight loss from cancer and chemotherapy, and she continues nifedipine 60mg ER daily for blood pressure control.    MENTAL HEALTH:  She has history of depression related to her daughter's death. She reports occasional bouts of mild depression, though  less severe than previously. She discontinued antidepressants but has Xanax available as needed for anxiety. She reports symptoms are manageable at this time and does not request need for additional medication.    WEIGHT:  She reports 40-pound weight loss since cancer diagnosis.    PREVENTIVE CARE:  She is overdue for mammogram and pap smear.         Review of Systems   Constitutional:  Positive for activity change. Negative for chills, fatigue and fever.   HENT:  Positive for mouth sores (2/2 chemotherapy).    Respiratory:  Positive for shortness of breath.    Gastrointestinal:  Negative for abdominal pain, constipation, nausea and vomiting.        +high ostomy output   Genitourinary:         +labial swelling   Neurological:  Positive for numbness. Negative for weakness.       Past Medical History:   Diagnosis Date    Abnormal Pap smear of cervix     Allergy     Corneal erosion of right eye     Generalized anxiety disorder     Hypertension     Rectal mass     Sinus problem        Social History[1]    Past Surgical History:   Procedure Laterality Date    BREAST CYST ASPIRATION       SECTION      X 2    CHOLECYSTECTOMY      COLONOSCOPY N/A 2024    Procedure: COLONOSCOPY;  Surgeon: Radhika Leary MD;  Location: [facility];  Service: Endoscopy;  Laterality: N/A;    DILATION AND CURETTAGE OF UTERUS      [facility]      EYE SURGERY Right     FOR CORNEAL EROSION    FLEXIBLE SIGMOIDOSCOPY N/A 2024    Procedure: SIGMOIDOSCOPY, FLEXIBLE(Fleets);  Surgeon: Oumar Montoya MD;  Location: [facility];  Service: General;  Laterality: N/A;    INSERTION OF TUNNELED CENTRAL VENOUS CATHETER (CVC) WITH SUBCUTANEOUS PORT N/A 2024    Procedure: [id number];  Surgeon: Oumar Montoya MD;  Location: [facility];  Service: General;  Laterality: N/A;    LIPOSUCTION      x 2    Mole Excision Right     Shin    TUBAL LIGATION         OBJECTIVE:    /78 (BP Location: Right arm, Patient Position: Sitting)  "  Pulse 87   Temp 98.3 °F (36.8 °C) (Oral)   Resp 18   Ht 5' 6" (1.676 m)   Wt 73.6 kg (162 lb 4.1 oz)   SpO2 96%   BMI 26.19 kg/m²       Current Outpatient Medications:     ALPRAZolam (XANAX) 0.5 MG tablet, Take 1 tablet (0.5 mg total) by mouth 3 (three) times daily., Disp: 90 tablet, Rfl: 0    apixaban (ELIQUIS DVT-PE TREAT 30D START) 5 mg (74 tabs) DsPk, Take 2 tablets by mouth 2 (two) times a day. For the first 7 days take two 5 mg tablets twice daily.  After 7 days take one 5 mg tablet twice daily., Disp: , Rfl:     diphenhydrAMINE-aluminum-magnesium hydroxide-simethicone-LIDOcaine viscous HCl 2%, Swish and spit 15 mLs every 4 (four) hours as needed., Disp: 400 each, Rfl: 0    diphenoxylate-atropine 2.5-0.025 mg (LOMOTIL) 2.5-0.025 mg per tablet, Take 1 tablet by mouth., Disp: , Rfl:     fexofenadine (ALLEGRA ALLERGY) 60 MG tablet, , Disp: , Rfl:     LIDOcaine-prilocaine (EMLA) cream, Apply topically as needed., Disp: 30 g, Rfl: 0    loperamide HCl (IMODIUM A-D ORAL), , Disp: , Rfl:     ondansetron (ZOFRAN) 8 MG tablet, Take 1 tablet (8 mg total) by mouth every 8 (eight) hours as needed., Disp: 30 tablet, Rfl: 2    ondansetron (ZOFRAN-ODT) 4 MG TbDL, Take 4 mg by mouth every 6 (six) hours as needed., Disp: , Rfl:     opium tincture 10 mg/mL (morphine) Tinc, Take 0.6 mLs (6 mg total) by mouth 4 (four) times daily as needed (diarrhea)., Disp: 118 mL, Rfl: 0    polyethylene glycol (GLYCOLAX) 17 gram/dose powder, Take 17 g by mouth once daily., Disp: , Rfl:     potassium chloride (MICRO-K) 10 MEQ CpSR, Take 2 capsules (20 mEq total) by mouth once daily. for 450 doses, Disp: 180 capsule, Rfl: 4    psyllium husk, aspartame, (METAMUCIL) 3.4 gram PwPk packet, Take 1 packet by mouth., Disp: , Rfl:     RYALTRIS 665-25 mcg/spray Spry, , Disp: , Rfl:     EPINEPHrine (EPIPEN) 0.3 mg/0.3 mL AtIn, Inject 0.3 mLs (0.3 mg total) into the muscle once. for 1 dose (Patient not taking: Reported on 8/5/2024), Disp: 0.3 mL, " Rfl: 0    NIFEdipine (ADALAT CC) 60 MG TbSR, Take 1 tablet (60 mg total) by mouth once daily., Disp: 90 tablet, Rfl: 3  No current facility-administered medications for this visit.    Physical Exam  Constitutional:       General: She is not in acute distress.     Appearance: Normal appearance. She is normal weight. She is not ill-appearing.   HENT:      Head: Normocephalic and atraumatic.      Right Ear: External ear normal.      Left Ear: External ear normal.      Nose: Nose normal.      Mouth/Throat:      Mouth: Mucous membranes are moist.      Pharynx: Oropharynx is clear.   Eyes:      Extraocular Movements: Extraocular movements intact.      Conjunctiva/sclera: Conjunctivae normal.      Pupils: Pupils are equal, round, and reactive to light.   Cardiovascular:      Rate and Rhythm: Normal rate and regular rhythm.      Pulses: Normal pulses.      Heart sounds: Normal heart sounds.   Pulmonary:      Effort: Pulmonary effort is normal. No respiratory distress.      Breath sounds: Normal breath sounds. No wheezing or rhonchi.   Abdominal:      General: Abdomen is flat.      Palpations: Abdomen is soft.      Comments: Ostomy present   Musculoskeletal:         General: No swelling. Normal range of motion.      Cervical back: Normal range of motion and neck supple.      Right lower leg: No edema.      Left lower leg: No edema.   Skin:     General: Skin is warm and dry.   Neurological:      General: No focal deficit present.      Mental Status: She is alert and oriented to person, place, and time. Mental status is at baseline.   Psychiatric:         Mood and Affect: Mood normal.         Behavior: Behavior normal.         Thought Content: Thought content normal.         Judgment: Judgment normal.         Assessment/Plan:  Assessment & Plan    C18.9 Malignant neoplasm of colon, unspecified  K91.89 Other postprocedural complications and disorders of digestive system  Z93.2 Ileostomy status  G62.0 Drug-induced  polyneuropathy  K12.31 Oral mucositis (ulcerative) due to antineoplastic therapy  J84.114 Acute interstitial pneumonitis  I82.220 Acute embolism and thrombosis of inferior vena cava  I10 Essential (primary) hypertension  F32.0 Major depressive disorder, single episode, mild  F41.1 Generalized anxiety disorder  R63.4 Abnormal weight loss  Z90.49 Acquired absence of other specified parts of digestive tract    IMPRESSION:  - Reviewed complex medical history, including recent colon cancer diagnosis, lung nodule treated with radiation, and ongoing chemotherapy.  - Recent weight loss improved BP control, allowing discontinuation of HCTZ.  - Deferred lipid panel due to current treatment burden.  - Evaluated labial swelling which may be potential chemotherapy side effect with no other symptoms such as pain or abnormal vaginal discharge, recommend discussing with oncologist or follow-up with gynecology if persists or progresses.  - Assessed mental health status, noting past use of antidepressants and current coping strategies.  - Discussed timing of preventive care (mammogram, pap smear) in context of ongoing cancer treatment.    COLON CANCER:  - Monitor the patient's colon cancer, which is currently responding to chemotherapy treatment.  - Schedule an MRI of the abdomen and pelvis to monitor cancer progression.  - Note that the patient was diagnosed with colon cancer following an abnormal FIT home test and subsequent colonoscopy.  - Continue chemotherapy treatment, with three more rounds remaining, under the management of Dr. Khoobehi.    POSTPROCEDURAL COMPLICATIONS OF DIGESTIVE SYSTEM:  - Acknowledge the difficult recovery process the patient has experienced.    ILEOSTOMY STATUS:  - Monitor ileostomy output, which has been high per patient (1,125 mL yesterday, previously reaching 1,600-1,700 mL).  - Note that the patient is considered a high output person, even before restarting chemotherapy.  - Continue the current  regimen to manage ileostomy output.    CHEMOTHERAPY-INDUCED NEUROPATHY:  - Monitor neuropathy in toes as a side effect of chemotherapy.    ORAL MUCOSITIS DUE TO CHEMOTHERAPY:  - Monitor mouth sores as a side effect of chemotherapy.    ACUTE INTERSTITIAL PNEUMONITIS:  - Monitor shortness of breath that occurred after radiation treatment for lung nodule.  - Patient following with pulmonology with recent good PFT.    THROMBOSIS OF INFERIOR VENA CAVA:  - Continue Eliquis for blood clot in the inferior vena cava.    HYPERTENSION:  - Continue nifedipine 60 mg extended release daily.  - Monitor blood pressure, which has been trending appropriately.  - Note that the patient was taken off HCTZ due to overcontrolled pressure.    DEPRESSION AND ANXIETY:  - Monitor bouts of depression, which the patient reports are not as severe as before.  - Acknowledge the patient's history of depression and current emotional state.  - Note that the patient has Xanax prescribed for anxiety but does not take it regularly due to side effects.  - Continue to monitor depression and anxiety status.    PARTIAL ABSENCE OF DIGESTIVE TRACT:  - Follow up on the patient's status post-colon resection surgery.  - Monitor for any complications or adjustments needed related to the partial absence of the digestive tract.    FOLLOW-UP:  - None Ordered lipid panel to be added to upcoming labs scheduled for Monday.  - Follow up for annual visit unless concerns arise sooner.        Discussed she is due for mammogram and pap smear however may defer until chemotherapy is completed due to current treatment burden.    Problem List Items Addressed This Visit       Essential hypertension    Relevant Medications    NIFEdipine (ADALAT CC) 60 MG TbSR    Major depressive disorder with single episode, in full remission    Malignant neoplasm of sigmoid colon    Malignant neoplasm metastatic to left lung    Ileostomy in place     Other Visit Diagnoses         Routine general  medical examination at a health care facility    -  Primary    Relevant Orders    Lipid Panel      Mixed hyperlipidemia        Relevant Orders    Lipid Panel            Patient verbalizes understanding of instructions and healthcare topics reviewed. All of patient's questions were answered.  Patient encouraged to contact office if other questions arise.    Health Maintenance Due   Topic Date Due    Shingles Vaccine (1 of 2) Never done    Pneumococcal Vaccines (Age 50+) (1 of 2 - PCV) Never done    COVID-19 Vaccine (3 - Moderna risk series) 01/31/2022    RSV Vaccine (Age 60+ and Pregnant patients) (1 - Risk 60-74 years 1-dose series) Never done    Influenza Vaccine (1) 09/01/2024       Follow up in about 1 year (around 4/16/2026), or sooner if needed, for annual wellness visit. Reach out when ready for mammogram/pap smear.    This note was generated with the assistance of ambient listening technology. Verbal consent was obtained by the patient and accompanying visitor(s) for the recording of patient appointment to facilitate this note. I attest to having reviewed and edited the generated note for accuracy, though some syntax or spelling errors may persist. Please contact the author of this note for any clarification.    Nabila Schmidt PA-C  Family Medicine Physician Assistant          [1]   Social History  Tobacco Use    Smoking status: Never    Smokeless tobacco: Never   Substance Use Topics    Alcohol use: Yes     Comment: 2 drinks per week    Drug use: No     Comment: MEDICAL MARIJUANA

## 2025-04-17 ENCOUNTER — HOSPITAL ENCOUNTER (OUTPATIENT)
Dept: RADIOLOGY | Facility: HOSPITAL | Age: 65
Discharge: HOME | End: 2025-04-17
Attending: INTERNAL MEDICINE
Payer: COMMERCIAL

## 2025-04-17 DIAGNOSIS — C78.02 MALIGNANT NEOPLASM METASTATIC TO LEFT LUNG: ICD-10-CM

## 2025-04-17 DIAGNOSIS — J70.0 RADIATION PNEUMONITIS: ICD-10-CM

## 2025-04-17 DIAGNOSIS — C18.7 MALIGNANT NEOPLASM OF SIGMOID COLON: ICD-10-CM

## 2025-04-17 PROCEDURE — A9585 GADOBUTROL INJECTION: HCPCS

## 2025-04-17 PROCEDURE — 71046 X-RAY EXAM CHEST 2 VIEWS: CPT | Mod: 26,,, | Performed by: RADIOLOGY

## 2025-04-17 PROCEDURE — 71046 X-RAY EXAM CHEST 2 VIEWS: CPT | Mod: TC

## 2025-04-17 PROCEDURE — 25500020 PHARM REV CODE 255

## 2025-04-17 PROCEDURE — 76376 3D RENDER W/INTRP POSTPROCES: CPT | Mod: TC

## 2025-04-17 RX ORDER — GADOBUTROL 604.72 MG/ML
INJECTION INTRAVENOUS
Status: COMPLETED
Start: 2025-04-17 | End: 2025-04-17

## 2025-04-17 RX ADMIN — GADOBUTROL 7 ML: 604.72 INJECTION INTRAVENOUS at 12:04

## 2025-04-21 ENCOUNTER — LAB VISIT (OUTPATIENT)
Dept: LAB | Facility: HOSPITAL | Age: 65
End: 2025-04-21
Attending: INTERNAL MEDICINE
Payer: COMMERCIAL

## 2025-04-21 DIAGNOSIS — E78.2 MIXED HYPERLIPIDEMIA: ICD-10-CM

## 2025-04-21 DIAGNOSIS — C78.02 MALIGNANT NEOPLASM METASTATIC TO LEFT LUNG: ICD-10-CM

## 2025-04-21 DIAGNOSIS — Z00.00 ROUTINE GENERAL MEDICAL EXAMINATION AT A HEALTH CARE FACILITY: ICD-10-CM

## 2025-04-21 DIAGNOSIS — C18.7 MALIGNANT NEOPLASM OF SIGMOID COLON: ICD-10-CM

## 2025-04-21 LAB
ABSOLUTE EOSINOPHIL (SMH): 0.22 K/UL
ABSOLUTE MONOCYTE (SMH): 0.52 K/UL (ref 0.3–1)
ABSOLUTE NEUTROPHIL COUNT (SMH): 2.5 K/UL (ref 1.8–7.7)
ALBUMIN SERPL-MCNC: 4.1 G/DL (ref 3.5–5.2)
ALP SERPL-CCNC: 140 UNIT/L (ref 40–150)
ALT SERPL-CCNC: 72 UNIT/L (ref 10–44)
ANION GAP (SMH): 10 MMOL/L (ref 8–16)
AST SERPL-CCNC: 45 UNIT/L (ref 11–45)
BASOPHILS # BLD AUTO: 0.05 K/UL
BASOPHILS NFR BLD AUTO: 0.8 %
BILIRUB SERPL-MCNC: 0.5 MG/DL (ref 0.1–1)
BUN SERPL-MCNC: 15 MG/DL (ref 8–23)
CALCIUM SERPL-MCNC: 9.7 MG/DL (ref 8.7–10.5)
CHLORIDE SERPL-SCNC: 103 MMOL/L (ref 95–110)
CHOLEST SERPL-MCNC: 160 MG/DL (ref 120–199)
CHOLEST/HDLC SERPL: 2.8 {RATIO} (ref 2–5)
CO2 SERPL-SCNC: 24 MMOL/L (ref 23–29)
CREAT SERPL-MCNC: 1 MG/DL (ref 0.5–1.4)
ERYTHROCYTE [DISTWIDTH] IN BLOOD BY AUTOMATED COUNT: 13.5 % (ref 11.5–14.5)
GFR SERPLBLD CREATININE-BSD FMLA CKD-EPI: >60 ML/MIN/1.73/M2
GLUCOSE SERPL-MCNC: 94 MG/DL (ref 70–110)
HCT VFR BLD AUTO: 35 % (ref 37–48.5)
HDLC SERPL-MCNC: 57 MG/DL (ref 40–75)
HDLC SERPL: 35.6 % (ref 20–50)
HGB BLD-MCNC: 11.9 GM/DL (ref 12–16)
IMM GRANULOCYTES # BLD AUTO: 0.02 K/UL (ref 0–0.04)
IMM GRANULOCYTES NFR BLD AUTO: 0.3 % (ref 0–0.5)
LDLC SERPL CALC-MCNC: 72.4 MG/DL (ref 63–159)
LYMPHOCYTES # BLD AUTO: 2.67 K/UL (ref 1–4.8)
MAGNESIUM SERPL-MCNC: 1.8 MG/DL (ref 1.6–2.6)
MCH RBC QN AUTO: 32.3 PG (ref 27–31)
MCHC RBC AUTO-ENTMCNC: 34 G/DL (ref 32–36)
MCV RBC AUTO: 95 FL (ref 82–98)
NONHDLC SERPL-MCNC: 103 MG/DL
NUCLEATED RBC (/100WBC) (SMH): 0 /100 WBC
PLATELET # BLD AUTO: 219 K/UL (ref 150–450)
PMV BLD AUTO: 9.4 FL (ref 9.2–12.9)
POTASSIUM SERPL-SCNC: 4.2 MMOL/L (ref 3.5–5.1)
PROT SERPL-MCNC: 7.7 GM/DL (ref 6–8.4)
RBC # BLD AUTO: 3.68 M/UL (ref 4–5.4)
RELATIVE EOSINOPHIL (SMH): 3.7 % (ref 0–8)
RELATIVE LYMPHOCYTE (SMH): 44.4 % (ref 18–48)
RELATIVE MONOCYTE (SMH): 8.7 % (ref 4–15)
RELATIVE NEUTROPHIL (SMH): 42.1 % (ref 38–73)
SODIUM SERPL-SCNC: 137 MMOL/L (ref 136–145)
TRIGL SERPL-MCNC: 153 MG/DL (ref 30–150)
WBC # BLD AUTO: 6.01 K/UL (ref 3.9–12.7)

## 2025-04-21 PROCEDURE — 80053 COMPREHEN METABOLIC PANEL: CPT

## 2025-04-21 PROCEDURE — 83735 ASSAY OF MAGNESIUM: CPT

## 2025-04-21 PROCEDURE — 80061 LIPID PANEL: CPT

## 2025-04-21 PROCEDURE — 85025 COMPLETE CBC W/AUTO DIFF WBC: CPT

## 2025-04-21 PROCEDURE — 36415 COLL VENOUS BLD VENIPUNCTURE: CPT

## 2025-04-22 ENCOUNTER — OFFICE VISIT (OUTPATIENT)
Dept: HEMATOLOGY/ONCOLOGY | Facility: CLINIC | Age: 65
End: 2025-04-22
Payer: COMMERCIAL

## 2025-04-22 VITALS
DIASTOLIC BLOOD PRESSURE: 67 MMHG | RESPIRATION RATE: 17 BRPM | HEART RATE: 98 BPM | SYSTOLIC BLOOD PRESSURE: 122 MMHG | BODY MASS INDEX: 25.9 KG/M2 | OXYGEN SATURATION: 98 % | TEMPERATURE: 98 F | WEIGHT: 161.19 LBS | HEIGHT: 66 IN

## 2025-04-22 DIAGNOSIS — K94.19 ALTERED BOWEL ELIMINATION DUE TO INTESTINAL OSTOMY: ICD-10-CM

## 2025-04-22 DIAGNOSIS — C78.02 MALIGNANT NEOPLASM METASTATIC TO LEFT LUNG: ICD-10-CM

## 2025-04-22 DIAGNOSIS — K12.30 MUCOSITIS: ICD-10-CM

## 2025-04-22 DIAGNOSIS — C18.7 MALIGNANT NEOPLASM OF SIGMOID COLON: Primary | ICD-10-CM

## 2025-04-22 PROCEDURE — 99215 OFFICE O/P EST HI 40 MIN: CPT | Mod: S$GLB,,, | Performed by: INTERNAL MEDICINE

## 2025-04-22 PROCEDURE — 3074F SYST BP LT 130 MM HG: CPT | Mod: CPTII,S$GLB,, | Performed by: INTERNAL MEDICINE

## 2025-04-22 PROCEDURE — 3078F DIAST BP <80 MM HG: CPT | Mod: CPTII,S$GLB,, | Performed by: INTERNAL MEDICINE

## 2025-04-22 PROCEDURE — G2211 COMPLEX E/M VISIT ADD ON: HCPCS | Mod: S$GLB,,, | Performed by: INTERNAL MEDICINE

## 2025-04-22 PROCEDURE — 1159F MED LIST DOCD IN RCRD: CPT | Mod: CPTII,S$GLB,, | Performed by: INTERNAL MEDICINE

## 2025-04-22 PROCEDURE — 99999 PR PBB SHADOW E&M-EST. PATIENT-LVL IV: CPT | Mod: PBBFAC,,, | Performed by: INTERNAL MEDICINE

## 2025-04-22 PROCEDURE — 3008F BODY MASS INDEX DOCD: CPT | Mod: CPTII,S$GLB,, | Performed by: INTERNAL MEDICINE

## 2025-04-22 RX ORDER — ONDANSETRON HYDROCHLORIDE 2 MG/ML
8 INJECTION, SOLUTION INTRAVENOUS
Status: CANCELLED | OUTPATIENT
Start: 2025-04-23

## 2025-04-22 RX ORDER — HEPARIN 100 UNIT/ML
500 SYRINGE INTRAVENOUS
Status: CANCELLED | OUTPATIENT
Start: 2025-04-23

## 2025-04-22 RX ORDER — EPINEPHRINE 0.3 MG/.3ML
0.3 INJECTION SUBCUTANEOUS ONCE AS NEEDED
Status: CANCELLED | OUTPATIENT
Start: 2025-04-23

## 2025-04-22 RX ORDER — HEPARIN 100 UNIT/ML
500 SYRINGE INTRAVENOUS
Status: CANCELLED | OUTPATIENT
Start: 2025-04-25

## 2025-04-22 RX ORDER — SODIUM CHLORIDE 0.9 % (FLUSH) 0.9 %
10 SYRINGE (ML) INJECTION
Status: CANCELLED | OUTPATIENT
Start: 2025-04-25

## 2025-04-22 RX ORDER — FLUOROURACIL 50 MG/ML
400 INJECTION, SOLUTION INTRAVENOUS
Status: CANCELLED | OUTPATIENT
Start: 2025-04-23

## 2025-04-22 RX ORDER — DIPHENHYDRAMINE HYDROCHLORIDE 50 MG/ML
50 INJECTION, SOLUTION INTRAMUSCULAR; INTRAVENOUS ONCE AS NEEDED
Status: CANCELLED | OUTPATIENT
Start: 2025-04-23

## 2025-04-22 RX ORDER — SODIUM CHLORIDE 0.9 % (FLUSH) 0.9 %
10 SYRINGE (ML) INJECTION
Status: CANCELLED | OUTPATIENT
Start: 2025-04-23

## 2025-04-22 NOTE — PROGRESS NOTES
Service Date:  4/22/25    Chief Complaint: Malignant neoplasm metastatic to left lung (Labs  chemo clear MRI   sore in and around mouth  labia was swollen  eyes watering )    Malaika Craven is a 64 y.o. female here with oligometastatic rectosigmoid colon cancer    Had 6 cycles of FOLFOX with Avastin.  Then went to [facility] and had radiation of the lung met.  Then had resection by [facility].  Now has ostomy in place.    Here for cycle 4 of adjuvant treatment.  Doing well.  Ostomy output continues to remain improved.  Had some vaginal and oral mucositis since the last treatment, which has resolved.  No other issues.    Review of Systems   Constitutional: Negative.  Negative for appetite change and unexpected weight change.   HENT:  Negative for mouth sores.    Eyes: Negative.  Negative for visual disturbance.   Respiratory: Negative.  Negative for cough and shortness of breath.    Cardiovascular: Negative.  Negative for chest pain.   Gastrointestinal:  Negative for abdominal pain and diarrhea.   Endocrine: Negative.    Genitourinary: Negative.  Negative for frequency.   Musculoskeletal: Negative.  Negative for back pain.   Integumentary:  Negative for rash. Negative.   Neurological: Negative.  Negative for headaches.   Hematological: Negative.  Negative for adenopathy.   Psychiatric/Behavioral: Negative.          Current Outpatient Medications   Medication Instructions    ALPRAZolam (XANAX) 0.5 mg, Oral, 3 times daily    apixaban (ELIQUIS DVT-PE TREAT 30D START) 5 mg (74 tabs) DsPk 2 tablets, 2 times daily    diphenhydrAMINE-aluminum-magnesium hydroxide-simethicone-LIDOcaine viscous HCl 2% 15 mLs, Swish & Spit, Every 4 hours PRN    diphenoxylate-atropine 2.5-0.025 mg (LOMOTIL) 2.5-0.025 mg per tablet 1 tablet    EPINEPHrine (EPIPEN) 0.3 mg, Intramuscular, Once    fexofenadine (ALLEGRA ALLERGY) 60 MG tablet     LIDOcaine-prilocaine (EMLA) cream Topical (Top), As needed (PRN)    loperamide HCl (IMODIUM A-D ORAL)      NIFEdipine (ADALAT CC) 60 mg, Oral, Daily    ondansetron (ZOFRAN) 8 mg, Oral, Every 8 hours PRN    ondansetron (ZOFRAN-ODT) 4 mg, Every 6 hours PRN    opium tincture 6 mg, Oral, 4 times daily PRN    polyethylene glycol (GLYCOLAX) 17 g, Daily    potassium chloride (MICRO-K) 10 MEQ CpSR 20 mEq, Oral, Daily    psyllium husk, aspartame, (METAMUCIL) 3.4 gram PwPk packet 1 packet    RYALTRIS 665-25 mcg/spray [place]         Past Medical History:   Diagnosis Date    Abnormal Pap smear of cervix     Allergy     Corneal erosion of right eye     Generalized anxiety disorder     Hypertension     Rectal mass     Sinus problem         Past Surgical History:   Procedure Laterality Date    BREAST CYST ASPIRATION       SECTION      X 2    CHOLECYSTECTOMY      COLONOSCOPY N/A 2024    Procedure: COLONOSCOPY;  Surgeon: Radhika Leary MD;  Location: [facility];  Service: Endoscopy;  Laterality: N/A;    DILATION AND CURETTAGE OF UTERUS      [facility]      EYE SURGERY Right     FOR CORNEAL EROSION    FLEXIBLE SIGMOIDOSCOPY N/A 2024    Procedure: SIGMOIDOSCOPY, FLEXIBLE(Fleets);  Surgeon: Oumar Montoya MD;  Location: [facility];  Service: General;  Laterality: N/A;    INSERTION OF TUNNELED CENTRAL VENOUS CATHETER (CVC) WITH SUBCUTANEOUS PORT N/A 2024    Procedure: [id number];  Surgeon: Oumar Montoya MD;  Location: [facility];  Service: General;  Laterality: N/A;    LIPOSUCTION      x 2    Mole Excision Right     Shin    TUBAL LIGATION          Family History   Problem Relation Name Age of Onset    Heart disease Mother      Hypertension Mother      Arthritis Father      Thyroid disease Father      Kidney disease Paternal Grandmother      Liver cancer Maternal Grandmother      Cancer Maternal Grandfather      Allergies Sister      Allergic rhinitis Sister      Angioedema Neg Hx      Asthma Neg Hx      Atopy Neg Hx      Eczema Neg Hx      Immunodeficiency Neg Hx      Rhinitis Neg Hx      Urticaria Neg  "Hx      Amblyopia Neg Hx      Blindness Neg Hx      Cataracts Neg Hx      Diabetes Neg Hx      Glaucoma Neg Hx      Macular degeneration Neg Hx      Retinal detachment Neg Hx      Strabismus Neg Hx      Stroke Neg Hx         Social History     Tobacco Use    Smoking status: Never    Smokeless tobacco: Never   Substance Use Topics    Alcohol use: Yes     Comment: 2 drinks per week    Drug use: No     Comment: MEDICAL MARIJUANA         Vitals:    04/22/25 0906   BP: 122/67   Pulse: 98   Resp: 17   Temp: 97.5 °F (36.4 °C)        Physical Exam:  /67 (BP Location: Left arm, Patient Position: Sitting)   Pulse 98   Temp 97.5 °F (36.4 °C) (Temporal)   Resp 17   Ht 5' 6" (1.676 m)   Wt 73.1 kg (161 lb 2.5 oz)   SpO2 98%   BMI 26.01 kg/m²     Physical Exam  Constitutional:       Appearance: Normal appearance.   HENT:      Head: Normocephalic and atraumatic.      Nose: Nose normal.      Mouth/Throat:      Mouth: Mucous membranes are moist.      Pharynx: Oropharynx is clear.   Eyes:      Conjunctiva/sclera: Conjunctivae normal.   Cardiovascular:      Rate and Rhythm: Normal rate and regular rhythm.      Heart sounds: Normal heart sounds.   Pulmonary:      Effort: Pulmonary effort is normal.      Breath sounds: Normal breath sounds.   Abdominal:      General: Abdomen is flat. Bowel sounds are normal.      Palpations: Abdomen is soft.   Musculoskeletal:         General: Normal range of motion.      Cervical back: Normal range of motion and neck supple.   Skin:     General: Skin is warm and dry.   Neurological:      General: No focal deficit present.      Mental Status: She is alert and oriented to person, place, and time. Mental status is at baseline.   Psychiatric:         Mood and Affect: Mood normal.          Labs:  Lab Results   Component Value Date    WBC 6.01 04/21/2025    RBC 3.68 (L) 04/21/2025    HGB 11.9 (L) 04/21/2025    HCT 35.0 (L) 04/21/2025    MCV 95 04/21/2025    MCH 32.3 (H) 04/21/2025    MCHC 34.0 " 04/21/2025    RDW 13.5 04/21/2025     04/21/2025    MPV 9.4 04/21/2025    GRAN 3.8 03/18/2025    GRAN 64.5 03/18/2025    LYMPH 1.6 03/18/2025    LYMPH 27.8 03/18/2025    MONO 0.2 (L) 03/18/2025    MONO 3.4 (L) 03/18/2025    EOS 0.2 03/18/2025    BASO 0.05 03/18/2025    EOSINOPHIL 3.1 03/18/2025    BASOPHIL 0.9 03/18/2025     Sodium   Date Value Ref Range Status   04/21/2025 137 136 - 145 mmol/L Final     Potassium   Date Value Ref Range Status   04/21/2025 4.2 3.5 - 5.1 mmol/L Final     Chloride   Date Value Ref Range Status   03/18/2025 106 95 - 110 mmol/L Final     CO2   Date Value Ref Range Status   04/21/2025 24 23 - 29 mmol/L Final     Glucose   Date Value Ref Range Status   03/18/2025 146 (H) 70 - 110 mg/dL Final     BUN   Date Value Ref Range Status   04/21/2025 15 8 - 23 mg/dL Final     Creatinine   Date Value Ref Range Status   04/21/2025 1.0 0.5 - 1.4 mg/dL Final     Calcium   Date Value Ref Range Status   04/21/2025 9.7 8.7 - 10.5 mg/dL Final     Total Protein   Date Value Ref Range Status   03/18/2025 8.2 6.0 - 8.4 g/dL Final     Albumin   Date Value Ref Range Status   04/21/2025 4.1 3.5 - 5.2 g/dL Final     Bilirubin Total   Date Value Ref Range Status   04/21/2025 0.5 0.1 - 1.0 mg/dL Final     Comment:     For infants and newborns, interpretation of results should be based   on gestational age, weight and in agreement with clinical   observations.    Premature Infant recommended reference ranges:   0-24 hours:  <8.0 mg/dL   24-48 hours: <12.0 mg/dL   3-5 days:    <15.0 mg/dL   6-29 days:   <15.0 mg/dL     ALP   Date Value Ref Range Status   04/21/2025 140 40 - 150 unit/L Final     AST   Date Value Ref Range Status   04/21/2025 45 11 - 45 unit/L Final     ALT   Date Value Ref Range Status   04/21/2025 72 (H) 10 - 44 unit/L Final     Anion Gap   Date Value Ref Range Status   03/18/2025 12 8 - 16 mmol/L Final     eGFR if    Date Value Ref Range Status   10/25/2021 >60.0 >60  mL/min/1.73 m^2 Final     eGFR if non    Date Value Ref Range Status   10/25/2021 >60.0 >60 mL/min/1.73 m^2 Final     Comment:     Calculation used to obtain the estimated glomerular filtration  rate (eGFR) is the CKD-EPI equation.          A/P:    Rectal adenocarcinoma   -patient had oligometastatic disease   -had 6 cycles of FOLFOX and Avastin with me.  Then went to [facility] had radiation therapy to the lung metastatic disease.  She also had a resection by [facility].  She now has an ostomy in place.    -now on adjuvant 5 FU for a total of 3 months plan    -okay for cycle 4 of 6  -return to clinic in 2 weeks for next treatment    Mucositis   -self-limited   -continue magic mouthwash as needed    Ostomy  -has high output.  -combination of Imodium, Lomotil, and Metamucil with the tincture of opium seems to finally be helping    Aurash Khoobehi, MD  Hematology and Oncology    Visit today included increased complexity associated with the care of the episodic problem addressed and managing the longitudinal care of the patient due to the serious and/or complex managed problem(s) colon cancer.

## 2025-04-23 ENCOUNTER — INFUSION (OUTPATIENT)
Dept: INFUSION THERAPY | Facility: HOSPITAL | Age: 65
End: 2025-04-23
Attending: INTERNAL MEDICINE
Payer: COMMERCIAL

## 2025-04-23 VITALS
TEMPERATURE: 98 F | SYSTOLIC BLOOD PRESSURE: 118 MMHG | DIASTOLIC BLOOD PRESSURE: 76 MMHG | WEIGHT: 161.38 LBS | HEART RATE: 82 BPM | BODY MASS INDEX: 25.94 KG/M2 | OXYGEN SATURATION: 99 % | HEIGHT: 66 IN | RESPIRATION RATE: 18 BRPM

## 2025-04-23 DIAGNOSIS — C18.7 MALIGNANT NEOPLASM OF SIGMOID COLON: ICD-10-CM

## 2025-04-23 DIAGNOSIS — C78.02 MALIGNANT NEOPLASM METASTATIC TO LEFT LUNG: Primary | ICD-10-CM

## 2025-04-23 PROCEDURE — 96416 CHEMO PROLONG INFUSE W/PUMP: CPT

## 2025-04-23 PROCEDURE — 25000003 PHARM REV CODE 250: Performed by: INTERNAL MEDICINE

## 2025-04-23 PROCEDURE — 96409 CHEMO IV PUSH SNGL DRUG: CPT

## 2025-04-23 PROCEDURE — 96366 THER/PROPH/DIAG IV INF ADDON: CPT

## 2025-04-23 PROCEDURE — 96367 TX/PROPH/DG ADDL SEQ IV INF: CPT

## 2025-04-23 PROCEDURE — 63600175 PHARM REV CODE 636 W HCPCS: Performed by: INTERNAL MEDICINE

## 2025-04-23 RX ORDER — SODIUM CHLORIDE 0.9 % (FLUSH) 0.9 %
10 SYRINGE (ML) INJECTION
Status: DISCONTINUED | OUTPATIENT
Start: 2025-04-23 | End: 2025-04-23 | Stop reason: HOSPADM

## 2025-04-23 RX ORDER — DIPHENHYDRAMINE HYDROCHLORIDE 50 MG/ML
50 INJECTION, SOLUTION INTRAMUSCULAR; INTRAVENOUS ONCE AS NEEDED
Status: DISCONTINUED | OUTPATIENT
Start: 2025-04-23 | End: 2025-04-23 | Stop reason: HOSPADM

## 2025-04-23 RX ORDER — EPINEPHRINE 0.3 MG/.3ML
0.3 INJECTION SUBCUTANEOUS ONCE AS NEEDED
Status: DISCONTINUED | OUTPATIENT
Start: 2025-04-23 | End: 2025-04-23 | Stop reason: HOSPADM

## 2025-04-23 RX ORDER — FLUOROURACIL 50 MG/ML
400 INJECTION, SOLUTION INTRAVENOUS
Status: COMPLETED | OUTPATIENT
Start: 2025-04-23 | End: 2025-04-23

## 2025-04-23 RX ORDER — ONDANSETRON HCL IN 0.9 % NACL 8 MG/50 ML
8 INTRAVENOUS SOLUTION, PIGGYBACK (ML) INTRAVENOUS
Status: COMPLETED | OUTPATIENT
Start: 2025-04-23 | End: 2025-04-23

## 2025-04-23 RX ADMIN — FLUOROURACIL 4440 MG: 50 INJECTION, SOLUTION INTRAVENOUS at 12:04

## 2025-04-23 RX ADMIN — LEUCOVORIN CALCIUM 740 MG: 350 INJECTION, POWDER, LYOPHILIZED, FOR SOLUTION INTRAMUSCULAR; INTRAVENOUS at 10:04

## 2025-04-23 RX ADMIN — SODIUM CHLORIDE 8 MG: 9 INJECTION, SOLUTION INTRAVENOUS at 10:04

## 2025-04-23 RX ADMIN — FLUOROURACIL 740 MG: 50 INJECTION, SOLUTION INTRAVENOUS at 12:04

## 2025-04-23 NOTE — PLAN OF CARE
Problem: Fatigue  Goal: Improved Activity Tolerance  Outcome: Progressing  Intervention: Promote Improved Energy  Flowsheets (Taken 4/23/2025 1036)  Fatigue Management: frequent rest breaks encouraged

## 2025-04-24 ENCOUNTER — RESULTS FOLLOW-UP (OUTPATIENT)
Dept: FAMILY MEDICINE | Facility: CLINIC | Age: 65
End: 2025-04-24

## 2025-04-25 ENCOUNTER — INFUSION (OUTPATIENT)
Dept: INFUSION THERAPY | Facility: HOSPITAL | Age: 65
End: 2025-04-25
Attending: INTERNAL MEDICINE
Payer: COMMERCIAL

## 2025-04-25 VITALS
HEIGHT: 66 IN | HEART RATE: 109 BPM | WEIGHT: 160.88 LBS | SYSTOLIC BLOOD PRESSURE: 125 MMHG | OXYGEN SATURATION: 98 % | DIASTOLIC BLOOD PRESSURE: 76 MMHG | BODY MASS INDEX: 25.85 KG/M2 | TEMPERATURE: 99 F | RESPIRATION RATE: 18 BRPM

## 2025-04-25 DIAGNOSIS — C18.7 MALIGNANT NEOPLASM OF SIGMOID COLON: ICD-10-CM

## 2025-04-25 DIAGNOSIS — C78.02 MALIGNANT NEOPLASM METASTATIC TO LEFT LUNG: Primary | ICD-10-CM

## 2025-04-25 PROCEDURE — 63600175 PHARM REV CODE 636 W HCPCS: Performed by: INTERNAL MEDICINE

## 2025-04-25 PROCEDURE — A4216 STERILE WATER/SALINE, 10 ML: HCPCS | Performed by: INTERNAL MEDICINE

## 2025-04-25 PROCEDURE — 25000003 PHARM REV CODE 250: Performed by: INTERNAL MEDICINE

## 2025-04-25 RX ORDER — HEPARIN 100 UNIT/ML
500 SYRINGE INTRAVENOUS
Status: DISCONTINUED | OUTPATIENT
Start: 2025-04-25 | End: 2025-04-25 | Stop reason: HOSPADM

## 2025-04-25 RX ORDER — SODIUM CHLORIDE 0.9 % (FLUSH) 0.9 %
10 SYRINGE (ML) INJECTION
Status: DISCONTINUED | OUTPATIENT
Start: 2025-04-25 | End: 2025-04-25 | Stop reason: HOSPADM

## 2025-04-25 RX ADMIN — HEPARIN 500 UNITS: 100 SYRINGE at 11:04

## 2025-04-25 RX ADMIN — SODIUM CHLORIDE, PRESERVATIVE FREE 10 ML: 5 INJECTION INTRAVENOUS at 11:04

## 2025-04-29 ENCOUNTER — PATIENT MESSAGE (OUTPATIENT)
Dept: HEMATOLOGY/ONCOLOGY | Facility: CLINIC | Age: 65
End: 2025-04-29
Payer: COMMERCIAL

## 2025-04-29 DIAGNOSIS — R19.7 DIARRHEA, UNSPECIFIED TYPE: ICD-10-CM

## 2025-04-29 RX ORDER — MORPHINE 10 MG/ML
6 TINCTURE ORAL 4 TIMES DAILY PRN
Qty: 118 ML | Refills: 0 | Status: SHIPPED | OUTPATIENT
Start: 2025-04-29

## 2025-04-30 DIAGNOSIS — C18.7 MALIGNANT NEOPLASM OF SIGMOID COLON: ICD-10-CM

## 2025-04-30 DIAGNOSIS — R19.7 DIARRHEA, UNSPECIFIED TYPE: Primary | ICD-10-CM

## 2025-04-30 RX ORDER — LOPERAMIDE HCL 2 MG
2 TABLET ORAL 4 TIMES DAILY PRN
Qty: 30 TABLET | Refills: 0 | Status: SHIPPED | OUTPATIENT
Start: 2025-04-30

## 2025-04-30 RX ORDER — ONDANSETRON 4 MG/1
4 TABLET, ORALLY DISINTEGRATING ORAL EVERY 6 HOURS PRN
Qty: 30 TABLET | Refills: 3 | Status: SHIPPED | OUTPATIENT
Start: 2025-04-30 | End: 2026-04-30

## 2025-05-02 ENCOUNTER — HOSPITAL ENCOUNTER (OUTPATIENT)
Dept: RADIOLOGY | Facility: HOSPITAL | Age: 65
Discharge: HOME | End: 2025-05-02
Attending: INTERNAL MEDICINE
Payer: COMMERCIAL

## 2025-05-02 DIAGNOSIS — J70.0 RADIATION PNEUMONITIS: ICD-10-CM

## 2025-05-02 PROCEDURE — 71250 CT THORAX DX C-: CPT | Mod: 26,,, | Performed by: RADIOLOGY

## 2025-05-02 PROCEDURE — 71250 CT THORAX DX C-: CPT | Mod: TC

## 2025-05-05 ENCOUNTER — LAB VISIT (OUTPATIENT)
Dept: LAB | Facility: HOSPITAL | Age: 65
End: 2025-05-05
Attending: INTERNAL MEDICINE
Payer: COMMERCIAL

## 2025-05-05 DIAGNOSIS — C78.02 MALIGNANT NEOPLASM METASTATIC TO LEFT LUNG: ICD-10-CM

## 2025-05-05 DIAGNOSIS — C18.7 MALIGNANT NEOPLASM OF SIGMOID COLON: ICD-10-CM

## 2025-05-05 LAB
ABSOLUTE EOSINOPHIL (SMH): 0.18 K/UL
ABSOLUTE MONOCYTE (SMH): 0.57 K/UL (ref 0.3–1)
ABSOLUTE NEUTROPHIL COUNT (SMH): 3.1 K/UL (ref 1.8–7.7)
ALBUMIN SERPL-MCNC: 4.4 G/DL (ref 3.5–5.2)
ALP SERPL-CCNC: 110 UNIT/L (ref 40–150)
ALT SERPL-CCNC: 34 UNIT/L (ref 10–44)
ANION GAP (SMH): 13 MMOL/L (ref 8–16)
AST SERPL-CCNC: 35 UNIT/L (ref 11–45)
BASOPHILS # BLD AUTO: 0.05 K/UL
BASOPHILS NFR BLD AUTO: 0.8 %
BILIRUB SERPL-MCNC: 0.7 MG/DL (ref 0.1–1)
BUN SERPL-MCNC: 24 MG/DL (ref 8–23)
CALCIUM SERPL-MCNC: 9.8 MG/DL (ref 8.7–10.5)
CHLORIDE SERPL-SCNC: 103 MMOL/L (ref 95–110)
CO2 SERPL-SCNC: 19 MMOL/L (ref 23–29)
CREAT SERPL-MCNC: 1.5 MG/DL (ref 0.5–1.4)
ERYTHROCYTE [DISTWIDTH] IN BLOOD BY AUTOMATED COUNT: 13.7 % (ref 11.5–14.5)
GFR SERPLBLD CREATININE-BSD FMLA CKD-EPI: 39 ML/MIN/1.73/M2
GLUCOSE SERPL-MCNC: 108 MG/DL (ref 70–110)
HCT VFR BLD AUTO: 34.1 % (ref 37–48.5)
HGB BLD-MCNC: 12 GM/DL (ref 12–16)
IMM GRANULOCYTES # BLD AUTO: 0.02 K/UL (ref 0–0.04)
IMM GRANULOCYTES NFR BLD AUTO: 0.3 % (ref 0–0.5)
LYMPHOCYTES # BLD AUTO: 2.68 K/UL (ref 1–4.8)
MCH RBC QN AUTO: 33.1 PG (ref 27–31)
MCHC RBC AUTO-ENTMCNC: 35.2 G/DL (ref 32–36)
MCV RBC AUTO: 94 FL (ref 82–98)
NUCLEATED RBC (/100WBC) (SMH): 0 /100 WBC
PLATELET # BLD AUTO: 216 K/UL (ref 150–450)
PMV BLD AUTO: 10.1 FL (ref 9.2–12.9)
POTASSIUM SERPL-SCNC: 4.6 MMOL/L (ref 3.5–5.1)
PROT SERPL-MCNC: 8 GM/DL (ref 6–8.4)
RBC # BLD AUTO: 3.63 M/UL (ref 4–5.4)
RELATIVE EOSINOPHIL (SMH): 2.7 % (ref 0–8)
RELATIVE LYMPHOCYTE (SMH): 40.6 % (ref 18–48)
RELATIVE MONOCYTE (SMH): 8.6 % (ref 4–15)
RELATIVE NEUTROPHIL (SMH): 47 % (ref 38–73)
SODIUM SERPL-SCNC: 135 MMOL/L (ref 136–145)
WBC # BLD AUTO: 6.6 K/UL (ref 3.9–12.7)

## 2025-05-05 PROCEDURE — 85025 COMPLETE CBC W/AUTO DIFF WBC: CPT

## 2025-05-05 PROCEDURE — 36415 COLL VENOUS BLD VENIPUNCTURE: CPT

## 2025-05-05 PROCEDURE — 84075 ASSAY ALKALINE PHOSPHATASE: CPT

## 2025-05-06 ENCOUNTER — OFFICE VISIT (OUTPATIENT)
Dept: HEMATOLOGY/ONCOLOGY | Facility: CLINIC | Age: 65
End: 2025-05-06
Payer: COMMERCIAL

## 2025-05-06 VITALS
HEIGHT: 66 IN | HEART RATE: 75 BPM | TEMPERATURE: 97 F | BODY MASS INDEX: 25.22 KG/M2 | RESPIRATION RATE: 18 BRPM | WEIGHT: 156.94 LBS | OXYGEN SATURATION: 100 % | SYSTOLIC BLOOD PRESSURE: 124 MMHG | DIASTOLIC BLOOD PRESSURE: 68 MMHG

## 2025-05-06 DIAGNOSIS — N17.9 AKI (ACUTE KIDNEY INJURY): ICD-10-CM

## 2025-05-06 DIAGNOSIS — C18.7 MALIGNANT NEOPLASM OF SIGMOID COLON: Primary | ICD-10-CM

## 2025-05-06 DIAGNOSIS — C78.02 MALIGNANT NEOPLASM METASTATIC TO LEFT LUNG: ICD-10-CM

## 2025-05-06 PROCEDURE — 1160F RVW MEDS BY RX/DR IN RCRD: CPT | Mod: CPTII,S$GLB,, | Performed by: INTERNAL MEDICINE

## 2025-05-06 PROCEDURE — G2211 COMPLEX E/M VISIT ADD ON: HCPCS | Mod: S$GLB,,, | Performed by: INTERNAL MEDICINE

## 2025-05-06 PROCEDURE — 3074F SYST BP LT 130 MM HG: CPT | Mod: CPTII,S$GLB,, | Performed by: INTERNAL MEDICINE

## 2025-05-06 PROCEDURE — 3078F DIAST BP <80 MM HG: CPT | Mod: CPTII,S$GLB,, | Performed by: INTERNAL MEDICINE

## 2025-05-06 PROCEDURE — 99215 OFFICE O/P EST HI 40 MIN: CPT | Mod: S$GLB,,, | Performed by: INTERNAL MEDICINE

## 2025-05-06 PROCEDURE — 1159F MED LIST DOCD IN RCRD: CPT | Mod: CPTII,S$GLB,, | Performed by: INTERNAL MEDICINE

## 2025-05-06 PROCEDURE — 3008F BODY MASS INDEX DOCD: CPT | Mod: CPTII,S$GLB,, | Performed by: INTERNAL MEDICINE

## 2025-05-06 PROCEDURE — 99999 PR PBB SHADOW E&M-EST. PATIENT-LVL III: CPT | Mod: PBBFAC,,, | Performed by: INTERNAL MEDICINE

## 2025-05-06 RX ORDER — DIPHENHYDRAMINE HYDROCHLORIDE 50 MG/ML
50 INJECTION, SOLUTION INTRAMUSCULAR; INTRAVENOUS ONCE AS NEEDED
Status: CANCELLED | OUTPATIENT
Start: 2025-05-07

## 2025-05-06 RX ORDER — SODIUM CHLORIDE 0.9 % (FLUSH) 0.9 %
10 SYRINGE (ML) INJECTION
Status: CANCELLED | OUTPATIENT
Start: 2025-05-09

## 2025-05-06 RX ORDER — SODIUM CHLORIDE 0.9 % (FLUSH) 0.9 %
10 SYRINGE (ML) INJECTION
Status: CANCELLED | OUTPATIENT
Start: 2025-05-07

## 2025-05-06 RX ORDER — ONDANSETRON HYDROCHLORIDE 2 MG/ML
8 INJECTION, SOLUTION INTRAVENOUS
Status: CANCELLED | OUTPATIENT
Start: 2025-05-07

## 2025-05-06 RX ORDER — FLUOROURACIL 50 MG/ML
400 INJECTION, SOLUTION INTRAVENOUS
Status: CANCELLED | OUTPATIENT
Start: 2025-05-07

## 2025-05-06 RX ORDER — HEPARIN 100 UNIT/ML
500 SYRINGE INTRAVENOUS
Status: CANCELLED | OUTPATIENT
Start: 2025-05-07

## 2025-05-06 RX ORDER — EPINEPHRINE 0.3 MG/.3ML
0.3 INJECTION SUBCUTANEOUS ONCE AS NEEDED
Status: CANCELLED | OUTPATIENT
Start: 2025-05-07

## 2025-05-06 RX ORDER — HEPARIN 100 UNIT/ML
500 SYRINGE INTRAVENOUS
Status: CANCELLED | OUTPATIENT
Start: 2025-05-09

## 2025-05-06 NOTE — PROGRESS NOTES
Service Date:  5/6/25    Chief Complaint: Colon Cancer (Labs  chemo clear)    Malaika Craven is a 64 y.o. female here with oligometastatic rectosigmoid colon cancer    Had 6 cycles of FOLFOX with Avastin.  Then went to [facility] and had radiation of the lung met.  Then had resection by [facility].  Now has ostomy in place.    Here for cycle 5 of adjuvant treatment.  Doing well.  Ostomy output continues to remain improved.  Doing well overall.  No complaints.    Review of Systems   Constitutional: Negative.  Negative for appetite change and unexpected weight change.   HENT:  Negative for mouth sores.    Eyes: Negative.  Negative for visual disturbance.   Respiratory: Negative.  Negative for cough and shortness of breath.    Cardiovascular: Negative.  Negative for chest pain.   Gastrointestinal:  Negative for abdominal pain and diarrhea.   Endocrine: Negative.    Genitourinary: Negative.  Negative for frequency.   Musculoskeletal: Negative.  Negative for back pain.   Integumentary:  Negative for rash. Negative.   Neurological: Negative.  Negative for headaches.   Hematological: Negative.  Negative for adenopathy.   Psychiatric/Behavioral: Negative.          Current Outpatient Medications   Medication Instructions    ALPRAZolam (XANAX) 0.5 mg, Oral, 3 times daily    apixaban (ELIQUIS DVT-PE TREAT 30D START) 5 mg (74 tabs) DsPk 2 tablets, 2 times daily    diphenhydrAMINE-aluminum-magnesium hydroxide-simethicone-LIDOcaine viscous HCl 2% 15 mLs, Swish & Spit, Every 4 hours PRN    diphenoxylate-atropine 2.5-0.025 mg (LOMOTIL) 2.5-0.025 mg per tablet 1 tablet    EPINEPHrine (EPIPEN) 0.3 mg, Intramuscular, Once    fexofenadine (ALLEGRA ALLERGY) 60 MG tablet     LIDOcaine-prilocaine (EMLA) cream Topical (Top), As needed (PRN)    loperamide (IMODIUM A-D) 2 mg, Oral, 4 times daily PRN    NIFEdipine (ADALAT CC) 60 mg, Oral, Daily    ondansetron (ZOFRAN) 8 mg, Oral, Every 8 hours PRN    ondansetron (ZOFRAN-ODT) 4 mg, Oral, Every  6 hours PRN    opium tincture 6 mg, Oral, 4 times daily PRN    polyethylene glycol (GLYCOLAX) 17 g, Daily    potassium chloride (MICRO-K) 10 MEQ CpSR 20 mEq, Oral, Daily    psyllium husk, aspartame, (METAMUCIL) 3.4 gram PwPk packet 1 packet    RYALTRIS 665-25 mcg/spray [place]         Past Medical History:   Diagnosis Date    Abnormal Pap smear of cervix     Allergy     Corneal erosion of right eye     Generalized anxiety disorder     Hypertension     Rectal mass     Sinus problem         Past Surgical History:   Procedure Laterality Date    BREAST CYST ASPIRATION       SECTION      X 2    CHOLECYSTECTOMY      COLONOSCOPY N/A 2024    Procedure: COLONOSCOPY;  Surgeon: Radhika Leary MD;  Location: [facility] ENDO;  Service: Endoscopy;  Laterality: N/A;    DILATION AND CURETTAGE OF UTERUS      [facility]      EYE SURGERY Right     FOR CORNEAL EROSION    FLEXIBLE SIGMOIDOSCOPY N/A 2024    Procedure: SIGMOIDOSCOPY, FLEXIBLE(Flesilvia);  Surgeon: Oumar Montoya MD;  Location: [facility] ENDO;  Service: General;  Laterality: N/A;    INSERTION OF TUNNELED CENTRAL VENOUS CATHETER (CVC) WITH SUBCUTANEOUS PORT N/A 2024    Procedure: [id number];  Surgeon: Oumar Montoya MD;  Location: [facility] OR;  Service: General;  Laterality: N/A;    LIPOSUCTION      x 2    Mole Excision Right     Shin    TUBAL LIGATION          Family History   Problem Relation Name Age of Onset    Heart disease Mother      Hypertension Mother      Arthritis Father      Thyroid disease Father      Kidney disease Paternal Grandmother      Liver cancer Maternal Grandmother      Cancer Maternal Grandfather      Allergies Sister      Allergic rhinitis Sister      Angioedema Neg Hx      Asthma Neg Hx      Atopy Neg Hx      Eczema Neg Hx      Immunodeficiency Neg Hx      Rhinitis Neg Hx      Urticaria Neg Hx      Amblyopia Neg Hx      Blindness Neg Hx      Cataracts Neg Hx      Diabetes Neg Hx      Glaucoma Neg Hx      Macular degeneration  "Neg Hx      Retinal detachment Neg Hx      Strabismus Neg Hx      Stroke Neg Hx         Social History     Tobacco Use    Smoking status: Never    Smokeless tobacco: Never   Substance Use Topics    Alcohol use: Yes     Comment: 2 drinks per week    Drug use: No     Comment: MEDICAL MARIJUANA         Vitals:    05/06/25 1026   BP: 124/68   Pulse: 75   Resp: 18   Temp: 97.3 °F (36.3 °C)        Physical Exam:  /68 (BP Location: Right arm, Patient Position: Sitting)   Pulse 75   Temp 97.3 °F (36.3 °C) (Temporal)   Resp 18   Ht 5' 6" (1.676 m)   Wt 71.2 kg (156 lb 15.5 oz)   SpO2 100%   BMI 25.34 kg/m²     Physical Exam  Constitutional:       Appearance: Normal appearance.   HENT:      Head: Normocephalic and atraumatic.      Nose: Nose normal.      Mouth/Throat:      Mouth: Mucous membranes are moist.      Pharynx: Oropharynx is clear.   Eyes:      Conjunctiva/sclera: Conjunctivae normal.   Cardiovascular:      Rate and Rhythm: Normal rate and regular rhythm.      Heart sounds: Normal heart sounds.   Pulmonary:      Effort: Pulmonary effort is normal.      Breath sounds: Normal breath sounds.   Abdominal:      General: Abdomen is flat. Bowel sounds are normal.      Palpations: Abdomen is soft.   Musculoskeletal:         General: Normal range of motion.      Cervical back: Normal range of motion and neck supple.   Skin:     General: Skin is warm and dry.   Neurological:      General: No focal deficit present.      Mental Status: She is alert and oriented to person, place, and time. Mental status is at baseline.   Psychiatric:         Mood and Affect: Mood normal.          Labs:  Lab Results   Component Value Date    WBC 6.60 05/05/2025    RBC 3.63 (L) 05/05/2025    HGB 12.0 05/05/2025    HCT 34.1 (L) 05/05/2025    MCV 94 05/05/2025    MCH 33.1 (H) 05/05/2025    MCHC 35.2 05/05/2025    RDW 13.7 05/05/2025     05/05/2025    MPV 10.1 05/05/2025    GRAN 3.8 03/18/2025    GRAN 64.5 03/18/2025    LYMPH 1.6 " 03/18/2025    LYMPH 27.8 03/18/2025    MONO 0.2 (L) 03/18/2025    MONO 3.4 (L) 03/18/2025    EOS 0.2 03/18/2025    BASO 0.05 03/18/2025    EOSINOPHIL 3.1 03/18/2025    BASOPHIL 0.9 03/18/2025     Sodium   Date Value Ref Range Status   05/05/2025 135 (L) 136 - 145 mmol/L Final     Potassium   Date Value Ref Range Status   05/05/2025 4.6 3.5 - 5.1 mmol/L Final     Chloride   Date Value Ref Range Status   05/05/2025 103 95 - 110 mmol/L Final     CO2   Date Value Ref Range Status   05/05/2025 19 (L) 23 - 29 mmol/L Final     Glucose   Date Value Ref Range Status   05/05/2025 108 70 - 110 mg/dL Final     BUN   Date Value Ref Range Status   05/05/2025 24 (H) 8 - 23 mg/dL Final     Creatinine   Date Value Ref Range Status   05/05/2025 1.5 (H) 0.5 - 1.4 mg/dL Final     Calcium   Date Value Ref Range Status   05/05/2025 9.8 8.7 - 10.5 mg/dL Final     Protein Total   Date Value Ref Range Status   05/05/2025 8.0 6.0 - 8.4 gm/dL Final     Albumin   Date Value Ref Range Status   05/05/2025 4.4 3.5 - 5.2 g/dL Final     Bilirubin Total   Date Value Ref Range Status   05/05/2025 0.7 0.1 - 1.0 mg/dL Final     Comment:     For infants and newborns, interpretation of results should be based   on gestational age, weight and in agreement with clinical   observations.    Premature Infant recommended reference ranges:   0-24 hours:  <8.0 mg/dL   24-48 hours: <12.0 mg/dL   3-5 days:    <15.0 mg/dL   6-29 days:   <15.0 mg/dL     ALP   Date Value Ref Range Status   05/05/2025 110 40 - 150 unit/L Final     AST   Date Value Ref Range Status   05/05/2025 35 11 - 45 unit/L Final     ALT   Date Value Ref Range Status   05/05/2025 34 10 - 44 unit/L Final     Anion Gap   Date Value Ref Range Status   05/05/2025 13 8 - 16 mmol/L Final     eGFR if    Date Value Ref Range Status   10/25/2021 >60.0 >60 mL/min/1.73 m^2 Final     eGFR if non    Date Value Ref Range Status   10/25/2021 >60.0 >60 mL/min/1.73 m^2 Final      Comment:     Calculation used to obtain the estimated glomerular filtration  rate (eGFR) is the CKD-EPI equation.          A/P:    Rectal adenocarcinoma   -patient had oligometastatic disease   -had 6 cycles of FOLFOX and Avastin with me.  Then went to [facility] had radiation therapy to the lung metastatic disease.  She also had a resection by [facility].  She now has an ostomy in place.    -now on adjuvant 5 FU for a total of 3 months plan    -okay for cycle 5 of 6  -return to clinic in 2 weeks for next treatment    Ostomy  -has high output.  -combination of Imodium, Lomotil, and Metamucil with the tincture of opium seems to finally be helping    Aurash Khoobehi, MD  Hematology and Oncology    Visit today included increased complexity associated with the care of the episodic problem addressed and managing the longitudinal care of the patient due to the serious and/or complex managed problem(s) colon cancer.

## 2025-05-07 ENCOUNTER — INFUSION (OUTPATIENT)
Dept: INFUSION THERAPY | Facility: HOSPITAL | Age: 65
End: 2025-05-07
Attending: INTERNAL MEDICINE
Payer: COMMERCIAL

## 2025-05-07 VITALS
RESPIRATION RATE: 16 BRPM | BODY MASS INDEX: 25.31 KG/M2 | SYSTOLIC BLOOD PRESSURE: 129 MMHG | HEART RATE: 70 BPM | WEIGHT: 157.5 LBS | OXYGEN SATURATION: 100 % | DIASTOLIC BLOOD PRESSURE: 85 MMHG | HEIGHT: 66 IN | TEMPERATURE: 98 F

## 2025-05-07 DIAGNOSIS — C78.02 MALIGNANT NEOPLASM METASTATIC TO LEFT LUNG: Primary | ICD-10-CM

## 2025-05-07 DIAGNOSIS — C18.7 MALIGNANT NEOPLASM OF SIGMOID COLON: ICD-10-CM

## 2025-05-07 PROCEDURE — 96366 THER/PROPH/DIAG IV INF ADDON: CPT

## 2025-05-07 PROCEDURE — 96409 CHEMO IV PUSH SNGL DRUG: CPT

## 2025-05-07 PROCEDURE — 96367 TX/PROPH/DG ADDL SEQ IV INF: CPT

## 2025-05-07 PROCEDURE — 25000003 PHARM REV CODE 250: Performed by: INTERNAL MEDICINE

## 2025-05-07 PROCEDURE — 63600175 PHARM REV CODE 636 W HCPCS: Performed by: INTERNAL MEDICINE

## 2025-05-07 PROCEDURE — 96416 CHEMO PROLONG INFUSE W/PUMP: CPT

## 2025-05-07 RX ORDER — FLUOROURACIL 50 MG/ML
400 INJECTION, SOLUTION INTRAVENOUS
Status: COMPLETED | OUTPATIENT
Start: 2025-05-07 | End: 2025-05-07

## 2025-05-07 RX ORDER — SODIUM CHLORIDE 0.9 % (FLUSH) 0.9 %
10 SYRINGE (ML) INJECTION
Status: DISCONTINUED | OUTPATIENT
Start: 2025-05-07 | End: 2025-05-07 | Stop reason: HOSPADM

## 2025-05-07 RX ORDER — ONDANSETRON HCL IN 0.9 % NACL 8 MG/50 ML
8 INTRAVENOUS SOLUTION, PIGGYBACK (ML) INTRAVENOUS
Status: COMPLETED | OUTPATIENT
Start: 2025-05-07 | End: 2025-05-07

## 2025-05-07 RX ORDER — DIPHENHYDRAMINE HYDROCHLORIDE 50 MG/ML
50 INJECTION, SOLUTION INTRAMUSCULAR; INTRAVENOUS ONCE AS NEEDED
Status: DISCONTINUED | OUTPATIENT
Start: 2025-05-07 | End: 2025-05-07 | Stop reason: HOSPADM

## 2025-05-07 RX ORDER — EPINEPHRINE 0.3 MG/.3ML
0.3 INJECTION SUBCUTANEOUS ONCE AS NEEDED
Status: DISCONTINUED | OUTPATIENT
Start: 2025-05-07 | End: 2025-05-07 | Stop reason: HOSPADM

## 2025-05-07 RX ADMIN — LEUCOVORIN CALCIUM 740 MG: 350 INJECTION, POWDER, LYOPHILIZED, FOR SOLUTION INTRAMUSCULAR; INTRAVENOUS at 10:05

## 2025-05-07 RX ADMIN — SODIUM CHLORIDE 8 MG: 9 INJECTION, SOLUTION INTRAVENOUS at 10:05

## 2025-05-07 RX ADMIN — SODIUM CHLORIDE: 9 INJECTION, SOLUTION INTRAVENOUS at 10:05

## 2025-05-07 RX ADMIN — FLUOROURACIL 740 MG: 50 INJECTION, SOLUTION INTRAVENOUS at 12:05

## 2025-05-07 RX ADMIN — FLUOROURACIL 4440 MG: 50 INJECTION, SOLUTION INTRAVENOUS at 12:05

## 2025-05-07 NOTE — PLAN OF CARE
Problem: Fatigue  Goal: Improved Activity Tolerance  Outcome: Progressing  Intervention: Promote Improved Energy  Flowsheets (Taken 5/7/2025 1023)  Fatigue Management: frequent rest breaks encouraged  Sleep/Rest Enhancement: reading promoted  Environmental Support: rest periods encouraged

## 2025-05-09 ENCOUNTER — INFUSION (OUTPATIENT)
Dept: INFUSION THERAPY | Facility: HOSPITAL | Age: 65
End: 2025-05-09
Attending: INTERNAL MEDICINE
Payer: COMMERCIAL

## 2025-05-09 VITALS
HEIGHT: 66 IN | TEMPERATURE: 98 F | DIASTOLIC BLOOD PRESSURE: 70 MMHG | HEART RATE: 93 BPM | OXYGEN SATURATION: 99 % | RESPIRATION RATE: 18 BRPM | SYSTOLIC BLOOD PRESSURE: 104 MMHG | WEIGHT: 159.38 LBS | BODY MASS INDEX: 25.61 KG/M2

## 2025-05-09 DIAGNOSIS — C78.02 MALIGNANT NEOPLASM METASTATIC TO LEFT LUNG: Primary | ICD-10-CM

## 2025-05-09 DIAGNOSIS — C18.7 MALIGNANT NEOPLASM OF SIGMOID COLON: ICD-10-CM

## 2025-05-09 PROCEDURE — 25000003 PHARM REV CODE 250: Performed by: INTERNAL MEDICINE

## 2025-05-09 PROCEDURE — 63600175 PHARM REV CODE 636 W HCPCS: Performed by: INTERNAL MEDICINE

## 2025-05-09 PROCEDURE — A4216 STERILE WATER/SALINE, 10 ML: HCPCS | Performed by: INTERNAL MEDICINE

## 2025-05-09 PROCEDURE — 96523 IRRIG DRUG DELIVERY DEVICE: CPT

## 2025-05-09 RX ORDER — HEPARIN 100 UNIT/ML
500 SYRINGE INTRAVENOUS
Status: DISCONTINUED | OUTPATIENT
Start: 2025-05-09 | End: 2025-05-09 | Stop reason: HOSPADM

## 2025-05-09 RX ORDER — SODIUM CHLORIDE 0.9 % (FLUSH) 0.9 %
10 SYRINGE (ML) INJECTION
Status: DISCONTINUED | OUTPATIENT
Start: 2025-05-09 | End: 2025-05-09 | Stop reason: HOSPADM

## 2025-05-09 RX ADMIN — Medication 10 ML: at 11:05

## 2025-05-09 RX ADMIN — HEPARIN 500 UNITS: 100 SYRINGE at 11:05

## 2025-05-14 ENCOUNTER — OFFICE VISIT (OUTPATIENT)
Dept: PULMONOLOGY | Facility: CLINIC | Age: 65
End: 2025-05-14
Payer: COMMERCIAL

## 2025-05-14 ENCOUNTER — LAB VISIT (OUTPATIENT)
Dept: LAB | Facility: HOSPITAL | Age: 65
End: 2025-05-14
Attending: INTERNAL MEDICINE
Payer: COMMERCIAL

## 2025-05-14 VITALS
WEIGHT: 160.63 LBS | SYSTOLIC BLOOD PRESSURE: 104 MMHG | HEART RATE: 107 BPM | HEIGHT: 66 IN | BODY MASS INDEX: 25.81 KG/M2 | DIASTOLIC BLOOD PRESSURE: 67 MMHG | OXYGEN SATURATION: 99 %

## 2025-05-14 DIAGNOSIS — G47.33 OBSTRUCTIVE SLEEP APNEA SYNDROME: Primary | ICD-10-CM

## 2025-05-14 DIAGNOSIS — J70.1 FIBROSIS OF LUNG FOLLOWING RADIATION: ICD-10-CM

## 2025-05-14 DIAGNOSIS — N17.9 AKI (ACUTE KIDNEY INJURY): ICD-10-CM

## 2025-05-14 DIAGNOSIS — C78.02 MALIGNANT NEOPLASM METASTATIC TO LEFT LUNG: ICD-10-CM

## 2025-05-14 DIAGNOSIS — C18.7 MALIGNANT NEOPLASM OF SIGMOID COLON: ICD-10-CM

## 2025-05-14 LAB
ABSOLUTE EOSINOPHIL (SMH): 0.26 K/UL
ABSOLUTE MONOCYTE (SMH): 0.19 K/UL (ref 0.3–1)
ABSOLUTE NEUTROPHIL COUNT (SMH): 2.3 K/UL (ref 1.8–7.7)
ALBUMIN SERPL-MCNC: 4 G/DL (ref 3.5–5.2)
ALP SERPL-CCNC: 110 UNIT/L (ref 40–150)
ALT SERPL-CCNC: 38 UNIT/L (ref 10–44)
ANION GAP (SMH): 7 MMOL/L (ref 8–16)
AST SERPL-CCNC: 22 UNIT/L (ref 11–45)
BASOPHILS # BLD AUTO: 0.04 K/UL
BASOPHILS NFR BLD AUTO: 0.8 %
BILIRUB SERPL-MCNC: 0.7 MG/DL (ref 0.1–1)
BUN SERPL-MCNC: 17 MG/DL (ref 8–23)
CALCIUM SERPL-MCNC: 9.4 MG/DL (ref 8.7–10.5)
CHLORIDE SERPL-SCNC: 106 MMOL/L (ref 95–110)
CO2 SERPL-SCNC: 24 MMOL/L (ref 23–29)
CREAT SERPL-MCNC: 1.1 MG/DL (ref 0.5–1.4)
ERYTHROCYTE [DISTWIDTH] IN BLOOD BY AUTOMATED COUNT: 14.2 % (ref 11.5–14.5)
GFR SERPLBLD CREATININE-BSD FMLA CKD-EPI: 57 ML/MIN/1.73/M2
GLUCOSE SERPL-MCNC: 127 MG/DL (ref 70–110)
HCT VFR BLD AUTO: 31.1 % (ref 37–48.5)
HGB BLD-MCNC: 11 GM/DL (ref 12–16)
IMM GRANULOCYTES # BLD AUTO: 0.01 K/UL (ref 0–0.04)
IMM GRANULOCYTES NFR BLD AUTO: 0.2 % (ref 0–0.5)
LYMPHOCYTES # BLD AUTO: 1.9 K/UL (ref 1–4.8)
MCH RBC QN AUTO: 33.5 PG (ref 27–31)
MCHC RBC AUTO-ENTMCNC: 35.4 G/DL (ref 32–36)
MCV RBC AUTO: 95 FL (ref 82–98)
NUCLEATED RBC (/100WBC) (SMH): 0 /100 WBC
PLATELET # BLD AUTO: 147 K/UL (ref 150–450)
PMV BLD AUTO: 10.8 FL (ref 9.2–12.9)
POTASSIUM SERPL-SCNC: 4 MMOL/L (ref 3.5–5.1)
PROT SERPL-MCNC: 7.1 GM/DL (ref 6–8.4)
RBC # BLD AUTO: 3.28 M/UL (ref 4–5.4)
RELATIVE EOSINOPHIL (SMH): 5.5 % (ref 0–8)
RELATIVE LYMPHOCYTE (SMH): 40.3 % (ref 18–48)
RELATIVE MONOCYTE (SMH): 4 % (ref 4–15)
RELATIVE NEUTROPHIL (SMH): 49.2 % (ref 38–73)
SODIUM SERPL-SCNC: 137 MMOL/L (ref 136–145)
WBC # BLD AUTO: 4.72 K/UL (ref 3.9–12.7)

## 2025-05-14 PROCEDURE — 3008F BODY MASS INDEX DOCD: CPT | Mod: CPTII,S$GLB,, | Performed by: INTERNAL MEDICINE

## 2025-05-14 PROCEDURE — 99999 PR PBB SHADOW E&M-EST. PATIENT-LVL IV: CPT | Mod: PBBFAC,,, | Performed by: INTERNAL MEDICINE

## 2025-05-14 PROCEDURE — 36415 COLL VENOUS BLD VENIPUNCTURE: CPT

## 2025-05-14 PROCEDURE — 99215 OFFICE O/P EST HI 40 MIN: CPT | Mod: S$GLB,,, | Performed by: INTERNAL MEDICINE

## 2025-05-14 PROCEDURE — 3074F SYST BP LT 130 MM HG: CPT | Mod: CPTII,S$GLB,, | Performed by: INTERNAL MEDICINE

## 2025-05-14 PROCEDURE — 82947 ASSAY GLUCOSE BLOOD QUANT: CPT

## 2025-05-14 PROCEDURE — 3078F DIAST BP <80 MM HG: CPT | Mod: CPTII,S$GLB,, | Performed by: INTERNAL MEDICINE

## 2025-05-14 PROCEDURE — 85025 COMPLETE CBC W/AUTO DIFF WBC: CPT

## 2025-05-14 PROCEDURE — 1159F MED LIST DOCD IN RCRD: CPT | Mod: CPTII,S$GLB,, | Performed by: INTERNAL MEDICINE

## 2025-05-14 RX ORDER — PROCHLORPERAZINE MALEATE 5 MG
TABLET ORAL
COMMUNITY
Start: 2025-04-29

## 2025-05-14 RX ORDER — OLANZAPINE 5 MG/1
TABLET, FILM COATED ORAL
COMMUNITY
Start: 2025-04-29

## 2025-05-14 NOTE — PATIENT INSTRUCTIONS
Ct viewed and clearing     You have no significant short breath now-- breathing test and echo of heart were very good.    Follow up xrays needed.  Call for problems    Sleep apnea 26 episodes hourly -- we discuss and would recommend zepbound once chemo finished and ok with oncology.  Would recommend cpap  auto titrate nasal mask with chin strap  heated humidity.  May need occasional 4 way nasal if congested, use flonase 1-2 daily. .  Will order cpap.  You deferred sleep apnea treatment with your other problems.

## 2025-05-14 NOTE — PROGRESS NOTES
5/14/2025    Malaika Craven  New Patient Consult    Chief Complaint   Patient presents with    Follow-up    Lung Cancer       HPI:  5/14/2025 pt feels better with no appreciable significant hinds  Ct viewed.  Pt inquires re osas    4/2/2025 pt had colon cancer dx May 2024, pt was found to have met to left lower lung.  Pt had pre op xrt to lung and colon surg 12/2024 with post op leak with repeat surg followed by drainage radiologically.   Pt had had pe 10/2024 with eliquis.     Pt had chemo late summer- pt had ct chest with bx around May,  follow up cta 8/2024 with smaller tumor with cavitaion seen. Pt started xrt September or so-- 4 doses given daily x 4 days.      Pt had streaking seen on ct at [facility] -- considered clot dz-- on eliquis for vague finding....      Pt has been on eliquis since sept.    Pt has had sob couple yrs -- pt noted breathing worsened last weekend.    No cough nor mucous. No chest pains.  Pt had er presentation with cta done. Area of left lower lung pneumonia seen -- larger than last 8/2024 density where initial lung met seen-- lesion had decreased 8/2024 after chemo with some cavitation then.  Now patchy pneumonia appearance..    Pt had had diarrhea 1600 -1700 since mid December -- tincture opium started 2 weeks ago.      No cough nor mucous , no chest pain    Pt worse hinds acitivity     Patient Instructions   Ct chest and xrays and [facility] records reviewed/viewed.    Working diagnosis would be radiation pneumonitis-- walking office sat increased implying not too too bad.  Could dose steroids but not clearly needed,.    Would check echo    Heart sl fast but not too too bad-- xanax recommended -- use 1/2 to 1 dose as needed.      Would follow up cxr in a wk or so,  would follow up ct in a month.  Could have f/u [facility] if not progressing.      Symptoms started quickly--   If breathing seems worsening --dose prednisone 20 mg daily for 3 days and call....  PFSH:  Past Medical  History:   Diagnosis Date    Abnormal Pap smear of cervix     Allergy     Corneal erosion of right eye     Generalized anxiety disorder     Hypertension     Rectal mass     Sinus problem          Past Surgical History:   Procedure Laterality Date    BREAST CYST ASPIRATION       SECTION      X 2    CHOLECYSTECTOMY      COLONOSCOPY N/A 2024    Procedure: COLONOSCOPY;  Surgeon: Radhika Leary MD;  Location: [facility];  Service: Endoscopy;  Laterality: N/A;    DILATION AND CURETTAGE OF UTERUS      [facility]      EYE SURGERY Right     FOR CORNEAL EROSION    FLEXIBLE SIGMOIDOSCOPY N/A 2024    Procedure: SIGMOIDOSCOPY, FLEXIBLE(Fleets);  Surgeon: Oumar Montoya MD;  Location: [facility] ENDO;  Service: General;  Laterality: N/A;    INSERTION OF TUNNELED CENTRAL VENOUS CATHETER (CVC) WITH SUBCUTANEOUS PORT N/A 2024    Procedure: [id number];  Surgeon: Oumar Montoya MD;  Location: [facility] OR;  Service: General;  Laterality: N/A;    LIPOSUCTION      x 2    Mole Excision Right     Shin    TUBAL LIGATION       Social History[1]  Family History   Problem Relation Name Age of Onset    Heart disease Mother      Hypertension Mother      Arthritis Father      Thyroid disease Father      Kidney disease Paternal Grandmother      Liver cancer Maternal Grandmother      Cancer Maternal Grandfather      Allergies Sister      Allergic rhinitis Sister      Angioedema Neg Hx      Asthma Neg Hx      Atopy Neg Hx      Eczema Neg Hx      Immunodeficiency Neg Hx      Rhinitis Neg Hx      Urticaria Neg Hx      Amblyopia Neg Hx      Blindness Neg Hx      Cataracts Neg Hx      Diabetes Neg Hx      Glaucoma Neg Hx      Macular degeneration Neg Hx      Retinal detachment Neg Hx      Strabismus Neg Hx      Stroke Neg Hx       Review of patient's allergies indicates:   Allergen Reactions    Atenolol Hives, Itching and Swelling    Losartan Swelling and Hives     Other reaction(s): Angioedema    Tetracyclines Rash     "Amlodipine      Hives and swelling on the lower extremities    Lisinopril Hives    Sulfa (sulfonamide antibiotics) Rash          Review of Systems:  a review of eleven systems covering constitutional, Eye, HEENT, Psych, Respiratory, Cardiac, GI, , Musculoskeletal, Endocrine, Dermatologic was negative except for pertinent findings as listed ABOVE and below:  pertinent positives as above, rest good        Exam:Comprehensive exam done. /67 (BP Location: Left arm, Patient Position: Sitting)   Pulse 107   Ht 5' 6" (1.676 m)   Wt 72.8 kg (160 lb 9.7 oz)   SpO2 99% Comment: on room air at rest  BMI 25.92 kg/m²   Exam included Vitals as listed, and patient's appearance and affect and alertness and mood, oral exam for yeast and hygiene and pharynx lesions and Mallapatti (M) score, neck with inspection for jvd and masses and thyroid abnormalities and lymph nodes (supraclavicular and infraclavicular nodes and axillary also examined and noted if abn), chest exam included symmetry and effort and fremitus and percussion and auscultation, cardiac exam included rhythm and gallops and murmur and rubs and jvd and edema, abdominal exam for mass and hepatosplenomegaly and tenderness and hernias and bowel sounds, Musculoskeletal exam with muscle tone and posture and mobility/gait and  strength, and skin for rashes and cyanosis and pallor and turgor, extremity for clubbing.  Findings were normal except for pertinent findings listed below:  M2, sl dull left lower lung.  No edema.  Rest good.          Radiographs (ct chest and cxr) reviewed: view by direct vision      Labs reviewed           PFT will be done and results to be reviewed       Plan:  Clinical impression is apparently straight forward and impression with management as below.    Malaika Barlow" was seen today for follow-up and lung cancer.    Diagnoses and all orders for this visit:    Obstructive sleep apnea syndrome  -     CPAP FOR HOME USE          Follow up " in about 3 months (around 8/14/2025), or if symptoms worsen or fail to improve.    Discussed with patient above for education the following:      Patient Instructions   Ct viewed and clearing     You have no significant short breath now-- breathing test and echo of heart were very good.    Follow up xrays needed.  Call for problems    Sleep apnea 26 episodes hourly -- we discuss and would recommend zepbound once chemo finished and ok with oncology.  Would recommend cpap  auto titrate nasal mask with chin strap  heated humidity.  May need occasional 4 way nasal if congested, use flonase 1-2 daily. .  Will order cpap.  You deferred sleep apnea treatment with your other problems.       Eval took 43 min         [1]   Social History  Tobacco Use    Smoking status: Never    Smokeless tobacco: Never   Substance Use Topics    Alcohol use: Yes     Comment: 2 drinks per week    Drug use: No     Comment: [employer]

## 2025-05-15 ENCOUNTER — PATIENT MESSAGE (OUTPATIENT)
Dept: HEMATOLOGY/ONCOLOGY | Facility: CLINIC | Age: 65
End: 2025-05-15
Payer: COMMERCIAL

## 2025-05-19 ENCOUNTER — LAB VISIT (OUTPATIENT)
Dept: LAB | Facility: HOSPITAL | Age: 65
End: 2025-05-19
Attending: INTERNAL MEDICINE
Payer: COMMERCIAL

## 2025-05-19 DIAGNOSIS — C18.7 MALIGNANT NEOPLASM OF SIGMOID COLON: ICD-10-CM

## 2025-05-19 DIAGNOSIS — C18.7 MALIGNANT NEOPLASM OF SIGMOID COLON: Primary | ICD-10-CM

## 2025-05-19 LAB
ABSOLUTE EOSINOPHIL (SMH): 0.15 K/UL
ABSOLUTE MONOCYTE (SMH): 0.64 K/UL (ref 0.3–1)
ABSOLUTE NEUTROPHIL COUNT (SMH): 5 K/UL (ref 1.8–7.7)
ALBUMIN SERPL-MCNC: 4.3 G/DL (ref 3.5–5.2)
ALP SERPL-CCNC: 128 UNIT/L (ref 40–150)
ALT SERPL-CCNC: 66 UNIT/L (ref 10–44)
ANION GAP (SMH): 11 MMOL/L (ref 8–16)
AST SERPL-CCNC: 26 UNIT/L (ref 11–45)
BASOPHILS # BLD AUTO: 0.05 K/UL
BASOPHILS NFR BLD AUTO: 0.5 %
BILIRUB SERPL-MCNC: 0.8 MG/DL (ref 0.1–1)
BUN SERPL-MCNC: 16 MG/DL (ref 8–23)
CALCIUM SERPL-MCNC: 9.9 MG/DL (ref 8.7–10.5)
CHLORIDE SERPL-SCNC: 104 MMOL/L (ref 95–110)
CO2 SERPL-SCNC: 21 MMOL/L (ref 23–29)
CREAT SERPL-MCNC: 1.1 MG/DL (ref 0.5–1.4)
ERYTHROCYTE [DISTWIDTH] IN BLOOD BY AUTOMATED COUNT: 14.5 % (ref 11.5–14.5)
GFR SERPLBLD CREATININE-BSD FMLA CKD-EPI: 57 ML/MIN/1.73/M2
GLUCOSE SERPL-MCNC: 103 MG/DL (ref 70–110)
HCT VFR BLD AUTO: 33.8 % (ref 37–48.5)
HGB BLD-MCNC: 12 GM/DL (ref 12–16)
IMM GRANULOCYTES # BLD AUTO: 0.04 K/UL (ref 0–0.04)
IMM GRANULOCYTES NFR BLD AUTO: 0.4 % (ref 0–0.5)
LYMPHOCYTES # BLD AUTO: 3.32 K/UL (ref 1–4.8)
MCH RBC QN AUTO: 33.2 PG (ref 27–31)
MCHC RBC AUTO-ENTMCNC: 35.5 G/DL (ref 32–36)
MCV RBC AUTO: 94 FL (ref 82–98)
NUCLEATED RBC (/100WBC) (SMH): 0 /100 WBC
PLATELET # BLD AUTO: 221 K/UL (ref 150–450)
PLATELET BLD QL SMEAR: NORMAL
PMV BLD AUTO: 9.9 FL (ref 9.2–12.9)
POTASSIUM SERPL-SCNC: 3.7 MMOL/L (ref 3.5–5.1)
PROT SERPL-MCNC: 8.2 GM/DL (ref 6–8.4)
RBC # BLD AUTO: 3.61 M/UL (ref 4–5.4)
RELATIVE EOSINOPHIL (SMH): 1.6 % (ref 0–8)
RELATIVE LYMPHOCYTE (SMH): 36 % (ref 18–48)
RELATIVE MONOCYTE (SMH): 6.9 % (ref 4–15)
RELATIVE NEUTROPHIL (SMH): 54.6 % (ref 38–73)
SODIUM SERPL-SCNC: 136 MMOL/L (ref 136–145)
WBC # BLD AUTO: 9.21 K/UL (ref 3.9–12.7)

## 2025-05-19 PROCEDURE — 80053 COMPREHEN METABOLIC PANEL: CPT

## 2025-05-19 PROCEDURE — 85025 COMPLETE CBC W/AUTO DIFF WBC: CPT

## 2025-05-19 PROCEDURE — 36415 COLL VENOUS BLD VENIPUNCTURE: CPT

## 2025-05-20 ENCOUNTER — OFFICE VISIT (OUTPATIENT)
Dept: HEMATOLOGY/ONCOLOGY | Facility: CLINIC | Age: 65
End: 2025-05-20
Payer: COMMERCIAL

## 2025-05-20 VITALS
HEART RATE: 93 BPM | HEIGHT: 66 IN | OXYGEN SATURATION: 99 % | BODY MASS INDEX: 25.12 KG/M2 | DIASTOLIC BLOOD PRESSURE: 71 MMHG | SYSTOLIC BLOOD PRESSURE: 120 MMHG | RESPIRATION RATE: 16 BRPM | TEMPERATURE: 97 F | WEIGHT: 156.31 LBS

## 2025-05-20 DIAGNOSIS — C78.02 MALIGNANT NEOPLASM METASTATIC TO LEFT LUNG: Primary | ICD-10-CM

## 2025-05-20 DIAGNOSIS — T45.1X5A CHEMOTHERAPY-INDUCED FATIGUE: ICD-10-CM

## 2025-05-20 DIAGNOSIS — R19.7 DIARRHEA, UNSPECIFIED TYPE: ICD-10-CM

## 2025-05-20 DIAGNOSIS — R53.83 CHEMOTHERAPY-INDUCED FATIGUE: ICD-10-CM

## 2025-05-20 DIAGNOSIS — C18.7 MALIGNANT NEOPLASM OF SIGMOID COLON: ICD-10-CM

## 2025-05-20 PROCEDURE — 99214 OFFICE O/P EST MOD 30 MIN: CPT | Mod: S$GLB,,, | Performed by: INTERNAL MEDICINE

## 2025-05-20 PROCEDURE — 99999 PR PBB SHADOW E&M-EST. PATIENT-LVL IV: CPT | Mod: PBBFAC,,, | Performed by: INTERNAL MEDICINE

## 2025-05-20 PROCEDURE — 1159F MED LIST DOCD IN RCRD: CPT | Mod: CPTII,S$GLB,, | Performed by: INTERNAL MEDICINE

## 2025-05-20 PROCEDURE — G2211 COMPLEX E/M VISIT ADD ON: HCPCS | Mod: S$GLB,,, | Performed by: INTERNAL MEDICINE

## 2025-05-20 PROCEDURE — 3078F DIAST BP <80 MM HG: CPT | Mod: CPTII,S$GLB,, | Performed by: INTERNAL MEDICINE

## 2025-05-20 PROCEDURE — 3008F BODY MASS INDEX DOCD: CPT | Mod: CPTII,S$GLB,, | Performed by: INTERNAL MEDICINE

## 2025-05-20 PROCEDURE — 3074F SYST BP LT 130 MM HG: CPT | Mod: CPTII,S$GLB,, | Performed by: INTERNAL MEDICINE

## 2025-05-20 NOTE — PROGRESS NOTES
Service Date:  5/20/25    Chief Complaint: Malignant neoplasm of sigmoid colon (Chemo clear)    Malaika Craven is a 64 y.o. female here with oligometastatic rectosigmoid colon cancer    Had 6 cycles of FOLFOX with Avastin.  Then went to [facility] and had radiation of the lung met.  Then had resection by [facility].  Now has ostomy in place.    Here for cycle 6 of adjuvant treatment.  Feeling very fatigued, more than with previous chemos. No other issues    Review of Systems   Constitutional: Negative.  Negative for appetite change and unexpected weight change.   HENT:  Negative for mouth sores.    Eyes: Negative.  Negative for visual disturbance.   Respiratory: Negative.  Negative for cough and shortness of breath.    Cardiovascular: Negative.  Negative for chest pain.   Gastrointestinal:  Negative for abdominal pain and diarrhea.   Endocrine: Negative.    Genitourinary: Negative.  Negative for frequency.   Musculoskeletal: Negative.  Negative for back pain.   Integumentary:  Negative for rash. Negative.   Neurological: Negative.  Negative for headaches.   Hematological: Negative.  Negative for adenopathy.   Psychiatric/Behavioral: Negative.          Current Outpatient Medications   Medication Instructions    ALPRAZolam (XANAX) 0.5 mg, Oral, 3 times daily    apixaban (ELIQUIS DVT-PE TREAT 30D START) 5 mg (74 tabs) DsPk 2 tablets, 2 times daily    diphenhydrAMINE-aluminum-magnesium hydroxide-simethicone-LIDOcaine viscous HCl 2% 15 mLs, Swish & Spit, Every 4 hours PRN    diphenoxylate-atropine 2.5-0.025 mg (LOMOTIL) 2.5-0.025 mg per tablet 1 tablet    fexofenadine (ALLEGRA ALLERGY) 60 MG tablet     LIDOcaine-prilocaine (EMLA) cream Topical (Top), As needed (PRN)    loperamide (IMODIUM A-D) 2 mg, Oral, 4 times daily PRN    NIFEdipine (ADALAT CC) 60 mg, Oral, Daily    OLANZapine (ZYPREXA) 5 MG tablet Take by mouth.    ondansetron (ZOFRAN) 8 mg, Oral, Every 8 hours PRN    ondansetron (ZOFRAN-ODT) 4 mg, Oral, Every 6  hours PRN    opium tincture 6 mg, Oral, 4 times daily PRN    polyethylene glycol (GLYCOLAX) 17 g, Daily    potassium chloride (MICRO-K) 10 MEQ CpSR 20 mEq, Oral, Daily    prochlorperazine (COMPAZINE) 5 MG tablet Take by mouth.    psyllium husk, aspartame, (METAMUCIL) 3.4 gram PwPk packet 1 packet    RYALTRIS 665-25 mcg/spray [place]         Past Medical History:   Diagnosis Date    Abnormal Pap smear of cervix     Allergy     Corneal erosion of right eye     Generalized anxiety disorder     Hypertension     Rectal mass     Sinus problem         Past Surgical History:   Procedure Laterality Date    BREAST CYST ASPIRATION       SECTION      X 2    CHOLECYSTECTOMY      COLONOSCOPY N/A 2024    Procedure: COLONOSCOPY;  Surgeon: Radhika Leary MD;  Location: [facility];  Service: Endoscopy;  Laterality: N/A;    DILATION AND CURETTAGE OF UTERUS      [facility]      EYE SURGERY Right     FOR CORNEAL EROSION    FLEXIBLE SIGMOIDOSCOPY N/A 2024    Procedure: SIGMOIDOSCOPY, FLEXIBLE(Fleets);  Surgeon: Oumar Montoya MD;  Location: [facility];  Service: General;  Laterality: N/A;    INSERTION OF TUNNELED CENTRAL VENOUS CATHETER (CVC) WITH SUBCUTANEOUS PORT N/A 2024    Procedure: [id number];  Surgeon: Oumar Montoya MD;  Location: [facility];  Service: General;  Laterality: N/A;    LIPOSUCTION      x 2    Mole Excision Right     Shin    TUBAL LIGATION          Family History   Problem Relation Name Age of Onset    Heart disease Mother      Hypertension Mother      Arthritis Father      Thyroid disease Father      Kidney disease Paternal Grandmother      Liver cancer Maternal Grandmother      Cancer Maternal Grandfather      Allergies Sister      Allergic rhinitis Sister      Angioedema Neg Hx      Asthma Neg Hx      Atopy Neg Hx      Eczema Neg Hx      Immunodeficiency Neg Hx      Rhinitis Neg Hx      Urticaria Neg Hx      Amblyopia Neg Hx      Blindness Neg Hx      Cataracts Neg Hx       "Diabetes Neg Hx      Glaucoma Neg Hx      Macular degeneration Neg Hx      Retinal detachment Neg Hx      Strabismus Neg Hx      Stroke Neg Hx         Social History     Tobacco Use    Smoking status: Never    Smokeless tobacco: Never   Substance Use Topics    Alcohol use: Yes     Comment: 2 drinks per week    Drug use: No     Comment: [employer]         Vitals:    05/20/25 1013   BP: 120/71   Pulse: 93   Resp: 16   Temp: 97.2 °F (36.2 °C)        Physical Exam:  /71 (BP Location: Left arm, Patient Position: Sitting)   Pulse 93   Temp 97.2 °F (36.2 °C) (Temporal)   Resp 16   Ht 5' 6" (1.676 m)   Wt 70.9 kg (156 lb 4.9 oz)   SpO2 99%   BMI 25.23 kg/m²     Physical Exam  Constitutional:       Appearance: Normal appearance.   HENT:      Head: Normocephalic and atraumatic.      Nose: Nose normal.      Mouth/Throat:      Mouth: Mucous membranes are moist.      Pharynx: Oropharynx is clear.   Eyes:      Conjunctiva/sclera: Conjunctivae normal.   Cardiovascular:      Rate and Rhythm: Normal rate and regular rhythm.      Heart sounds: Normal heart sounds.   Pulmonary:      Effort: Pulmonary effort is normal.      Breath sounds: Normal breath sounds.   Abdominal:      General: Abdomen is flat. Bowel sounds are normal.      Palpations: Abdomen is soft.   Musculoskeletal:         General: Normal range of motion.      Cervical back: Normal range of motion and neck supple.   Skin:     General: Skin is warm and dry.   Neurological:      General: No focal deficit present.      Mental Status: She is alert and oriented to person, place, and time. Mental status is at baseline.   Psychiatric:         Mood and Affect: Mood normal.          Labs:  Lab Results   Component Value Date    WBC 9.21 05/19/2025    RBC 3.61 (L) 05/19/2025    HGB 12.0 05/19/2025    HCT 33.8 (L) 05/19/2025    MCV 94 05/19/2025    MCH 33.2 (H) 05/19/2025    MCHC 35.5 05/19/2025    RDW 14.5 05/19/2025     05/19/2025    MPV 9.9 05/19/2025 "    GRAN 3.8 03/18/2025    GRAN 64.5 03/18/2025    LYMPH 1.6 03/18/2025    LYMPH 27.8 03/18/2025    MONO 0.2 (L) 03/18/2025    MONO 3.4 (L) 03/18/2025    EOS 0.2 03/18/2025    BASO 0.05 03/18/2025    EOSINOPHIL 3.1 03/18/2025    BASOPHIL 0.9 03/18/2025     Sodium   Date Value Ref Range Status   05/19/2025 136 136 - 145 mmol/L Final     Potassium   Date Value Ref Range Status   05/19/2025 3.7 3.5 - 5.1 mmol/L Final     Chloride   Date Value Ref Range Status   05/19/2025 104 95 - 110 mmol/L Final     CO2   Date Value Ref Range Status   05/19/2025 21 (L) 23 - 29 mmol/L Final     Glucose   Date Value Ref Range Status   05/19/2025 103 70 - 110 mg/dL Final     BUN   Date Value Ref Range Status   05/19/2025 16 8 - 23 mg/dL Final     Creatinine   Date Value Ref Range Status   05/19/2025 1.1 0.5 - 1.4 mg/dL Final     Calcium   Date Value Ref Range Status   05/19/2025 9.9 8.7 - 10.5 mg/dL Final     Protein Total   Date Value Ref Range Status   05/19/2025 8.2 6.0 - 8.4 gm/dL Final     Albumin   Date Value Ref Range Status   05/19/2025 4.3 3.5 - 5.2 g/dL Final     Bilirubin Total   Date Value Ref Range Status   05/19/2025 0.8 0.1 - 1.0 mg/dL Final     Comment:     For infants and newborns, interpretation of results should be based   on gestational age, weight and in agreement with clinical   observations.    Premature Infant recommended reference ranges:   0-24 hours:  <8.0 mg/dL   24-48 hours: <12.0 mg/dL   3-5 days:    <15.0 mg/dL   6-29 days:   <15.0 mg/dL     ALP   Date Value Ref Range Status   05/19/2025 128 40 - 150 unit/L Final     AST   Date Value Ref Range Status   05/19/2025 26 11 - 45 unit/L Final     ALT   Date Value Ref Range Status   05/19/2025 66 (H) 10 - 44 unit/L Final     Anion Gap   Date Value Ref Range Status   05/19/2025 11 8 - 16 mmol/L Final     eGFR if    Date Value Ref Range Status   10/25/2021 >60.0 >60 mL/min/1.73 m^2 Final     eGFR if non    Date Value Ref Range Status    10/25/2021 >60.0 >60 mL/min/1.73 m^2 Final     Comment:     Calculation used to obtain the estimated glomerular filtration  rate (eGFR) is the CKD-EPI equation.          A/P:    Rectal adenocarcinoma   -patient had oligometastatic disease   -had 6 cycles of FOLFOX and Avastin with me.  Then went to [facility] had radiation therapy to the lung metastatic disease.  She also had a resection by [facility].  She now has an ostomy in place.    -now on adjuvant 5 FU for a total of 3 months plan    -delay cycle 6 due to chemo relate fatigue  -RTC in 1 week to try again    Chemo fatigue  -will give 1 week delay in treatment  -encouraged her to stay active to help combat    Ostomy  -has high output.  -combination of Imodium, Lomotil, and Metamucil with the tincture of opium seems to finally be helping    Aurash Khoobehi, MD  Hematology and Oncology    Visit today included increased complexity associated with the care of the episodic problem addressed and managing the longitudinal care of the patient due to the serious and/or complex managed problem(s) colon cancer.

## 2025-05-24 ENCOUNTER — LAB VISIT (OUTPATIENT)
Dept: LAB | Facility: HOSPITAL | Age: 65
End: 2025-05-24
Attending: INTERNAL MEDICINE
Payer: COMMERCIAL

## 2025-05-24 DIAGNOSIS — C78.02 MALIGNANT NEOPLASM METASTATIC TO LEFT LUNG: ICD-10-CM

## 2025-05-24 DIAGNOSIS — C18.7 MALIGNANT NEOPLASM OF SIGMOID COLON: ICD-10-CM

## 2025-05-24 LAB
ABSOLUTE EOSINOPHIL (SMH): 0.1 K/UL
ABSOLUTE MONOCYTE (SMH): 0.27 K/UL (ref 0.3–1)
ABSOLUTE NEUTROPHIL COUNT (SMH): 4.4 K/UL (ref 1.8–7.7)
ALBUMIN SERPL-MCNC: 4.1 G/DL (ref 3.5–5.2)
ALP SERPL-CCNC: 123 UNIT/L (ref 40–150)
ALT SERPL-CCNC: 75 UNIT/L (ref 10–44)
ANION GAP (SMH): 13 MMOL/L (ref 8–16)
AST SERPL-CCNC: 75 UNIT/L (ref 11–45)
BASOPHILS # BLD AUTO: 0.05 K/UL
BASOPHILS NFR BLD AUTO: 0.8 %
BILIRUB SERPL-MCNC: 1.2 MG/DL (ref 0.1–1)
BUN SERPL-MCNC: 18 MG/DL (ref 8–23)
CALCIUM SERPL-MCNC: 9.7 MG/DL (ref 8.7–10.5)
CHLORIDE SERPL-SCNC: 105 MMOL/L (ref 95–110)
CO2 SERPL-SCNC: 19 MMOL/L (ref 23–29)
CREAT SERPL-MCNC: 1.4 MG/DL (ref 0.5–1.4)
ERYTHROCYTE [DISTWIDTH] IN BLOOD BY AUTOMATED COUNT: 15.7 % (ref 11.5–14.5)
GFR SERPLBLD CREATININE-BSD FMLA CKD-EPI: 43 ML/MIN/1.73/M2
GLUCOSE SERPL-MCNC: 164 MG/DL (ref 70–110)
HCT VFR BLD AUTO: 31.9 % (ref 37–48.5)
HGB BLD-MCNC: 11.4 GM/DL (ref 12–16)
IMM GRANULOCYTES # BLD AUTO: 0.02 K/UL (ref 0–0.04)
IMM GRANULOCYTES NFR BLD AUTO: 0.3 % (ref 0–0.5)
LYMPHOCYTES # BLD AUTO: 1.78 K/UL (ref 1–4.8)
MCH RBC QN AUTO: 34.3 PG (ref 27–31)
MCHC RBC AUTO-ENTMCNC: 35.7 G/DL (ref 32–36)
MCV RBC AUTO: 96 FL (ref 82–98)
NUCLEATED RBC (/100WBC) (SMH): 0 /100 WBC
PLATELET # BLD AUTO: 171 K/UL (ref 150–450)
PMV BLD AUTO: 9.8 FL (ref 9.2–12.9)
POTASSIUM SERPL-SCNC: 3.9 MMOL/L (ref 3.5–5.1)
PROT SERPL-MCNC: 7.5 GM/DL (ref 6–8.4)
RBC # BLD AUTO: 3.32 M/UL (ref 4–5.4)
RELATIVE EOSINOPHIL (SMH): 1.5 % (ref 0–8)
RELATIVE LYMPHOCYTE (SMH): 26.9 % (ref 18–48)
RELATIVE MONOCYTE (SMH): 4.1 % (ref 4–15)
RELATIVE NEUTROPHIL (SMH): 66.4 % (ref 38–73)
SODIUM SERPL-SCNC: 137 MMOL/L (ref 136–145)
WBC # BLD AUTO: 6.62 K/UL (ref 3.9–12.7)

## 2025-05-24 PROCEDURE — 36415 COLL VENOUS BLD VENIPUNCTURE: CPT

## 2025-05-24 PROCEDURE — 85025 COMPLETE CBC W/AUTO DIFF WBC: CPT

## 2025-05-24 PROCEDURE — 80053 COMPREHEN METABOLIC PANEL: CPT

## 2025-05-27 ENCOUNTER — OFFICE VISIT (OUTPATIENT)
Dept: HEMATOLOGY/ONCOLOGY | Facility: CLINIC | Age: 65
End: 2025-05-27
Payer: COMMERCIAL

## 2025-05-27 VITALS
SYSTOLIC BLOOD PRESSURE: 119 MMHG | WEIGHT: 158.94 LBS | OXYGEN SATURATION: 100 % | HEART RATE: 84 BPM | RESPIRATION RATE: 18 BRPM | TEMPERATURE: 97 F | HEIGHT: 66 IN | DIASTOLIC BLOOD PRESSURE: 63 MMHG | BODY MASS INDEX: 25.54 KG/M2

## 2025-05-27 DIAGNOSIS — C78.02 MALIGNANT NEOPLASM METASTATIC TO LEFT LUNG: Primary | ICD-10-CM

## 2025-05-27 DIAGNOSIS — Z95.828 PORT-A-CATH IN PLACE: ICD-10-CM

## 2025-05-27 DIAGNOSIS — K94.19 ALTERED BOWEL ELIMINATION DUE TO INTESTINAL OSTOMY: ICD-10-CM

## 2025-05-27 DIAGNOSIS — T45.1X5A CHEMOTHERAPY-INDUCED FATIGUE: ICD-10-CM

## 2025-05-27 DIAGNOSIS — C18.7 MALIGNANT NEOPLASM OF SIGMOID COLON: ICD-10-CM

## 2025-05-27 DIAGNOSIS — R53.83 CHEMOTHERAPY-INDUCED FATIGUE: ICD-10-CM

## 2025-05-27 PROCEDURE — 99215 OFFICE O/P EST HI 40 MIN: CPT | Mod: S$GLB,,, | Performed by: INTERNAL MEDICINE

## 2025-05-27 PROCEDURE — 99999 PR PBB SHADOW E&M-EST. PATIENT-LVL IV: CPT | Mod: PBBFAC,,, | Performed by: INTERNAL MEDICINE

## 2025-05-27 PROCEDURE — 3074F SYST BP LT 130 MM HG: CPT | Mod: CPTII,S$GLB,, | Performed by: INTERNAL MEDICINE

## 2025-05-27 PROCEDURE — 3078F DIAST BP <80 MM HG: CPT | Mod: CPTII,S$GLB,, | Performed by: INTERNAL MEDICINE

## 2025-05-27 PROCEDURE — G2211 COMPLEX E/M VISIT ADD ON: HCPCS | Mod: S$GLB,,, | Performed by: INTERNAL MEDICINE

## 2025-05-27 PROCEDURE — 3008F BODY MASS INDEX DOCD: CPT | Mod: CPTII,S$GLB,, | Performed by: INTERNAL MEDICINE

## 2025-05-27 PROCEDURE — 1159F MED LIST DOCD IN RCRD: CPT | Mod: CPTII,S$GLB,, | Performed by: INTERNAL MEDICINE

## 2025-05-27 RX ORDER — SODIUM CHLORIDE 0.9 % (FLUSH) 0.9 %
10 SYRINGE (ML) INJECTION
Status: CANCELLED | OUTPATIENT
Start: 2025-05-28

## 2025-05-27 RX ORDER — SODIUM CHLORIDE 0.9 % (FLUSH) 0.9 %
10 SYRINGE (ML) INJECTION
Status: CANCELLED | OUTPATIENT
Start: 2025-05-30

## 2025-05-27 RX ORDER — HEPARIN 100 UNIT/ML
500 SYRINGE INTRAVENOUS
Status: CANCELLED | OUTPATIENT
Start: 2025-05-30

## 2025-05-27 RX ORDER — EPINEPHRINE 0.3 MG/.3ML
0.3 INJECTION SUBCUTANEOUS ONCE AS NEEDED
Status: CANCELLED | OUTPATIENT
Start: 2025-05-28

## 2025-05-27 RX ORDER — HEPARIN 100 UNIT/ML
500 SYRINGE INTRAVENOUS
OUTPATIENT
Start: 2025-05-27

## 2025-05-27 RX ORDER — DIPHENHYDRAMINE HYDROCHLORIDE 50 MG/ML
50 INJECTION, SOLUTION INTRAMUSCULAR; INTRAVENOUS ONCE AS NEEDED
Status: CANCELLED | OUTPATIENT
Start: 2025-05-28

## 2025-05-27 RX ORDER — SODIUM CHLORIDE 0.9 % (FLUSH) 0.9 %
10 SYRINGE (ML) INJECTION
OUTPATIENT
Start: 2025-05-27

## 2025-05-27 RX ORDER — ONDANSETRON HYDROCHLORIDE 2 MG/ML
8 INJECTION, SOLUTION INTRAVENOUS
Status: CANCELLED | OUTPATIENT
Start: 2025-05-28

## 2025-05-27 RX ORDER — FLUOROURACIL 50 MG/ML
400 INJECTION, SOLUTION INTRAVENOUS
Status: CANCELLED | OUTPATIENT
Start: 2025-05-28

## 2025-05-27 RX ORDER — HEPARIN 100 UNIT/ML
500 SYRINGE INTRAVENOUS
Status: CANCELLED | OUTPATIENT
Start: 2025-05-28

## 2025-05-27 NOTE — Clinical Note
Ok for chemotherapy. Last chemo. Stop all treatments after tomorrow. Port flushes ordered. RTC in 4 weeks with labs

## 2025-05-27 NOTE — PROGRESS NOTES
Service Date:  5/27/25    Chief Complaint: Colon Cancer (Labs chemo clear)    Malaika Craven is a 64 y.o. female here with oligometastatic rectosigmoid colon cancer    Had 6 cycles of FOLFOX with Avastin.  Then went to [facility] and had radiation of the lung met.  Then had resection by [facility].  Now has ostomy in place.    Here for cycle 6 of adjuvant treatment.  Delayed 1 week due to fatigue last week.  That has improved somewhat.  Still slightly present.  No other complaints.  Ostomy output still well controlled on current regimen.    Review of Systems   Constitutional: Negative.  Negative for appetite change and unexpected weight change.   HENT:  Negative for mouth sores.    Eyes: Negative.  Negative for visual disturbance.   Respiratory: Negative.  Negative for cough and shortness of breath.    Cardiovascular: Negative.  Negative for chest pain.   Gastrointestinal:  Negative for abdominal pain and diarrhea.   Endocrine: Negative.    Genitourinary: Negative.  Negative for frequency.   Musculoskeletal: Negative.  Negative for back pain.   Integumentary:  Negative for rash. Negative.   Neurological: Negative.  Negative for headaches.   Hematological: Negative.  Negative for adenopathy.   Psychiatric/Behavioral: Negative.          Current Outpatient Medications   Medication Instructions    ALPRAZolam (XANAX) 0.5 mg, Oral, 3 times daily    apixaban (ELIQUIS DVT-PE TREAT 30D START) 5 mg (74 tabs) DsPk 2 tablets, 2 times daily    diphenhydrAMINE-aluminum-magnesium hydroxide-simethicone-LIDOcaine viscous HCl 2% 15 mLs, Swish & Spit, Every 4 hours PRN    diphenoxylate-atropine 2.5-0.025 mg (LOMOTIL) 2.5-0.025 mg per tablet 1 tablet    fexofenadine (ALLEGRA ALLERGY) 60 MG tablet     LIDOcaine-prilocaine (EMLA) cream Topical (Top), As needed (PRN)    loperamide (IMODIUM A-D) 2 mg, Oral, 4 times daily PRN    NIFEdipine (ADALAT CC) 60 mg, Oral, Daily    OLANZapine (ZYPREXA) 5 MG tablet Take by mouth.    ondansetron  (ZOFRAN) 8 mg, Oral, Every 8 hours PRN    ondansetron (ZOFRAN-ODT) 4 mg, Oral, Every 6 hours PRN    opium tincture 6 mg, Oral, 4 times daily PRN    polyethylene glycol (GLYCOLAX) 17 g, Daily    potassium chloride (MICRO-K) 10 MEQ CpSR 20 mEq, Oral, Daily    prochlorperazine (COMPAZINE) 5 MG tablet Take by mouth.    psyllium husk, aspartame, (METAMUCIL) 3.4 gram PwPk packet 1 packet    RYALTRIS 665-25 mcg/spray [place]         Past Medical History:   Diagnosis Date    Abnormal Pap smear of cervix     Allergy     Corneal erosion of right eye     Generalized anxiety disorder     Hypertension     Rectal mass     Sinus problem         Past Surgical History:   Procedure Laterality Date    BREAST CYST ASPIRATION       SECTION      X 2    CHOLECYSTECTOMY      COLONOSCOPY N/A 2024    Procedure: COLONOSCOPY;  Surgeon: Radhika Leary MD;  Location: [facility];  Service: Endoscopy;  Laterality: N/A;    DILATION AND CURETTAGE OF UTERUS      [facility]      EYE SURGERY Right     FOR CORNEAL EROSION    FLEXIBLE SIGMOIDOSCOPY N/A 2024    Procedure: SIGMOIDOSCOPY, FLEXIBLE(Fleets);  Surgeon: Oumar Montoya MD;  Location: [facility];  Service: General;  Laterality: N/A;    INSERTION OF TUNNELED CENTRAL VENOUS CATHETER (CVC) WITH SUBCUTANEOUS PORT N/A 2024    Procedure: [id number];  Surgeon: Oumar Montoya MD;  Location: [facility];  Service: General;  Laterality: N/A;    LIPOSUCTION      x 2    Mole Excision Right     Shin    TUBAL LIGATION          Family History   Problem Relation Name Age of Onset    Heart disease Mother      Hypertension Mother      Arthritis Father      Thyroid disease Father      Kidney disease Paternal Grandmother      Liver cancer Maternal Grandmother      Cancer Maternal Grandfather      Allergies Sister      Allergic rhinitis Sister      Angioedema Neg Hx      Asthma Neg Hx      Atopy Neg Hx      Eczema Neg Hx      Immunodeficiency Neg Hx      Rhinitis Neg Hx       "Urticaria Neg Hx      Amblyopia Neg Hx      Blindness Neg Hx      Cataracts Neg Hx      Diabetes Neg Hx      Glaucoma Neg Hx      Macular degeneration Neg Hx      Retinal detachment Neg Hx      Strabismus Neg Hx      Stroke Neg Hx         Social History     Tobacco Use    Smoking status: Never    Smokeless tobacco: Never   Substance Use Topics    Alcohol use: Yes     Comment: 2 drinks per week    Drug use: No     Comment: MEDICAL MARIJUANA         Vitals:    05/27/25 1009   BP: 119/63   Pulse: 84   Resp: 18   Temp: 97 °F (36.1 °C)        Physical Exam:  /63 (BP Location: Left arm, Patient Position: Sitting)   Pulse 84   Temp 97 °F (36.1 °C) (Temporal)   Resp 18   Ht 5' 6" (1.676 m)   Wt 72.1 kg (158 lb 15.2 oz)   SpO2 100%   BMI 25.66 kg/m²     Physical Exam  Constitutional:       Appearance: Normal appearance.   HENT:      Head: Normocephalic and atraumatic.      Nose: Nose normal.      Mouth/Throat:      Mouth: Mucous membranes are moist.      Pharynx: Oropharynx is clear.   Eyes:      Conjunctiva/sclera: Conjunctivae normal.   Cardiovascular:      Rate and Rhythm: Normal rate and regular rhythm.      Heart sounds: Normal heart sounds.   Pulmonary:      Effort: Pulmonary effort is normal.      Breath sounds: Normal breath sounds.   Abdominal:      General: Abdomen is flat. Bowel sounds are normal.      Palpations: Abdomen is soft.   Musculoskeletal:         General: Normal range of motion.      Cervical back: Normal range of motion and neck supple.   Skin:     General: Skin is warm and dry.   Neurological:      General: No focal deficit present.      Mental Status: She is alert and oriented to person, place, and time. Mental status is at baseline.   Psychiatric:         Mood and Affect: Mood normal.          Labs:  Lab Results   Component Value Date    WBC 6.62 05/24/2025    RBC 3.32 (L) 05/24/2025    HGB 11.4 (L) 05/24/2025    HCT 31.9 (L) 05/24/2025    MCV 96 05/24/2025    MCH 34.3 (H) 05/24/2025    " MCHC 35.7 05/24/2025    RDW 15.7 (H) 05/24/2025     05/24/2025    MPV 9.8 05/24/2025    GRAN 3.8 03/18/2025    GRAN 64.5 03/18/2025    LYMPH 1.6 03/18/2025    LYMPH 27.8 03/18/2025    MONO 0.2 (L) 03/18/2025    MONO 3.4 (L) 03/18/2025    EOS 0.2 03/18/2025    BASO 0.05 03/18/2025    EOSINOPHIL 3.1 03/18/2025    BASOPHIL 0.9 03/18/2025     Sodium   Date Value Ref Range Status   05/24/2025 137 136 - 145 mmol/L Final     Potassium   Date Value Ref Range Status   05/24/2025 3.9 3.5 - 5.1 mmol/L Final     Chloride   Date Value Ref Range Status   05/24/2025 105 95 - 110 mmol/L Final     CO2   Date Value Ref Range Status   05/24/2025 19 (L) 23 - 29 mmol/L Final     Glucose   Date Value Ref Range Status   05/24/2025 164 (H) 70 - 110 mg/dL Final     BUN   Date Value Ref Range Status   05/24/2025 18 8 - 23 mg/dL Final     Creatinine   Date Value Ref Range Status   05/24/2025 1.4 0.5 - 1.4 mg/dL Final     Calcium   Date Value Ref Range Status   05/24/2025 9.7 8.7 - 10.5 mg/dL Final     Protein Total   Date Value Ref Range Status   05/24/2025 7.5 6.0 - 8.4 gm/dL Final     Albumin   Date Value Ref Range Status   05/24/2025 4.1 3.5 - 5.2 g/dL Final     Bilirubin Total   Date Value Ref Range Status   05/24/2025 1.2 (H) 0.1 - 1.0 mg/dL Final     Comment:     For infants and newborns, interpretation of results should be based   on gestational age, weight and in agreement with clinical   observations.    Premature Infant recommended reference ranges:   0-24 hours:  <8.0 mg/dL   24-48 hours: <12.0 mg/dL   3-5 days:    <15.0 mg/dL   6-29 days:   <15.0 mg/dL     ALP   Date Value Ref Range Status   05/24/2025 123 40 - 150 unit/L Final     AST   Date Value Ref Range Status   05/24/2025 75 (H) 11 - 45 unit/L Final     ALT   Date Value Ref Range Status   05/24/2025 75 (H) 10 - 44 unit/L Final     Anion Gap   Date Value Ref Range Status   05/24/2025 13 8 - 16 mmol/L Final     eGFR if    Date Value Ref Range Status    10/25/2021 >60.0 >60 mL/min/1.73 m^2 Final     eGFR if non    Date Value Ref Range Status   10/25/2021 >60.0 >60 mL/min/1.73 m^2 Final     Comment:     Calculation used to obtain the estimated glomerular filtration  rate (eGFR) is the CKD-EPI equation.          A/P:    Rectal adenocarcinoma   -patient had oligometastatic disease   -had 6 cycles of FOLFOX and Avastin with me.  Then went to [facility] had radiation therapy to the lung metastatic disease.  She also had a resection by [facility].  She now has an ostomy in place.    -now on adjuvant 5 FU for a total of 3 months plan    -okay for last cycle of adjuvant treatment  -return to clinic in 4 weeks with labs for toxicity check.  Port flushes ordered.  Plans to have further scans at [facility] in July.    Chemo fatigue  -improved with 1 week delay but still slightly present  Ostomy  -has high output.  -combination of Imodium, Lomotil, and Metamucil with the tincture of opium seems to finally be helping    Aurash Khoobehi, MD  Hematology and Oncology    Visit today included increased complexity associated with the care of the episodic problem addressed and managing the longitudinal care of the patient due to the serious and/or complex managed problem(s) colon cancer.

## 2025-05-28 ENCOUNTER — INFUSION (OUTPATIENT)
Dept: INFUSION THERAPY | Facility: HOSPITAL | Age: 65
End: 2025-05-28
Attending: INTERNAL MEDICINE
Payer: COMMERCIAL

## 2025-05-28 VITALS
HEART RATE: 65 BPM | OXYGEN SATURATION: 98 % | DIASTOLIC BLOOD PRESSURE: 64 MMHG | TEMPERATURE: 98 F | RESPIRATION RATE: 18 BRPM | WEIGHT: 159.13 LBS | HEIGHT: 66 IN | SYSTOLIC BLOOD PRESSURE: 112 MMHG | BODY MASS INDEX: 25.57 KG/M2

## 2025-05-28 DIAGNOSIS — C78.02 MALIGNANT NEOPLASM METASTATIC TO LEFT LUNG: Primary | ICD-10-CM

## 2025-05-28 DIAGNOSIS — C18.7 MALIGNANT NEOPLASM OF SIGMOID COLON: ICD-10-CM

## 2025-05-28 PROCEDURE — 96367 TX/PROPH/DG ADDL SEQ IV INF: CPT

## 2025-05-28 PROCEDURE — 96416 CHEMO PROLONG INFUSE W/PUMP: CPT

## 2025-05-28 PROCEDURE — 96409 CHEMO IV PUSH SNGL DRUG: CPT

## 2025-05-28 PROCEDURE — 96366 THER/PROPH/DIAG IV INF ADDON: CPT

## 2025-05-28 PROCEDURE — 25000003 PHARM REV CODE 250: Performed by: INTERNAL MEDICINE

## 2025-05-28 PROCEDURE — 63600175 PHARM REV CODE 636 W HCPCS: Performed by: INTERNAL MEDICINE

## 2025-05-28 RX ORDER — EPINEPHRINE 0.3 MG/.3ML
0.3 INJECTION SUBCUTANEOUS ONCE AS NEEDED
Status: DISCONTINUED | OUTPATIENT
Start: 2025-05-28 | End: 2025-05-28 | Stop reason: HOSPADM

## 2025-05-28 RX ORDER — SODIUM CHLORIDE 0.9 % (FLUSH) 0.9 %
10 SYRINGE (ML) INJECTION
Status: DISCONTINUED | OUTPATIENT
Start: 2025-05-28 | End: 2025-05-28 | Stop reason: HOSPADM

## 2025-05-28 RX ORDER — DIPHENHYDRAMINE HYDROCHLORIDE 50 MG/ML
50 INJECTION, SOLUTION INTRAMUSCULAR; INTRAVENOUS ONCE AS NEEDED
Status: DISCONTINUED | OUTPATIENT
Start: 2025-05-28 | End: 2025-05-28 | Stop reason: HOSPADM

## 2025-05-28 RX ORDER — ONDANSETRON HCL IN 0.9 % NACL 8 MG/50 ML
8 INTRAVENOUS SOLUTION, PIGGYBACK (ML) INTRAVENOUS
Status: COMPLETED | OUTPATIENT
Start: 2025-05-28 | End: 2025-05-28

## 2025-05-28 RX ORDER — FLUOROURACIL 50 MG/ML
400 INJECTION, SOLUTION INTRAVENOUS
Status: COMPLETED | OUTPATIENT
Start: 2025-05-28 | End: 2025-05-28

## 2025-05-28 RX ADMIN — SODIUM CHLORIDE: 9 INJECTION, SOLUTION INTRAVENOUS at 09:05

## 2025-05-28 RX ADMIN — FLUOROURACIL 4440 MG: 50 INJECTION, SOLUTION INTRAVENOUS at 12:05

## 2025-05-28 RX ADMIN — LEUCOVORIN CALCIUM 740 MG: 350 INJECTION, POWDER, LYOPHILIZED, FOR SOLUTION INTRAMUSCULAR; INTRAVENOUS at 10:05

## 2025-05-28 RX ADMIN — FLUOROURACIL 740 MG: 50 INJECTION, SOLUTION INTRAVENOUS at 12:05

## 2025-05-28 RX ADMIN — SODIUM CHLORIDE 8 MG: 9 INJECTION, SOLUTION INTRAVENOUS at 09:05

## 2025-05-28 NOTE — PLAN OF CARE
Problem: Fatigue  Goal: Improved Activity Tolerance  Outcome: Progressing  Intervention: Promote Improved Energy  Flowsheets (Taken 5/28/2025 6212)  Fatigue Management:   fatigue-related activity identified   frequent rest breaks encouraged   paced activity encouraged  Sleep/Rest Enhancement:   noise level reduced   relaxation techniques promoted   regular sleep/rest pattern promoted  Activity Management:   Ambulated -L4   Up in chair - L3  Environmental Support:   calm environment promoted   distractions minimized   rest periods encouraged

## 2025-05-30 ENCOUNTER — INFUSION (OUTPATIENT)
Dept: INFUSION THERAPY | Facility: HOSPITAL | Age: 65
End: 2025-05-30
Attending: INTERNAL MEDICINE
Payer: COMMERCIAL

## 2025-05-30 VITALS
OXYGEN SATURATION: 97 % | TEMPERATURE: 98 F | DIASTOLIC BLOOD PRESSURE: 61 MMHG | WEIGHT: 158.5 LBS | RESPIRATION RATE: 16 BRPM | BODY MASS INDEX: 25.47 KG/M2 | HEART RATE: 100 BPM | SYSTOLIC BLOOD PRESSURE: 98 MMHG | HEIGHT: 66 IN

## 2025-05-30 DIAGNOSIS — C18.7 MALIGNANT NEOPLASM OF SIGMOID COLON: ICD-10-CM

## 2025-05-30 DIAGNOSIS — C78.02 MALIGNANT NEOPLASM METASTATIC TO LEFT LUNG: Primary | ICD-10-CM

## 2025-05-30 PROCEDURE — 96523 IRRIG DRUG DELIVERY DEVICE: CPT

## 2025-05-30 PROCEDURE — 63600175 PHARM REV CODE 636 W HCPCS: Performed by: INTERNAL MEDICINE

## 2025-05-30 PROCEDURE — 25000003 PHARM REV CODE 250: Performed by: INTERNAL MEDICINE

## 2025-05-30 PROCEDURE — A4216 STERILE WATER/SALINE, 10 ML: HCPCS | Performed by: INTERNAL MEDICINE

## 2025-05-30 RX ORDER — SODIUM CHLORIDE 0.9 % (FLUSH) 0.9 %
10 SYRINGE (ML) INJECTION
Status: DISCONTINUED | OUTPATIENT
Start: 2025-05-30 | End: 2025-05-30 | Stop reason: HOSPADM

## 2025-05-30 RX ORDER — HEPARIN 100 UNIT/ML
500 SYRINGE INTRAVENOUS
Status: DISCONTINUED | OUTPATIENT
Start: 2025-05-30 | End: 2025-05-30 | Stop reason: HOSPADM

## 2025-05-30 RX ADMIN — HEPARIN 500 UNITS: 100 SYRINGE at 11:05

## 2025-05-30 RX ADMIN — SODIUM CHLORIDE, PRESERVATIVE FREE 10 ML: 5 INJECTION INTRAVENOUS at 11:05

## 2025-05-30 NOTE — PLAN OF CARE
Problem: Infection  Goal: Absence of Infection Signs and Symptoms  Outcome: Progressing  Intervention: Prevent or Manage Infection  Flowsheets (Taken 5/30/2025 1141)  Infection Management: aseptic technique maintained

## 2025-06-04 DIAGNOSIS — R19.7 DIARRHEA, UNSPECIFIED TYPE: ICD-10-CM

## 2025-06-04 DIAGNOSIS — C18.7 MALIGNANT NEOPLASM OF SIGMOID COLON: ICD-10-CM

## 2025-06-04 RX ORDER — LOPERAMIDE HYDROCHLORIDE 2 MG/1
CAPSULE ORAL
Qty: 30 CAPSULE | Refills: 3 | Status: SHIPPED | OUTPATIENT
Start: 2025-06-04

## 2025-06-05 ENCOUNTER — PATIENT MESSAGE (OUTPATIENT)
Dept: HEMATOLOGY/ONCOLOGY | Facility: CLINIC | Age: 65
End: 2025-06-05
Payer: COMMERCIAL

## 2025-06-09 ENCOUNTER — PATIENT MESSAGE (OUTPATIENT)
Dept: HEMATOLOGY/ONCOLOGY | Facility: CLINIC | Age: 65
End: 2025-06-09
Payer: COMMERCIAL

## 2025-06-09 DIAGNOSIS — R19.7 DIARRHEA, UNSPECIFIED TYPE: ICD-10-CM

## 2025-06-09 RX ORDER — MORPHINE 10 MG/ML
6 TINCTURE ORAL 4 TIMES DAILY PRN
Qty: 118 ML | Refills: 0 | Status: SHIPPED | OUTPATIENT
Start: 2025-06-09

## 2025-06-19 ENCOUNTER — TELEPHONE (OUTPATIENT)
Dept: HEMATOLOGY/ONCOLOGY | Facility: CLINIC | Age: 65
End: 2025-06-19
Payer: COMMERCIAL

## 2025-06-21 ENCOUNTER — LAB VISIT (OUTPATIENT)
Dept: LAB | Facility: HOSPITAL | Age: 65
End: 2025-06-21
Attending: INTERNAL MEDICINE
Payer: COMMERCIAL

## 2025-06-21 DIAGNOSIS — C78.02 MALIGNANT NEOPLASM METASTATIC TO LEFT LUNG: ICD-10-CM

## 2025-06-21 DIAGNOSIS — C18.7 MALIGNANT NEOPLASM OF SIGMOID COLON: ICD-10-CM

## 2025-06-21 LAB
ABSOLUTE EOSINOPHIL (SMH): 0.19 K/UL
ABSOLUTE MONOCYTE (SMH): 0.43 K/UL (ref 0.3–1)
ABSOLUTE NEUTROPHIL COUNT (SMH): 3.7 K/UL (ref 1.8–7.7)
ALBUMIN SERPL-MCNC: 4.1 G/DL (ref 3.5–5.2)
ALP SERPL-CCNC: 122 UNIT/L (ref 40–150)
ALT SERPL-CCNC: 39 UNIT/L (ref 10–44)
ANION GAP (SMH): 13 MMOL/L (ref 8–16)
AST SERPL-CCNC: 27 UNIT/L (ref 11–45)
BASOPHILS # BLD AUTO: 0.06 K/UL
BASOPHILS NFR BLD AUTO: 1 %
BILIRUB SERPL-MCNC: 0.5 MG/DL (ref 0.1–1)
BUN SERPL-MCNC: 15 MG/DL (ref 8–23)
CALCIUM SERPL-MCNC: 9.6 MG/DL (ref 8.7–10.5)
CARCINOEMBRYONIC ANTIGEN (SMH): 1.1 NG/ML
CHLORIDE SERPL-SCNC: 108 MMOL/L (ref 95–110)
CO2 SERPL-SCNC: 19 MMOL/L (ref 23–29)
CREAT SERPL-MCNC: 1.2 MG/DL (ref 0.5–1.4)
ERYTHROCYTE [DISTWIDTH] IN BLOOD BY AUTOMATED COUNT: 13.9 % (ref 11.5–14.5)
GFR SERPLBLD CREATININE-BSD FMLA CKD-EPI: 51 ML/MIN/1.73/M2
GLUCOSE SERPL-MCNC: 142 MG/DL (ref 70–110)
HCT VFR BLD AUTO: 33.7 % (ref 37–48.5)
HGB BLD-MCNC: 11.8 GM/DL (ref 12–16)
IMM GRANULOCYTES # BLD AUTO: 0.07 K/UL (ref 0–0.04)
IMM GRANULOCYTES NFR BLD AUTO: 1.1 % (ref 0–0.5)
LYMPHOCYTES # BLD AUTO: 1.72 K/UL (ref 1–4.8)
MCH RBC QN AUTO: 35.2 PG (ref 27–31)
MCHC RBC AUTO-ENTMCNC: 35 G/DL (ref 32–36)
MCV RBC AUTO: 101 FL (ref 82–98)
NUCLEATED RBC (/100WBC) (SMH): 0 /100 WBC
PLATELET # BLD AUTO: 201 K/UL (ref 150–450)
PMV BLD AUTO: 10.4 FL (ref 9.2–12.9)
POTASSIUM SERPL-SCNC: 3.8 MMOL/L (ref 3.5–5.1)
PROT SERPL-MCNC: 7.5 GM/DL (ref 6–8.4)
RBC # BLD AUTO: 3.35 M/UL (ref 4–5.4)
RELATIVE EOSINOPHIL (SMH): 3.1 % (ref 0–8)
RELATIVE LYMPHOCYTE (SMH): 28 % (ref 18–48)
RELATIVE MONOCYTE (SMH): 7 % (ref 4–15)
RELATIVE NEUTROPHIL (SMH): 59.8 % (ref 38–73)
SODIUM SERPL-SCNC: 140 MMOL/L (ref 136–145)
WBC # BLD AUTO: 6.14 K/UL (ref 3.9–12.7)

## 2025-06-21 PROCEDURE — 36415 COLL VENOUS BLD VENIPUNCTURE: CPT

## 2025-06-21 PROCEDURE — 80053 COMPREHEN METABOLIC PANEL: CPT

## 2025-06-21 PROCEDURE — 85025 COMPLETE CBC W/AUTO DIFF WBC: CPT

## 2025-06-21 PROCEDURE — 82378 CARCINOEMBRYONIC ANTIGEN: CPT

## 2025-06-26 ENCOUNTER — OFFICE VISIT (OUTPATIENT)
Dept: HEMATOLOGY/ONCOLOGY | Facility: CLINIC | Age: 65
End: 2025-06-26
Payer: COMMERCIAL

## 2025-06-26 ENCOUNTER — TELEPHONE (OUTPATIENT)
Dept: HEMATOLOGY/ONCOLOGY | Facility: CLINIC | Age: 65
End: 2025-06-26

## 2025-06-26 VITALS
DIASTOLIC BLOOD PRESSURE: 79 MMHG | OXYGEN SATURATION: 95 % | SYSTOLIC BLOOD PRESSURE: 120 MMHG | HEART RATE: 83 BPM | TEMPERATURE: 98 F | BODY MASS INDEX: 24.73 KG/M2 | RESPIRATION RATE: 17 BRPM | WEIGHT: 153.88 LBS | HEIGHT: 66 IN

## 2025-06-26 DIAGNOSIS — C78.02 MALIGNANT NEOPLASM METASTATIC TO LEFT LUNG: Primary | ICD-10-CM

## 2025-06-26 DIAGNOSIS — T45.1X5A CHEMOTHERAPY-INDUCED FATIGUE: ICD-10-CM

## 2025-06-26 DIAGNOSIS — R53.83 CHEMOTHERAPY-INDUCED FATIGUE: ICD-10-CM

## 2025-06-26 DIAGNOSIS — C18.7 MALIGNANT NEOPLASM OF SIGMOID COLON: ICD-10-CM

## 2025-06-26 PROCEDURE — 99999 PR PBB SHADOW E&M-EST. PATIENT-LVL IV: CPT | Mod: PBBFAC,,, | Performed by: INTERNAL MEDICINE

## 2025-06-26 PROCEDURE — 3078F DIAST BP <80 MM HG: CPT | Mod: CPTII,S$GLB,, | Performed by: INTERNAL MEDICINE

## 2025-06-26 PROCEDURE — G2211 COMPLEX E/M VISIT ADD ON: HCPCS | Mod: S$GLB,,, | Performed by: INTERNAL MEDICINE

## 2025-06-26 PROCEDURE — 3008F BODY MASS INDEX DOCD: CPT | Mod: CPTII,S$GLB,, | Performed by: INTERNAL MEDICINE

## 2025-06-26 PROCEDURE — 3074F SYST BP LT 130 MM HG: CPT | Mod: CPTII,S$GLB,, | Performed by: INTERNAL MEDICINE

## 2025-06-26 PROCEDURE — 99214 OFFICE O/P EST MOD 30 MIN: CPT | Mod: S$GLB,,, | Performed by: INTERNAL MEDICINE

## 2025-06-26 PROCEDURE — 1159F MED LIST DOCD IN RCRD: CPT | Mod: CPTII,S$GLB,, | Performed by: INTERNAL MEDICINE

## 2025-06-26 NOTE — PROGRESS NOTES
Service Date:  6/26/25    Chief Complaint: Malignant neoplasm of sigmoid colon (Labs  4wfu  chemo stopped)    Malaika Craven is a 64 y.o. female here with oligometastatic rectosigmoid colon cancer    Had 6 cycles of FOLFOX with Avastin.  Then went to [facility] and had radiation of the lung met.  Then had resection by [facility].  Now has ostomy in place.    Completed adjuvant therapy.  Here for toxicity check.  Doing well.  No complaints to me aside from the ongoing fatigue.  She feels like it is getting better but still present.    Review of Systems   Constitutional: Negative.  Negative for appetite change and unexpected weight change.   HENT:  Negative for mouth sores.    Eyes: Negative.  Negative for visual disturbance.   Respiratory: Negative.  Negative for cough and shortness of breath.    Cardiovascular: Negative.  Negative for chest pain.   Gastrointestinal:  Negative for abdominal pain and diarrhea.   Endocrine: Negative.    Genitourinary: Negative.  Negative for frequency.   Musculoskeletal: Negative.  Negative for back pain.   Integumentary:  Negative for rash. Negative.   Neurological: Negative.  Negative for headaches.   Hematological: Negative.  Negative for adenopathy.   Psychiatric/Behavioral: Negative.          Current Outpatient Medications   Medication Instructions    ALPRAZolam (XANAX) 0.5 mg, Oral, 3 times daily    apixaban (ELIQUIS DVT-PE TREAT 30D START) 5 mg (74 tabs) DsPk 2 tablets, 2 times daily    diphenhydrAMINE-aluminum-magnesium hydroxide-simethicone-LIDOcaine viscous HCl 2% 15 mLs, Swish & Spit, Every 4 hours PRN    diphenoxylate-atropine 2.5-0.025 mg (LOMOTIL) 2.5-0.025 mg per tablet 1 tablet    fexofenadine (ALLEGRA ALLERGY) 60 MG tablet     LIDOcaine-prilocaine (EMLA) cream Topical (Top), As needed (PRN)    loperamide (IMODIUM) 2 mg capsule TAKE 1 TABLET (2 MG TOTAL) BY MOUTH 4 (FOUR) TIMES DAILY AS NEEDED.    NIFEdipine (ADALAT CC) 60 mg, Oral, Daily    OLANZapine (ZYPREXA) 5 MG  tablet Take by mouth.    ondansetron (ZOFRAN-ODT) 4 mg, Oral, Every 6 hours PRN    opium tincture 6 mg, Oral, 4 times daily PRN    polyethylene glycol (GLYCOLAX) 17 g, Daily    potassium chloride (MICRO-K) 10 MEQ CpSR 20 mEq, Oral, Daily    prochlorperazine (COMPAZINE) 5 MG tablet Take by mouth.    psyllium husk, aspartame, (METAMUCIL) 3.4 gram PwPk packet 1 packet    RYALTRIS 665-25 mcg/spray [place]         Past Medical History:   Diagnosis Date    Abnormal Pap smear of cervix     Allergy     Corneal erosion of right eye     Generalized anxiety disorder     Hypertension     Rectal mass     Sinus problem         Past Surgical History:   Procedure Laterality Date    BREAST CYST ASPIRATION       SECTION      X 2    CHOLECYSTECTOMY      COLONOSCOPY N/A 2024    Procedure: COLONOSCOPY;  Surgeon: Radhika Leary MD;  Location: [facility];  Service: Endoscopy;  Laterality: N/A;    DILATION AND CURETTAGE OF UTERUS      [facility]      EYE SURGERY Right     FOR CORNEAL EROSION    FLEXIBLE SIGMOIDOSCOPY N/A 2024    Procedure: SIGMOIDOSCOPY, FLEXIBLE(Fleets);  Surgeon: Oumar Montoya MD;  Location: [facility] ENDO;  Service: General;  Laterality: N/A;    INSERTION OF TUNNELED CENTRAL VENOUS CATHETER (CVC) WITH SUBCUTANEOUS PORT N/A 2024    Procedure: [id number];  Surgeon: Oumar Montoya MD;  Location: [facility];  Service: General;  Laterality: N/A;    LIPOSUCTION      x 2    Mole Excision Right     Shin    TUBAL LIGATION          Family History   Problem Relation Name Age of Onset    Heart disease Mother      Hypertension Mother      Arthritis Father      Thyroid disease Father      Kidney disease Paternal Grandmother      Liver cancer Maternal Grandmother      Cancer Maternal Grandfather      Allergies Sister      Allergic rhinitis Sister      Angioedema Neg Hx      Asthma Neg Hx      Atopy Neg Hx      Eczema Neg Hx      Immunodeficiency Neg Hx      Rhinitis Neg Hx      Urticaria Neg Hx       "Amblyopia Neg Hx      Blindness Neg Hx      Cataracts Neg Hx      Diabetes Neg Hx      Glaucoma Neg Hx      Macular degeneration Neg Hx      Retinal detachment Neg Hx      Strabismus Neg Hx      Stroke Neg Hx         Social History     Tobacco Use    Smoking status: Never    Smokeless tobacco: Never   Substance Use Topics    Alcohol use: Yes     Comment: 2 drinks per week    Drug use: No     Comment: MEDICAL MARIJUANA         Vitals:    06/26/25 1353   BP: 120/79   Pulse: 83   Resp: 17   Temp: 98 °F (36.7 °C)        Physical Exam:  /79 (BP Location: Right arm, Patient Position: Sitting)   Pulse 83   Temp 98 °F (36.7 °C) (Temporal)   Resp 17   Ht 5' 6" (1.676 m)   Wt 69.8 kg (153 lb 14.1 oz)   SpO2 95%   BMI 24.84 kg/m²     Physical Exam  Constitutional:       Appearance: Normal appearance.   HENT:      Head: Normocephalic and atraumatic.      Nose: Nose normal.      Mouth/Throat:      Mouth: Mucous membranes are moist.      Pharynx: Oropharynx is clear.   Eyes:      Conjunctiva/sclera: Conjunctivae normal.   Cardiovascular:      Rate and Rhythm: Normal rate and regular rhythm.      Heart sounds: Normal heart sounds.   Pulmonary:      Effort: Pulmonary effort is normal.      Breath sounds: Normal breath sounds.   Abdominal:      General: Abdomen is flat. Bowel sounds are normal.      Palpations: Abdomen is soft.   Musculoskeletal:         General: Normal range of motion.      Cervical back: Normal range of motion and neck supple.   Skin:     General: Skin is warm and dry.   Neurological:      General: No focal deficit present.      Mental Status: She is alert and oriented to person, place, and time. Mental status is at baseline.   Psychiatric:         Mood and Affect: Mood normal.          Labs:  Lab Results   Component Value Date    WBC 6.14 06/21/2025    RBC 3.35 (L) 06/21/2025    HGB 11.8 (L) 06/21/2025    HCT 33.7 (L) 06/21/2025     (H) 06/21/2025    MCH 35.2 (H) 06/21/2025    MCHC 35.0 " 06/21/2025    RDW 13.9 06/21/2025     06/21/2025    MPV 10.4 06/21/2025    GRAN 3.8 03/18/2025    GRAN 64.5 03/18/2025    LYMPH 1.6 03/18/2025    LYMPH 27.8 03/18/2025    MONO 0.2 (L) 03/18/2025    MONO 3.4 (L) 03/18/2025    EOS 0.2 03/18/2025    BASO 0.05 03/18/2025    EOSINOPHIL 3.1 03/18/2025    BASOPHIL 0.9 03/18/2025     Sodium   Date Value Ref Range Status   06/21/2025 140 136 - 145 mmol/L Final     Potassium   Date Value Ref Range Status   06/21/2025 3.8 3.5 - 5.1 mmol/L Final     Chloride   Date Value Ref Range Status   06/21/2025 108 95 - 110 mmol/L Final     CO2   Date Value Ref Range Status   06/21/2025 19 (L) 23 - 29 mmol/L Final     Glucose   Date Value Ref Range Status   06/21/2025 142 (H) 70 - 110 mg/dL Final     BUN   Date Value Ref Range Status   06/21/2025 15 8 - 23 mg/dL Final     Creatinine   Date Value Ref Range Status   06/21/2025 1.2 0.5 - 1.4 mg/dL Final     Calcium   Date Value Ref Range Status   06/21/2025 9.6 8.7 - 10.5 mg/dL Final     Protein Total   Date Value Ref Range Status   06/21/2025 7.5 6.0 - 8.4 gm/dL Final     Albumin   Date Value Ref Range Status   06/21/2025 4.1 3.5 - 5.2 g/dL Final     Bilirubin Total   Date Value Ref Range Status   06/21/2025 0.5 0.1 - 1.0 mg/dL Final     Comment:     For infants and newborns, interpretation of results should be based   on gestational age, weight and in agreement with clinical   observations.    Premature Infant recommended reference ranges:   0-24 hours:  <8.0 mg/dL   24-48 hours: <12.0 mg/dL   3-5 days:    <15.0 mg/dL   6-29 days:   <15.0 mg/dL     ALP   Date Value Ref Range Status   06/21/2025 122 40 - 150 unit/L Final     AST   Date Value Ref Range Status   06/21/2025 27 11 - 45 unit/L Final     ALT   Date Value Ref Range Status   06/21/2025 39 10 - 44 unit/L Final     Anion Gap   Date Value Ref Range Status   06/21/2025 13 8 - 16 mmol/L Final     eGFR if    Date Value Ref Range Status   10/25/2021 >60.0 >60  mL/min/1.73 m^2 Final     eGFR if non    Date Value Ref Range Status   10/25/2021 >60.0 >60 mL/min/1.73 m^2 Final     Comment:     Calculation used to obtain the estimated glomerular filtration  rate (eGFR) is the CKD-EPI equation.          A/P:    Rectal adenocarcinoma   -patient had oligometastatic disease   -had 6 cycles of FOLFOX and Avastin with me.  Then went to [facility] had radiation therapy to the lung metastatic disease.  She also had a resection by [facility].  She now has an ostomy in place.    -completed adjuvant 5 FU   -everything stable on toxicity check today.  Fatigue remains.  Recheck blood work in 3 months.  Signatera test today.  Patient will have scans next month with [facility].    Chemo fatigue  -improving but still present.  I advised her on exercise which can help.    Ostomy  -has high output.  -combination of Imodium, Lomotil, and Metamucil with the tincture of opium seems to finally be helping    Aurash Khoobehi, MD  Hematology and Oncology    Visit today included increased complexity associated with the care of the episodic problem addressed and managing the longitudinal care of the patient due to the serious and/or complex managed problem(s) colon cancer.

## 2025-07-07 ENCOUNTER — PATIENT MESSAGE (OUTPATIENT)
Dept: FAMILY MEDICINE | Facility: CLINIC | Age: 65
End: 2025-07-07
Payer: COMMERCIAL

## 2025-07-11 ENCOUNTER — INFUSION (OUTPATIENT)
Dept: INFUSION THERAPY | Facility: HOSPITAL | Age: 65
End: 2025-07-11
Attending: INTERNAL MEDICINE
Payer: COMMERCIAL

## 2025-07-11 VITALS
WEIGHT: 158.31 LBS | OXYGEN SATURATION: 98 % | HEIGHT: 66 IN | BODY MASS INDEX: 25.44 KG/M2 | RESPIRATION RATE: 18 BRPM | TEMPERATURE: 98 F | DIASTOLIC BLOOD PRESSURE: 85 MMHG | HEART RATE: 88 BPM | SYSTOLIC BLOOD PRESSURE: 143 MMHG

## 2025-07-11 DIAGNOSIS — Z95.828 PORT-A-CATH IN PLACE: Primary | ICD-10-CM

## 2025-07-11 PROCEDURE — 63600175 PHARM REV CODE 636 W HCPCS: Performed by: INTERNAL MEDICINE

## 2025-07-11 PROCEDURE — 25000003 PHARM REV CODE 250: Performed by: INTERNAL MEDICINE

## 2025-07-11 PROCEDURE — A4216 STERILE WATER/SALINE, 10 ML: HCPCS | Performed by: INTERNAL MEDICINE

## 2025-07-11 PROCEDURE — 96523 IRRIG DRUG DELIVERY DEVICE: CPT

## 2025-07-11 RX ORDER — SODIUM CHLORIDE 0.9 % (FLUSH) 0.9 %
10 SYRINGE (ML) INJECTION
OUTPATIENT
Start: 2025-07-11

## 2025-07-11 RX ORDER — HEPARIN 100 UNIT/ML
500 SYRINGE INTRAVENOUS
Status: DISCONTINUED | OUTPATIENT
Start: 2025-07-11 | End: 2025-07-11 | Stop reason: HOSPADM

## 2025-07-11 RX ORDER — HEPARIN 100 UNIT/ML
500 SYRINGE INTRAVENOUS
OUTPATIENT
Start: 2025-07-11

## 2025-07-11 RX ORDER — SODIUM CHLORIDE 0.9 % (FLUSH) 0.9 %
10 SYRINGE (ML) INJECTION
Status: DISCONTINUED | OUTPATIENT
Start: 2025-07-11 | End: 2025-07-11 | Stop reason: HOSPADM

## 2025-07-11 RX ADMIN — SODIUM CHLORIDE, PRESERVATIVE FREE 10 ML: 5 INJECTION INTRAVENOUS at 03:07

## 2025-07-11 RX ADMIN — HEPARIN 500 UNITS: 100 SYRINGE at 03:07

## 2025-07-11 NOTE — PLAN OF CARE
Problem: Infection  Goal: Absence of Infection Signs and Symptoms  Outcome: Progressing  Intervention: Prevent or Manage Infection  Flowsheets (Taken 7/11/2025 1510)  Infection Management: aseptic technique maintained

## 2025-07-13 ENCOUNTER — PATIENT MESSAGE (OUTPATIENT)
Dept: HEMATOLOGY/ONCOLOGY | Facility: CLINIC | Age: 65
End: 2025-07-13
Payer: COMMERCIAL

## 2025-08-04 ENCOUNTER — PATIENT MESSAGE (OUTPATIENT)
Dept: HEMATOLOGY/ONCOLOGY | Facility: CLINIC | Age: 65
End: 2025-08-04
Payer: COMMERCIAL

## 2025-08-07 ENCOUNTER — PATIENT MESSAGE (OUTPATIENT)
Dept: HEMATOLOGY/ONCOLOGY | Facility: CLINIC | Age: 65
End: 2025-08-07
Payer: COMMERCIAL

## 2025-08-26 ENCOUNTER — PATIENT OUTREACH (OUTPATIENT)
Dept: ADMINISTRATIVE | Facility: HOSPITAL | Age: 65
End: 2025-08-26
Payer: COMMERCIAL

## 2025-08-26 ENCOUNTER — PATIENT MESSAGE (OUTPATIENT)
Dept: ADMINISTRATIVE | Facility: HOSPITAL | Age: 65
End: 2025-08-26
Payer: COMMERCIAL

## 2025-08-26 DIAGNOSIS — Z12.31 OTHER SCREENING MAMMOGRAM: ICD-10-CM

## 2025-08-29 ENCOUNTER — INFUSION (OUTPATIENT)
Dept: INFUSION THERAPY | Facility: HOSPITAL | Age: 65
End: 2025-08-29
Attending: INTERNAL MEDICINE
Payer: COMMERCIAL

## 2025-08-29 VITALS
RESPIRATION RATE: 18 BRPM | TEMPERATURE: 98 F | SYSTOLIC BLOOD PRESSURE: 134 MMHG | OXYGEN SATURATION: 99 % | WEIGHT: 162.25 LBS | HEART RATE: 87 BPM | DIASTOLIC BLOOD PRESSURE: 83 MMHG | HEIGHT: 66 IN | BODY MASS INDEX: 26.08 KG/M2

## 2025-08-29 DIAGNOSIS — Z95.828 PORT-A-CATH IN PLACE: Primary | ICD-10-CM

## 2025-08-29 PROCEDURE — 25000003 PHARM REV CODE 250: Performed by: INTERNAL MEDICINE

## 2025-08-29 PROCEDURE — 96523 IRRIG DRUG DELIVERY DEVICE: CPT

## 2025-08-29 PROCEDURE — 63600175 PHARM REV CODE 636 W HCPCS: Performed by: INTERNAL MEDICINE

## 2025-08-29 PROCEDURE — A4216 STERILE WATER/SALINE, 10 ML: HCPCS | Performed by: INTERNAL MEDICINE

## 2025-08-29 RX ORDER — SODIUM CHLORIDE 0.9 % (FLUSH) 0.9 %
10 SYRINGE (ML) INJECTION
OUTPATIENT
Start: 2025-08-29

## 2025-08-29 RX ORDER — SODIUM CHLORIDE 0.9 % (FLUSH) 0.9 %
10 SYRINGE (ML) INJECTION
Status: DISCONTINUED | OUTPATIENT
Start: 2025-08-29 | End: 2025-08-29 | Stop reason: HOSPADM

## 2025-08-29 RX ORDER — HEPARIN 100 UNIT/ML
500 SYRINGE INTRAVENOUS
Status: DISCONTINUED | OUTPATIENT
Start: 2025-08-29 | End: 2025-08-29 | Stop reason: HOSPADM

## 2025-08-29 RX ORDER — HEPARIN 100 UNIT/ML
500 SYRINGE INTRAVENOUS
OUTPATIENT
Start: 2025-08-29

## 2025-08-29 RX ADMIN — HEPARIN 500 UNITS: 100 SYRINGE at 11:08

## 2025-08-29 RX ADMIN — Medication 10 ML: at 11:08

## (undated) DEVICE — GOWN POLY REINF BRTH SLV XL

## (undated) DEVICE — TOWEL OR DISP STRL BLUE 4/PK

## (undated) DEVICE — NDL SAFETY 21G X 1 1/2 ECLPSE

## (undated) DEVICE — PACK SIRUS BASIC V SURG STRL

## (undated) DEVICE — SYR 10CC LUER LOCK

## (undated) DEVICE — SOL NACL IRR 1000ML BTL

## (undated) DEVICE — DRAPE C ARM 42 X 120 10/BX

## (undated) DEVICE — SUT MONOCRYL 4-0 PS-2

## (undated) DEVICE — PACK SET UP 190 OMC-NS

## (undated) DEVICE — SYS LABEL CORRECT MED

## (undated) DEVICE — CLOSURE SKIN STERI STRIP 1/2X4

## (undated) DEVICE — GLOVE SENSICARE PI ALOE 7.5

## (undated) DEVICE — DRESSING TRANS 4X4 TEGADERM

## (undated) DEVICE — DRAPE T THYROID STERILE

## (undated) DEVICE — STRAP OR TABLE 5IN X 72IN

## (undated) DEVICE — PENCIL SMOKE EVAC ROCKER 70MM

## (undated) DEVICE — LINER SUCTION 3000CC

## (undated) DEVICE — GAUZE WOVEN STRL 12-PLY 4X4IN

## (undated) DEVICE — SUT VICRYL 3-0 27 SH

## (undated) DEVICE — SLEEVE SCD EXPRESS KNEE MEDIUM

## (undated) DEVICE — ELECTRODE REM PLYHSV RETURN 9

## (undated) DEVICE — APPLICATOR CHLORAPREP ORN 26ML

## (undated) DEVICE — SUT 2-0 VICRYL / SH (J417)